# Patient Record
Sex: FEMALE | Race: WHITE | NOT HISPANIC OR LATINO | Employment: OTHER | URBAN - METROPOLITAN AREA
[De-identification: names, ages, dates, MRNs, and addresses within clinical notes are randomized per-mention and may not be internally consistent; named-entity substitution may affect disease eponyms.]

---

## 2017-03-02 ENCOUNTER — HOSPITAL ENCOUNTER (EMERGENCY)
Facility: HOSPITAL | Age: 82
Discharge: HOME | End: 2017-03-02
Attending: EMERGENCY MEDICINE
Payer: MEDICARE

## 2017-03-02 VITALS
WEIGHT: 125 LBS | OXYGEN SATURATION: 98 % | RESPIRATION RATE: 18 BRPM | HEIGHT: 58 IN | DIASTOLIC BLOOD PRESSURE: 80 MMHG | HEART RATE: 77 BPM | TEMPERATURE: 98 F | SYSTOLIC BLOOD PRESSURE: 170 MMHG | BODY MASS INDEX: 26.24 KG/M2

## 2017-03-02 DIAGNOSIS — M25.512 ACUTE PAIN OF LEFT SHOULDER: Primary | ICD-10-CM

## 2017-03-02 DIAGNOSIS — M25.519 SHOULDER PAIN: ICD-10-CM

## 2017-03-02 PROCEDURE — 63600175 PHARM REV CODE 636 W HCPCS: Performed by: EMERGENCY MEDICINE

## 2017-03-02 PROCEDURE — 96372 THER/PROPH/DIAG INJ SC/IM: CPT

## 2017-03-02 PROCEDURE — 99283 EMERGENCY DEPT VISIT LOW MDM: CPT | Mod: 25

## 2017-03-02 RX ORDER — ORPHENADRINE CITRATE 30 MG/ML
60 INJECTION INTRAMUSCULAR; INTRAVENOUS
Status: COMPLETED | OUTPATIENT
Start: 2017-03-02 | End: 2017-03-02

## 2017-03-02 RX ORDER — TIZANIDINE HYDROCHLORIDE 4 MG/1
1 CAPSULE, GELATIN COATED ORAL NIGHTLY
Qty: 12 CAPSULE | Refills: 0 | Status: SHIPPED | OUTPATIENT
Start: 2017-03-02 | End: 2018-03-30

## 2017-03-02 RX ORDER — KETOROLAC TROMETHAMINE 30 MG/ML
30 INJECTION, SOLUTION INTRAMUSCULAR; INTRAVENOUS
Status: COMPLETED | OUTPATIENT
Start: 2017-03-02 | End: 2017-03-02

## 2017-03-02 RX ADMIN — KETOROLAC TROMETHAMINE 30 MG: 30 INJECTION, SOLUTION INTRAMUSCULAR at 08:03

## 2017-03-02 RX ADMIN — ORPHENADRINE CITRATE 60 MG: 30 INJECTION INTRAMUSCULAR; INTRAVENOUS at 08:03

## 2017-03-02 NOTE — ED AVS SNAPSHOT
[facility]  [address]  [place] [address]               Willow Giron   3/2/2017  7:22 PM   ED    Description:  Female : 1930   Department:  [facility]           Your Care was Coordinated By:     Provider Role From To    Edward Trinh MD Attending Provider 17 --      Reason for Visit     Shoulder Pain           Diagnoses this Visit        Comments    Acute pain of left shoulder    -  Primary     Shoulder pain           ED Disposition     None           To Do List           Follow-up Information     Schedule an appointment as soon as possible for a visit with Qamar Bland MD.    Specialty:  Internal Medicine    Contact information:    [address]  [address]  [place] [address]  [phone]         These Medications        Disp Refills Start End    tizanidine 4 mg Cap 12 capsule 0 3/2/2017     Take 1 capsule by mouth every evening. - Oral    Pharmacy: [facility] [address] [place] [address] [address] AT [facility] [address] Ph #: [phone]         [facility]     [facility] -  Assistance  Registered nurses in the [facility] provide clinical advisement, health education, appointment booking, and other advisory services.  Call for this free service at [phone].             Medications           Message regarding Medications     Verify the changes and/or additions to your medication regime listed below are the same as discussed with your clinician today.  If any of these changes or additions are incorrect, please notify your healthcare provider.        START taking these NEW medications        Refills    tizanidine 4 mg Cap 0    Sig: Take 1 capsule by mouth every evening.    Class: Print    Route: Oral      These medications were administered today        Dose Freq    ketorolac injection 30 mg 30 mg ED 1 Time    Sig: Inject 30 mg into  "the muscle ED 1 Time.    Class: Normal    Route: Intramuscular    orphenadrine injection 60 mg 60 mg ED 1 Time    Sig: Inject 2 mLs (60 mg total) into the muscle ED 1 Time.    Class: Normal    Route: Intramuscular           Verify that the below list of medications is an accurate representation of the medications you are currently taking.  If none reported, the list may be blank. If incorrect, please contact your healthcare provider. Carry this list with you in case of emergency.           Current Medications     hydrocodone-acetaminophen 7.5-325mg (NORCO) 7.5-325 mg per tablet Take 1 tablet by mouth every 6 (six) hours as needed.    alprazolam (XANAX) 0.5 MG tablet     amlodipine (NORVASC) 10 MG tablet Take 10 mg by mouth once daily.    celecoxib (CELEBREX) 200 MG capsule Take 200 mg by mouth 2 (two) times daily.    furosemide (LASIX) 20 MG tablet     lisinopril (PRINIVIL,ZESTRIL) 40 MG tablet Take 40 mg by mouth once daily.    nabumetone (RELAFEN) 500 MG tablet Take 500 mg by mouth 2 (two) times daily.    omeprazole (PRILOSEC) 20 MG capsule Take 20 mg by mouth once daily.    simvastatin (ZOCOR) 40 MG tablet Take 40 mg by mouth every evening.    tizanidine 4 mg Cap Take 1 capsule by mouth every evening.           Clinical Reference Information           Your Vitals Were     BP Pulse Temp Resp Height Weight    180/81 (Patient Position: Sitting) 78 98.1 °F (36.7 °C) (Oral) 20 4' 10" (1.473 m) 56.7 kg (125 lb)    SpO2 BMI             97% 26.13 kg/m2         Allergies as of 3/2/2017        Reactions    Pcn [Penicillins] Rash      Immunizations Administered on Date of Encounter - 3/2/2017     None      ED Micro, Lab, POCT     None      ED Imaging Orders     Start Ordered       Status Ordering Provider    03/02/17 1934 03/02/17 1933  X-Ray Scapula Left  1 time imaging      Final result     03/02/17 1933 03/02/17 1933  X-Ray Shoulder 2 or More Views Left  1 time imaging      Final result       Discharge " References/Attachments     R.I.C.E. (ENGLISH)    SHOULDER PAIN, UNCERTAIN CAUSE (ENGLISH)      [facility] Sign-Up     Activating your [facility] account is as easy as 1-2-3!     1) Visit [email or web address], select Sign Up Now, enter this activation code and your date of birth, then select Next.  [email or web address]  Expires: 4/16/2017  8:54 PM      2) Create a username and password to use when you visit [facility] in the future and select a security question in case you lose your password and select Next.    3) Enter your e-mail address and click Sign Up!    Additional Information  If you have questions, please e-mail [email or web address] or call [phone] to talk to our [facility] staff. Remember, [facility] is NOT to be used for urgent needs. For medical emergencies, dial 911.         Smoking Cessation     If you would like to quit smoking:   You may be eligible for free services if you are a [place] resident and started smoking cigarettes before September 1, 1988.  Call the Smoking Cessation Trust (SCT) toll free at (934) 536-9713 or (271) 346-0253.   Call 7-923-QUIT-NOW if you do not meet the above criteria.             [facility] complies with applicable Federal civil rights laws and does not discriminate on the basis of race, color, national origin, age, disability, or sex.        Language Assistance Services     ATTENTION: Language assistance services are available, free of charge. Please call [phone].      ATENCIÓN: Si habla español, tiene a rahman disposición servicios gratuitos de asistencia lingüística. Llame al [phone].     CHÚ Ý: N?u b?n nói Ti?ng Vi?t, có các d?ch v? h? tr? ngôn ng? mi?n phí dành cho b?n. G?i s? [phone].

## 2017-03-03 ENCOUNTER — TELEPHONE (OUTPATIENT)
Dept: ORTHOPEDICS | Facility: CLINIC | Age: 82
End: 2017-03-03

## 2017-03-03 NOTE — TELEPHONE ENCOUNTER
Spoke with daughter, Aliya, who is requesting earlier appt than available late next week with Dr. Bland - as noted in 3/2/17 ED discharge instructions. SELIN James/[facility] notified and will have staff follow up with daughter/pt for appt in [facility].

## 2017-03-03 NOTE — ED PROVIDER NOTES
Encounter Date: 3/2/2017       History     Chief Complaint   Patient presents with    Shoulder Pain     pt ambulatory to room with family member and reports left shoulder pain since friday; seen at urgent care saturday; pt denies any injury; pt pain was worse today but took hydrocoone pta and p[ain was relieved;     Review of patient's allergies indicates:   Allergen Reactions    Pcn [penicillins] Rash     HPI Comments: Since the ED complaining of left shoulder pain.  She reports an aching pain to the left posterior shoulder, just lateral to the scapula.  Reports the pain is constant but exacerbated by movement of her left upper extremity.  Denies any trauma.  Reports she was seen in an urgent care and given a prescription for Norco.  However, she still has the pain and she has had been having difficulty sleeping due to the pain.  Denies any focal numbness or weakness.  Has a history of chronic and severe arthritis.  Has been unable to follow-up with an orthopedist or with her primary care physician, who is out of town for the week.    The history is provided by the patient and a relative.     Past Medical History:   Diagnosis Date    Arthritis     GERD (gastroesophageal reflux disease)     Hyperlipidemia     Hypertension      Past Surgical History:   Procedure Laterality Date     SECTION, CLASSIC      HYSTERECTOMY      SHOULDER SURGERY       History reviewed. No pertinent family history.  Social History   Substance Use Topics    Smoking status: Former Smoker    Smokeless tobacco: None    Alcohol use Yes      Comment: social     Review of Systems   Constitutional: Negative for chills, fatigue and fever.   HENT: Negative for congestion, rhinorrhea, sore throat and voice change.    Eyes: Negative for photophobia, pain and redness.   Respiratory: Negative for cough, chest tightness and shortness of breath.    Cardiovascular: Negative for chest pain, palpitations and leg swelling.   Gastrointestinal:  Negative for abdominal pain, diarrhea, nausea and vomiting.   Genitourinary: Negative for dysuria, frequency and urgency.   Musculoskeletal: Negative for gait problem, neck pain and neck stiffness.   Neurological: Negative for seizures, light-headedness, numbness and headaches.   All other systems reviewed and are negative.      Physical Exam   Initial Vitals   BP Pulse Resp Temp SpO2   03/02/17 1927 03/02/17 1927 03/02/17 1927 03/02/17 1927 03/02/17 1927   180/81 78 20 98.1 °F (36.7 °C) 97 %     Physical Exam    Nursing note and vitals reviewed.  Constitutional: She appears well-developed and well-nourished. No distress.   Somewhat elderly and frail   HENT:   Head: Normocephalic and atraumatic.   Mouth/Throat: Oropharynx is clear and moist.   Eyes: Conjunctivae and EOM are normal. Pupils are equal, round, and reactive to light.   Neck: Normal range of motion. Neck supple. No tracheal deviation present.   Cardiovascular: Normal rate, regular rhythm, normal heart sounds and intact distal pulses.   Pulmonary/Chest: Breath sounds normal. No respiratory distress. She has no wheezes. She has no rhonchi. She has no rales.   Abdominal: Soft. Bowel sounds are normal. She exhibits no distension. There is no tenderness. There is no rebound and no guarding.   Musculoskeletal: She exhibits no edema.        Arms:  Neurological: She is alert and oriented to person, place, and time. She has normal strength. No cranial nerve deficit or sensory deficit.   Skin: Skin is warm and dry.         ED Course   Procedures  Labs Reviewed - No data to display       X-Rays:   Independently Interpreted Readings:   Other Readings:  X-ray of left shoulder and scapula interpreted by radiologist and visualized by me:    Imaging Results         X-Ray Shoulder 2 or More Views Left (Final result) Result time:  03/02/17 20:13:18    Final result by Wyatt Menendez MD (03/02/17 20:13:18)    Impression:      1.  Degenerative changes of the glenohumeral  joint and acromioclavicular joint, without convincing evidence of acute displaced fracture or dislocation of the shoulder or scapula noting prominent vascular channel along the inferior glenoid margin.      Electronically signed by: STEFAN MENENDEZ MD  Date:     03/02/17  Time:    20:13     Narrative:    Scapula left, shoulder complete 2 or more views    Clinical history: Shoulder pain    Comparison: None    Findings:  2 view shoulder, 2 views scapula.    Degenerative changes are noted of the glenohumeral joint.  The left humeral head maintains anatomic relationship of the glenoid without acute displaced fracture or dislocation.  Degenerative changes are noted of the acromioclavicular joint without significant widening.  There is diffuse osteopenia.  Allowing for this, no convincing acute displaced scapular fracture noting prominent vascular channel along the inferior glenoid margin.  The visualized left ribs appear intact.  The visualized left lung zones are grossly clear.            X-Ray Scapula Left (Final result) Result time:  03/02/17 20:13:18    Final result by Stefan Menendez MD (03/02/17 20:13:18)    Impression:      1.  Degenerative changes of the glenohumeral joint and acromioclavicular joint, without convincing evidence of acute displaced fracture or dislocation of the shoulder or scapula noting prominent vascular channel along the inferior glenoid margin.      Electronically signed by: STEFAN MENENDEZ MD  Date:     03/02/17  Time:    20:13     Narrative:    Scapula left, shoulder complete 2 or more views    Clinical history: Shoulder pain    Comparison: None    Findings:  2 view shoulder, 2 views scapula.    Degenerative changes are noted of the glenohumeral joint.  The left humeral head maintains anatomic relationship of the glenoid without acute displaced fracture or dislocation.  Degenerative changes are noted of the acromioclavicular joint without significant widening.  There is diffuse osteopenia.   Allowing for this, no convincing acute displaced scapular fracture noting prominent vascular channel along the inferior glenoid margin.  The visualized left ribs appear intact.  The visualized left lung zones are grossly clear.              Medical Decision Making:   Initial Assessment:   86-year-old female presents to the emergency department complaining of pain to her left posterior shoulder.  Eyes trauma.  Differential Diagnosis:   Fracture, mass, injury, dislocation, soft tissue injury, muscle strain or sprain  Independently Interpreted Test(s):   I have ordered and independently interpreted X-rays - see prior notes.  ED Management:  Patient feeling somewhat better.  Discussed disposition with she and her daughter, including discharge with prescriptions for Norflex, instructions to call her primary care physician and her insurance company for a list of orthopedists for follow-up appointments early next week, return with new or worsening symptoms.  Patient and daughter expressed good understanding and are comfortable with discharge at this time.                   ED Course     Clinical Impression:   The primary encounter diagnosis was Acute pain of left shoulder. A diagnosis of Shoulder pain was also pertinent to this visit.    Disposition:   Disposition: Discharged  Condition: Stable       Edward Trinh MD  03/02/17 7789

## 2017-03-03 NOTE — ED NOTES
Pt here c/o left shoulder pain that was treated in urgent care last Saturday with no relief keeping pt from sleeping. Pt with no other compleints, deneis trauma,new activity or any other problems.

## 2017-03-03 NOTE — TELEPHONE ENCOUNTER
----- Message from Ofelia Menendez sent at 3/3/2017  2:03 PM CST -----  Contact: Pt's daughter Aliya Mejia  Pt's daughter, Aliya, called asking for advice about an urgent appt for above patient.      Pt was discharged from the hospital on yesterday for acute pain of left shoulder.       Please call Ms. Pisano at [phone].          Thanks!

## 2017-10-23 DIAGNOSIS — R60.0 LOWER EXTREMITY EDEMA: Primary | ICD-10-CM

## 2017-11-06 ENCOUNTER — HOSPITAL ENCOUNTER (OUTPATIENT)
Dept: RADIOLOGY | Facility: HOSPITAL | Age: 82
Discharge: HOME | End: 2017-11-06
Attending: INTERNAL MEDICINE
Payer: MEDICARE

## 2017-11-06 DIAGNOSIS — R60.0 LOWER EXTREMITY EDEMA: ICD-10-CM

## 2017-11-06 PROCEDURE — 93970 EXTREMITY STUDY: CPT | Mod: 26,,, | Performed by: RADIOLOGY

## 2017-11-06 PROCEDURE — 93970 EXTREMITY STUDY: CPT | Mod: TC

## 2018-02-17 ENCOUNTER — HOSPITAL ENCOUNTER (INPATIENT)
Facility: HOSPITAL | Age: 83
LOS: 6 days | Discharge: NURSING, REHAB OR LONG-TERM CARE | DRG: 643 | End: 2018-02-23
Attending: EMERGENCY MEDICINE | Admitting: INTERNAL MEDICINE
Payer: MEDICARE

## 2018-02-17 DIAGNOSIS — R42 DIZZINESS: ICD-10-CM

## 2018-02-17 DIAGNOSIS — M79.604 PAIN AND SWELLING OF RIGHT LOWER EXTREMITY: ICD-10-CM

## 2018-02-17 DIAGNOSIS — E22.2 SIADH (SYNDROME OF INAPPROPRIATE ADH PRODUCTION): ICD-10-CM

## 2018-02-17 DIAGNOSIS — D64.9 ANEMIA, UNSPECIFIED TYPE: ICD-10-CM

## 2018-02-17 DIAGNOSIS — E87.1 ACUTE HYPONATREMIA: ICD-10-CM

## 2018-02-17 DIAGNOSIS — E87.6 ACUTE HYPOKALEMIA: ICD-10-CM

## 2018-02-17 DIAGNOSIS — M79.89 PAIN AND SWELLING OF RIGHT LOWER EXTREMITY: ICD-10-CM

## 2018-02-17 DIAGNOSIS — E87.1 HYPONATREMIA: Primary | ICD-10-CM

## 2018-02-17 DIAGNOSIS — J01.30 ACUTE NON-RECURRENT SPHENOIDAL SINUSITIS: ICD-10-CM

## 2018-02-17 DIAGNOSIS — R41.0 CONFUSION: ICD-10-CM

## 2018-02-17 DIAGNOSIS — I10 ESSENTIAL HYPERTENSION: ICD-10-CM

## 2018-02-17 DIAGNOSIS — E87.8 HYPOCHLOREMIA: ICD-10-CM

## 2018-02-17 DIAGNOSIS — R41.82 ALTERED MENTAL STATUS, UNSPECIFIED ALTERED MENTAL STATUS TYPE: ICD-10-CM

## 2018-02-17 PROBLEM — K21.9 GERD (GASTROESOPHAGEAL REFLUX DISEASE): Status: ACTIVE | Noted: 2018-02-17

## 2018-02-17 PROBLEM — R19.7 DIARRHEA: Status: ACTIVE | Noted: 2018-02-17

## 2018-02-17 PROBLEM — E78.5 HYPERLIPIDEMIA: Status: ACTIVE | Noted: 2018-02-17

## 2018-02-17 LAB
ALBUMIN SERPL BCP-MCNC: 4.1 G/DL
ALP SERPL-CCNC: 75 U/L
ALT SERPL W/O P-5'-P-CCNC: 13 U/L
ANION GAP SERPL CALC-SCNC: 10 MMOL/L
ANION GAP SERPL CALC-SCNC: 7 MMOL/L
ANION GAP SERPL CALC-SCNC: 7 MMOL/L
AST SERPL-CCNC: 19 U/L
BASOPHILS # BLD AUTO: 0.01 K/UL
BASOPHILS NFR BLD: 0.1 %
BILIRUB SERPL-MCNC: 0.6 MG/DL
BILIRUB UR QL STRIP: NEGATIVE
BUN SERPL-MCNC: 12 MG/DL
BUN SERPL-MCNC: 12 MG/DL
BUN SERPL-MCNC: 14 MG/DL
CALCIUM SERPL-MCNC: 8.8 MG/DL
CALCIUM SERPL-MCNC: 9.2 MG/DL
CALCIUM SERPL-MCNC: 9.4 MG/DL
CHLORIDE SERPL-SCNC: 76 MMOL/L
CHLORIDE SERPL-SCNC: 82 MMOL/L
CHLORIDE SERPL-SCNC: 85 MMOL/L
CLARITY UR: CLEAR
CO2 SERPL-SCNC: 28 MMOL/L
CO2 SERPL-SCNC: 28 MMOL/L
CO2 SERPL-SCNC: 31 MMOL/L
COLOR UR: YELLOW
CREAT SERPL-MCNC: 0.9 MG/DL
CREAT SERPL-MCNC: 1 MG/DL
CREAT SERPL-MCNC: 1.2 MG/DL
CREAT UR-MCNC: 23.6 MG/DL
DIFFERENTIAL METHOD: ABNORMAL
EOSINOPHIL # BLD AUTO: 0.1 K/UL
EOSINOPHIL NFR BLD: 0.7 %
ERYTHROCYTE [DISTWIDTH] IN BLOOD BY AUTOMATED COUNT: 11.3 %
EST. GFR  (AFRICAN AMERICAN): 47 ML/MIN/1.73 M^2
EST. GFR  (AFRICAN AMERICAN): 59 ML/MIN/1.73 M^2
EST. GFR  (AFRICAN AMERICAN): >60 ML/MIN/1.73 M^2
EST. GFR  (NON AFRICAN AMERICAN): 41 ML/MIN/1.73 M^2
EST. GFR  (NON AFRICAN AMERICAN): 51 ML/MIN/1.73 M^2
EST. GFR  (NON AFRICAN AMERICAN): 58 ML/MIN/1.73 M^2
GLUCOSE SERPL-MCNC: 108 MG/DL
GLUCOSE SERPL-MCNC: 125 MG/DL
GLUCOSE SERPL-MCNC: 98 MG/DL
GLUCOSE UR QL STRIP: NEGATIVE
HCT VFR BLD AUTO: 33 %
HGB BLD-MCNC: 11.9 G/DL
HGB UR QL STRIP: ABNORMAL
KETONES UR QL STRIP: NEGATIVE
LEUKOCYTE ESTERASE UR QL STRIP: NEGATIVE
LIPASE SERPL-CCNC: 59 U/L
LYMPHOCYTES # BLD AUTO: 1.2 K/UL
LYMPHOCYTES NFR BLD: 16.8 %
MAGNESIUM SERPL-MCNC: 1.9 MG/DL
MCH RBC QN AUTO: 31.3 PG
MCHC RBC AUTO-ENTMCNC: 36.1 G/DL
MCV RBC AUTO: 87 FL
MONOCYTES # BLD AUTO: 0.8 K/UL
MONOCYTES NFR BLD: 11.4 %
NEUTROPHILS # BLD AUTO: 5.2 K/UL
NEUTROPHILS NFR BLD: 70.9 %
NITRITE UR QL STRIP: NEGATIVE
OSMOLALITY SERPL: 248 MOSM/KG
OSMOLALITY UR: 130 MOSM/KG
PH UR STRIP: 7 [PH] (ref 5–8)
PLATELET # BLD AUTO: 265 K/UL
PMV BLD AUTO: 8.7 FL
POCT GLUCOSE: 112 MG/DL (ref 70–110)
POTASSIUM SERPL-SCNC: 2.9 MMOL/L
POTASSIUM SERPL-SCNC: 3.2 MMOL/L
POTASSIUM SERPL-SCNC: 3.8 MMOL/L
PROT SERPL-MCNC: 6.9 G/DL
PROT UR QL STRIP: NEGATIVE
RBC # BLD AUTO: 3.8 M/UL
SODIUM SERPL-SCNC: 114 MMOL/L
SODIUM SERPL-SCNC: 120 MMOL/L
SODIUM SERPL-SCNC: 120 MMOL/L
SODIUM UR-SCNC: 21 MMOL/L
SP GR UR STRIP: <=1.005 (ref 1–1.03)
TROPONIN I SERPL DL<=0.01 NG/ML-MCNC: 0.01 NG/ML
URN SPEC COLLECT METH UR: ABNORMAL
UROBILINOGEN UR STRIP-ACNC: NEGATIVE EU/DL
WBC # BLD AUTO: 7.34 K/UL

## 2018-02-17 PROCEDURE — 11000001 HC ACUTE MED/SURG PRIVATE ROOM

## 2018-02-17 PROCEDURE — 83735 ASSAY OF MAGNESIUM: CPT

## 2018-02-17 PROCEDURE — 36415 COLL VENOUS BLD VENIPUNCTURE: CPT

## 2018-02-17 PROCEDURE — 82570 ASSAY OF URINE CREATININE: CPT

## 2018-02-17 PROCEDURE — 93005 ELECTROCARDIOGRAM TRACING: CPT

## 2018-02-17 PROCEDURE — 63600175 PHARM REV CODE 636 W HCPCS: Performed by: EMERGENCY MEDICINE

## 2018-02-17 PROCEDURE — 80048 BASIC METABOLIC PNL TOTAL CA: CPT

## 2018-02-17 PROCEDURE — 99285 EMERGENCY DEPT VISIT HI MDM: CPT | Mod: 25

## 2018-02-17 PROCEDURE — 83935 ASSAY OF URINE OSMOLALITY: CPT

## 2018-02-17 PROCEDURE — 80048 BASIC METABOLIC PNL TOTAL CA: CPT | Mod: 91

## 2018-02-17 PROCEDURE — 96374 THER/PROPH/DIAG INJ IV PUSH: CPT

## 2018-02-17 PROCEDURE — 96361 HYDRATE IV INFUSION ADD-ON: CPT

## 2018-02-17 PROCEDURE — 25000003 PHARM REV CODE 250: Performed by: STUDENT IN AN ORGANIZED HEALTH CARE EDUCATION/TRAINING PROGRAM

## 2018-02-17 PROCEDURE — 25000003 PHARM REV CODE 250: Performed by: EMERGENCY MEDICINE

## 2018-02-17 PROCEDURE — 84300 ASSAY OF URINE SODIUM: CPT

## 2018-02-17 PROCEDURE — 94761 N-INVAS EAR/PLS OXIMETRY MLT: CPT

## 2018-02-17 PROCEDURE — 63600175 PHARM REV CODE 636 W HCPCS: Performed by: STUDENT IN AN ORGANIZED HEALTH CARE EDUCATION/TRAINING PROGRAM

## 2018-02-17 PROCEDURE — 85025 COMPLETE CBC W/AUTO DIFF WBC: CPT

## 2018-02-17 PROCEDURE — 93010 ELECTROCARDIOGRAM REPORT: CPT | Mod: ,,, | Performed by: INTERNAL MEDICINE

## 2018-02-17 PROCEDURE — 84484 ASSAY OF TROPONIN QUANT: CPT

## 2018-02-17 PROCEDURE — 80053 COMPREHEN METABOLIC PANEL: CPT

## 2018-02-17 PROCEDURE — 83930 ASSAY OF BLOOD OSMOLALITY: CPT

## 2018-02-17 PROCEDURE — 83690 ASSAY OF LIPASE: CPT

## 2018-02-17 PROCEDURE — 81003 URINALYSIS AUTO W/O SCOPE: CPT

## 2018-02-17 PROCEDURE — 82962 GLUCOSE BLOOD TEST: CPT

## 2018-02-17 RX ORDER — SODIUM CHLORIDE 0.9 % (FLUSH) 0.9 %
5 SYRINGE (ML) INJECTION
Status: DISCONTINUED | OUTPATIENT
Start: 2018-02-17 | End: 2018-02-23 | Stop reason: HOSPADM

## 2018-02-17 RX ORDER — ACETAMINOPHEN 325 MG/1
650 TABLET ORAL EVERY 6 HOURS PRN
Status: DISCONTINUED | OUTPATIENT
Start: 2018-02-17 | End: 2018-02-23 | Stop reason: HOSPADM

## 2018-02-17 RX ORDER — SIMETHICONE 125 MG
125 TABLET,CHEWABLE ORAL EVERY 6 HOURS PRN
Status: DISCONTINUED | OUTPATIENT
Start: 2018-02-17 | End: 2018-02-23 | Stop reason: HOSPADM

## 2018-02-17 RX ORDER — HEPARIN SODIUM 5000 [USP'U]/ML
5000 INJECTION, SOLUTION INTRAVENOUS; SUBCUTANEOUS EVERY 8 HOURS
Status: DISCONTINUED | OUTPATIENT
Start: 2018-02-17 | End: 2018-02-23 | Stop reason: HOSPADM

## 2018-02-17 RX ORDER — CELECOXIB 100 MG/1
200 CAPSULE ORAL 2 TIMES DAILY
Status: DISCONTINUED | OUTPATIENT
Start: 2018-02-17 | End: 2018-02-23 | Stop reason: HOSPADM

## 2018-02-17 RX ORDER — LISINOPRIL 20 MG/1
40 TABLET ORAL DAILY
Status: DISCONTINUED | OUTPATIENT
Start: 2018-02-18 | End: 2018-02-23 | Stop reason: HOSPADM

## 2018-02-17 RX ORDER — POTASSIUM CHLORIDE 7.45 MG/ML
10 INJECTION INTRAVENOUS ONCE
Status: DISCONTINUED | OUTPATIENT
Start: 2018-02-17 | End: 2018-02-17

## 2018-02-17 RX ORDER — AMLODIPINE BESYLATE 5 MG/1
10 TABLET ORAL ONCE
Status: COMPLETED | OUTPATIENT
Start: 2018-02-17 | End: 2018-02-17

## 2018-02-17 RX ORDER — POTASSIUM CHLORIDE 20 MEQ/1
40 TABLET, EXTENDED RELEASE ORAL ONCE
Status: DISCONTINUED | OUTPATIENT
Start: 2018-02-17 | End: 2018-02-17

## 2018-02-17 RX ORDER — PANTOPRAZOLE SODIUM 40 MG/1
40 TABLET, DELAYED RELEASE ORAL DAILY
Status: DISCONTINUED | OUTPATIENT
Start: 2018-02-18 | End: 2018-02-23 | Stop reason: HOSPADM

## 2018-02-17 RX ORDER — LISINOPRIL 20 MG/1
40 TABLET ORAL ONCE
Status: COMPLETED | OUTPATIENT
Start: 2018-02-17 | End: 2018-02-17

## 2018-02-17 RX ORDER — AMLODIPINE BESYLATE 5 MG/1
10 TABLET ORAL DAILY
Status: DISCONTINUED | OUTPATIENT
Start: 2018-02-18 | End: 2018-02-23 | Stop reason: HOSPADM

## 2018-02-17 RX ORDER — HYDROCODONE BITARTRATE AND ACETAMINOPHEN 7.5; 325 MG/1; MG/1
1 TABLET ORAL EVERY 8 HOURS PRN
Status: DISCONTINUED | OUTPATIENT
Start: 2018-02-17 | End: 2018-02-23 | Stop reason: HOSPADM

## 2018-02-17 RX ORDER — ONDANSETRON 2 MG/ML
4 INJECTION INTRAMUSCULAR; INTRAVENOUS
Status: COMPLETED | OUTPATIENT
Start: 2018-02-17 | End: 2018-02-17

## 2018-02-17 RX ORDER — POTASSIUM CHLORIDE 20 MEQ/1
40 TABLET, EXTENDED RELEASE ORAL ONCE
Status: COMPLETED | OUTPATIENT
Start: 2018-02-17 | End: 2018-02-17

## 2018-02-17 RX ORDER — SIMVASTATIN 20 MG/1
40 TABLET, FILM COATED ORAL NIGHTLY
Status: DISCONTINUED | OUTPATIENT
Start: 2018-02-17 | End: 2018-02-23 | Stop reason: HOSPADM

## 2018-02-17 RX ORDER — MECLIZINE HYDROCHLORIDE 25 MG/1
25 TABLET ORAL ONCE
Status: COMPLETED | OUTPATIENT
Start: 2018-02-17 | End: 2018-02-17

## 2018-02-17 RX ADMIN — HYDROCODONE BITARTRATE AND ACETAMINOPHEN 1 TABLET: 7.5; 325 TABLET ORAL at 08:02

## 2018-02-17 RX ADMIN — SODIUM CHLORIDE 250 ML: 0.9 INJECTION, SOLUTION INTRAVENOUS at 01:02

## 2018-02-17 RX ADMIN — AMLODIPINE BESYLATE 10 MG: 5 TABLET ORAL at 06:02

## 2018-02-17 RX ADMIN — MECLIZINE HYDROCHLORIDE 25 MG: 25 TABLET ORAL at 01:02

## 2018-02-17 RX ADMIN — CELECOXIB 200 MG: 100 CAPSULE ORAL at 10:02

## 2018-02-17 RX ADMIN — ONDANSETRON 4 MG: 2 INJECTION INTRAMUSCULAR; INTRAVENOUS at 02:02

## 2018-02-17 RX ADMIN — HEPARIN SODIUM 5000 UNITS: 5000 INJECTION, SOLUTION INTRAVENOUS; SUBCUTANEOUS at 10:02

## 2018-02-17 RX ADMIN — SIMETHICONE 125 MG: 125 TABLET, CHEWABLE ORAL at 10:02

## 2018-02-17 RX ADMIN — LISINOPRIL 40 MG: 20 TABLET ORAL at 06:02

## 2018-02-17 RX ADMIN — POTASSIUM CHLORIDE 40 MEQ: 20 TABLET, EXTENDED RELEASE ORAL at 01:02

## 2018-02-17 RX ADMIN — SIMVASTATIN 40 MG: 20 TABLET, FILM COATED ORAL at 08:02

## 2018-02-17 NOTE — ED NOTES
Pt is still urinating on the commode. Is urinating a small amount every couple of minutes. Requests to stay on commode for a little while longer. Family is at bedside

## 2018-02-17 NOTE — ED NOTES
Pt assisted back into bed from commode. Remains weak. Lab contacted to verify accuracy of pt's recent BMP

## 2018-02-17 NOTE — ED NOTES
"Pt states she began having nausea, vomiting, and diarrhea last Thursday, and that these symptoms lasted until this Wednesday. States she began having some dizziness last Friday and the dizziness has not improved. Reports yesterday she became very weak while walking to the bathroom and slid down to the ground. Was assisted by her , and did not injure herself or hit her head, but was unable to get up on her own. Medics came to the house and got her up, but she was not evaluated in the hospital. Pt denies recent chest pain, SOB, or fever. Describes her dizziness as "something in my head feels off." States she does not feel the room spinning, but feels more lightheaded.  "

## 2018-02-17 NOTE — ED PROVIDER NOTES
Encounter Date: 2018    SCRIBE #1 NOTE: I, Queta Rivera, am scribing for, and in the presence of,  Dr. Goodwin. I have scribed the entire note.       History     Chief Complaint   Patient presents with    Dizziness     pt states last thursday she began having n/v/d that went till this Wednesday.  Pt c/o dizziness since Friday.  pt denies abd pain     Time patient was seen by the provider: 12:17 PM      The patient is a 87 y.o. female with co-morbidities including arthritis, GERD, HTN, and HLD who presents to the ED with a complaint of dizziness since Friday. She reports she felt dizzy and fell this morning, but denies hitting her head or LOC. She states she had the same dizzy episode 2 years ago. She says she took Phenergan and Xanax last night, to alleviate her symptoms and ease her anxiety. The patient mentions no exacerbating or relieving factors. She also reports that she had the stomach flu from last Thursday to Wednesday, and had associated N/V/D which has since subsided. She denies any current N/V/D, headache, visual changes, numbness, SOB, or CP, but states she feels generally weak. The patient states she is dizzy right now.              Review of patient's allergies indicates:   Allergen Reactions    Pcn [penicillins] Rash     Past Medical History:   Diagnosis Date    Arthritis     GERD (gastroesophageal reflux disease)     Hyperlipidemia     Hypertension      Past Surgical History:   Procedure Laterality Date     SECTION, CLASSIC      HYSTERECTOMY      SHOULDER SURGERY       No family history on file.  Social History   Substance Use Topics    Smoking status: Former Smoker    Smokeless tobacco: Not on file    Alcohol use Yes      Comment: social     Review of Systems   Constitutional: Negative for fever.   HENT: Negative for sore throat.    Respiratory: Negative for shortness of breath.    Cardiovascular: Negative for chest pain.   Gastrointestinal: Negative for nausea.    Genitourinary: Negative for dysuria.   Musculoskeletal: Negative for back pain.   Skin: Negative for rash.   Neurological: Positive for dizziness and weakness. Negative for numbness.   Hematological: Does not bruise/bleed easily.       Physical Exam     Initial Vitals [02/17/18 1146]   BP Pulse Resp Temp SpO2   (!) 189/79 65 16 98.9 °F (37.2 °C) 99 %      MAP       115.67         Physical Exam    Nursing note and vitals reviewed.  Constitutional: She appears well-developed.   HENT:   Head: Normocephalic and atraumatic.   Mouth/Throat: Oropharynx is clear and moist.   Eyes: Conjunctivae are normal. Pupils are equal, round, and reactive to light.   Neck: Neck supple.   nontender   Cardiovascular: Normal rate, regular rhythm, normal heart sounds and intact distal pulses. Exam reveals no gallop and no friction rub.    No murmur heard.  Pulmonary/Chest: Breath sounds normal. She has no wheezes. She has no rhonchi. She has no rales.   Abdominal: Soft. She exhibits no distension. There is no tenderness.   Musculoskeletal: Normal range of motion.   2+ pitting edema bilaterally (symmetric), no pronator drift, symmetric  strength   Lymphadenopathy:     She has no cervical adenopathy.   Neurological: She is alert and oriented to person, place, and time.   Skin: No rash noted. No erythema.   Psychiatric: She has a normal mood and affect.         ED Course   Procedures  Labs Reviewed   CBC W/ AUTO DIFFERENTIAL - Abnormal; Notable for the following:        Result Value    RBC 3.80 (*)     Hemoglobin 11.9 (*)     Hematocrit 33.0 (*)     MCH 31.3 (*)     MCHC 36.1 (*)     RDW 11.3 (*)     MPV 8.7 (*)     Lymph% 16.8 (*)     All other components within normal limits   COMPREHENSIVE METABOLIC PANEL - Abnormal; Notable for the following:     Sodium 114 (*)     Potassium 2.9 (*)     Chloride 76 (*)     eGFR if  47 (*)     eGFR if non  41 (*)     All other components within normal limits     Narrative:     Sodium  result(s) called and verbal readback obtained from Yaquelin Naik., 02/17/2018 12:59   URINALYSIS - Abnormal; Notable for the following:     Specific Gravity, UA <=1.005 (*)     Occult Blood UA Trace (*)     All other components within normal limits   POCT GLUCOSE - Abnormal; Notable for the following:     POCT Glucose 112 (*)     All other components within normal limits   LIPASE   MAGNESIUM   TROPONIN I     EKG Readings: (Independently Interpreted)   Initial Reading: No STEMI. Heart Rate: 70. Conduction: 1st Degree AV Block. T Waves Flipped: III, V1 and V3.       X-Rays:   Independently Interpreted Readings:   Other Readings:  I have visualized all imaging for this patient, radiology has done the interpretation.      Imaging Results          CT Head Without Contrast (Final result)  Result time 02/17/18 12:54:57    Final result by Connie Mo MD (02/17/18 12:54:57)                 Impression:        Age advanced generalized cerebral volume loss with prominence of the extra-axial space about the bilateral frontal lobes with remote encephalomalacia in the right basal ganglia.  No evidence for acute intracranial hemorrhage or abnormal parenchymal hypoattenuation to suggest an acute infarction.  Clinical correlation and further evaluation as warranted.      Electronically signed by: Connie Mo MD  Date:     02/17/18  Time:    12:54              Narrative:    Comparison: None.    Technique: 5 mm CT images of the brain were obtained without contrast.    Results: There is age advanced generalized cerebral volume loss with prominence of the extra-axial spaces about the bilateral frontal lobes.  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  Mild to moderate chronic microvascular ischemic changes noted.  There is no evidence for acute intracranial hemorrhage or sulcal effacement.  No mass effect or midline shift is seen.  The ventricles are normal in size and configuration without  hydrocephalus.  No extra-axial fluid collections.    The visualized paranasal sinuses including the mastoid air cells are clear.                             X-Ray Chest PA And Lateral (Final result)  Result time 02/17/18 12:16:52    Final result by Connie Mo MD (02/17/18 12:16:52)                 Impression:     As above.      Electronically signed by: Connie Mo MD  Date:     02/17/18  Time:    12:16              Narrative:    Chest, 2 views: The lungs are clear.  The heart is normal in size.  Calcified atheromatous disease affects the aorta.  No mediastinal, hilar or pleural abnormalities are detected.  The bones are osteopenic and show age-appropriate degenerative change.                              Medical Decision Making:   Initial Assessment:   The patient is a 87 y.o. female with co-morbidities including arthritis, GERD, HTN, and HLD who presents to the ED with a complaint of dizziness since Friday.   Differential Diagnosis:   Posterior CVA, vertigo, dehydration,  BPPV  Clinical Tests:   Lab Tests: Ordered and Reviewed       <> Summary of Lab: Hyponatremic, hypokalemic  Radiological Study: Ordered and Reviewed  ED Management:  Patient given IVFs in ED   Will admit for further correction of electrolytes.  Case discussed with [facility] internal medicine who agree with admission                   ED Course as of Feb 19 2207   Sat Feb 17, 2018   1158 BP: (!) 189/79 [LD]   1158 Temp: 98.9 °F (37.2 °C) [LD]   1158 Pulse: 65 [LD]   1158 Resp: 16 [LD]   1158 SpO2: 99 % [LD]   1317 Patient getting 250 cc NS Sodium: (!!) 114 [LD]   1318 Ordered 40 mEq of K dur Potassium: (!) 2.9 [LD]      ED Course User Index  [LD] Olivia Goodwin MD     Clinical Impression:     1. Acute hyponatremia    2. Dizziness    3. Acute hypokalemia    4. Hypochloremia          Disposition:   Disposition: Admitted         I, Olivia Goodwin,  personally performed the services described in this documentation. All medical record entries  made by the scribe were at my direction and in my presence.  I have reviewed the chart and agree that the record reflects my personal performance and is accurate and complete. Olivia Goodwin M.D. 1:17 PM02/17/2018                 Olivia Goodwin MD  02/17/18 4927       Olivia Goodwin MD  02/19/18 0755

## 2018-02-17 NOTE — LETTER
February 23, 2018         [address] [place] [address] [place] [address]  Phone: [phone]  Fax: [phone]       Patient: Willow Giron   YOB: 1930  Date of Visit: 02/23/2018    To Whom It May Concern:    Rossy Giron  was at [facility] from 2/17/18 until 02/23/2018. She may return to work/school when she is medically able, sometime after 2/26 with no restrictions. If you have any questions or concerns, or if I can be of further assistance, please do not hesitate to contact me.    Sincerely,    Karen Hawkins MD

## 2018-02-17 NOTE — ED PROVIDER NOTES
Encounter Date: 2018       History     Chief Complaint   Patient presents with    Dizziness     pt states last thursday she began having n/v/d that went till this Wednesday.  Pt c/o dizziness since Friday.  pt denies abd pain     HPI  Review of patient's allergies indicates:   Allergen Reactions    Pcn [penicillins] Rash     Past Medical History:   Diagnosis Date    Arthritis     GERD (gastroesophageal reflux disease)     Hyperlipidemia     Hypertension      Past Surgical History:   Procedure Laterality Date     SECTION, CLASSIC      HYSTERECTOMY      SHOULDER SURGERY       No family history on file.  Social History   Substance Use Topics    Smoking status: Former Smoker    Smokeless tobacco: Not on file    Alcohol use Yes      Comment: social     Review of Systems    Physical Exam     Initial Vitals [18 1146]   BP Pulse Resp Temp SpO2   (!) 189/79 65 16 98.9 °F (37.2 °C) 99 %      MAP       115.67         Physical Exam    ED Course   Procedures  Labs Reviewed   CBC W/ AUTO DIFFERENTIAL - Abnormal; Notable for the following:        Result Value    RBC 3.80 (*)     Hemoglobin 11.9 (*)     Hematocrit 33.0 (*)     MCH 31.3 (*)     MCHC 36.1 (*)     RDW 11.3 (*)     MPV 8.7 (*)     Lymph% 16.8 (*)     All other components within normal limits   COMPREHENSIVE METABOLIC PANEL - Abnormal; Notable for the following:     Sodium 114 (*)     Potassium 2.9 (*)     Chloride 76 (*)     eGFR if  47 (*)     eGFR if non  41 (*)     All other components within normal limits    Narrative:     Sodium  result(s) called and verbal readback obtained from Yaquelin Naik., 2018 12:59   URINALYSIS - Abnormal; Notable for the following:     Specific Gravity, UA <=1.005 (*)     Occult Blood UA Trace (*)     All other components within normal limits   POCT GLUCOSE - Abnormal; Notable for the following:     POCT Glucose 112 (*)     All other components within normal limits    LIPASE   MAGNESIUM   TROPONIN I                               ED Course as of Feb 17 1317   Sat Feb 17, 2018   1158 BP: (!) 189/79 [LD]   1158 Temp: 98.9 °F (37.2 °C) [LD]   1158 Pulse: 65 [LD]   1158 Resp: 16 [LD]   1158 SpO2: 99 % [LD]      ED Course User Index  [LD] Olivia Goodwin MD     Clinical Impression:   {Add your Clinical Impression here. If you haven't documented one yet, please pend the note, finalize a Clinical Impression, and refresh your note before signing.:[id number]}

## 2018-02-18 PROBLEM — R11.2 NON-INTRACTABLE VOMITING WITH NAUSEA: Status: ACTIVE | Noted: 2018-02-17

## 2018-02-18 LAB
ALBUMIN SERPL BCP-MCNC: 3.8 G/DL
ALP SERPL-CCNC: 61 U/L
ALT SERPL W/O P-5'-P-CCNC: 14 U/L
ANION GAP SERPL CALC-SCNC: 10 MMOL/L
ANION GAP SERPL CALC-SCNC: 10 MMOL/L
ANION GAP SERPL CALC-SCNC: 13 MMOL/L
ANION GAP SERPL CALC-SCNC: 7 MMOL/L
ANION GAP SERPL CALC-SCNC: 8 MMOL/L
AST SERPL-CCNC: 18 U/L
BASOPHILS # BLD AUTO: 0.01 K/UL
BASOPHILS NFR BLD: 0.2 %
BILIRUB SERPL-MCNC: 0.6 MG/DL
BUN SERPL-MCNC: 10 MG/DL
BUN SERPL-MCNC: 11 MG/DL
BUN SERPL-MCNC: 11 MG/DL
BUN SERPL-MCNC: 12 MG/DL
BUN SERPL-MCNC: 12 MG/DL
CALCIUM SERPL-MCNC: 9 MG/DL
CALCIUM SERPL-MCNC: 9.1 MG/DL
CALCIUM SERPL-MCNC: 9.2 MG/DL
CALCIUM SERPL-MCNC: 9.2 MG/DL
CALCIUM SERPL-MCNC: 9.6 MG/DL
CHLORIDE SERPL-SCNC: 86 MMOL/L
CHLORIDE SERPL-SCNC: 88 MMOL/L
CHLORIDE SERPL-SCNC: 88 MMOL/L
CO2 SERPL-SCNC: 24 MMOL/L
CO2 SERPL-SCNC: 26 MMOL/L
CO2 SERPL-SCNC: 26 MMOL/L
CO2 SERPL-SCNC: 29 MMOL/L
CO2 SERPL-SCNC: 30 MMOL/L
CREAT SERPL-MCNC: 0.9 MG/DL
CREAT SERPL-MCNC: 1 MG/DL
CREAT SERPL-MCNC: 1 MG/DL
DIFFERENTIAL METHOD: ABNORMAL
EOSINOPHIL # BLD AUTO: 0.1 K/UL
EOSINOPHIL NFR BLD: 1.4 %
ERYTHROCYTE [DISTWIDTH] IN BLOOD BY AUTOMATED COUNT: 11.3 %
EST. GFR  (AFRICAN AMERICAN): 59 ML/MIN/1.73 M^2
EST. GFR  (AFRICAN AMERICAN): 59 ML/MIN/1.73 M^2
EST. GFR  (AFRICAN AMERICAN): >60 ML/MIN/1.73 M^2
EST. GFR  (NON AFRICAN AMERICAN): 51 ML/MIN/1.73 M^2
EST. GFR  (NON AFRICAN AMERICAN): 51 ML/MIN/1.73 M^2
EST. GFR  (NON AFRICAN AMERICAN): 58 ML/MIN/1.73 M^2
ESTIMATED AVG GLUCOSE: 94 MG/DL
FERRITIN SERPL-MCNC: 141 NG/ML
GLUCOSE SERPL-MCNC: 104 MG/DL
GLUCOSE SERPL-MCNC: 106 MG/DL
GLUCOSE SERPL-MCNC: 108 MG/DL
GLUCOSE SERPL-MCNC: 147 MG/DL
GLUCOSE SERPL-MCNC: 162 MG/DL
HBA1C MFR BLD HPLC: 4.9 %
HCT VFR BLD AUTO: 32.6 %
HGB BLD-MCNC: 11.8 G/DL
IRON SERPL-MCNC: 65 UG/DL
LYMPHOCYTES # BLD AUTO: 1.5 K/UL
LYMPHOCYTES NFR BLD: 30.7 %
MAGNESIUM SERPL-MCNC: 1.9 MG/DL
MCH RBC QN AUTO: 31.8 PG
MCHC RBC AUTO-ENTMCNC: 36.2 G/DL
MCV RBC AUTO: 88 FL
MONOCYTES # BLD AUTO: 0.6 K/UL
MONOCYTES NFR BLD: 11.8 %
NEUTROPHILS # BLD AUTO: 2.7 K/UL
NEUTROPHILS NFR BLD: 55.9 %
PHOSPHATE SERPL-MCNC: 3.2 MG/DL
PLATELET # BLD AUTO: 269 K/UL
PMV BLD AUTO: 8.7 FL
POTASSIUM SERPL-SCNC: 3.2 MMOL/L
POTASSIUM SERPL-SCNC: 3.4 MMOL/L
POTASSIUM SERPL-SCNC: 3.5 MMOL/L
POTASSIUM SERPL-SCNC: 3.8 MMOL/L
POTASSIUM SERPL-SCNC: 3.8 MMOL/L
PROT SERPL-MCNC: 6.5 G/DL
RBC # BLD AUTO: 3.71 M/UL
SATURATED IRON: 17 %
SODIUM SERPL-SCNC: 122 MMOL/L
SODIUM SERPL-SCNC: 123 MMOL/L
SODIUM SERPL-SCNC: 123 MMOL/L
SODIUM SERPL-SCNC: 124 MMOL/L
SODIUM SERPL-SCNC: 125 MMOL/L
TOTAL IRON BINDING CAPACITY: 383 UG/DL
TRANSFERRIN SERPL-MCNC: 259 MG/DL
TRANSFERRIN SERPL-MCNC: 259 MG/DL
WBC # BLD AUTO: 4.85 K/UL

## 2018-02-18 PROCEDURE — 97535 SELF CARE MNGMENT TRAINING: CPT

## 2018-02-18 PROCEDURE — G8988 SELF CARE GOAL STATUS: HCPCS | Mod: CJ

## 2018-02-18 PROCEDURE — 25000003 PHARM REV CODE 250: Performed by: STUDENT IN AN ORGANIZED HEALTH CARE EDUCATION/TRAINING PROGRAM

## 2018-02-18 PROCEDURE — 83540 ASSAY OF IRON: CPT

## 2018-02-18 PROCEDURE — G8987 SELF CARE CURRENT STATUS: HCPCS | Mod: CK

## 2018-02-18 PROCEDURE — 85025 COMPLETE CBC W/AUTO DIFF WBC: CPT

## 2018-02-18 PROCEDURE — 82728 ASSAY OF FERRITIN: CPT

## 2018-02-18 PROCEDURE — 63600175 PHARM REV CODE 636 W HCPCS: Performed by: STUDENT IN AN ORGANIZED HEALTH CARE EDUCATION/TRAINING PROGRAM

## 2018-02-18 PROCEDURE — 97161 PT EVAL LOW COMPLEX 20 MIN: CPT

## 2018-02-18 PROCEDURE — 25000003 PHARM REV CODE 250: Performed by: RADIOLOGY

## 2018-02-18 PROCEDURE — 83036 HEMOGLOBIN GLYCOSYLATED A1C: CPT

## 2018-02-18 PROCEDURE — 94761 N-INVAS EAR/PLS OXIMETRY MLT: CPT

## 2018-02-18 PROCEDURE — 36415 COLL VENOUS BLD VENIPUNCTURE: CPT

## 2018-02-18 PROCEDURE — 80053 COMPREHEN METABOLIC PANEL: CPT

## 2018-02-18 PROCEDURE — 97166 OT EVAL MOD COMPLEX 45 MIN: CPT

## 2018-02-18 PROCEDURE — 11000001 HC ACUTE MED/SURG PRIVATE ROOM

## 2018-02-18 PROCEDURE — 97530 THERAPEUTIC ACTIVITIES: CPT

## 2018-02-18 PROCEDURE — 84100 ASSAY OF PHOSPHORUS: CPT

## 2018-02-18 PROCEDURE — G8979 MOBILITY GOAL STATUS: HCPCS | Mod: CJ

## 2018-02-18 PROCEDURE — 83735 ASSAY OF MAGNESIUM: CPT

## 2018-02-18 PROCEDURE — G8978 MOBILITY CURRENT STATUS: HCPCS | Mod: CK

## 2018-02-18 PROCEDURE — 63600175 PHARM REV CODE 636 W HCPCS: Performed by: RADIOLOGY

## 2018-02-18 PROCEDURE — 80048 BASIC METABOLIC PNL TOTAL CA: CPT | Mod: 91

## 2018-02-18 RX ORDER — DOCUSATE SODIUM 100 MG/1
100 CAPSULE, LIQUID FILLED ORAL 2 TIMES DAILY
Status: DISCONTINUED | OUTPATIENT
Start: 2018-02-18 | End: 2018-02-23 | Stop reason: HOSPADM

## 2018-02-18 RX ORDER — RAMELTEON 8 MG/1
8 TABLET ORAL ONCE
Status: COMPLETED | OUTPATIENT
Start: 2018-02-18 | End: 2018-02-18

## 2018-02-18 RX ORDER — CLINDAMYCIN HYDROCHLORIDE 150 MG/1
150 CAPSULE ORAL EVERY 6 HOURS
Status: DISCONTINUED | OUTPATIENT
Start: 2018-02-18 | End: 2018-02-22

## 2018-02-18 RX ORDER — POTASSIUM CHLORIDE 20 MEQ/1
20 TABLET, EXTENDED RELEASE ORAL
Status: COMPLETED | OUTPATIENT
Start: 2018-02-18 | End: 2018-02-18

## 2018-02-18 RX ORDER — DEXTROSE MONOHYDRATE 50 MG/ML
INJECTION, SOLUTION INTRAVENOUS CONTINUOUS
Status: DISPENSED | OUTPATIENT
Start: 2018-02-18 | End: 2018-02-19

## 2018-02-18 RX ORDER — DEXTROSE MONOHYDRATE 50 MG/ML
INJECTION, SOLUTION INTRAVENOUS CONTINUOUS
Status: DISCONTINUED | OUTPATIENT
Start: 2018-02-18 | End: 2018-02-18

## 2018-02-18 RX ORDER — POTASSIUM CHLORIDE 20 MEQ/1
20 TABLET, EXTENDED RELEASE ORAL ONCE
Status: COMPLETED | OUTPATIENT
Start: 2018-02-18 | End: 2018-02-18

## 2018-02-18 RX ORDER — POTASSIUM CHLORIDE 7.45 MG/ML
10 INJECTION INTRAVENOUS ONCE
Status: COMPLETED | OUTPATIENT
Start: 2018-02-18 | End: 2018-02-18

## 2018-02-18 RX ADMIN — POTASSIUM CHLORIDE 20 MEQ: 20 TABLET, EXTENDED RELEASE ORAL at 06:02

## 2018-02-18 RX ADMIN — ACETAMINOPHEN 650 MG: 325 TABLET ORAL at 09:02

## 2018-02-18 RX ADMIN — CLINDAMYCIN HYDROCHLORIDE 150 MG: 150 CAPSULE ORAL at 06:02

## 2018-02-18 RX ADMIN — CELECOXIB 200 MG: 100 CAPSULE ORAL at 09:02

## 2018-02-18 RX ADMIN — DOCUSATE SODIUM 100 MG: 100 CAPSULE, LIQUID FILLED ORAL at 06:02

## 2018-02-18 RX ADMIN — POTASSIUM CHLORIDE 10 MEQ: 10 INJECTION, SOLUTION INTRAVENOUS at 06:02

## 2018-02-18 RX ADMIN — SIMVASTATIN 40 MG: 20 TABLET, FILM COATED ORAL at 09:02

## 2018-02-18 RX ADMIN — HYDROCODONE BITARTRATE AND ACETAMINOPHEN 1 TABLET: 7.5; 325 TABLET ORAL at 06:02

## 2018-02-18 RX ADMIN — DEXTROSE: 5 SOLUTION INTRAVENOUS at 11:02

## 2018-02-18 RX ADMIN — POTASSIUM CHLORIDE 20 MEQ: 20 TABLET, EXTENDED RELEASE ORAL at 07:02

## 2018-02-18 RX ADMIN — HEPARIN SODIUM 5000 UNITS: 5000 INJECTION, SOLUTION INTRAVENOUS; SUBCUTANEOUS at 06:02

## 2018-02-18 RX ADMIN — DOCUSATE SODIUM 100 MG: 100 CAPSULE, LIQUID FILLED ORAL at 09:02

## 2018-02-18 RX ADMIN — HEPARIN SODIUM 5000 UNITS: 5000 INJECTION, SOLUTION INTRAVENOUS; SUBCUTANEOUS at 01:02

## 2018-02-18 RX ADMIN — RAMELTEON 8 MG: 8 TABLET, FILM COATED ORAL at 09:02

## 2018-02-18 RX ADMIN — AMLODIPINE BESYLATE 10 MG: 5 TABLET ORAL at 09:02

## 2018-02-18 RX ADMIN — HEPARIN SODIUM 5000 UNITS: 5000 INJECTION, SOLUTION INTRAVENOUS; SUBCUTANEOUS at 09:02

## 2018-02-18 RX ADMIN — PANTOPRAZOLE SODIUM 40 MG: 40 TABLET, DELAYED RELEASE ORAL at 09:02

## 2018-02-18 RX ADMIN — LISINOPRIL 40 MG: 20 TABLET ORAL at 09:02

## 2018-02-18 RX ADMIN — HYDROCODONE BITARTRATE AND ACETAMINOPHEN 1 TABLET: 7.5; 325 TABLET ORAL at 07:02

## 2018-02-18 RX ADMIN — DEXTROSE: 5 SOLUTION INTRAVENOUS at 09:02

## 2018-02-18 NOTE — H&P
[facility] Internal Medicine History and Physical - Resident Note    Admitting Team: [facility] Internal Medicine Team B  Attending Physician: Emily  Resident: Mock  Interns: [place]    Date of Admit: 2/17/2018    Chief Complaint     Dizziness (pt states last thursday she began having n/v/d that went till this Wednesday.  Pt c/o dizziness since Friday.  pt denies abd pain)   for one day.     Subjective:      History of Present Illness:  Willow Giron is a 87 y.o. female who  has a past medical history of Arthritis; GERD (gastroesophageal reflux disease); Hyperlipidemia; and Hypertension.. The patient presented to [facility] on 2/17/2018 with a primary complaint of Dizziness (pt states last thursday she began having n/v/d that went till this Wednesday.  Pt c/o dizziness since Friday.  pt denies abd pain)      Ms. Giron is a 87F brought in by her daughter for one day of lightheadedness associated with fatigue, weakness, nausea vomiting and diarrhea and decreased PO intake for one week.  Nausea with Vomiting and watery diarrhea 4-5 x day started about one week ago; contacted PCP who called in a prescription for phenergan last monday, which she took last night with her xanax that she takes for anxiety.  Then last night, she fell asleep at dinner table leaning forward; when she hadn't come to bed that night her  found her sleeping at the table and she was hard to arouse at that time then fell out of the chair.  Denies head trauma.  She denies sick contacts or anyone else with similar symptoms, fever, chills, abdominal pain, chest pain.     In the ED she was found to have a Na of 114, K of 2.9, cl of 76.  CT head, CXR, trop all negative. She was given a 250cc bolus of NS, meclizine, potassium replacement, and zofran for nausea.     Past Medical History:  Past Medical History:   Diagnosis Date    Arthritis     GERD (gastroesophageal reflux disease)     Hyperlipidemia     Hypertension         Past Surgical History:  Past Surgical History:   Procedure Laterality Date     SECTION, CLASSIC      HYSTERECTOMY      SHOULDER SURGERY         Allergies:  Review of patient's allergies indicates:   Allergen Reactions    Pcn [penicillins] Rash       Home Medications:  Prior to Admission medications    Medication Sig Start Date End Date Taking? Authorizing Provider   alprazolam (XANAX) 0.5 MG tablet  11/2/15   Historical Provider, MD   amlodipine (NORVASC) 10 MG tablet Take 10 mg by mouth once daily.    Historical Provider, MD   celecoxib (CELEBREX) 200 MG capsule Take 200 mg by mouth 2 (two) times daily.    Historical Provider, MD   furosemide (LASIX) 20 MG tablet  9/21/15   Historical Provider, MD   hydrocodone-acetaminophen 7.5-325mg (NORCO) 7.5-325 mg per tablet Take 1 tablet by mouth every 6 (six) hours as needed.    Historical Provider, MD   lisinopril (PRINIVIL,ZESTRIL) 40 MG tablet Take 40 mg by mouth once daily.    Historical Provider, MD   nabumetone (RELAFEN) 500 MG tablet Take 500 mg by mouth 2 (two) times daily.    Historical Provider, MD   omeprazole (PRILOSEC) 20 MG capsule Take 20 mg by mouth once daily.    Historical Provider, MD   simvastatin (ZOCOR) 40 MG tablet Take 40 mg by mouth every evening.    Historical Provider, MD   tizanidine 4 mg Cap Take 1 capsule by mouth every evening. 3/2/17   Edward Trinh MD       Family History:  No family history on file.    Social History:  Social History   Substance Use Topics    Smoking status: Former Smoker    Smokeless tobacco: Not on file    Alcohol use Yes      Comment: social       Review of Systems:  Pertinent positives and negatives listed in HPI. All other systems are reviewed and are negative.    Health Maintaince :   Primary Care Physician: Qamar Bland MD  Immunizations:   TDap is not up to date  Influenza is up to date  Pneumovax is up to date  Cancer Screening:  PAP: is up to date.   Mammogram: is up to  "date.  Colonoscopy: is up to date.      Objective:   Last 24 Hour Vital Signs:  BP  Min: 189/79  Max: 189/79  Temp  Av.9 °F (37.2 °C)  Min: 98.9 °F (37.2 °C)  Max: 98.9 °F (37.2 °C)  Pulse  Av  Min: 65  Max: 65  Resp  Av  Min: 16  Max: 16  SpO2  Av %  Min: 99 %  Max: 99 %  Height  Av' 10" (147.3 cm)  Min: 4' 10" (147.3 cm)  Max: 4' 10" (147.3 cm)  Weight  Av kg (119 lb)  Min: 54 kg (119 lb)  Max: 54 kg (119 lb)  Body mass index is 24.87 kg/m².  No intake/output data recorded.    Physical Examination:  General: Alert and awake in no apparent distress, was using the commode, pale appearing elderly woman  Head:  Normocephalic and atraumatic  Eyes:  PERRL; EOMi with anicteric sclera and clear conjunctivae  Mouth:  Oropharynx clear and without exudate; moist mucous membranes  Cardio:  Regular rate and rhythm with normal S1 and S2; no murmurs or rubs  Resp:  CTAB; respirations unlabored; no wheezes, crackles or rhonchi  Abdom: Soft, NTND with normoactive bowel sounds  Extrem: Warm and well-perfused with no clubbing, cyanosis or edema  Skin:  Pale, No rashes, lesions, or color changes  Pulses: 2+ and symmetric distally  Neuro:  AAOx3; cooperative and pleasant with no focal deficits    Laboratory:  Most Recent Data:  CBC: Lab Results   Component Value Date    WBC 7.34 2018    HGB 11.9 (L) 2018    HCT 33.0 (L) 2018     2018    MCV 87 2018    RDW 11.3 (L) 2018     BMP: Lab Results   Component Value Date     (LL) 2018    K 2.9 (L) 2018    CL 76 (L) 2018    CO2 28 2018    BUN 14 2018    CREATININE 1.2 2018     2018    CALCIUM 9.4 2018    MG 1.9 2018     LFTs: Lab Results   Component Value Date    PROT 6.9 2018    ALBUMIN 4.1 2018    BILITOT 0.6 2018    AST 19 2018    ALKPHOS 75 2018    ALT 13 2018     Coags: No results found for: INR, PROTIME, PTT  FLP: No " results found for: CHOL, HDL, LDLCALC, TRIG, CHOLHDL  DM: Lab Results   Component Value Date    CREATININE 1.2 02/17/2018     Thyroid: No results found for: TSH, FREET4, O8IBCEY, O3UGVJD, THYROIDAB  Anemia: No results found for: IRON, TIBC, FERRITIN, TRWHCMBD43, FOLATE  Cardiac: Lab Results   Component Value Date    TROPONINI 0.007 02/17/2018     Urinalysis: Lab Results   Component Value Date    COLORU Yellow 02/17/2018    SPECGRAV <=1.005 (A) 02/17/2018    NITRITE Negative 02/17/2018    KETONESU Negative 02/17/2018    UROBILINOGEN Negative 02/17/2018       Trended Cardiac Data:    Recent Labs  Lab 02/17/18  1222   TROPONINI 0.007       Microbiology Data:  none    Other Laboratory Data:    Lipase 59    Other Results:  EKG (my interpretation): NSR 70 BPM, no TWI, STI,     Radiology:  Imaging Results          CT Head Without Contrast (Final result)  Result time 02/17/18 12:54:57    Final result by Connie Mo MD (02/17/18 12:54:57)                 Impression:        Age advanced generalized cerebral volume loss with prominence of the extra-axial space about the bilateral frontal lobes with remote encephalomalacia in the right basal ganglia.  No evidence for acute intracranial hemorrhage or abnormal parenchymal hypoattenuation to suggest an acute infarction.  Clinical correlation and further evaluation as warranted.      Electronically signed by: Connie Mo MD  Date:     02/17/18  Time:    12:54              Narrative:    Comparison: None.    Technique: 5 mm CT images of the brain were obtained without contrast.    Results: There is age advanced generalized cerebral volume loss with prominence of the extra-axial spaces about the bilateral frontal lobes.  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  Mild to moderate chronic microvascular ischemic changes noted.  There is no evidence for acute intracranial hemorrhage or sulcal effacement.  No mass effect or midline shift is seen.  The ventricles are  normal in size and configuration without hydrocephalus.  No extra-axial fluid collections.    The visualized paranasal sinuses including the mastoid air cells are clear.                             X-Ray Chest PA And Lateral (Final result)  Result time 02/17/18 12:16:52    Final result by Connie Mo MD (02/17/18 12:16:52)                 Impression:     As above.      Electronically signed by: Connie Mo MD  Date:     02/17/18  Time:    12:16              Narrative:    Chest, 2 views: The lungs are clear.  The heart is normal in size.  Calcified atheromatous disease affects the aorta.  No mediastinal, hilar or pleural abnormalities are detected.  The bones are osteopenic and show age-appropriate degenerative change.                                 Assessment:     Willow Giron is a 87 y.o. female with:  Patient Active Problem List    Diagnosis Date Noted    Acute hyponatremia 02/17/2018        Plan:     Hyponatremia  -114 in ED  -250cc NS bolus  -120 on repeat x 2  -fluid restriction   -urine sodium 21, urine Cr 23.6, urine osmolality 248  -BMP q4    Hypokalemia   -likely secondary to diarrhea   -K 2.9, 3.8 on repeat  -40 meq oral in ED  -replete as needed  -BMP q4     Hypochloridemia, resolved  -likely secondary to diarrhea  -76 initially, 82 on repeat    HTN  -189/79 on presentation   -didn't take BP meds this morning  -continue home amlodipine, lisinopril, first dose given this evening     HLD  -continue home simvastatin 40    GERD  -protonix 40    Arthritis  -tylenol PRN  -continue home celecoxib, norco PRN    Anemia  -H/H 11.9/33, MCV 87  -iron studies    Diet: cardiac fluid restriction  Ppx: heparin  Dispo: pending correction of sodium, PT/OT evaluation  Code: Full    Code Status:     Full    Jessie Saez  [facility] HO-I  [facility] Internal [facility] Service    [facility] Hospitalist Pager numbers:   [facility] Hospitalist Medicine Team A (Adrien/Rufino): [phone]  [facility] Hospitalist  Medicine Team B (Emily/Mekhi):  [phone]

## 2018-02-18 NOTE — NURSING
Pt arrived to the unit from ED. Pt is AAOx4. She is on room air. Family at the bedside. Bed in lowest position. Bed alarm set. Pt instructed to call for assistance. VS documented refer to flowsheet. Will continue to monitor.

## 2018-02-18 NOTE — PT/OT/SLP EVAL
Physical Therapy Evaluation/Treatment     Patient Name:  Willow Giron   MRN:  [id number]    Recommendations:     Discharge Recommendations:  nursing facility, skilled   Discharge Equipment Recommendations:  (Defer to SNF)   Barriers to discharge: Decreased caregiver support    Assessment:     Willow Giron is a 87 y.o. female admitted with a medical diagnosis of Hyponatremia.  She presents with the following impairments/functional limitations:  weakness, impaired endurance, impaired self care skills, impaired balance, gait instability, impaired functional mobilty, decreased lower extremity function, decreased upper extremity function, decreased ROM, decreased safety awareness, impaired coordination, impaired fine motor, impaired skin, edema. Pt currently requires increased assistance compared to baseline. Pt expresses increased fear of falling. Pt would benefit from continued acute care PT services as well as SNF placement upon hospital discharge to improve current impairments and return safely to a more independent functional mobility level.     Rehab Prognosis:  good; patient would benefit from acute skilled PT services to address these deficits and reach maximum level of function.      Recent Surgery: * No surgery found *      Plan:     During this hospitalization, patient to be seen 6 x/week to address the above listed problems via gait training, therapeutic activities, therapeutic exercises  · Plan of Care Expires:  03/21/18   Plan of Care Reviewed with: patient, daughter, other (see comments) (BRANDY)    Subjective     Communicated with Sol, RN prior to session.  Patient found sitting up in bedside chair  upon PT entry to room, agreeable to evaluation.      Chief Complaint: decreased functional mobility, can not get comfortable, and increased fear of falling.   Patient comments/goals: improve current impairments and be more stable during functional mobility.   Pain/Comfort:  · Pain Rating 1:  0/10  · Pain Rating Post-Intervention 1: 0/10    Patients cultural, spiritual, Episcopal conflicts given the current situation: None verbalized to PT    Living Environment:  Pt lives with her  in a 2 story home with 2STE and a tub/shower with grab bars.  She reports her bedroom and bathroom are downstairs and she does not go up stairs as she is afraid she will fall. She denies any recent falls/near falls. Pt denies however, her daughter endorses furniture walking.   Prior to admission, patients level of function was independent with ambulation and ADLs, her  drives. She does light cooking, such as heating up meals her children have prepared and frozen for her and her .  Patient has the following equipment: grab bar, shower chair.  DME owned (not currently used): none.  Upon discharge, patient will have limited assistance. None of her children live in the city and her  requires AD for ambulation.    Objective:     Patient found with: peripheral IV     General Precautions: Standard, fall, seizure   Orthopedic Precautions:N/A   Braces: N/A     Exams:  · Cognitive Exam:  Patient is oriented to Person, Place, Time and Situation and follows 100% of   commands   · Gross Motor Coordination:  Decreased use of RUE 2* chronic R shoulder subluxation.   · Skin Integrity/Edema:      · -       Skin integrity: Redness and Pitting edema to the lateral distal leg.  Pt denies tenderness of the area.   · RLE ROM: WFL  · RLE Strength: WFL except Hip flexion: 3-/5, Knee flexion: 3/5  · LLE ROM: WFL  · LLE Strength: WFL except Hip and knee flexion: 3+/5    Functional Mobility:  · Bed Mobility:     · Scooting: stand by assistance  · Bridging: stand by assistance  · Sit to Supine: contact guard assistance  · Transfers:     · Sit to Stand:  contact guard assistance with no AD  · Toilet Transfer: contact guard assistance with  HHA X2  using  Step Transfer  · Gait: ~5 feet X2  · Balance: Sitting: Good. Standing:  Fair     AM-PAC 6 CLICK MOBILITY  Total Score:17       Therapeutic Activities and Exercises:  -Bed mobility and transfers as listed above.   -Pt's family with many questions regarding pt's POC, current assistance level and anticipated needs. All questions were answered.    Additional Treatment session:   1196-9311: PT returned to assist pt back to bed.   Bed >chair with HHA X2 and CGA.  -Pt requried assistance to scoot backwards while sitting EOB, as her feet were unable to reach the floor. Pt scooted towards HOB with SBA and use of Bed rails.  -Daughter asked questions about pt's fluid restrictions. Deferred questions to Sol, RN and notified Sol of daughter's questions.      Patient left HOB elevated with all lines intact, call button in reach, bed alarm on, Sol, RN notified and daughter present.    GOALS:    Physical Therapy Goals        Problem: Physical Therapy Goal    Goal Priority Disciplines Outcome Goal Variances Interventions   Physical Therapy Goal     PT/OT, PT Ongoing (interventions implemented as appropriate)     Description:  Goals to be met by: 3/21/18     Patient will increase functional independence with mobility by performin) Sup <>Sit with Mod I with RW  2) Sit <>Stand with Mod I with RW   3) Bed <>chair mod I with RW  4) ambulate 150 feet Mod I with RW  5) perform LE therex X10 with independence.                       History:     Past Medical History:   Diagnosis Date    Arthritis     GERD (gastroesophageal reflux disease)     Hyperlipidemia     Hypertension        Past Surgical History:   Procedure Laterality Date     SECTION, CLASSIC      HYSTERECTOMY      SHOULDER SURGERY         Clinical Decision Making:     History  Co-morbidities and personal factors that may impact the plan of care Examination  Body Structures and Functions, activity limitations and participation restrictions that may impact the plan of care Clinical Presentation   Decision Making/ Complexity Score    Co-morbidities:   [] Time since onset of injury / illness / exacerbation  [] Status of current condition  []Patient's cognitive status and safety concerns    [] Multiple Medical Problems (see med hx)  Personal Factors:   [] Patient's age  [] Prior Level of function   [] Patient's home situation (environment and family support)  [] Patient's level of motivation  [] Expected progression of patient      HISTORY:(criteria)    [x] 37027 - no personal factors/history    [] 92361 - has 1-2 personal factor/comorbidity     [] 39870 - has >3 personal factor/comorbidity     Body Regions:  [x] Objective examination findings  [] Head     []  Neck  [] Trunk   [x] Upper Extremity  [x] Lower Extremity    Body Systems:  [] For communication ability, affect, cognition, language, and learning style: the assessment of the ability to make needs known, consciousness, orientation (person, place, and time), expected emotional /behavioral responses, and learning preferences (eg, learning barriers, education  needs)  [x] For the neuromuscular system: a general assessment of gross coordinated movement (eg, balance, gait, locomotion, transfers, and transitions) and motor function  (motor control and motor learning)  [x] For the musculoskeletal system: the assessment of gross symmetry, gross range of motion, gross strength, height, and weight  [] For the integumentary system: the assessment of pliability(texture), presence of scar formation, skin color, and skin integrity  [] For cardiovascular/pulmonary system: the assessment of heart rate, respiratory rate, blood pressure, and edema     Activity limitations:    [] Patient's cognitive status and saf ety concerns          [] Status of current condition      [] Weight bearing restriction  [] Cardiopulmunary Restriction    Participation Restrictions:   [] Goals and goal agreement with the patient     [] Rehab potential (prognosis) and probable outcome      Examination of Body System:  (criteria)    [] 49851 - addressing 1-2 elements    [] 53022 - addressing a total of 3 or more elements     [x] 37816 -  Addressing a total of 4 or more elements         Clinical Presentation: (criteria)  Stable - 70816     On examination of body system using standardized tests and measures patient presents with 4 or more elements from any of the following: body structures and functions, activity limitations, and/or participation restrictions.  Leading to a clinical presentation that is considered stable and/or uncomplicated                              Clinical Decision Making  (Eval Complexity):  Low- 51009     Time Tracking:     PT Received On: 02/18/18  PT Start Time: 1125     PT Stop Time: 1150  PT Total Time (min): 25 min     Billable Minutes: Evaluation 10 and Therapeutic Activity 25      Reynaldo Glover, PT, DPT  02/18/2018

## 2018-02-18 NOTE — PROGRESS NOTES
[facility] Resident Progress Note    Subjective:      Willow Giron is a 87 y.o. female who is being followed by the [facility] IM service at [facility] for hyponatremia and dizziness for one day.    Patient says dizziness, N/V have improved. No diarrhea for two days. Has not had a BM for two days and feels like she needs something to help her go. Endorses right arm pain after KCl infusion through IV.      Objective:   Last 24 Hour Vital Signs:  BP  Min: 104/57  Max: 195/84  Temp  Av.4 °F (36.9 °C)  Min: 98.2 °F (36.8 °C)  Max: 98.6 °F (37 °C)  Pulse  Av.4  Min: 61  Max: 80  Resp  Av  Min: 18  Max: 18  SpO2  Av.6 %  Min: 97 %  Max: 99 %  I/O last 3 completed shifts:  In: 1511.7 [P.O.:685; I.V.:576.7; IV Piggyback:250]  Out: 650 [Urine:650]    Physical Examination:  General:          Alert and awake in no apparent distress, pale appearing elderly woman  Head:               Normocephalic and atraumatic  Eyes:               PERRL; EOMi with anicteric sclera and clear conjunctivae  Mouth:             Oropharynx clear and without exudate; moist mucous membranes  Cardio:             Regular rate and rhythm with normal S1 and S2; no murmurs or rubs  Resp:               CTAB; respirations unlabored; no wheezes, crackles or rhonchi  Abdom:            Soft, NTND with normoactive bowel sounds  Extrem:            Warm and well-perfused with no clubbing, cyanosis or edema  Skin:                Pale, No rashes, lesions, or color changes  Pulses:            2+ and symmetric distally  Neuro:             AAOx3; cooperative and pleasant with no focal deficits    Laboratory:  Laboratory Data Reviewed: yes  Pertinent Findings:  CBC:   Lab Results   Component Value Date    WBC 4.85 2018    HGB 11.8 (L) 2018    HCT 32.6 (L) 2018     2018    MCV 88 2018    RDW 11.3 (L) 2018     BMP:   Lab Results   Component Value Date     (L) 2018     K 3.5 02/18/2018    CL 88 (L) 02/18/2018    CO2 24 02/18/2018    BUN 12 02/18/2018    CREATININE 0.9 02/18/2018     02/18/2018    CALCIUM 9.6 02/18/2018    MG 1.9 02/18/2018    PHOS 3.2 02/18/2018     LFTs:   Lab Results   Component Value Date    PROT 6.5 02/18/2018    ALBUMIN 3.8 02/18/2018    BILITOT 0.6 02/18/2018    AST 18 02/18/2018    ALKPHOS 61 02/18/2018    ALT 14 02/18/2018     Coags: No results found for: INR, PROTIME, PTT  FLP: No results found for: CHOL, HDL, LDLCALC, TRIG, CHOLHDL  DM:   Lab Results   Component Value Date    HGBA1C 4.9 02/18/2018    CREATININE 0.9 02/18/2018     Thyroid: No results found for: TSH, FREET4, S5RAFLZ, M7QNZVS, THYROIDAB  Anemia:   Lab Results   Component Value Date    IRON 65 02/18/2018    TIBC 383 02/18/2018    FERRITIN 141 02/18/2018     Cardiac:   Lab Results   Component Value Date    TROPONINI 0.007 02/17/2018     Urinalysis:   Lab Results   Component Value Date    COLORU Yellow 02/17/2018    SPECGRAV <=1.005 (A) 02/17/2018    NITRITE Negative 02/17/2018    KETONESU Negative 02/17/2018    UROBILINOGEN Negative 02/17/2018       Trended Cardiac Data:    Recent Labs  Lab 02/17/18  1222   TROPONINI 0.007       Microbiology Data Reviewed: yes  Pertinent Findings:  none    Other Results:  EKG (my interpretation):no new  Radiology Data Reviewed: yes  Pertinent Findings:  No new    Current Medications:     Infusions:   dextrose 5 %          Scheduled:   amLODIPine  10 mg Oral Daily    celecoxib  200 mg Oral BID    clindamycin  150 mg Oral Q6H    docusate sodium  100 mg Oral BID    heparin (porcine)  5,000 Units Subcutaneous Q8H    lisinopril  40 mg Oral Daily    pantoprazole  40 mg Oral Daily    psyllium husk (aspartame)  1 packet Oral Daily    simvastatin  40 mg Oral QHS        PRN:  acetaminophen, hydrocodone-acetaminophen 7.5-325mg, simethicone, sodium chloride 0.9%, [START ON 2/19/2018] DIPH,PERTUS (ADACEL),TETANUS PF VAC (ADULT)    Antibiotics and Day  Number of Therapy:  none    Lines and Day Number of Therapy:  PIV    Assessment:     Willow Giron is a 87 y.o.female with  Patient Active Problem List    Diagnosis Date Noted    Hyponatremia 02/17/2018    Hypokalemia 02/17/2018    Essential hypertension 02/17/2018    Hyperlipidemia 02/17/2018    Diarrhea 02/17/2018    GERD (gastroesophageal reflux disease) 02/17/2018    Non-intractable vomiting with nausea 02/17/2018        Plan:     Hyponatremia  -114 in ED  -250cc NS bolus  -120 on repeat x 2, 123 this morning  -will give d5 at 100mL/hr  -urine sodium 21, urine Cr 23.6, urine osmolality 248  -BMP q4     Hypokalemia   -likely secondary to diarrhea   -K 2.9 on presentation, 3.5 this morning  -40 meq oral in ED, KCL 10 meq   -replete as needed  -BMP q4      Hypochloridemia, resolved  -likely secondary to diarrhea  -76 initially, 82 on repeat     HTN  -189/79 on presentation   -didn't take BP meds this morning  -continue home amlodipine, lisinopril, first dose given this evening      HLD  -continue home simvastatin 40     GERD  -protonix 40     Arthritis  -tylenol PRN  -continue home celecoxib, norco PRN     Anemia  -H/H 11.9/33, MCV 87  -iron studies: iron 65, TIBC 383, Sat 17, Transferrin 259, Ferritin 141      Diet: cardiac fluid  Ppx: heparin  Dispo: pending correction of sodium, PT/OT evaluation  Code: Full    Jessie Saez  [facility] Internal Medicine HO-1  U IM Service Team B    [facility] Medicine Hospitalist Pager numbers:   [facility] Hospitalist Medicine Team A (Adrien/Rufino): [phone]  [facility] Hospitalist Medicine Team B (Emily/Mekhi):  [phone]

## 2018-02-18 NOTE — NURSING
Pt reported she is constipated and is getting miralax constantly. Spoken to Dr. Saez on the floor and MD aware. No order at this moment.

## 2018-02-18 NOTE — PLAN OF CARE
Problem: Physical Therapy Goal  Goal: Physical Therapy Goal  Goals to be met by: 3/21/18     Patient will increase functional independence with mobility by performin) Sup <>Sit with Mod I with RW  2) Sit <>Stand with Mod I with RW   3) Bed <>chair mod I with RW  4) ambulate 150 feet Mod I with RW  5) perform LE therex X10 with independence.     Outcome: Ongoing (interventions implemented as appropriate)  Initial evaluation complete PT to follow. Recommending SNF placement upon hospital discharge.

## 2018-02-18 NOTE — PT/OT/SLP EVAL
Occupational Therapy   Evaluation    Name: Willow Giron  MRN: [id number]  Admitting Diagnosis:  Hyponatremia      Recommendations:     Discharge Recommendations: nursing facility, skilled  Discharge Equipment Recommendations:  walker, rolling, bedside commode, bath bench (defer to SNF)  Barriers to discharge:  Decreased caregiver support    History:     Occupational Profile:  Living Environment: 2st home w/ 0STE w/ spouse; lives on 1st floor; T/S combo  Previous level of function: Mod (I) w/ BADLs, fxnl mobility w/o DME & standing tub t/f's & showers w/ GB use  Roles and Routines: light meal prep  Equipment Owned:  shower chair, grab bar  Assistance upon Discharge: 24hr S/A    Past Medical History:   Diagnosis Date    Arthritis     GERD (gastroesophageal reflux disease)     Hyperlipidemia     Hypertension        Past Surgical History:   Procedure Laterality Date     SECTION, CLASSIC      HYSTERECTOMY      SHOULDER SURGERY         Subjective     Chief Complaint: sitting too long  Patient/Family stated goals: to get help at home  Communicated with: prudencio prior to session.  Pain/Comfort:  · Pain Rating 1: 0/10  · Pain Rating Post-Intervention 1: 0/10    Patients cultural, spiritual, Jainism conflicts given the current situation:      Objective:     Patient found with: peripheral IV    General Precautions: Standard, fall, seizure   Orthopedic Precautions:    Braces:       Occupational Performance:    Bed Mobility:    · NT    Functional Mobility/Transfers:  · Patient completed Sit <> Stand Transfer with contact guard assistance  with  no assistive device   · Patient completed Toilet Transfer Step Transfer technique with contact guard assistance and of 2 persons with  bedside commode  · Functional Mobility: HHA x 2; thru f/a on R    Activities of Daily Living:  · Toileting: moderate assistance for clothing mgmt    Cognitive/Visual Perceptual:  Grossly intact; decreased insight into current  "deficits    Physical Exam:  R elb-->Ds WFL  L UE 3+/5 at shldr; 4-/5 elb-->Ds  CGA x 2 for static stand balance  Skin: reddened area post knee-->ankle laterally on R     Patient left at EOB w/ PT with all lines intact and fly & PT present    AMPA 6 Click:  [facility] Total Score: 15    Treatment & Education:  OT marilee completed w/ PT this date. Pt found in b/s chair w/ noted chronic R shldr sublux s/p 2-3 yrs w/ failed Sx x 2 attempts. Pt lives w/ spouse on 1st level of 2st w/ 0STE & has T/S combo. PLOF: Mod (I) w/o amb DME w/ BADLs incl standing tub showers/ t/'fs w/ GB use; perf light meal prep. Has, but doesn't use sh ch. Pt currently req'ed HHA x 2 amb short dist b/s chair<--> BSC w/ R UE support thru elbow. Req'ed CGA for static standing balance during toileting hygiene & during Mod A clothing mgmt task. Pt left at EOB for PT to complete LB assess. Edu to pt/fly re: d/c recs & DME. Pt/fly verbalized understanding.      Education:    Assessment:   Pt presents w/ decreased overall endurance/conditioning, balance/mobility & coordination w/ subsequent decline in (I)/safety w/ BADLs, fxnl mobility & fxnl t/f's. OT 5x/wk to increase phys/fxnl status & maximize potential to achieve established goals for d/c--> SNF & DME recs for RW, BSC & TTB deferred.      Willow Giron is a 87 y.o. female with a medical diagnosis of Hyponatremia.  She presents with ..  Performance deficits affecting function are weakness, impaired endurance, impaired self care skills, impaired functional mobilty, decreased coordination, impaired balance, gait instability, decreased upper extremity function, decreased lower extremity function, decreased safety awareness, edema, decreased ROM.      Rehab Prognosis:  good; patient would benefit from acute skilled OT services to address these deficits and reach maximum level of function.         Clinical Decision Makin.  OT Mod:  "Pt evaluation falls under moderate complexity for evaluation " "coding due to identification of 3-5 performance deficits noted as stated above. Eval required Min/Mod assistance to complete on this date and detailed assessment(s) were utilized. Moreover, an expanded review of history and occupational profile obtained with additional review of cognitive, physical and psychosocial hx."     Plan:     Patient to be seen 5 x/week to address the above listed problems via self-care/home management, therapeutic activities, therapeutic exercises  · Plan of Care Expires: 03/18/18  · Plan of Care Reviewed with: patient, daughter, other (see comments) (BRANDY)    This Plan of care has been discussed with the patient who was involved in its development and understands and is in agreement with the identified goals and treatment plan    GOALS:    Occupational Therapy Goals        Problem: Occupational Therapy Goal    Goal Priority Disciplines Outcome Interventions   Occupational Therapy Goal     OT, PT/OT Ongoing (interventions implemented as appropriate)    Description:  Goals to be met by: 03/18/18     Patient will increase functional independence with ADLs by performing:    Feeding with Set-up Assistance.  UE Dressing with Supervision.  LE Dressing with Minimal Assistance.  Grooming while standing at sink with Stand-by Assistance.  Toileting from toilet with Contact Guard Assistance for hygiene and clothing management.   Toilet transfer to toilet with Contact Guard Assistance.  Increased functional strength to WFL for ADLs.                      Time Tracking:     OT Date of Treatment: 02/18/18  OT Start Time: 1125  OT Stop Time: 1150  OT Total Time (min): 25 min    Billable Minutes:Evaluation 10  Self Care/Home Management 15  Total Time 40    YOLY Martinez  2/18/2018    "

## 2018-02-18 NOTE — PLAN OF CARE
Problem: Patient Care Overview  Goal: Plan of Care Review  Pt. On RA. Pt with no apparent distress noted. Will continue to monitor

## 2018-02-18 NOTE — PLAN OF CARE
DAVID Plan of Care Note    Paged regarding RLE swelling and redness. I went to bedside to evaluate the patient. Noted well-demarcated redness and induration on RLE with warmth. No pain with flexion of foot. I highly suspect erysipelas and began treatment with PO clinda (given penicillin allergy). LE venous US ordered to r/o DVT as well.     Patient was also complaining of constipation. She reports a home regimen of metamucil and colace BID. I added those medications to her MAR.       Willow Millard MD  2/18/2018  2:44 PM

## 2018-02-18 NOTE — PLAN OF CARE
Problem: Patient Care Overview  Goal: Plan of Care Review  Outcome: Ongoing (interventions implemented as appropriate)  Plan of care reviewed with patient and daughter. Verbalized understanding. No distress noted. Will continue to monitor. Bed alarm active, bed in lowest position, call light within reach. Patient instructed to use call light for assistance up to BSC. Verbalized understanding. Bed rails up x2.

## 2018-02-18 NOTE — NURSING
Pt complaint of general ache and redness on her L lateral calf to popliteal. Notify Dr. Millard andMd will see the pt. Will continue to monitor.

## 2018-02-19 PROBLEM — E87.1 HYPONATREMIA: Status: RESOLVED | Noted: 2018-02-17 | Resolved: 2018-02-19

## 2018-02-19 PROBLEM — R19.7 DIARRHEA: Status: RESOLVED | Noted: 2018-02-17 | Resolved: 2018-02-19

## 2018-02-19 PROBLEM — E87.6 HYPOKALEMIA: Status: RESOLVED | Noted: 2018-02-17 | Resolved: 2018-02-19

## 2018-02-19 LAB
ALBUMIN SERPL BCP-MCNC: 3.5 G/DL
ALBUMIN SERPL BCP-MCNC: 3.6 G/DL
ALBUMIN SERPL BCP-MCNC: 3.7 G/DL
ALP SERPL-CCNC: 56 U/L
ALP SERPL-CCNC: 60 U/L
ALP SERPL-CCNC: 61 U/L
ALT SERPL W/O P-5'-P-CCNC: 11 U/L
ALT SERPL W/O P-5'-P-CCNC: 12 U/L
ALT SERPL W/O P-5'-P-CCNC: 13 U/L
ANION GAP SERPL CALC-SCNC: 10 MMOL/L
ANION GAP SERPL CALC-SCNC: 10 MMOL/L
ANION GAP SERPL CALC-SCNC: 11 MMOL/L
ANION GAP SERPL CALC-SCNC: 12 MMOL/L
ANION GAP SERPL CALC-SCNC: 9 MMOL/L
ANION GAP SERPL CALC-SCNC: 9 MMOL/L
AST SERPL-CCNC: 15 U/L
AST SERPL-CCNC: 17 U/L
AST SERPL-CCNC: 19 U/L
BASOPHILS # BLD AUTO: 0.03 K/UL
BASOPHILS NFR BLD: 0.5 %
BILIRUB SERPL-MCNC: 0.4 MG/DL
BILIRUB SERPL-MCNC: 0.5 MG/DL
BILIRUB SERPL-MCNC: 0.5 MG/DL
BUN SERPL-MCNC: 11 MG/DL
BUN SERPL-MCNC: 6 MG/DL
BUN SERPL-MCNC: 7 MG/DL
BUN SERPL-MCNC: 7 MG/DL
BUN SERPL-MCNC: 9 MG/DL
BUN SERPL-MCNC: 9 MG/DL
CALCIUM SERPL-MCNC: 8.6 MG/DL
CALCIUM SERPL-MCNC: 8.8 MG/DL
CALCIUM SERPL-MCNC: 9 MG/DL
CALCIUM SERPL-MCNC: 9 MG/DL
CALCIUM SERPL-MCNC: 9.1 MG/DL
CALCIUM SERPL-MCNC: 9.4 MG/DL
CHLORIDE SERPL-SCNC: 88 MMOL/L
CHLORIDE SERPL-SCNC: 89 MMOL/L
CHLORIDE SERPL-SCNC: 89 MMOL/L
CHLORIDE SERPL-SCNC: 90 MMOL/L
CHLORIDE SERPL-SCNC: 90 MMOL/L
CHLORIDE SERPL-SCNC: 91 MMOL/L
CO2 SERPL-SCNC: 22 MMOL/L
CO2 SERPL-SCNC: 22 MMOL/L
CO2 SERPL-SCNC: 23 MMOL/L
CO2 SERPL-SCNC: 23 MMOL/L
CO2 SERPL-SCNC: 25 MMOL/L
CO2 SERPL-SCNC: 25 MMOL/L
CREAT SERPL-MCNC: 0.7 MG/DL
CREAT SERPL-MCNC: 0.8 MG/DL
CREAT SERPL-MCNC: 0.9 MG/DL
DIFFERENTIAL METHOD: ABNORMAL
EOSINOPHIL # BLD AUTO: 0.1 K/UL
EOSINOPHIL NFR BLD: 1 %
ERYTHROCYTE [DISTWIDTH] IN BLOOD BY AUTOMATED COUNT: 11.6 %
EST. GFR  (AFRICAN AMERICAN): >60 ML/MIN/1.73 M^2
EST. GFR  (NON AFRICAN AMERICAN): 58 ML/MIN/1.73 M^2
EST. GFR  (NON AFRICAN AMERICAN): >60 ML/MIN/1.73 M^2
FOLATE SERPL-MCNC: 9.4 NG/ML
GLUCOSE SERPL-MCNC: 131 MG/DL
GLUCOSE SERPL-MCNC: 135 MG/DL
GLUCOSE SERPL-MCNC: 147 MG/DL
GLUCOSE SERPL-MCNC: 150 MG/DL
HCT VFR BLD AUTO: 32.5 %
HGB BLD-MCNC: 11.3 G/DL
LYMPHOCYTES # BLD AUTO: 1.8 K/UL
LYMPHOCYTES NFR BLD: 29.2 %
MAGNESIUM SERPL-MCNC: 1.9 MG/DL
MCH RBC QN AUTO: 31 PG
MCHC RBC AUTO-ENTMCNC: 34.8 G/DL
MCV RBC AUTO: 89 FL
MONOCYTES # BLD AUTO: 0.8 K/UL
MONOCYTES NFR BLD: 11.9 %
NEUTROPHILS # BLD AUTO: 3.6 K/UL
NEUTROPHILS NFR BLD: 57.1 %
PHOSPHATE SERPL-MCNC: 2.6 MG/DL
PLATELET # BLD AUTO: 285 K/UL
PMV BLD AUTO: 8.8 FL
POTASSIUM SERPL-SCNC: 3.8 MMOL/L
POTASSIUM SERPL-SCNC: 3.8 MMOL/L
POTASSIUM SERPL-SCNC: 4 MMOL/L
POTASSIUM SERPL-SCNC: 4.2 MMOL/L
PROT SERPL-MCNC: 6.1 G/DL
PROT SERPL-MCNC: 6.2 G/DL
PROT SERPL-MCNC: 6.6 G/DL
RBC # BLD AUTO: 3.64 M/UL
SODIUM SERPL-SCNC: 121 MMOL/L
SODIUM SERPL-SCNC: 122 MMOL/L
SODIUM SERPL-SCNC: 122 MMOL/L
SODIUM SERPL-SCNC: 123 MMOL/L
SODIUM SERPL-SCNC: 125 MMOL/L
SODIUM SERPL-SCNC: 125 MMOL/L
TSH SERPL DL<=0.005 MIU/L-ACNC: 0.73 UIU/ML
VIT B12 SERPL-MCNC: 854 PG/ML
WBC # BLD AUTO: 6.31 K/UL

## 2018-02-19 PROCEDURE — 25000003 PHARM REV CODE 250: Performed by: STUDENT IN AN ORGANIZED HEALTH CARE EDUCATION/TRAINING PROGRAM

## 2018-02-19 PROCEDURE — 63600175 PHARM REV CODE 636 W HCPCS: Performed by: STUDENT IN AN ORGANIZED HEALTH CARE EDUCATION/TRAINING PROGRAM

## 2018-02-19 PROCEDURE — 80053 COMPREHEN METABOLIC PANEL: CPT

## 2018-02-19 PROCEDURE — 97110 THERAPEUTIC EXERCISES: CPT

## 2018-02-19 PROCEDURE — 84100 ASSAY OF PHOSPHORUS: CPT

## 2018-02-19 PROCEDURE — 82607 VITAMIN B-12: CPT

## 2018-02-19 PROCEDURE — 80048 BASIC METABOLIC PNL TOTAL CA: CPT

## 2018-02-19 PROCEDURE — 97535 SELF CARE MNGMENT TRAINING: CPT

## 2018-02-19 PROCEDURE — 83735 ASSAY OF MAGNESIUM: CPT

## 2018-02-19 PROCEDURE — 82746 ASSAY OF FOLIC ACID SERUM: CPT

## 2018-02-19 PROCEDURE — 85025 COMPLETE CBC W/AUTO DIFF WBC: CPT

## 2018-02-19 PROCEDURE — 86580 TB INTRADERMAL TEST: CPT | Performed by: STUDENT IN AN ORGANIZED HEALTH CARE EDUCATION/TRAINING PROGRAM

## 2018-02-19 PROCEDURE — 94761 N-INVAS EAR/PLS OXIMETRY MLT: CPT

## 2018-02-19 PROCEDURE — 80048 BASIC METABOLIC PNL TOTAL CA: CPT | Mod: 91

## 2018-02-19 PROCEDURE — 36415 COLL VENOUS BLD VENIPUNCTURE: CPT

## 2018-02-19 PROCEDURE — 11000001 HC ACUTE MED/SURG PRIVATE ROOM

## 2018-02-19 PROCEDURE — 80053 COMPREHEN METABOLIC PANEL: CPT | Mod: 91

## 2018-02-19 PROCEDURE — 84443 ASSAY THYROID STIM HORMONE: CPT

## 2018-02-19 PROCEDURE — 82533 TOTAL CORTISOL: CPT

## 2018-02-19 RX ORDER — RAMELTEON 8 MG/1
8 TABLET ORAL NIGHTLY PRN
Status: DISCONTINUED | OUTPATIENT
Start: 2018-02-19 | End: 2018-02-23 | Stop reason: HOSPADM

## 2018-02-19 RX ORDER — DEXTROSE MONOHYDRATE 50 MG/ML
INJECTION, SOLUTION INTRAVENOUS CONTINUOUS
Status: DISCONTINUED | OUTPATIENT
Start: 2018-02-19 | End: 2018-02-19

## 2018-02-19 RX ORDER — QUETIAPINE FUMARATE 25 MG/1
25 TABLET, FILM COATED ORAL NIGHTLY
Status: DISCONTINUED | OUTPATIENT
Start: 2018-02-19 | End: 2018-02-20

## 2018-02-19 RX ORDER — LABETALOL HYDROCHLORIDE 5 MG/ML
10 INJECTION, SOLUTION INTRAVENOUS EVERY 6 HOURS PRN
Status: DISCONTINUED | OUTPATIENT
Start: 2018-02-19 | End: 2018-02-23 | Stop reason: HOSPADM

## 2018-02-19 RX ORDER — SODIUM CHLORIDE 9 MG/ML
INJECTION, SOLUTION INTRAVENOUS CONTINUOUS
Status: DISCONTINUED | OUTPATIENT
Start: 2018-02-19 | End: 2018-02-19

## 2018-02-19 RX ADMIN — PANTOPRAZOLE SODIUM 40 MG: 40 TABLET, DELAYED RELEASE ORAL at 09:02

## 2018-02-19 RX ADMIN — VANCOMYCIN HYDROCHLORIDE 750 MG: 750 INJECTION, POWDER, LYOPHILIZED, FOR SOLUTION INTRAVENOUS at 07:02

## 2018-02-19 RX ADMIN — HEPARIN SODIUM 5000 UNITS: 5000 INJECTION, SOLUTION INTRAVENOUS; SUBCUTANEOUS at 06:02

## 2018-02-19 RX ADMIN — CLINDAMYCIN HYDROCHLORIDE 150 MG: 150 CAPSULE ORAL at 05:02

## 2018-02-19 RX ADMIN — CLINDAMYCIN HYDROCHLORIDE 150 MG: 150 CAPSULE ORAL at 06:02

## 2018-02-19 RX ADMIN — DEXTROSE: 5 SOLUTION INTRAVENOUS at 09:02

## 2018-02-19 RX ADMIN — Medication 5 UNITS: at 05:02

## 2018-02-19 RX ADMIN — CLINDAMYCIN HYDROCHLORIDE 150 MG: 150 CAPSULE ORAL at 11:02

## 2018-02-19 RX ADMIN — HEPARIN SODIUM 5000 UNITS: 5000 INJECTION, SOLUTION INTRAVENOUS; SUBCUTANEOUS at 02:02

## 2018-02-19 RX ADMIN — DOCUSATE SODIUM 100 MG: 100 CAPSULE, LIQUID FILLED ORAL at 09:02

## 2018-02-19 RX ADMIN — AMLODIPINE BESYLATE 10 MG: 5 TABLET ORAL at 07:02

## 2018-02-19 RX ADMIN — CELECOXIB 200 MG: 100 CAPSULE ORAL at 09:02

## 2018-02-19 RX ADMIN — CLINDAMYCIN HYDROCHLORIDE 150 MG: 150 CAPSULE ORAL at 12:02

## 2018-02-19 RX ADMIN — LISINOPRIL 40 MG: 20 TABLET ORAL at 07:02

## 2018-02-19 RX ADMIN — SODIUM CHLORIDE: 0.9 INJECTION, SOLUTION INTRAVENOUS at 06:02

## 2018-02-19 RX ADMIN — SODIUM CHLORIDE: 0.9 INJECTION, SOLUTION INTRAVENOUS at 11:02

## 2018-02-19 NOTE — PT/OT/SLP PROGRESS
"Occupational Therapy   Treatment    Name: Willow Giron  MRN: [id number]  Admitting Diagnosis:  Hyponatremia       Recommendations:     Discharge Recommendations: nursing facility, skilled  Discharge Equipment Recommendations:   (Defer to SNF)  Barriers to discharge:  Decreased caregiver support    Subjective   "You're a little white girl. I'm holding that little black girl's hand right now" - patient having tangential, hallucinative speech and thoughts, but was oriented  Communicated with: nurseJulia prior to session.  Pain/Comfort:  · Pain Rating 1: 0/10  · Pain Rating Post-Intervention 1: 0/10    Patients cultural, spiritual, Synagogue conflicts given the current situation:  (none reported)    Objective:     Patient found with: peripheral IV    General Precautions: Standard, fall, seizure   Orthopedic Precautions:N/A   Braces: N/A     Bed Mobility:    · Patient completed Scooting/Bridging with contact guard assistance, total assistance and 2 persons  · Patient completed Supine to Sit with minimum assistance and with side rail  · Patient completed Sit to Supine with moderate assistance and with leg lift     Functional Mobility/Transfers:  · Patient completed Sit <> Stand Transfer with minimum assistance  with  no assistive device   · Patient completed Toilet Transfer Step Transfer technique with minimum assistance with  no AD and bedside commode    Activities of Daily Living:  · Grooming: contact guard assistance and for sitting balance    · Toileting: maximal assistance      Patient left HOB elevated with all lines intact, call button in reach, bed alarm on, RN notified and daughter present    [facility] 6 Click:  [facility] Total Score: 15    Treatment & Education:  Patient appeared to be hallucinating and having tangential conversations. Agreeable to therapy. Bed mob as noted above. Completed G/H tasks from EOB -- oral care and facial hygiene. Patient requesting to use BSC. Min (A) t/f and VCs for technique. " Patient with increased time on BSC and required max (a) for hygiene and to pull down/up underwear. Patient returned to EOB and supine. TOtal (A) of 2 to HOB via drawsheet.  Education:    Assessment:   Patient with tangential speech throughout entire session. Oriented to self, , year and location. Patient will benefit from continued skilled OT to address functional deficits. Recommend SNF upon D/C.     Willow Giron is a 87 y.o. female with a medical diagnosis of Hyponatremia.  Performance deficits affecting function are weakness, impaired endurance, impaired self care skills, impaired functional mobilty, gait instability, impaired balance, decreased upper extremity function, decreased lower extremity function, impaired cognition, decreased safety awareness, decreased ROM.      Rehab Prognosis:  Good; patient would benefit from acute skilled OT services to address these deficits and reach maximum level of function.       Plan:     Patient to be seen 5 x/week to address the above listed problems via self-care/home management, therapeutic activities, therapeutic exercises  · Plan of Care Expires: 18  · Plan of Care Reviewed with: patient, daughter, family    This Plan of care has been discussed with the patient who was involved in its development and understands and is in agreement with the identified goals and treatment plan    GOALS:    Occupational Therapy Goals        Problem: Occupational Therapy Goal    Goal Priority Disciplines Outcome Interventions   Occupational Therapy Goal     OT, PT/OT Ongoing (interventions implemented as appropriate)    Description:  Goals to be met by: 18     Patient will increase functional independence with ADLs by performing:    Feeding with Set-up Assistance.  UE Dressing with Supervision.  LE Dressing with Minimal Assistance.  Grooming while standing at sink with Stand-by Assistance.  Toileting from toilet with Contact Guard Assistance for hygiene and clothing  management.   Toilet transfer to toilet with Contact Guard Assistance.  Increased functional strength to WFL for ADLs.                      Time Tracking:     OT Date of Treatment: 02/19/18  OT Start Time: 1031  OT Stop Time: 1109  OT Total Time (min): 38 min    Billable Minutes:Self Care/Home Management 38    JONATAN Lockett  2/19/2018

## 2018-02-19 NOTE — PROGRESS NOTES
"U Internal Medicine Resident Progress Note    Subjective:      Willow Giron is a 87 y.o. female who is being followed by the [facility] IM service at [facility] for hyponatremia and dizziness for one day.    Ramelteon didn't work for her sleep, she is requesting something "stronger." Denying any complaints at this time. No nasuea/vomiting/shortness of breath. She is answering all questions appropriately this morning, alert and oriented, although her sentences are not making sense contextually. Words are appropriate without dysarthria and she is able to say yes or no to questions, but she just keeps talking with no clear purpose.    Daughter present at bedside feeding patient, states patient is not at her baseline mental status.    Per PT/OT recs from yesterday, patient would benefit from SNF.     Objective:   Last 24 Hour Vital Signs:  BP  Min: 138/64  Max: 195/84  Temp  Av °F (36.7 °C)  Min: 97 °F (36.1 °C)  Max: 98.4 °F (36.9 °C)  Pulse  Av  Min: 69  Max: 80  Resp  Av.4  Min: 16  Max: 20  SpO2  Av.4 %  Min: 94 %  Max: 99 %  I/O last 3 completed shifts:  In: 1261.7 [P.O.:685; I.V.:576.7]  Out: 950 [Urine:950]    Physical Examination:  General:          Alert and awake in no apparent distress, pale appearing elderly woman  Head:               Normocephalic and atraumatic  Eyes:               PERRL; EOMi with anicteric sclera and clear conjunctivae  Mouth:             Oropharynx clear and without exudate; moist mucous membranes  Cardio:             Regular rate and rhythm with normal S1 and S2; no murmurs or rubs  Resp:               CTAB; respirations unlabored; no wheezes, crackles or rhonchi  Abdom:            Soft, NTND with normoactive bowel sounds  Extrem:            Warm and well-perfused with no clubbing, cyanosis or edema  Skin:                Some RLE swelling and redness, full range of motion  Pulses:            2+ and symmetric distally  Neuro:             AAOx3; " cooperative and pleasant with no focal deficits    Laboratory:  Laboratory Data Reviewed: yes  Pertinent Findings:  CBC:   Lab Results   Component Value Date    WBC 6.31 02/19/2018    HGB 11.3 (L) 02/19/2018    HCT 32.5 (L) 02/19/2018     02/19/2018    MCV 89 02/19/2018    RDW 11.6 02/19/2018     BMP:   Lab Results   Component Value Date     (L) 02/19/2018     (L) 02/19/2018    K 3.8 02/19/2018    K 3.8 02/19/2018    CL 89 (L) 02/19/2018    CL 90 (L) 02/19/2018    CO2 25 02/19/2018    CO2 25 02/19/2018    BUN 9 02/19/2018    BUN 9 02/19/2018    CREATININE 0.8 02/19/2018    CREATININE 0.8 02/19/2018     (H) 02/19/2018     (H) 02/19/2018    CALCIUM 9.0 02/19/2018    CALCIUM 9.1 02/19/2018    MG 1.9 02/19/2018    PHOS 2.6 (L) 02/19/2018     LFTs:   Lab Results   Component Value Date    PROT 6.2 02/19/2018    ALBUMIN 3.6 02/19/2018    BILITOT 0.5 02/19/2018    AST 17 02/19/2018    ALKPHOS 56 02/19/2018    ALT 12 02/19/2018     Coags: No results found for: INR, PROTIME, PTT  FLP: No results found for: CHOL, HDL, LDLCALC, TRIG, CHOLHDL  DM:   Lab Results   Component Value Date    HGBA1C 4.9 02/18/2018    CREATININE 0.8 02/19/2018    CREATININE 0.8 02/19/2018     Thyroid: No results found for: TSH, FREET4, K5MGIZE, V0CCCAK, THYROIDAB  Anemia:   Lab Results   Component Value Date    IRON 65 02/18/2018    TIBC 383 02/18/2018    FERRITIN 141 02/18/2018     Cardiac:   Lab Results   Component Value Date    TROPONINI 0.007 02/17/2018     Urinalysis:   Lab Results   Component Value Date    COLORU Yellow 02/17/2018    SPECGRAV <=1.005 (A) 02/17/2018    NITRITE Negative 02/17/2018    KETONESU Negative 02/17/2018    UROBILINOGEN Negative 02/17/2018       Trended Cardiac Data:    Recent Labs  Lab 02/17/18  1222   TROPONINI 0.007       Microbiology Data Reviewed: yes  Pertinent Findings:  none    Other Results:  EKG (my interpretation):no new  Radiology Data Reviewed: yes  Pertinent Findings:  No  new    Current Medications:     Infusions:       Scheduled:   amLODIPine  10 mg Oral Daily    celecoxib  200 mg Oral BID    clindamycin  150 mg Oral Q6H    docusate sodium  100 mg Oral BID    heparin (porcine)  5,000 Units Subcutaneous Q8H    lisinopril  40 mg Oral Daily    pantoprazole  40 mg Oral Daily    psyllium husk (aspartame)  1 packet Oral Daily    simvastatin  40 mg Oral QHS        PRN:  acetaminophen, hydrocodone-acetaminophen 7.5-325mg, simethicone, sodium chloride 0.9%, DIPH,PERTUS (ADACEL),TETANUS PF VAC (ADULT)    Antibiotics and Day Number of Therapy:  none    Lines and Day Number of Therapy:  PIV    Assessment:     Willow Giron is a 87 y.o.female with  Patient Active Problem List    Diagnosis Date Noted    Hyponatremia 02/17/2018    Hypokalemia 02/17/2018    Essential hypertension 02/17/2018    Hyperlipidemia 02/17/2018    Diarrhea 02/17/2018    GERD (gastroesophageal reflux disease) 02/17/2018    Non-intractable vomiting with nausea 02/17/2018        Plan:     Altered Mental status, possibly 2/2 delirium  -delirium precautions in place, no medications that could cause this, no known alcohol or drug abuse, no signs of withdrawal, CT head negative for acute abnormality, but with age-advanced cerebral volume loss  -TSH wnl  -will check B12 level, folate  -Alert and oriented x4, however as described above, speaking off-topic, non-purposeful    Hyponatremia  -114 in Ed, now 125, Na increasing appropriately with D5 NS @ 100mL/hr  -urine sodium 21, urine Cr 23.6, urine osmolality 248  -BMP q4 to monitor sodium     Hypokalemia   -likely secondary to diarrhea   -K 2.9 on presentation, 3.8 this morning  -replete as needed     Hypochloridemia, resolving  -likely secondary to diarrhea  -76 initially, 90 today     HTN  -189/79 on presentation (hadn't taken home BP meds), remaining elevated, continuing home meds, will add PRN labetalol for systolic >180    Rash on right lower extremity,  c/w erysipelas  -started on clindamycin, improving  -no evidence of DVTs on ultrasound     HLD  -continue home simvastatin 40mg daily     GERD  -protonix 40mg daily     Arthritis  -tylenol PRN  -continue home celecoxib, norco PRN     Normocytic Anemia  -H/H 11.9/33, MCV 87  -iron studies: iron 65, TIBC 383, Sat 17, Transferrin 259, Ferritin 141      Diet: cardiac fluid  Ppx: heparin  Dispo: pending correction of sodium, PT/OT recs for SNF  Code: Full    Karen Hawkins  U Internal Medicine HO-1  [facility] IM Service Team B    [facility] Medicine Hospitalist Pager numbers:   U Hospitalist Medicine Team A (Adrien/Rufino): [phone]  U Hospitalist Medicine Team B (Emily/Mekhi):  [phone]

## 2018-02-19 NOTE — PLAN OF CARE
Problem: Physical Therapy Goal  Goal: Physical Therapy Goal  Goals to be met by: 3/21/18     Patient will increase functional independence with mobility by performin) Sup <>Sit with Mod I with RW  2) Sit <>Stand with Mod I with RW   3) Bed <>chair mod I with RW  4) ambulate 150 feet Mod I with RW  5) perform LE therex X10 with independence.      Outcome: Ongoing (interventions implemented as appropriate)  Goals ongoing

## 2018-02-19 NOTE — PT/OT/SLP PROGRESS
Physical Therapy Treatment    Patient Name:  Willow Giron   MRN:  [id number]    Recommendations:     Discharge Recommendations:  nursing facility, skilled   Discharge Equipment Recommendations:  (defer to SNF)   Barriers to discharge: Decreased caregiver support    Assessment:     Willow Giron is a 87 y.o. female admitted with a medical diagnosis of Hyponatremia.  She presents with the following impairments/functional limitations:  weakness, impaired endurance, impaired functional mobilty, impaired cognition pt speaking throughout ex's and very nonsensical,no safe to attempt further rx today.    Rehab Prognosis:  Good; patient would benefit from acute skilled PT services to address these deficits and reach maximum level of function.      Recent Surgery: * No surgery found *      Plan:     During this hospitalization, patient to be seen 6 x/week to address the above listed problems via gait training, therapeutic activities, therapeutic exercises  · Plan of Care Expires:  03/21/18   Plan of Care Reviewed with: patient, family    Subjective     Communicated with nsg prior to session.  Patient found supine upon PT entry to room, agreeable to treatment.      Chief Complaint: n/a  Patient comments/goals: various comments  Pain/Comfort:  · Pain Rating 1: 0/10    Patients cultural, spiritual, Presybeterian conflicts given the current situation: None verbalized to PT    Objective:     Patient found with: peripheral IV     General Precautions: Standard, fall, seizure   Orthopedic Precautions:N/A   Braces: N/A     Functional Mobility:  · Gait: n/a      AM-PAC 6 CLICK MOBILITY  Turning over in bed (including adjusting bedclothes, sheets and blankets)?: 3  Sitting down on and standing up from a chair with arms (e.g., wheelchair, bedside commode, etc.): 3  Moving from lying on back to sitting on the side of the bed?: 3  Moving to and from a bed to a chair (including a wheelchair)?: 3  Need to walk in hospital room?:  2  Climbing 3-5 steps with a railing?: 2  Total Score: 16       Therapeutic Activities and Exercises: le AA supine ex's X 10-12 reps inc: ap,hs,abd/add,slr with increased cues to stay on task,family present and PCT       Patient left supine with all lines intact and call button in reach..    GOALS: see general POC   Physical Therapy Goals        Problem: Physical Therapy Goal    Goal Priority Disciplines Outcome Goal Variances Interventions   Physical Therapy Goal     PT/OT, PT Ongoing (interventions implemented as appropriate)     Description:  Goals to be met by: 3/21/18     Patient will increase functional independence with mobility by performin) Sup <>Sit with Mod I with RW  2) Sit <>Stand with Mod I with RW   3) Bed <>chair mod I with RW  4) ambulate 150 feet Mod I with RW  5) perform LE therex X10 with independence.                       Time Tracking:     PT Received On: 18  PT Start Time: 1504     PT Stop Time: 1516  PT Total Time (min): 12 min     Billable Minutes: Therapeutic Exercise 12    Treatment Type: Treatment  PT/PTA: PTA     PTA Visit Number: 1     Jesús Mejia, PTA  2018

## 2018-02-19 NOTE — PLAN OF CARE
POC discussed with pt and family. Pt is awake and alert,oriented to person,place,and time.Pt continues to form nonsensical sentences. No distress noted. Denies any pain.Safety/Fall precautions maintained. Call bell in reach. Bed in lowest position.All questions answered. Verbalized understanding.

## 2018-02-19 NOTE — PT/OT/SLP PROGRESS
Occupational Therapy  Visit Attempt    Patient Name:  Willow Giron   MRN:  [id number]    Patient not seen today secondary to eating breakfast and family asked that therapy return later. Will follow-up later, as time permits.    JONATAN Lockett  2/19/2018

## 2018-02-19 NOTE — PLAN OF CARE
Problem: Patient Care Overview  Goal: Plan of Care Review  Outcome: Ongoing (interventions implemented as appropriate)  Plan of care reviewed with patient. Verbalized understanding. No distress noted. Will continue to monitor. Bed alarm active, bed in lowest position, call light within reach. Patient instructed to use call light for assistance. Verbalized understanding. Bed rails up x2.

## 2018-02-19 NOTE — PLAN OF CARE
Problem: Occupational Therapy Goal  Goal: Occupational Therapy Goal  Goals to be met by: 18     Patient will increase functional independence with ADLs by performing:    Feeding with Set-up Assistance.  UE Dressing with Supervision.  LE Dressing with Minimal Assistance.  Grooming while standing at sink with Stand-by Assistance.  Toileting from toilet with Contact Guard Assistance for hygiene and clothing management.   Toilet transfer to toilet with Contact Guard Assistance.  Increased functional strength to WFL for ADLs.     Outcome: Ongoing (interventions implemented as appropriate)  Patient with tangential speech throughout entire session. Oriented to self, , year and location. Patient will benefit from continued skilled OT to address functional deficits. Recommend SNF upon D/C.

## 2018-02-19 NOTE — PLAN OF CARE
"TN went to meet with patient, daughter-in-law (Karen) at bedside. Patient is independent and lives with her spouse at home. Per Karen, patient has five children who provide support. She does not have home health, but has grab bars and a shower chair at home. Patient also has a "life alert" at home. Patient still drives as well. Therapy is recommending SNF for patient. TN educated patient and Karen of SNF placement. Patient is not making sense and rambling with her sentences. Patient is refusing SNF, however Karen believes patient would benefit from it. TN left SNF list at bedside. TN will contact family regarding SNF placement.     --Update: TN met with patient and family again at bedside. Patient's son Az at bedside now. He stated they spoke with his siblings and would like patient to go SNF. He will also talk with his father as well. TN did encourage them to choose SNFs, so that when patient is ready for discharge we have a place. He was also notified it is a process, and would need to start working on it. Family verbalized understanding. TN also made a copy for family of SNF per request. TN will touch base with them tomorrow morning if we have not heard back yet. TN did inform them would have to be approved by her insurance as well.     02/19/18 1044   Discharge Assessment   Assessment Type Discharge Planning Assessment   Confirmed/corrected address and phone number on facesheet? Yes   Assessment information obtained from? Patient;Medical Record;Other  (Daughter-In-Law)   Prior to hospitilization cognitive status: Alert/Oriented   Prior to hospitalization functional status: Independent   Current cognitive status: Not Oriented to Time   Current Functional Status: Needs Assistance   Facility Arrived From: Home   Lives With spouse   Able to Return to Prior Arrangements unable to determine at this time (comments)   Is patient able to care for self after discharge? Unable to determine at this time (comments) "   Who are your caregiver(s) and their phone number(s)? Jung Villatoro Spouse [phone]    Patient's perception of discharge disposition skilled nursing facility   Readmission Within The Last 30 Days no previous admission in last 30 days   Patient currently being followed by outpatient case management? No   Equipment Currently Used at Home grab bar;shower chair   Do you have any problems affording any of your prescribed medications? No   Is the patient taking medications as prescribed? yes   Does the patient have transportation home? Yes   Transportation Available family or friend will provide   Dialysis Name and Scheduled days N/A   Does the patient receive services at [facility]? No   Discharge Plan A Skilled Nursing Facility   Discharge Plan B Home with family;Home Health   Patient/Family In Agreement With Plan yes     Jennifer Guerrier RN  Transition Navigator  [phone]

## 2018-02-19 NOTE — PLAN OF CARE
Problem: Patient Care Overview  Goal: Plan of Care Review  Plan of care reviewed with the patient. Verbalized clear understanding. Bed alarm set. Bed in lowest position. Pt remain afebrile and free of fall. Call light within reach. Instructed pt to call when getting out of bed. Family remains at the bedside.Administered prn pain medicine as needed. Dextrose 5% continuous IV infusing @ 100ml/hr. Bedside commode at the bedside. Will continue to monitor.

## 2018-02-20 ENCOUNTER — TELEPHONE (OUTPATIENT)
Dept: OTOLARYNGOLOGY | Facility: CLINIC | Age: 83
End: 2018-02-20

## 2018-02-20 PROBLEM — J01.30 ACUTE SPHENOIDAL SINUSITIS: Status: ACTIVE | Noted: 2018-02-20

## 2018-02-20 LAB
ALBUMIN SERPL BCP-MCNC: 3.1 G/DL
ALBUMIN SERPL BCP-MCNC: 3.2 G/DL
ALBUMIN SERPL BCP-MCNC: 3.2 G/DL
ALBUMIN SERPL BCP-MCNC: 3.3 G/DL
ALP SERPL-CCNC: 52 U/L
ALP SERPL-CCNC: 53 U/L
ALP SERPL-CCNC: 58 U/L
ALP SERPL-CCNC: 58 U/L
ALT SERPL W/O P-5'-P-CCNC: 10 U/L
ALT SERPL W/O P-5'-P-CCNC: 11 U/L
ALT SERPL W/O P-5'-P-CCNC: 13 U/L
ALT SERPL W/O P-5'-P-CCNC: 13 U/L
AMPHET+METHAMPHET UR QL: NEGATIVE
ANION GAP SERPL CALC-SCNC: 6 MMOL/L
ANION GAP SERPL CALC-SCNC: 7 MMOL/L
ANION GAP SERPL CALC-SCNC: 8 MMOL/L
ANION GAP SERPL CALC-SCNC: 8 MMOL/L
AST SERPL-CCNC: 15 U/L
AST SERPL-CCNC: 16 U/L
AST SERPL-CCNC: 19 U/L
AST SERPL-CCNC: 19 U/L
BARBITURATES UR QL SCN>200 NG/ML: NEGATIVE
BASOPHILS # BLD AUTO: 0.03 K/UL
BASOPHILS NFR BLD: 0.4 %
BENZODIAZ UR QL SCN>200 NG/ML: NEGATIVE
BILIRUB SERPL-MCNC: 0.4 MG/DL
BILIRUB SERPL-MCNC: 0.5 MG/DL
BUN SERPL-MCNC: 10 MG/DL
BUN SERPL-MCNC: 13 MG/DL
BUN SERPL-MCNC: 6 MG/DL
BUN SERPL-MCNC: 8 MG/DL
BZE UR QL SCN: NEGATIVE
CALCIUM SERPL-MCNC: 8.4 MG/DL
CALCIUM SERPL-MCNC: 8.4 MG/DL
CALCIUM SERPL-MCNC: 8.6 MG/DL
CALCIUM SERPL-MCNC: 8.8 MG/DL
CANNABINOIDS UR QL SCN: NEGATIVE
CHLORIDE SERPL-SCNC: 93 MMOL/L
CHLORIDE SERPL-SCNC: 93 MMOL/L
CHLORIDE SERPL-SCNC: 94 MMOL/L
CHLORIDE SERPL-SCNC: 95 MMOL/L
CO2 SERPL-SCNC: 21 MMOL/L
CO2 SERPL-SCNC: 22 MMOL/L
CO2 SERPL-SCNC: 23 MMOL/L
CO2 SERPL-SCNC: 24 MMOL/L
CORTIS SERPL-MCNC: 13.8 UG/DL
CREAT SERPL-MCNC: 0.7 MG/DL
CREAT SERPL-MCNC: 0.7 MG/DL
CREAT SERPL-MCNC: 0.8 MG/DL
CREAT SERPL-MCNC: 0.8 MG/DL
CREAT UR-MCNC: 50 MG/DL
DIFFERENTIAL METHOD: ABNORMAL
EOSINOPHIL # BLD AUTO: 0 K/UL
EOSINOPHIL NFR BLD: 0.4 %
ERYTHROCYTE [DISTWIDTH] IN BLOOD BY AUTOMATED COUNT: 11.6 %
EST. GFR  (AFRICAN AMERICAN): >60 ML/MIN/1.73 M^2
EST. GFR  (NON AFRICAN AMERICAN): >60 ML/MIN/1.73 M^2
ETHANOL UR-MCNC: <10 MG/DL
GLUCOSE SERPL-MCNC: 103 MG/DL
GLUCOSE SERPL-MCNC: 111 MG/DL
GLUCOSE SERPL-MCNC: 116 MG/DL
GLUCOSE SERPL-MCNC: 127 MG/DL
HCT VFR BLD AUTO: 30.2 %
HGB BLD-MCNC: 10.4 G/DL
LYMPHOCYTES # BLD AUTO: 1.2 K/UL
LYMPHOCYTES NFR BLD: 15 %
MAGNESIUM SERPL-MCNC: 1.6 MG/DL
MCH RBC QN AUTO: 31.2 PG
MCHC RBC AUTO-ENTMCNC: 34.4 G/DL
MCV RBC AUTO: 91 FL
METHADONE UR QL SCN>300 NG/ML: NEGATIVE
MONOCYTES # BLD AUTO: 0.6 K/UL
MONOCYTES NFR BLD: 7.9 %
NEUTROPHILS # BLD AUTO: 6 K/UL
NEUTROPHILS NFR BLD: 76.2 %
OPIATES UR QL SCN: NEGATIVE
OSMOLALITY SERPL: 263 MOSM/KG
PCP UR QL SCN>25 NG/ML: NEGATIVE
PHOSPHATE SERPL-MCNC: 3.2 MG/DL
PLATELET # BLD AUTO: 235 K/UL
PMV BLD AUTO: 8.3 FL
POTASSIUM SERPL-SCNC: 4 MMOL/L
POTASSIUM SERPL-SCNC: 4.1 MMOL/L
POTASSIUM SERPL-SCNC: 4.5 MMOL/L
POTASSIUM SERPL-SCNC: 4.6 MMOL/L
PROT SERPL-MCNC: 5.5 G/DL
PROT SERPL-MCNC: 5.6 G/DL
PROT SERPL-MCNC: 5.7 G/DL
PROT SERPL-MCNC: 5.8 G/DL
RBC # BLD AUTO: 3.33 M/UL
SODIUM SERPL-SCNC: 121 MMOL/L
SODIUM SERPL-SCNC: 124 MMOL/L
SODIUM SERPL-SCNC: 124 MMOL/L
SODIUM SERPL-SCNC: 125 MMOL/L
TOXICOLOGY INFORMATION: NORMAL
WBC # BLD AUTO: 7.88 K/UL

## 2018-02-20 PROCEDURE — 97110 THERAPEUTIC EXERCISES: CPT

## 2018-02-20 PROCEDURE — 83930 ASSAY OF BLOOD OSMOLALITY: CPT

## 2018-02-20 PROCEDURE — 63600175 PHARM REV CODE 636 W HCPCS: Performed by: STUDENT IN AN ORGANIZED HEALTH CARE EDUCATION/TRAINING PROGRAM

## 2018-02-20 PROCEDURE — 97116 GAIT TRAINING THERAPY: CPT

## 2018-02-20 PROCEDURE — 25000003 PHARM REV CODE 250: Performed by: STUDENT IN AN ORGANIZED HEALTH CARE EDUCATION/TRAINING PROGRAM

## 2018-02-20 PROCEDURE — 83935 ASSAY OF URINE OSMOLALITY: CPT

## 2018-02-20 PROCEDURE — 83735 ASSAY OF MAGNESIUM: CPT

## 2018-02-20 PROCEDURE — 80053 COMPREHEN METABOLIC PANEL: CPT | Mod: 91

## 2018-02-20 PROCEDURE — 84100 ASSAY OF PHOSPHORUS: CPT

## 2018-02-20 PROCEDURE — 80053 COMPREHEN METABOLIC PANEL: CPT

## 2018-02-20 PROCEDURE — 80307 DRUG TEST PRSMV CHEM ANLYZR: CPT

## 2018-02-20 PROCEDURE — 97535 SELF CARE MNGMENT TRAINING: CPT

## 2018-02-20 PROCEDURE — 85025 COMPLETE CBC W/AUTO DIFF WBC: CPT

## 2018-02-20 PROCEDURE — 84540 ASSAY OF URINE/UREA-N: CPT

## 2018-02-20 PROCEDURE — 11000001 HC ACUTE MED/SURG PRIVATE ROOM

## 2018-02-20 PROCEDURE — 25000242 PHARM REV CODE 250 ALT 637 W/ HCPCS: Performed by: STUDENT IN AN ORGANIZED HEALTH CARE EDUCATION/TRAINING PROGRAM

## 2018-02-20 PROCEDURE — 25500020 PHARM REV CODE 255: Performed by: INTERNAL MEDICINE

## 2018-02-20 PROCEDURE — 36415 COLL VENOUS BLD VENIPUNCTURE: CPT

## 2018-02-20 PROCEDURE — A9585 GADOBUTROL INJECTION: HCPCS | Performed by: INTERNAL MEDICINE

## 2018-02-20 PROCEDURE — 94761 N-INVAS EAR/PLS OXIMETRY MLT: CPT

## 2018-02-20 RX ORDER — FLUTICASONE PROPIONATE 50 MCG
2 SPRAY, SUSPENSION (ML) NASAL 2 TIMES DAILY
Status: DISCONTINUED | OUTPATIENT
Start: 2018-02-20 | End: 2018-02-23 | Stop reason: HOSPADM

## 2018-02-20 RX ORDER — GADOBUTROL 604.72 MG/ML
5 INJECTION INTRAVENOUS
Status: COMPLETED | OUTPATIENT
Start: 2018-02-20 | End: 2018-02-20

## 2018-02-20 RX ADMIN — HEPARIN SODIUM 5000 UNITS: 5000 INJECTION, SOLUTION INTRAVENOUS; SUBCUTANEOUS at 10:02

## 2018-02-20 RX ADMIN — FLUTICASONE PROPIONATE 100 MCG: 50 SPRAY, METERED NASAL at 03:02

## 2018-02-20 RX ADMIN — CLINDAMYCIN HYDROCHLORIDE 150 MG: 150 CAPSULE ORAL at 06:02

## 2018-02-20 RX ADMIN — SIMVASTATIN 40 MG: 20 TABLET, FILM COATED ORAL at 08:02

## 2018-02-20 RX ADMIN — AMLODIPINE BESYLATE 10 MG: 5 TABLET ORAL at 09:02

## 2018-02-20 RX ADMIN — CLINDAMYCIN HYDROCHLORIDE 150 MG: 150 CAPSULE ORAL at 11:02

## 2018-02-20 RX ADMIN — CLINDAMYCIN HYDROCHLORIDE 150 MG: 150 CAPSULE ORAL at 05:02

## 2018-02-20 RX ADMIN — DOCUSATE SODIUM 100 MG: 100 CAPSULE, LIQUID FILLED ORAL at 08:02

## 2018-02-20 RX ADMIN — DOCUSATE SODIUM 100 MG: 100 CAPSULE, LIQUID FILLED ORAL at 09:02

## 2018-02-20 RX ADMIN — PANTOPRAZOLE SODIUM 40 MG: 40 TABLET, DELAYED RELEASE ORAL at 09:02

## 2018-02-20 RX ADMIN — CELECOXIB 200 MG: 100 CAPSULE ORAL at 09:02

## 2018-02-20 RX ADMIN — GADOBUTROL 5 ML: 604.72 INJECTION INTRAVENOUS at 08:02

## 2018-02-20 RX ADMIN — HEPARIN SODIUM 5000 UNITS: 5000 INJECTION, SOLUTION INTRAVENOUS; SUBCUTANEOUS at 06:02

## 2018-02-20 RX ADMIN — CELECOXIB 200 MG: 100 CAPSULE ORAL at 08:02

## 2018-02-20 RX ADMIN — HEPARIN SODIUM 5000 UNITS: 5000 INJECTION, SOLUTION INTRAVENOUS; SUBCUTANEOUS at 02:02

## 2018-02-20 RX ADMIN — SODIUM CHLORIDE TAB 1 GM 1 G: 1 TAB at 02:02

## 2018-02-20 RX ADMIN — FLUTICASONE PROPIONATE 100 MCG: 50 SPRAY, METERED NASAL at 08:02

## 2018-02-20 RX ADMIN — LISINOPRIL 40 MG: 20 TABLET ORAL at 09:02

## 2018-02-20 NOTE — PT/OT/SLP PROGRESS
Occupational Therapy   Treatment    Name: Willow Giron  MRN: [id number]  Admitting Diagnosis:  Hyponatremia       Recommendations:     Discharge Recommendations: nursing facility, skilled  Discharge Equipment Recommendations:   (defer to SNF)  Barriers to discharge:  Decreased caregiver support    Subjective     Communicated with: Julia maldonado prior to session.  Pain/Comfort:  · Pain Rating 1: 0/10  · Pain Rating Post-Intervention 1: 0/10    Patients cultural, spiritual, Jewish conflicts given the current situation:  (none reported)    Objective:     Patient found with: peripheral IV, telemetry    General Precautions: Standard, fall, seizure   Orthopedic Precautions:N/A   Braces: N/A     Bed Mobility:    · N/A - in bedside chair    Functional Mobility/Transfers:  · Patient completed Toilet Transfer Step Transfer technique with minimum assistance with  no AD and bedside commode    Activities of Daily Living:  · Grooming: stand by assistance    · Toileting: minimum assistance      Patient left up in chair with all lines intact, call button in reach, RN notified and granddaughter present    [facility] 6 Click:  [facility] Total Score: 16    Treatment & Education:  Patient in bedside chair and agreeable to therapy. Completed BUE AROM therex, 1 x 10 reps: horizontal sh abd/add, bicep curls. Patient then requested to use BSC. RN requested clean catch urine specimen. T/f bedside chair <> BSC with Min (A) and VCs. Patient required min (A) with toileting and hygiene. Returned to bedside chair and able to scoot self back with VCs. BLEs elevated for comfort.  Education:    Assessment:   Patient with improved mental status - no tangential conversation noted. Patient improving with activity tolerance and will benefit from SNF upon D/C to maximize strength and endurance for ADL tasks.    Willow Giron is a 87 y.o. female with a medical diagnosis of Hyponatremia.  Performance deficits affecting function are weakness,  impaired endurance, impaired self care skills, impaired functional mobilty, gait instability, decreased upper extremity function, decreased lower extremity function, decreased ROM, impaired balance, decreased safety awareness, impaired cognition, decreased coordination.      Rehab Prognosis: Good; patient would benefit from acute skilled OT services to address these deficits and reach maximum level of function.       Plan:     Patient to be seen 5 x/week to address the above listed problems via self-care/home management, therapeutic exercises, therapeutic activities  · Plan of Care Expires: 03/18/18  · Plan of Care Reviewed with: patient, grandchild(orestes)    This Plan of care has been discussed with the patient who was involved in its development and understands and is in agreement with the identified goals and treatment plan    GOALS:    Occupational Therapy Goals        Problem: Occupational Therapy Goal    Goal Priority Disciplines Outcome Interventions   Occupational Therapy Goal     OT, PT/OT Ongoing (interventions implemented as appropriate)    Description:  Goals to be met by: 03/18/18     Patient will increase functional independence with ADLs by performing:    Feeding with Set-up Assistance.  UE Dressing with Supervision.  LE Dressing with Minimal Assistance.  Grooming while standing at sink with Stand-by Assistance.  Toileting from toilet with Contact Guard Assistance for hygiene and clothing management.   Toilet transfer to toilet with Contact Guard Assistance.  Increased functional strength to WFL for ADLs.                      Time Tracking:     OT Date of Treatment: 02/20/18  OT Start Time: 1444  OT Stop Time: 1510  OT Total Time (min): 26 min    Billable Minutes:Self Care/Home Management 26    JONATAN Lockett  2/20/2018

## 2018-02-20 NOTE — PLAN OF CARE
TN spoke with daughter-in-law Karen via phone. She stated  their niece works [facility] [employer] [employer] in [place]. They would like to send SNF referral there, as well as [facility]. TN informed Aracely ALY.    TN also received called from daughter Aliya. She is in agreement, but wants to make sure her mother would not do better with home health. TN explained to her differences between SNF and Home Health. They will speak with the MD team today and get back with TN.    --Update: TN went to meet with patient, granddaughter at bedside. Patient oriented and speaking more coherently today. TN did explain to her regarding SNF vs. HH. Patient would also like to see what she does with therapy today (therapy in room at this time). TN also called and spoke with daughter Aliya. She stated they do believe they want SNF placement for their mother. They are going to see [facility] today. They would want [facility] as their 1st choice since it is close to patient's home. TN informed her sent referral to [facility] as they requested as well. They do not want [facility] anymore, because they believe it is too far away. TN sent referral via right [facility] to [facility].    TN did inform daughter again once patient is medically accepted, will have to make sure insurance will approve as well.     02/20/18 1325   Discharge Reassessment   Assessment Type Discharge Planning Reassessment   Provided patient/caregiver education on the expected discharge date and the discharge plan Yes   Do you have any problems affording any of your prescribed medications? TBD   Discharge Plan A Skilled Nursing Facility   Discharge Plan B Home with family;Home Health   Patient choice form signed by patient/caregiver N/A   Can the patient answer the patient profile reliably? Yes, cognitively intact   How does the patient rate their overall health at the present time? Good   Describe the patient's ability to walk at the present time. Walks with the help  of equipment   How often would a person be available to care for the patient? Often     Jennifer Guerrier RN  Transition Navigator  [phone]

## 2018-02-20 NOTE — PLAN OF CARE
Problem: Occupational Therapy Goal  Goal: Occupational Therapy Goal  Goals to be met by: 03/18/18     Patient will increase functional independence with ADLs by performing:    Feeding with Set-up Assistance.  UE Dressing with Supervision.  LE Dressing with Minimal Assistance.  Grooming while standing at sink with Stand-by Assistance.  Toileting from toilet with Contact Guard Assistance for hygiene and clothing management.   Toilet transfer to toilet with Contact Guard Assistance.  Increased functional strength to WFL for ADLs.     Outcome: Ongoing (interventions implemented as appropriate)  Patient with improved mental status - no tangential conversation noted. Patient improving with activity tolerance and will benefit from SNF upon D/C to maximize strength and endurance for ADL tasks.

## 2018-02-20 NOTE — TELEPHONE ENCOUNTER
----- Message from Sherry Queen sent at 2018 12:37 PM CST -----  Contact: [phone]     CONSULTS:     Patient: Willow Giron    : 1930    Clinic#: [id number]    Room number: 419    Referring MD: Dr. Bennett    Diagnosis: acute sinus diease per CT and MRI    Person calling: Jerica

## 2018-02-20 NOTE — PLAN OF CARE
POC discussed with pt and family.Pt is awake and alert,oriented X4. No distress noted. Denies any pain Bed in lowest position. Safety/Fall precautions maintained. Call bell in reach. All questions answered. Verbalized understanding.

## 2018-02-20 NOTE — CONSULTS
Consult Note  [facility]    Consult Requested By: Andrea Diaz MD    Reason for Consult: Hyponatremia    SUBJECTIVE:     History of Present Illness:  Patient is a 87 y.o. female with PMHx of HTN, dyslipidemia, anxiety, GERD, IBD, chronic pain syndrome presents to ER after she sustained near syncope. Two days prior to admission, she experienced N/V/D. PCP gave her phenergan which did not help much. Her daughter finally brought her in. Her serum Na was 114, CL 78, K 2.9, urinalysis revealed specific gravity < 1.001. Serum urine osm 248, Charlie 21 and Uosm 130. Patient was given NS 1 liter in ER followed by NS bolus. She was started on D5W because serum Na increased to 120. Her serum Na dropped back to 122-125. She takes lasix 20 mg once daily. She picks and chooses her diet. She drinks  tea and eats selected food (according to her grand-daughter) [facility] is consulted for hyponatremia work up. She states that she might have taken lasix p.o on the days she was having N/V/D    Past Medical History:   Diagnosis Date    Arthritis     GERD (gastroesophageal reflux disease)     Hyperlipidemia     Hypertension      Past Surgical History:   Procedure Laterality Date     SECTION, CLASSIC      HYSTERECTOMY      SHOULDER SURGERY       No family history on file.  Social History   Substance Use Topics    Smoking status: Former Smoker    Smokeless tobacco: Not on file    Alcohol use Yes      Comment: social       Review of patient's allergies indicates:   Allergen Reactions    Pcn [penicillins] Rash        Review of Systems:  ROS is negative for chest pain, SOB, palp, HA, change in vision    OBJECTIVE:     Vital Signs (Most Recent)  Temp: 99.6 °F (37.6 °C) (18 0401)  Pulse: 77 (18 0401)  Resp: 18 (18 0401)  BP: (!) 168/70 (18 0516)  SpO2: 95 % (18 0004)    Vital Signs Range (Last 24H):  Temp:  [97.6 °F (36.4 °C)-99.6 °F (37.6 °C)]   Pulse:  [76-91]   Resp:  [18-20]   BP:  (140-195)/(70-88)   SpO2:  [95 %-96 %]     Physical Exam:  NAD  No jvd  No murmurs  Lungs are clear   Abdomen is soft +BS  Ext: no edema    Laboratory:  CBC:   Recent Labs  Lab 02/20/18  0658   WBC 7.88   RBC 3.33*   HGB 10.4*   HCT 30.2*      MCV 91   MCH 31.2*   MCHC 34.4     BMP:   Recent Labs  Lab 02/20/18  0658      *   K 4.1   CL 95   CO2 24   BUN 8   CREATININE 0.7   CALCIUM 8.6*   MG 1.6     CMP:   Recent Labs  Lab 02/20/18  0658      CALCIUM 8.6*   ALBUMIN 3.2*   PROT 5.6*   *   K 4.1   CO2 24   CL 95   BUN 8   CREATININE 0.7   ALKPHOS 52*   ALT 11   AST 16   BILITOT 0.5     LFTs:   Recent Labs  Lab 02/20/18  0658   ALT 11   AST 16   ALKPHOS 52*   BILITOT 0.5   PROT 5.6*   ALBUMIN 3.2*     Recent Labs  Lab 02/17/18  1222   TROPONINI 0.007       Recent Labs  Lab 02/17/18  1214   COLORU Yellow   SPECGRAV <=1.005*   PHUR 7.0   PROTEINUA Negative   NITRITE Negative   LEUKOCYTESUR Negative   UROBILINOGEN Negative       Cortisol and TSH are normal    ASSESSMENT/PLAN:     A 88 y/o with PMHx of HTN, dyslipidemia, IBS presents to ER with near syncope found to have hypovolemia hypotonic hyponatremia d/t N/V/D. Her urine Na was 21 which might be an over representation. She might have taken lasix that day.     1- hypovolemia hypotonic hyponatremia etiology d/t N/V/D    Plan:   -- start salt tablet 1 gram p.o twice a day with meals (each salt tablet 1 gram is equivalent of Na 154 mmol--> one NS 0.9% has 154 mmol/L)   -- serum Na q6 hrs  -- plan to increase serum Na by 8 mmol/L/day  -- repeat urine osmo today and in am  -- no signs of SIADH  -- would not give lasix yet  -- increase free water intake to 1.5 liters rather than 1 liter.

## 2018-02-20 NOTE — PLAN OF CARE
Problem: Patient Care Overview  Goal: Plan of Care Review  Outcome: Ongoing (interventions implemented as appropriate)  Plan of care reviewed with patient and son. Verbalized understanding. Pt was able to sleep throughout the night without any medication aid. No distress noted. Will continue to monitor. Bed alarm active, bed in lowest position, call light within reach. Patient instructed to use call light for assistance. Verbalized understanding. Bed rails up x2.

## 2018-02-20 NOTE — PT/OT/SLP PROGRESS
Physical Therapy Treatment    Patient Name:  Willow Giron   MRN:  [id number]    Recommendations:     Discharge Recommendations:  nursing facility, skilled   Discharge Equipment Recommendations:  (defer to SNF)   Barriers to discharge: Decreased caregiver support    Assessment:     Willow Giron is a 87 y.o. female admitted with a medical diagnosis of Hyponatremia.  She presents with the following impairments/functional limitations:  weakness, impaired endurance, impaired functional mobilty, gait instability, impaired balance, decreased ROM, impaired coordination pt with much improved cognitive and mobility status,pt remains weak and requires SBA for safety,pt will benefit from continued rx.    Rehab Prognosis:  Good; patient would benefit from acute skilled PT services to address these deficits and reach maximum level of function.      Recent Surgery: * No surgery found *      Plan:     During this hospitalization, patient to be seen 6 x/week to address the above listed problems via gait training, therapeutic activities, therapeutic exercises  · Plan of Care Expires:  03/21/18   Plan of Care Reviewed with: patient    Subjective     Communicated with nsg prior to session.  Patient found supine upon PT entry to room, agreeable to treatment.      Chief Complaint: n/a  Patient comments/goals: pt is feeling much better today and has assistance of her  at home  Pain/Comfort:  · Pain Rating 1: 0/10    Patients cultural, spiritual, Zoroastrian conflicts given the current situation: None verbalized to PT    Objective:     Patient found with: peripheral IV     General Precautions: Standard, fall, seizure   Orthopedic Precautions:N/A   Braces: N/A     Functional Mobility:  · Bed Mobility:     · Supine to Sit: contact guard assistance and minimum assistance  · Transfers:     · Sit to Stand:  contact guard assistance with rolling walker  · Bed to Chair: contact guard assistance with  rolling walker  using   ambulation  · Gait: amb ~20' X 2 and ~10' X 1 with RW and CGA X 2 seated rests  · Balance: fair sitting balance      AM-PAC 6 CLICK MOBILITY  Turning over in bed (including adjusting bedclothes, sheets and blankets)?: 3  Sitting down on and standing up from a chair with arms (e.g., wheelchair, bedside commode, etc.): 3  Moving from lying on back to sitting on the side of the bed?: 3  Moving to and from a bed to a chair (including a wheelchair)?: 3  Need to walk in hospital room?: 3  Climbing 3-5 steps with a railing?: 2  Total Score: 17       Therapeutic Activities and Exercises: le supine ex's X 10-12 reps inc: ap,qs,hs,abd/add,slr       Patient left up in chair with all lines intact, call button in reach and nsg notified..    GOALS: see general POC   Physical Therapy Goals        Problem: Physical Therapy Goal    Goal Priority Disciplines Outcome Goal Variances Interventions   Physical Therapy Goal     PT/OT, PT Ongoing (interventions implemented as appropriate)     Description:  Goals to be met by: 3/21/18     Patient will increase functional independence with mobility by performin) Sup <>Sit with Mod I with RW  2) Sit <>Stand with Mod I with RW   3) Bed <>chair mod I with RW  4) ambulate 150 feet Mod I with RW  5) perform LE therex X10 with independence.                       Time Tracking:     PT Received On: 18  PT Start Time: 1304     PT Stop Time: 1332  PT Total Time (min): 28 min     Billable Minutes: Gait Training 17 and Therapeutic Exercise 11    Treatment Type: Treatment  PT/PTA: PTA     PTA Visit Number: 2     Jesús Mejia, PTA  2018

## 2018-02-20 NOTE — PROGRESS NOTES
[facility] Resident Progress Note    Subjective:      Willow Giron is a 87 y.o. female who is being followed by the [facility] service at [facility] for hyponatremia and dizziness for one day.    Yesterday, pt's mental status progressively worsened with severe derailment thought disorder with constant talking, milton-like. Stat CT head done, with no acute process but shows sinus disease. MRI Brain ordered to be done today, and Psych consulted for further recs. DDx include delirium d/t infection vs hyponatremia vs benzo withdrawal. Stat BMP with Na of 122. Fluids stopped. Nephrology consulted for further hyponatremia mgmt.     Last night, pt was sleeping when nurse went to give night-time meds. Pt's son requested to hold off and allow pt to sleep. Held meds included Zocor, Clinda, Celebrex, Seroquel. Pt slept throughout the night without any agitation or distress.      Pt says she is feeling much better, and slept well. Family at bedside says she is almost at baseline now. She completed her MRI this morning without difficulty.  Denies HA, nasal congestion, SOB, chest pain, N/V, and abdominal pain. Reports mild intermittent rhinorrhea.     Objective:   Last 24 Hour Vital Signs:  BP  Min: 140/74  Max: 193/83  Temp  Av.6 °F (37 °C)  Min: 97.6 °F (36.4 °C)  Max: 99.6 °F (37.6 °C)  Pulse  Av.5  Min: 68  Max: 87  Resp  Av.8  Min: 17  Max: 18  SpO2  Av.5 %  Min: 95 %  Max: 96 %  I/O last 3 completed shifts:  In: 300 [P.O.:300]  Out: 1900 [Urine:1900]    Physical Examination:  General: Alert, NAD, sitting up with HOB at 60degrees talking with her granddaughter  HEENT: NC/AT, EOMI, PERRL, MMM, clear speech, occasional sniffle  Resp: normal effort, lungs CTA b/l, speaking in full sentences  CV: RRR, no murmur or gallop, 2+ b/l radial and DP pulses  Abdomen: soft, nontender, nondistended, normoactive bowel sounds  Extremities: no cyanosis or clubbing, no edema of b/l  LE,Skin: dry, warm, non-diaphoretic, mild flat nontender erythematous rash over posterior right calf and knee- improved from yesterday; echymosis over dorsal left hand and wrist  Neuro: AOx3, clear speech with appropriate answers, no asterixis, equal b/l UE and LE strength, no focal deficits    Laboratory:  Laboratory Data Reviewed: yes  Pertinent Findings:  Trended Lab Data:     Recent Labs  Lab 02/18/18  0442  02/19/18  0414  02/20/18  0010 02/20/18  0658 02/20/18  1257   WBC 4.85  --  6.31  --   --  7.88  --    HGB 11.8*  --  11.3*  --   --  10.4*  --    HCT 32.6*  --  32.5*  --   --  30.2*  --      --  285  --   --  235  --    MCV 88  --  89  --   --  91  --    RDW 11.3*  --  11.6  --   --  11.6  --    *  < > 125*  125*  < > 124* 125* 124*   K 3.2*  < > 3.8  3.8  < > 4.0 4.1 4.5   CL 86*  < > 89*  90*  < > 94* 95 93*   CO2 30*  < > 25  25  < > 22* 24 23   BUN 11  < > 9  9  < > 6* 8 10   CREATININE 1.0  < > 0.8  0.8  < > 0.7 0.7 0.8     < > 131*  135*  < > 116* 103 127*   CALCIUM 9.2  < > 9.0  9.1  < > 8.4* 8.6* 8.8   MG 1.9  --  1.9  --   --  1.6  --    PHOS 3.2  --  2.6*  --   --  3.2  --    PROT 6.5  --  6.2  < > 5.5* 5.6* 5.8*   ALBUMIN 3.8  --  3.6  < > 3.1* 3.2* 3.3*   BILITOT 0.6  --  0.5  < > 0.4 0.5 0.5   AST 18  --  17  < > 15 16 19   ALKPHOS 61  --  56  < > 53* 52* 58   ALT 14  --  12  < > 10 11 13   < > = values in this interval not displayed.   Trended Cardiac Data:     Recent Labs  Lab 02/17/18  1222   TROPONINI 0.007          Thyroid:   Lab Results   Component Value Date    TSH 0.735 02/19/2018     Anemia:   Lab Results   Component Value Date    IRON 65 02/18/2018    TIBC 383 02/18/2018    FERRITIN 141 02/18/2018    BPOMZBHN89 854 02/19/2018    FOLATE 9.4 02/19/2018   Urinalysis:   Lab Results   Component Value Date    COLORU Yellow 02/17/2018    SPECGRAV <=1.005 (A) 02/17/2018    NITRITE Negative 02/17/2018    KETONESU Negative 02/17/2018    UROBILINOGEN Negative  02/17/2018       Microbiology Data Reviewed: yes  Pertinent Findings:  none    Other Results:  EKG (my interpretation):no new  Radiology Data Reviewed: yes  Pertinent Findings:  MRI Brain w and wo Contrast (2/20)-  Findings:  There is prominence of the extra-axial CSF space overlying the frontal lobes bilaterally. Findings are suggestive of either bilateral frontal atrophy or bilateral chronic subdural hygromas.  In the deep white matter of the right frontal lobe and extending into the right anterior basal ganglia there are changes from encephalomalacia with volume loss and ipsilateral dilatation of the right frontal horn. There is also a focal susceptibility change in the caudate nucleus and lentiform nucleus, suggesting an old resolved hemorrhagic infarction. In addition in the deep white matter all the centrum bilaterally there are numerous scattered T2 hyperintensities without restricted diffusion or abnormal enhancement or abnormal mass effects, most likely from chronic microvascular ischemia.  Within the parenchyma there is no abnormal enhancement or restricted diffusion to suggest acute infarctions or neoplasms. There is no midline shift or herniations. The temporal horns are symmetric and normal.  There is cerebellar atrophy. In the posterior fossa no acute infarctions or neoplasms or extra-axial enhancing masses is noted.  The flow-voids of the major vessels is normal. There are post operative changes from cataract surgery. Air-fluid levels in the sphenoid sinuses from acute sphenoid sinusitis noted bilaterally.   Impression   1. 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  2. Periventricular white matter changes from chronic microvascular ischemia.  3. 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  4. Acute sphenoid sinusitis.       CT Head w/o Contrast (2/19)-  Findings:  Remote  right basal ganglia lacunar infarcts, unchanged.  There is no evidence of acute infarct, hemorrhage, or mass.  There is ventricular and sulcal enlargement consistent with generalized atrophy.  No mass-effect or midline shift.  There is enlargement of the extra-axial CSF spaces along the frontal lobes, unchanged. Mild to moderate decreased supratentorial white matter attenuation most likely related to chronic nonspecific small vessel disease.  Air-fluid levels in the sphenoid sinuses, unchanged.  The remaining paranasal sinuses and mastoid air cells are clear.    The calvarium appears intact.  .   Impression   No acute intracranial abnormalities.  Stable chronic findings, as above.  Sphenoid sinus disease with air-fluid levels, unchanged.       Current Medications:     Infusions:       Scheduled:   amLODIPine  10 mg Oral Daily    celecoxib  200 mg Oral BID    clindamycin  150 mg Oral Q6H    docusate sodium  100 mg Oral BID    fluticasone  2 spray Each Nare BID    heparin (porcine)  5,000 Units Subcutaneous Q8H    lisinopril  40 mg Oral Daily    pantoprazole  40 mg Oral Daily    simvastatin  40 mg Oral QHS    sodium chloride  1 g Oral BID WM        PRN:  acetaminophen, hydrocodone-acetaminophen 7.5-325mg, labetalol, ramelteon, simethicone, sodium chloride 0.9%, DIPH,PERTUS (ADACEL),TETANUS PF VAC (ADULT)    Antibiotics and Day Number of Therapy:  Clindamycin- 2/18-current  Vanc- x1 dose on 2/19    Lines and Day Number of Therapy:  PIV    Assessment:     Willow Giron is a 87 y.o.female with  Patient Active Problem List    Diagnosis Date Noted    Acute sphenoidal sinusitis 02/20/2018    Essential hypertension 02/17/2018    Hyperlipidemia 02/17/2018    GERD (gastroesophageal reflux disease) 02/17/2018    Non-intractable vomiting with nausea 02/17/2018        Plan:     Altered Mental Status with insomnia and derailment thought disorder, likely 2/2 delirium vs hyponatremia vs benzo withdrawal vs  infection   -Yesterday morning (2/19), pt was AOx3 but had odd speech content. Over the course of the day, she progressively worsened with AOx2 (self, time, but unable to tell place or situation), non-stop derailment-pattern speech. Family reports she has not slept since admission.   -takes 1/2 tablet of Xanax (0.5mg tablet possibly) BID every day, but did not continue on admit due to concern for sedation especially in setting of severe hyponatremia  -delirium precautions in place, no medications that could cause this, no known alcohol or drug abuse, no signs of withdrawal, CT head negative for acute abnormality, but with age-advanced cerebral volume loss  -TSH wnl, random cortisol wnl, Vit B12 level WNL, folate  -Psychiatry consulted for evaluation and treatment, appreciate recs  -started seroquel 25mg qhs last night as pt had not slept since admit, but pt fell asleep last night before taking any of her night meds and family did not want to wake her  -Stat CT Head yesterday for concerns of bleed or other CNS pathology- imaging unchanged, but shows sinus disease  -MRI Brain with no acute changes but shows acute sphenoid sinusitis  -pt is near baseline this morning and continuing to improve    Hyponatremia  -114 in Ed, now 125, Na increasing appropriately with D5 NS @ 100mL/hr  -urine sodium 21, urine Cr 23.6, urine osmolality 248  -held fluids after her Na decreased to 122 from 125 with normal saline  -consulted Nephrology for further mgmt recs- no signs of SIADH, plan to increase serum Na by 8 mmol/L/day, start Salt tab 1g BID with meals, increase free water intake to 1.5L, get urine Osm today and tomorrow morning, would not give lasix, continue serum Na q6h  -Na improved to 124-125 this morning. Will initiate Nephrology recs     Hypokalemia (resolved)  -likely secondary to diarrhea   -K 2.9 on presentation, 4.1 this morning  -replete as needed      Hypochloridemia, improving  -likely secondary to diarrhea  -76  initially, 95 today    Acute Sphenoid Sinusitis  -seen on both CT Head and MRI Brain  -pt reports intermittent rhinorrhea but denies nasal congestion or sinus pain  -already on clindamycin for erysipelas, will start flonase and nasal saline BID x7 days then decrease to daily.    Weakness, dizzines  -on admission, c/o weakness and dizziness for ~1 week  -likely due to hyponatremia and dehydration  -PT/OT consulted, recommend SNF  -SW is working on SNF placement now     HTN  -189/79 on presentation (hadn't taken home BP meds)  -remaining elevated, continuing home meds (lisinopril 40mg and amlodipine 10mg), added PRN labetalol for systolic >180  -will likely need to add another antihypertensive if elevated BP continues    R LE Erysipelas  -started on clindamycin, improving  -no evidence of DVTs on ultrasound      HLD  -continue home simvastatin 40mg daily     GERD  -protonix 40mg daily     Arthritis  -tylenol PRN  -continue home celecoxib, norco PRN     Normocytic Anemia  -H/H 11.9/33, MCV 87  -iron studies: iron 65, TIBC 383, Sat 17, Transferrin 259, Ferritin 141      Diet: cardiac fluid  Ppx: heparin  Code: Full    Dispo: pending correction of sodium, Nephrology following, Psych consulted, PT/OT following, SHEEBA working on SNF       Estefani Bennett DO  [facility] Internal Medicine/Pediatrics- PGY2  [facility] Internal [facility] Service Team B  2/20/2018, 2:23 PM      [facility] Hospitalist Pager numbers:   [facility] Hospitalist Medicine Team A (Adrien/Rufino):   [facility] Hospitalist Medicine Team B (Emily/Mekhi):  [phone]

## 2018-02-20 NOTE — PLAN OF CARE
Problem: Patient Care Overview  Goal: Plan of Care Review  Outcome: Ongoing (interventions implemented as appropriate)  Pt on RA with sats of 98. Will continue to monitor.

## 2018-02-20 NOTE — PLAN OF CARE
"Spoke with Dr. Saez and informed her that pt was sleeping when nurse went to administer pm medications and family member at bedside requested to let her sleep stating "she finally just fell asleep and hasn't slept since Friday."  was ok with letting the pt sleep and holding off on pm meds. VSS. Will continue to monitor.   "

## 2018-02-21 PROBLEM — R11.2 NON-INTRACTABLE VOMITING WITH NAUSEA: Status: RESOLVED | Noted: 2018-02-17 | Resolved: 2018-02-21

## 2018-02-21 LAB
ALBUMIN SERPL BCP-MCNC: 3.2 G/DL
ALBUMIN SERPL BCP-MCNC: 3.2 G/DL
ALBUMIN SERPL BCP-MCNC: 3.3 G/DL
ALBUMIN SERPL BCP-MCNC: 3.3 G/DL
ALP SERPL-CCNC: 54 U/L
ALP SERPL-CCNC: 57 U/L
ALP SERPL-CCNC: 58 U/L
ALP SERPL-CCNC: 64 U/L
ALT SERPL W/O P-5'-P-CCNC: 11 U/L
ALT SERPL W/O P-5'-P-CCNC: 12 U/L
ALT SERPL W/O P-5'-P-CCNC: 12 U/L
ALT SERPL W/O P-5'-P-CCNC: 13 U/L
ANION GAP SERPL CALC-SCNC: 6 MMOL/L
ANION GAP SERPL CALC-SCNC: 6 MMOL/L
ANION GAP SERPL CALC-SCNC: 8 MMOL/L
ANION GAP SERPL CALC-SCNC: 9 MMOL/L
AST SERPL-CCNC: 16 U/L
AST SERPL-CCNC: 17 U/L
AST SERPL-CCNC: 17 U/L
AST SERPL-CCNC: 18 U/L
BASOPHILS # BLD AUTO: 0.03 K/UL
BASOPHILS NFR BLD: 0.7 %
BILIRUB SERPL-MCNC: 0.5 MG/DL
BUN SERPL-MCNC: 12 MG/DL
BUN SERPL-MCNC: 13 MG/DL
BUN SERPL-MCNC: 14 MG/DL
BUN SERPL-MCNC: 16 MG/DL
CALCIUM SERPL-MCNC: 8.5 MG/DL
CALCIUM SERPL-MCNC: 8.5 MG/DL
CALCIUM SERPL-MCNC: 8.8 MG/DL
CALCIUM SERPL-MCNC: 8.8 MG/DL
CHLORIDE SERPL-SCNC: 92 MMOL/L
CHLORIDE SERPL-SCNC: 93 MMOL/L
CHLORIDE SERPL-SCNC: 93 MMOL/L
CHLORIDE SERPL-SCNC: 94 MMOL/L
CO2 SERPL-SCNC: 22 MMOL/L
CO2 SERPL-SCNC: 22 MMOL/L
CO2 SERPL-SCNC: 23 MMOL/L
CO2 SERPL-SCNC: 26 MMOL/L
CREAT SERPL-MCNC: 0.7 MG/DL
CREAT SERPL-MCNC: 1 MG/DL
CREAT UR-MCNC: 152.9 MG/DL
DIFFERENTIAL METHOD: ABNORMAL
EOSINOPHIL # BLD AUTO: 0.1 K/UL
EOSINOPHIL NFR BLD: 2.7 %
ERYTHROCYTE [DISTWIDTH] IN BLOOD BY AUTOMATED COUNT: 11.6 %
EST. GFR  (AFRICAN AMERICAN): 59 ML/MIN/1.73 M^2
EST. GFR  (AFRICAN AMERICAN): >60 ML/MIN/1.73 M^2
EST. GFR  (NON AFRICAN AMERICAN): 51 ML/MIN/1.73 M^2
EST. GFR  (NON AFRICAN AMERICAN): >60 ML/MIN/1.73 M^2
GLUCOSE SERPL-MCNC: 106 MG/DL
GLUCOSE SERPL-MCNC: 106 MG/DL
GLUCOSE SERPL-MCNC: 107 MG/DL
GLUCOSE SERPL-MCNC: 119 MG/DL
HCT VFR BLD AUTO: 30.4 %
HGB BLD-MCNC: 10.5 G/DL
LYMPHOCYTES # BLD AUTO: 1.3 K/UL
LYMPHOCYTES NFR BLD: 31.5 %
MAGNESIUM SERPL-MCNC: 1.7 MG/DL
MCH RBC QN AUTO: 31.3 PG
MCHC RBC AUTO-ENTMCNC: 34.5 G/DL
MCV RBC AUTO: 91 FL
MONOCYTES # BLD AUTO: 0.5 K/UL
MONOCYTES NFR BLD: 13.1 %
NEUTROPHILS # BLD AUTO: 2.1 K/UL
NEUTROPHILS NFR BLD: 51.8 %
OSMOLALITY UR: 368 MOSM/KG
OSMOLALITY UR: 558 MOSM/KG
PHOSPHATE SERPL-MCNC: 3.6 MG/DL
PLATELET # BLD AUTO: 237 K/UL
PMV BLD AUTO: 8.7 FL
POTASSIUM SERPL-SCNC: 4 MMOL/L
POTASSIUM SERPL-SCNC: 4 MMOL/L
POTASSIUM SERPL-SCNC: 4.1 MMOL/L
POTASSIUM SERPL-SCNC: 4.5 MMOL/L
PROT SERPL-MCNC: 5.7 G/DL
PROT SERPL-MCNC: 5.8 G/DL
PROT SERPL-MCNC: 5.9 G/DL
PROT SERPL-MCNC: 6.1 G/DL
RBC # BLD AUTO: 3.35 M/UL
SODIUM SERPL-SCNC: 123 MMOL/L
SODIUM SERPL-SCNC: 123 MMOL/L
SODIUM SERPL-SCNC: 124 MMOL/L
SODIUM SERPL-SCNC: 124 MMOL/L
UUN UR-MCNC: 365 MG/DL
UUN UR-MCNC: 852 MG/DL
WBC # BLD AUTO: 4.06 K/UL

## 2018-02-21 PROCEDURE — 84540 ASSAY OF URINE/UREA-N: CPT

## 2018-02-21 PROCEDURE — 97116 GAIT TRAINING THERAPY: CPT

## 2018-02-21 PROCEDURE — 82570 ASSAY OF URINE CREATININE: CPT

## 2018-02-21 PROCEDURE — 63600175 PHARM REV CODE 636 W HCPCS: Performed by: STUDENT IN AN ORGANIZED HEALTH CARE EDUCATION/TRAINING PROGRAM

## 2018-02-21 PROCEDURE — 83935 ASSAY OF URINE OSMOLALITY: CPT

## 2018-02-21 PROCEDURE — 25000003 PHARM REV CODE 250: Performed by: STUDENT IN AN ORGANIZED HEALTH CARE EDUCATION/TRAINING PROGRAM

## 2018-02-21 PROCEDURE — 11000001 HC ACUTE MED/SURG PRIVATE ROOM

## 2018-02-21 PROCEDURE — 80053 COMPREHEN METABOLIC PANEL: CPT

## 2018-02-21 PROCEDURE — 97530 THERAPEUTIC ACTIVITIES: CPT

## 2018-02-21 PROCEDURE — 83735 ASSAY OF MAGNESIUM: CPT

## 2018-02-21 PROCEDURE — A4216 STERILE WATER/SALINE, 10 ML: HCPCS | Performed by: STUDENT IN AN ORGANIZED HEALTH CARE EDUCATION/TRAINING PROGRAM

## 2018-02-21 PROCEDURE — 84100 ASSAY OF PHOSPHORUS: CPT

## 2018-02-21 PROCEDURE — 36415 COLL VENOUS BLD VENIPUNCTURE: CPT

## 2018-02-21 PROCEDURE — 94761 N-INVAS EAR/PLS OXIMETRY MLT: CPT

## 2018-02-21 PROCEDURE — 99222 1ST HOSP IP/OBS MODERATE 55: CPT | Mod: ,,, | Performed by: OTOLARYNGOLOGY

## 2018-02-21 PROCEDURE — 97535 SELF CARE MNGMENT TRAINING: CPT

## 2018-02-21 PROCEDURE — 85025 COMPLETE CBC W/AUTO DIFF WBC: CPT

## 2018-02-21 RX ORDER — FLUTICASONE PROPIONATE 50 MCG
2 SPRAY, SUSPENSION (ML) NASAL 2 TIMES DAILY
Qty: 1 BOTTLE | Refills: 0 | Status: SHIPPED | OUTPATIENT
Start: 2018-02-21 | End: 2018-02-23

## 2018-02-21 RX ORDER — MOXIFLOXACIN HYDROCHLORIDE 400 MG/1
400 TABLET ORAL DAILY
Status: DISCONTINUED | OUTPATIENT
Start: 2018-02-21 | End: 2018-02-23 | Stop reason: HOSPADM

## 2018-02-21 RX ADMIN — LISINOPRIL 40 MG: 20 TABLET ORAL at 08:02

## 2018-02-21 RX ADMIN — FLUTICASONE PROPIONATE 100 MCG: 50 SPRAY, METERED NASAL at 09:02

## 2018-02-21 RX ADMIN — CLINDAMYCIN HYDROCHLORIDE 150 MG: 150 CAPSULE ORAL at 05:02

## 2018-02-21 RX ADMIN — PANTOPRAZOLE SODIUM 40 MG: 40 TABLET, DELAYED RELEASE ORAL at 08:02

## 2018-02-21 RX ADMIN — HEPARIN SODIUM 5000 UNITS: 5000 INJECTION, SOLUTION INTRAVENOUS; SUBCUTANEOUS at 05:02

## 2018-02-21 RX ADMIN — LABETALOL HYDROCHLORIDE 10 MG: 5 INJECTION, SOLUTION INTRAVENOUS at 12:02

## 2018-02-21 RX ADMIN — DOCUSATE SODIUM 100 MG: 100 CAPSULE, LIQUID FILLED ORAL at 09:02

## 2018-02-21 RX ADMIN — CLINDAMYCIN HYDROCHLORIDE 150 MG: 150 CAPSULE ORAL at 11:02

## 2018-02-21 RX ADMIN — SODIUM CHLORIDE TAB 1 GM 2 G: 1 TAB at 05:02

## 2018-02-21 RX ADMIN — SODIUM CHLORIDE TAB 1 GM 2 G: 1 TAB at 08:02

## 2018-02-21 RX ADMIN — CELECOXIB 200 MG: 100 CAPSULE ORAL at 08:02

## 2018-02-21 RX ADMIN — LIDOCAINE HYDROCHLORIDE: 20 SOLUTION ORAL; TOPICAL at 09:02

## 2018-02-21 RX ADMIN — MOXIFLOXACIN HYDROCHLORIDE 400 MG: 400 TABLET, FILM COATED ORAL at 11:02

## 2018-02-21 RX ADMIN — SODIUM CHLORIDE, PRESERVATIVE FREE 5 ML: 5 INJECTION INTRAVENOUS at 09:02

## 2018-02-21 RX ADMIN — AMLODIPINE BESYLATE 10 MG: 5 TABLET ORAL at 08:02

## 2018-02-21 RX ADMIN — HEPARIN SODIUM 5000 UNITS: 5000 INJECTION, SOLUTION INTRAVENOUS; SUBCUTANEOUS at 09:02

## 2018-02-21 RX ADMIN — SIMVASTATIN 40 MG: 20 TABLET, FILM COATED ORAL at 09:02

## 2018-02-21 RX ADMIN — HEPARIN SODIUM 5000 UNITS: 5000 INJECTION, SOLUTION INTRAVENOUS; SUBCUTANEOUS at 01:02

## 2018-02-21 RX ADMIN — FLUTICASONE PROPIONATE 100 MCG: 50 SPRAY, METERED NASAL at 08:02

## 2018-02-21 RX ADMIN — DOCUSATE SODIUM 100 MG: 100 CAPSULE, LIQUID FILLED ORAL at 08:02

## 2018-02-21 RX ADMIN — CELECOXIB 200 MG: 100 CAPSULE ORAL at 09:02

## 2018-02-21 NOTE — PHYSICIAN QUERY
PT Name: Willow Giron  MR #: [id number]    Physician Query Form - Neurological Condition Clarification       CDS/: Robyn Monaco               Contact information:    This form is a permanent document in the medical record.     Query Date: February 21, 2018    By submitting this query, we are merely seeking further clarification of documentation. Please utilize your independent clinical judgment when addressing the question(s) below.    The Medical record contains the following:   Indicators   Supporting Clinical Findings Location in Medical Record   x AMS, Confusion, LOC, etc. Then last night, she fell asleep at dinner table leaning forward; when she hadn't come to bed that night her   found her sleeping at the table and she was hard to arouse at that time then fell out of the chair  ED she was found to have a Na of 114, K of 2.9, cl of 76    -Yesterday morning (2/19), pt was AOx3 but had odd speech content. Over the course of the day, she progressively worsened with AOx2 (self, time, but unable to tell place or situation), non-stop derailment-pattern speech. Family reports she has not  slept since admission.     Pt says she is feeling much better, and slept well. Son is at the bedside says she is almost at baseline now. Alert and oriented x4, denying any complaints      Pt says she is feeling much better, and slept well. Son is at the bedside says she is almost at baseline now. Alert and oriented x4, denying any complaints 2/21 Hosp med note   x Acute / Chronic Illness Hyponateremia  Hypokalemia  Hypocloridemia  Acute sphenoidal sinusitis    hypovolemia hypotonic hyponatremia etiology d/t N/V/D on admission but now its behaving like SIADH (high urine osmolality and low serum osmo)   2/21 Hosp med note          2/21 Renal consult   x Radiology Findings MRI Brain with no acute changes but shows acute sphenoid sinusitis    2/21 Hosp med note   x Electrolyte Imbalance Na= 114, 120, 120, 123, 122,  125, 125, 124, 123, 125, 121  K= 2.9, 3.2, 3.4  [place]= 76, 82, 85, 86   2/18- 2/19 Lab   x Medication Sodium cloride tablet 2 times daily with meals  Clindamycin  q 6hours  Vancomycin IVPB once  Potassium cloride 20meq IV   KCl 20meq x4 doses  D5 NS at 100cc hr  NS at 150cc/hr    2/20- 2/21  MAR    2/18 MAR    2/17- 2/18 MAR  2/19 MAR  2/18 MAR   x Treatment Renal consult  CT head  MRI head  -250cc NS bolus  -120 on repeat x 2  -fluid restriction   -urine sodium 21, urine Cr 23.6, urine osmolality 248  -BMP q4  40 meq oral in ED    2/18- 2/21 NSG orders    Other       Provider, please specify the diagnosis or diagnoses associated with above clinical findings.    [   ] Metabolic Encephalopathy    [  ] Toxic Encephalopathy    [  ] Other Encephalopathy    [X  ] Unspecified Encephalopathy    [  ] Other (please specify): _____________________________________    [  ] Clinically Undetermined      Please document in your progress notes daily for the duration of treatment until resolved, and  include in your discharge summary.

## 2018-02-21 NOTE — PROGRESS NOTES
Progress Note  [facility]    Admit Date: 2/17/2018   LOS: 4 days     SUBJECTIVE:     Follow-up For:  hyponatremia    Review of Systems:  She feels better this morning  She received her first salt tab at 2 pm and after two hrs her serum Na was 121. Which prompted increase in salt tab 2 grams  Na 125 on salt tab 2 grams twice daily    OBJECTIVE:     Vital Signs (Most Recent)  Temp: 98.8 °F (37.1 °C) (02/21/18 0708)  Pulse: 63 (02/21/18 0752)  Resp: 17 (02/21/18 0708)  BP: (!) 191/81 (02/21/18 0708)  SpO2: 97 % (02/21/18 0853)    Vital Signs Range (Last 24H):  Temp:  [98.2 °F (36.8 °C)-99 °F (37.2 °C)]   Pulse:  [61-71]   Resp:  [12-18]   BP: (130-191)/(58-81)   SpO2:  [95 %-97 %]     I & O (Last 24H):  Intake/Output Summary (Last 24 hours) at 02/21/18 0932  Last data filed at 02/21/18 0051   Gross per 24 hour   Intake              300 ml   Output             1775 ml   Net            -1475 ml     Physical Exam:  NAD  No murmurs  Lungs are clear  Abdomen is soft +BS  Ext: no edema    Laboratory:  CBC:    Recent Labs  Lab 02/21/18  0637   WBC 4.06   RBC 3.35*   HGB 10.5*   HCT 30.4*        BMP:    Recent Labs  Lab 02/21/18  0636   *   K 4.5   CL 92*   CO2 26   BUN 13   CREATININE 0.7   CALCIUM 8.8      Urine osmo: 368  Serum osmo: 263        ASSESSMENT/PLAN:        A 86 y/o with PMHx of HTN, dyslipidemia, IBS presents to ER with near syncope found to have hypovolemia hypotonic hyponatremia d/t N/V/D. Her urine Na was 21 which might be an over representation. She might have taken lasix that day.      1- hypovolemia hypotonic hyponatremia etiology d/t N/V/D on admission but now its behaving like SIADH (high urine osmolality and low serum osmo)     Plan:   -- salt tablet 2 gram p.o twice a day with meals (each salt tablet 1 gram is equivalent of Na 154 mmol--> one NS 0.9% has 154 mmol/L)   -- serum Na q6 hrs  -- plan to increase serum Na by 8 mmol/L/day  -- repeat urine osmo am  -- will consider lasix 20 mg  p.o twice daily to assist with free water excretion  -- unfortunately we have to limit her water intake to 1 liter per day    Brady Castillo MD  [facility] PGY-5

## 2018-02-21 NOTE — PLAN OF CARE
Problem: Occupational Therapy Goal  Goal: Occupational Therapy Goal  Goals to be met by: 03/18/18     Patient will increase functional independence with ADLs by performing:    Feeding with Set-up Assistance. --MET 2/21  UE Dressing with Supervision.  LE Dressing with Minimal Assistance.  Grooming while standing at sink with Stand-by Assistance.  Toileting from toilet with Contact Guard Assistance for hygiene and clothing management.   Toilet transfer to toilet with Contact Guard Assistance.  Increased functional strength to WFL for ADLs. --MET 2/21     Outcome: Ongoing (interventions implemented as appropriate)  Patient continues to improve with mentation and activity participation/tolerance. Will benefit from continued skilled OT to address functional deficits. Recommend SNF upon D/C to maximize independence and safety with ADL tasks.

## 2018-02-21 NOTE — PROGRESS NOTES
sent progress notes to  [facility] via [facility].

## 2018-02-21 NOTE — ASSESSMENT & PLAN NOTE
No evidence of complicated acute sphenoidal sinusitis. Continue Clindamycin as course has already been started for erysipelas, consider addition of respiratory quinolone which has better coverage for acute sinusitis. Continue Flonase daily.  Continue nasal saline irrigations BID.  Follow up in my office in 2 weeks.

## 2018-02-21 NOTE — PLAN OF CARE
TN spoke with patient's daughter Aliya. She stated they toured [facility], and they definitely want that as their choice for SNF. TN informed her will notify SW. Patient is not ready yet for discharge. Once patient is medically accepted, will have to get authorization from insurance for SNF. Patient's daughter is aware and agreeable.     --Update: TN went to meet with patient, son Scott in room. Patient sitting in chair at this time. Patient and Scott states they are all in agreement for SNF at [facility]. TN informed them once medically ready, will have to get insurance approval. They are all in agreement and verbalized understanding.     02/21/18 141   Discharge Reassessment   Assessment Type Discharge Planning Reassessment   Provided patient/caregiver education on the expected discharge date and the discharge plan No   Do you have any problems affording any of your prescribed medications? No   Discharge Plan A Skilled Nursing Facility   Discharge Plan B Home with family;Home Health   Patient choice form signed by patient/caregiver N/A   Can the patient answer the patient profile reliably? Yes, cognitively intact   How does the patient rate their overall health at the present time? Good   Describe the patient's ability to walk at the present time. Walks with the help of equipment   How often would a person be available to care for the patient? Often     Jennifer Guerrier RN  Transition Navigator  [phone]

## 2018-02-21 NOTE — PT/OT/SLP PROGRESS
Occupational Therapy   Treatment    Name: Willow Giron  MRN: [id number]  Admitting Diagnosis:  Hyponatremia       Recommendations:     Discharge Recommendations: nursing facility, skilled  Discharge Equipment Recommendations:   (Defer to SNF)  Barriers to discharge:  Decreased caregiver support    Subjective     Communicated with: nurseSharon prior to session.  Pain/Comfort:  · Pain Rating 1: 0/10  · Pain Rating Post-Intervention 1: 0/10    Patients cultural, spiritual, Restoration conflicts given the current situation:  (none reported)    Objective:     Patient found with: telemetry    General Precautions: Standard, fall, seizure   Orthopedic Precautions:N/A   Braces: N/A     Bed Mobility:    · Patient completed Scooting/Bridging with stand by assistance  · Patient completed Supine to Sit with stand by assistance and with side rail     Functional Mobility/Transfers:  · Patient completed Sit <> Stand Transfer with contact guard assistance  with  no assistive device   · Patient completed Bed <> Chair Transfer using Step Transfer technique with contact guard assistance with no assistive device    Activities of Daily Living:  · Feeding:  supervision    · Grooming: stand by assistance      Patient left up in chair with all lines intact, call button in reach, RN notified and son present    [facility] 6 Click:  [facility] Total Score: 18    Treatment & Education:  Patient eating breakfast with set-up (A) -- minimal PO intake noted. Agreeable to OOB activity. Bed mob as noted above. Patient able to stand and t/f to chair with CGA and VCs. In bedside chair, patient completed facial hygiene, oral care, and hair grooming with set-up and SBA. Patient with use of BUEs to perform tasks, despite R sh subluxation. Patient left in chair and PCT notified regarding patient's desire for bath.  Education:    Assessment:   Patient continues to improve with mentation and activity participation/tolerance. Will benefit from continued skilled  OT to address functional deficits. Recommend SNF upon D/C to maximize independence and safety with ADL tasks.    Willow Giron is a 87 y.o. female with a medical diagnosis of Hyponatremia.  Performance deficits affecting function are weakness, impaired endurance, impaired self care skills, impaired functional mobilty, gait instability, impaired balance, decreased upper extremity function, decreased ROM, impaired fine motor, decreased coordination.      Rehab Prognosis:  Good; patient would benefit from acute skilled OT services to address these deficits and reach maximum level of function.       Plan:     Patient to be seen 5 x/week to address the above listed problems via self-care/home management, therapeutic activities, therapeutic exercises  · Plan of Care Expires: 03/18/18  · Plan of Care Reviewed with: patient, son    This Plan of care has been discussed with the patient who was involved in its development and understands and is in agreement with the identified goals and treatment plan    GOALS:    Occupational Therapy Goals        Problem: Occupational Therapy Goal    Goal Priority Disciplines Outcome Interventions   Occupational Therapy Goal     OT, PT/OT Ongoing (interventions implemented as appropriate)    Description:  Goals to be met by: 03/18/18     Patient will increase functional independence with ADLs by performing:    Feeding with Set-up Assistance. --MET 2/21  UE Dressing with Supervision.  LE Dressing with Minimal Assistance.  Grooming while standing at sink with Stand-by Assistance.  Toileting from toilet with Contact Guard Assistance for hygiene and clothing management.   Toilet transfer to toilet with Contact Guard Assistance.  Increased functional strength to WFL for ADLs. --MET 2/21                       Time Tracking:     OT Date of Treatment: 02/21/18  OT Start Time: 0933  OT Stop Time: 1000  OT Total Time (min): 27 min    Billable Minutes:Self Care/Home Management 27    Katherine GASTON  JESSICA Cantu/ANTHONY  2/21/2018

## 2018-02-21 NOTE — PROGRESS NOTES
Patient has been medically accepted by [facility] for Skilled level of care pending insurance (PHN)'s authorization. Patient not stable for discharge today.  sent updated notes to Admissions coordinator Soila via [facility].  Family in agreement with plan.

## 2018-02-21 NOTE — SUBJECTIVE & OBJECTIVE
Medications:  Continuous Infusions:  Scheduled Meds:   amLODIPine  10 mg Oral Daily    celecoxib  200 mg Oral BID    clindamycin  150 mg Oral Q6H    docusate sodium  100 mg Oral BID    fluticasone  2 spray Each Nare BID    heparin (porcine)  5,000 Units Subcutaneous Q8H    lisinopril  40 mg Oral Daily    pantoprazole  40 mg Oral Daily    simvastatin  40 mg Oral QHS    sodium chloride  2 g Oral BID WM     PRN Meds:acetaminophen, hydrocodone-acetaminophen 7.5-325mg, labetalol, ramelteon, simethicone, sodium chloride 0.9%, DIPH,PERTUS (ADACEL),TETANUS PF VAC (ADULT)     No current facility-administered medications on file prior to encounter.      Current Outpatient Prescriptions on File Prior to Encounter   Medication Sig    alprazolam (XANAX) 0.5 MG tablet     amlodipine (NORVASC) 10 MG tablet Take 10 mg by mouth once daily.    celecoxib (CELEBREX) 200 MG capsule Take 200 mg by mouth 2 (two) times daily.    furosemide (LASIX) 20 MG tablet     hydrocodone-acetaminophen 7.5-325mg (NORCO) 7.5-325 mg per tablet Take 1 tablet by mouth every 6 (six) hours as needed.    lisinopril (PRINIVIL,ZESTRIL) 40 MG tablet Take 40 mg by mouth once daily.    nabumetone (RELAFEN) 500 MG tablet Take 500 mg by mouth 2 (two) times daily.    omeprazole (PRILOSEC) 20 MG capsule Take 20 mg by mouth once daily.    simvastatin (ZOCOR) 40 MG tablet Take 40 mg by mouth every evening.    tizanidine 4 mg Cap Take 1 capsule by mouth every evening.       Review of patient's allergies indicates:   Allergen Reactions    Pcn [penicillins] Rash       Past Medical History:   Diagnosis Date    Arthritis     GERD (gastroesophageal reflux disease)     Hyperlipidemia     Hypertension      Past Surgical History:   Procedure Laterality Date     SECTION, CLASSIC      HYSTERECTOMY      SHOULDER SURGERY       Family History     None        Social History Main Topics    Smoking status: Former Smoker    Smokeless tobacco: Not on  file    Alcohol use Yes      Comment: social    Drug use: No    Sexual activity: Not on file     Review of Systems   Constitutional: Negative for chills, fever and unexpected weight change.   HENT: Negative for congestion, ear pain, facial swelling, postnasal drip, rhinorrhea, sinus pain and sinus pressure.    Eyes: Negative for photophobia, pain and visual disturbance.   Respiratory: Negative for shortness of breath, wheezing and stridor.    Cardiovascular: Negative for chest pain and palpitations.   Gastrointestinal: Negative for abdominal pain, blood in stool and vomiting.   Musculoskeletal: Negative for gait problem and myalgias.   Skin: Negative for pallor and rash.   Neurological: Negative for seizures, syncope, weakness, light-headedness and headaches.   Hematological: Negative for adenopathy. Does not bruise/bleed easily.   Psychiatric/Behavioral: Negative for confusion and dysphoric mood.     Objective:     Vital Signs (Most Recent):  Temp: 98.8 °F (37.1 °C) (02/21/18 0708)  Pulse: 63 (02/21/18 0708)  Resp: 17 (02/21/18 0708)  BP: (!) 191/81 (02/21/18 0708)  SpO2: 95 % (02/21/18 0014) Vital Signs (24h Range):  Temp:  [98.2 °F (36.8 °C)-99 °F (37.2 °C)] 98.8 °F (37.1 °C)  Pulse:  [61-71] 63  Resp:  [12-18] 17  SpO2:  [95 %-97 %] 95 %  BP: (130-191)/(58-81) 191/81     Weight: 48.6 kg (107 lb 2.3 oz)  Body mass index is 22.39 kg/m².         Physical Exam     Constitutional: Well appearing / communicating.  NAD.  Eyes: EOM I Bilaterally  Head/Face: Normocephalic.  Negative paranasal sinus pressure/tenderness.  Salivary glands WNL.  House Brackmann I Bilaterally.    Right Ear: External Auditory Canal WNL,TM w/o masses/lesions/perforations.  Auricle WNL.  Left Ear: External Auditory Canal WNL,TM w/o masses/lesions/perforations. Auricle WNL.  Nose: No gross nasal septal deviation. Inferior Turbinates 3+ bilaterally. No septal perforation. No masses/lesions. External nasal skin without masses/lesions.  Oral  Cavity: Gingiva/lips WNL.  FOM Soft, no masses palpated. Oral Tongue mobile. Hard Palate WNL.   Oropharynx: BOT WNL. No masses/lesions noted. Tonsillar fossa/pharyngeal wall without lesions. Posterior oropharynx WNL.  Soft palate without masses. Midline uvula.   Neck/Lymphatic: No LAD I-VI bilaterally.  No thyromegaly.  No masses noted on exam.    Mirror laryngoscopy/nasopharyngoscopy: Active gag reflex.  Unable to perform.    Neuro/Psychiatric: AOx3.  Normal mood and affect.   Cardiovascular: Normal carotid pulses bilaterally, no increasing jugular venous distention noted at cervical region bilaterally.    Respiratory: Normal respiratory effort, no stridor, no retractions noted.      Significant Labs:  BMP:   Recent Labs  Lab 02/20/18  0658  02/20/18  2346     < > 106   CL 95  < > 93*   CO2 24  < > 22*   BUN 8  < > 16   CREATININE 0.7  < > 0.7   CALCIUM 8.6*  < > 8.5*   MG 1.6  --   --    < > = values in this interval not displayed.  CBC:   Recent Labs  Lab 02/21/18  0637   WBC 4.06   RBC 3.35*   HGB 10.5*   HCT 30.4*      MCV 91   MCH 31.3*   MCHC 34.5     CMP:   Recent Labs  Lab 02/20/18  2346      CALCIUM 8.5*   ALBUMIN 3.2*   PROT 5.8*   *   K 4.0   CO2 22*   CL 93*   BUN 16   CREATININE 0.7   ALKPHOS 64   ALT 12   AST 17   BILITOT 0.5       Significant Diagnostics:  CT: I have reviewed all pertinent results/findings within the past 24 hours and my personal findings are:  CT head: air-fluid level in bilateral sphenoid sinuses  MRI: I have reviewed all pertinent results/findings within the past 24 hours and my personal findings are:  air-fluid levels in bilateral sphenoid sinuses

## 2018-02-21 NOTE — PROGRESS NOTES
U Internal Medicine Resident Progress Note    Subjective:      Willow Giron is a 87 y.o. female who is being followed by the [facility] IM service at [facility] for hyponatremia and dizziness for one day.     Pt says she is feeling much better, and slept well. Son is at the bedside says she is almost at baseline now. Alert and oriented x4, denying any complaints.  Denies HA, nasal congestion, SOB, chest pain, N/V, and abdominal pain. Reports mild intermittent rhinorrhea.     Objective:   Last 24 Hour Vital Signs:  BP  Min: 130/58  Max: 191/81  Temp  Av.7 °F (37.1 °C)  Min: 98.2 °F (36.8 °C)  Max: 99 °F (37.2 °C)  Pulse  Av.1  Min: 61  Max: 71  Resp  Avg: 15.3  Min: 12  Max: 18  SpO2  Av %  Min: 95 %  Max: 97 %  Weight  Av.6 kg (107 lb 2.3 oz)  Min: 48.6 kg (107 lb 2.3 oz)  Max: 48.6 kg (107 lb 2.3 oz)  I/O last 3 completed shifts:  In: 300 [P.O.:300]  Out: 1775 [Urine:]    Physical Examination:  General: Alert, NAD, sitting up with HOB at 60degrees talking with her granddaughter  HEENT: NC/AT, EOMI, PERRL, MMM, clear speech, occasional sniffle  Resp: normal effort, lungs CTA b/l, speaking in full sentences  CV: RRR, no murmur or gallop, 2+ b/l radial and DP pulses  Abdomen: soft, nontender, nondistended, normoactive bowel sounds  Extremities: no cyanosis or clubbing, no edema of b/l LE,Skin: dry, warm, non-diaphoretic, mild flat nontender erythematous rash over posterior right calf and knee- improved from yesterday; echymosis over dorsal left hand and wrist  Neuro: AOx4, clear speech with appropriate answers, equal strength bilaterally    Laboratory:  Laboratory Data Reviewed: yes  Pertinent Findings:  Trended Lab Data:     Recent Labs  Lab 18  0442  18  0414  18  0658 18  1257 18  1728 18  2346 18  0637   WBC 4.85  --  6.31  --  7.88  --   --   --  4.06   HGB 11.8*  --  11.3*  --  10.4*  --   --   --  10.5*   HCT 32.6*  --   32.5*  --  30.2*  --   --   --  30.4*     --  285  --  235  --   --   --  237   MCV 88  --  89  --  91  --   --   --  91   RDW 11.3*  --  11.6  --  11.6  --   --   --  11.6   *  < > 125*  125*  < > 125* 124* 121* 123*  --    K 3.2*  < > 3.8  3.8  < > 4.1 4.5 4.6 4.0  --    CL 86*  < > 89*  90*  < > 95 93* 93* 93*  --    CO2 30*  < > 25  25  < > 24 23 21* 22*  --    BUN 11  < > 9  9  < > 8 10 13 16  --    CREATININE 1.0  < > 0.8  0.8  < > 0.7 0.8 0.8 0.7  --      < > 131*  135*  < > 103 127* 111* 106  --    CALCIUM 9.2  < > 9.0  9.1  < > 8.6* 8.8 8.4* 8.5*  --    MG 1.9  --  1.9  --  1.6  --   --   --   --    PHOS 3.2  --  2.6*  --  3.2  --   --   --   --    PROT 6.5  --  6.2  < > 5.6* 5.8* 5.7* 5.8*  --    ALBUMIN 3.8  --  3.6  < > 3.2* 3.3* 3.2* 3.2*  --    BILITOT 0.6  --  0.5  < > 0.5 0.5 0.5 0.5  --    AST 18  --  17  < > 16 19 19 17  --    ALKPHOS 61  --  56  < > 52* 58 58 64  --    ALT 14  --  12  < > 11 13 13 12  --    < > = values in this interval not displayed.   Trended Cardiac Data:     Recent Labs  Lab 02/17/18  1222   TROPONINI 0.007          Thyroid:   Lab Results   Component Value Date    TSH 0.735 02/19/2018     Anemia:   Lab Results   Component Value Date    IRON 65 02/18/2018    TIBC 383 02/18/2018    FERRITIN 141 02/18/2018    AAIYFANM16 854 02/19/2018    FOLATE 9.4 02/19/2018   Urinalysis:   Lab Results   Component Value Date    COLORU Yellow 02/17/2018    SPECGRAV <=1.005 (A) 02/17/2018    NITRITE Negative 02/17/2018    KETONESU Negative 02/17/2018    UROBILINOGEN Negative 02/17/2018       Microbiology Data Reviewed: yes  Pertinent Findings:  none    Other Results:  EKG (my interpretation):no new  Radiology Data Reviewed: yes  Pertinent Findings:  MRI Brain w and wo Contrast (2/20)-  Findings:  There is prominence of the extra-axial CSF space overlying the frontal lobes bilaterally. Findings are suggestive of either bilateral frontal atrophy or bilateral chronic  subdural hygromas.  In the deep white matter of the right frontal lobe and extending into the right anterior basal ganglia there are changes from encephalomalacia with volume loss and ipsilateral dilatation of the right frontal horn. There is also a focal susceptibility change in the caudate nucleus and lentiform nucleus, suggesting an old resolved hemorrhagic infarction. In addition in the deep white matter all the centrum bilaterally there are numerous scattered T2 hyperintensities without restricted diffusion or abnormal enhancement or abnormal mass effects, most likely from chronic microvascular ischemia.  Within the parenchyma there is no abnormal enhancement or restricted diffusion to suggest acute infarctions or neoplasms. There is no midline shift or herniations. The temporal horns are symmetric and normal.  There is cerebellar atrophy. In the posterior fossa no acute infarctions or neoplasms or extra-axial enhancing masses is noted.  The flow-voids of the major vessels is normal. There are post operative changes from cataract surgery. Air-fluid levels in the sphenoid sinuses from acute sphenoid sinusitis noted bilaterally.   Impression   1. 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  2. Periventricular white matter changes from chronic microvascular ischemia.  3. 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  4. Acute sphenoid sinusitis.       CT Head w/o Contrast (2/19)-  Findings:  Remote right basal ganglia lacunar infarcts, unchanged.  There is no evidence of acute infarct, hemorrhage, or mass.  There is ventricular and sulcal enlargement consistent with generalized atrophy.  No mass-effect or midline shift.  There is enlargement of the extra-axial CSF spaces along the frontal lobes, unchanged. Mild to moderate decreased supratentorial white matter attenuation most likely related  to chronic nonspecific small vessel disease.  Air-fluid levels in the sphenoid sinuses, unchanged.  The remaining paranasal sinuses and mastoid air cells are clear.    The calvarium appears intact.  .   Impression   No acute intracranial abnormalities.  Stable chronic findings, as above.  Sphenoid sinus disease with air-fluid levels, unchanged.       Current Medications:     Infusions:       Scheduled:   amLODIPine  10 mg Oral Daily    celecoxib  200 mg Oral BID    clindamycin  150 mg Oral Q6H    docusate sodium  100 mg Oral BID    fluticasone  2 spray Each Nare BID    heparin (porcine)  5,000 Units Subcutaneous Q8H    lisinopril  40 mg Oral Daily    pantoprazole  40 mg Oral Daily    simvastatin  40 mg Oral QHS    sodium chloride  2 g Oral BID WM        PRN:  acetaminophen, hydrocodone-acetaminophen 7.5-325mg, labetalol, ramelteon, simethicone, sodium chloride 0.9%, DIPH,PERTUS (ADACEL),TETANUS PF VAC (ADULT)    Antibiotics and Day Number of Therapy:  Clindamycin- 2/18-current  Vanc- x1 dose on 2/19    Lines and Day Number of Therapy:  PIV    Assessment:     Willow Giron is a 87 y.o.female with  Patient Active Problem List    Diagnosis Date Noted    Acute sphenoidal sinusitis 02/20/2018    Essential hypertension 02/17/2018    Hyperlipidemia 02/17/2018    GERD (gastroesophageal reflux disease) 02/17/2018    Non-intractable vomiting with nausea 02/17/2018        Plan:     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 Over the course of the day, she progressively worsened with AOx2 (self, time, but unable to tell place or situation), non-stop derailment-pattern speech. Family reports she has not slept since admission.   -takes 1/2 tablet of Xanax (0.5mg tablet possibly) BID every day, but did not continue on admit due to concern for  sedation especially in setting of severe hyponatremia  -delirium precautions in place, no medications that could cause this, no known alcohol or drug abuse, no signs of withdrawal, CT head negative for acute abnormality, but with age-advanced cerebral volume loss  -TSH wnl, random cortisol wnl, Vit B12 level WNL, folate  -Psychiatry consulted for evaluation and treatment, appreciate recs  -started seroquel 25mg qhs last night as pt had not slept since admit, but pt fell asleep last night before taking any of her night meds and family did not want to wake her  -Stat CT Head repeat unchanged, but shows sinus disease, flonase for now. ENT consulted, appreciate recs, will start moxifloxacin  -MRI Brain with no acute changes but shows acute sphenoid sinusitis  -pt at baseline    Hypovolemic Hypotonic Hyponatremia  -114 in Ed, now 124, Na increasing appropriately with D5 NS @ 100mL/hr  -urine sodium 21, urine Cr 23.6, urine osmolality 248  -likely from nausea/vomiting/diarrhea  -held fluids after her Na decreased to 122 from 125 with normal saline  -consulted Nephrology for further mgmt recs- no signs of SIADH, plan to increase serum Na by 8 mmol/L/day, start Salt tab 1g BID with meals, increase free water intake to 1.5L, get urine Osm today and tomorrow morning, would not give lasix, continue serum Na q6h  -Na improved to 124, continue 2g salt tab daily with meals. Will initiate Nephrology recs, plan to increase Na by 8mmol/L/day, limit free water intake to 1L per day. Will consider adding lasix to assist with free water excretion.  -will get repeat UA with lytes and FeUrea, osmo this morning     Hypokalemia (resolved)  -likely secondary to diarrhea   -K 2.9 on presentation, normalized  -continue to monitor     Hypochloridemia, improving  -likely secondary to diarrhea  -76 initially, 92 today    Acute Sphenoid Sinusitis  -seen on both CT Head and MRI Brain  -pt reports intermittent rhinorrhea but denies nasal congestion  or sinus pain  -already on clindamycin for erysipelas, will start flonase and nasal saline BID x7 days then decrease to daily.    Weakness, dizzines  -on admission, c/o weakness and dizziness for ~1 week  -likely due to hyponatremia and dehydration  -PT/OT consulted, recommend SNF  -SW is working on SNF placement now-possibly [place]     HTN  -189/79 on presentation (hadn't taken home BP meds)  -remaining elevated, continuing home meds (lisinopril 40mg and amlodipine 10mg), added PRN labetalol for systolic >180  -will likely need to add another antihypertensive if elevated BP continues    R LE Erysipelas  -started on clindamycin, improving  -no evidence of DVTs on ultrasound      HLD  -continue home simvastatin 40mg daily     GERD  -protonix 40mg daily     Arthritis  -tylenol PRN  -continue home celecoxib, norco PRN     Normocytic Anemia  -H/H 11.9/33, MCV 87  -iron studies: iron 65, TIBC 383, Sat 17, Transferrin 259, Ferritin 141      Diet: cardiac fluid  Ppx: heparin  Code: Full    Dispo: pending correction of sodium, Nephrology following, Psych consulted, PT/OT following, SW working on SNF       Karen Hawkins MD  U Internal Medicine-PGY1  [facility] Internal Medicine Service Team B  2/21/2018, 2:23 PM      [facility] Medicine Hospitalist Pager numbers:   [facility] Hospitalist Medicine Team A (Adrien/Rufino): [phone]  [facility] Hospitalist Medicine Team B (Emily/Mekhi):  [phone]

## 2018-02-21 NOTE — HPI
87 year old female admitted on 2/17/2017 after a 2 day history of lightheadness associated with nausea, vomitting, and diarrhea.  She was found to have hyponatremia upon evaluation. CT scan of the head obtained in the ED showed no acute intracranial findings but did show sphenoid sinus opacification.   Yesterday she had acute mental status change that prompted further work up including CT head and MRI shows sphenoid sinusitis and I have been consulted to further evaluate.  The patient reports that she has intermittent rhinorrhea.  She denies nasal congestion, facial pain/pressure, headaches, changes in sense of smell or taste.  She states that she has not had previous chronic sinus problems. She has never had any nasal procedures in the past. She was not previously on any allergy medications or nasal sprays at home.  She is currrently on Clindamycin, nasal saline irrigations, and Flonase.

## 2018-02-21 NOTE — CONSULTS
[facility]  Otorhinolaryngology-Head & Neck Surgery  Consult Note    Patient Name: Willow Giron  MRN: [id number]  Code Status: Full Code  Admission Date: 2/17/2018  Hospital Length of Stay: 4 days  Attending Physician: Andrea Diaz MD  Primary Care Provider: Qamar Bland MD    Patient information was obtained from patient, spouse/SO, past medical records and ER records.     Consults  Subjective:     Chief Complaint/Reason for Admission: hyponatremia    History of Present Illness: 87 year old female admitted on 2/17/2017 after a 2 day history of lightheadness associated with nausea, vomitting, and diarrhea.  She was found to have hyponatremia upon evaluation. CT scan of the head obtained in the ED showed no acute intracranial findings but did show sphenoid sinus opacification.   Yesterday she had acute mental status change that prompted further work up including CT head and MRI shows sphenoid sinusitis and I have been consulted to further evaluate.  The patient reports that she has intermittent rhinorrhea.  She denies nasal congestion, facial pain/pressure, headaches, changes in sense of smell or taste.  She states that she has not had previous chronic sinus problems. She has never had any nasal procedures in the past. She was not previously on any allergy medications or nasal sprays at home.  She is currrently on Clindamycin, nasal saline irrigations, and Flonase.     Medications:  Continuous Infusions:  Scheduled Meds:   amLODIPine  10 mg Oral Daily    celecoxib  200 mg Oral BID    clindamycin  150 mg Oral Q6H    docusate sodium  100 mg Oral BID    fluticasone  2 spray Each Nare BID    heparin (porcine)  5,000 Units Subcutaneous Q8H    lisinopril  40 mg Oral Daily    pantoprazole  40 mg Oral Daily    simvastatin  40 mg Oral QHS    sodium chloride  2 g Oral BID WM     PRN Meds:acetaminophen, hydrocodone-acetaminophen 7.5-325mg, labetalol, ramelteon, simethicone, sodium  chloride 0.9%, DIPH,PERTUS (ADACEL),TETANUS PF VAC (ADULT)     No current facility-administered medications on file prior to encounter.      Current Outpatient Prescriptions on File Prior to Encounter   Medication Sig    alprazolam (XANAX) 0.5 MG tablet     amlodipine (NORVASC) 10 MG tablet Take 10 mg by mouth once daily.    celecoxib (CELEBREX) 200 MG capsule Take 200 mg by mouth 2 (two) times daily.    furosemide (LASIX) 20 MG tablet     hydrocodone-acetaminophen 7.5-325mg (NORCO) 7.5-325 mg per tablet Take 1 tablet by mouth every 6 (six) hours as needed.    lisinopril (PRINIVIL,ZESTRIL) 40 MG tablet Take 40 mg by mouth once daily.    nabumetone (RELAFEN) 500 MG tablet Take 500 mg by mouth 2 (two) times daily.    omeprazole (PRILOSEC) 20 MG capsule Take 20 mg by mouth once daily.    simvastatin (ZOCOR) 40 MG tablet Take 40 mg by mouth every evening.    tizanidine 4 mg Cap Take 1 capsule by mouth every evening.       Review of patient's allergies indicates:   Allergen Reactions    Pcn [penicillins] Rash       Past Medical History:   Diagnosis Date    Arthritis     GERD (gastroesophageal reflux disease)     Hyperlipidemia     Hypertension      Past Surgical History:   Procedure Laterality Date     SECTION, CLASSIC      HYSTERECTOMY      SHOULDER SURGERY       Family History     None        Social History Main Topics    Smoking status: Former Smoker    Smokeless tobacco: Not on file    Alcohol use Yes      Comment: social    Drug use: No    Sexual activity: Not on file     Review of Systems   Constitutional: Negative for chills, fever and unexpected weight change.   HENT: Negative for congestion, ear pain, facial swelling, postnasal drip, rhinorrhea, sinus pain and sinus pressure.    Eyes: Negative for photophobia, pain and visual disturbance.   Respiratory: Negative for shortness of breath, wheezing and stridor.    Cardiovascular: Negative for chest pain and palpitations.    Gastrointestinal: Negative for abdominal pain, blood in stool and vomiting.   Musculoskeletal: Negative for gait problem and myalgias.   Skin: Negative for pallor and rash.   Neurological: Negative for seizures, syncope, weakness, light-headedness and headaches.   Hematological: Negative for adenopathy. Does not bruise/bleed easily.   Psychiatric/Behavioral: Negative for confusion and dysphoric mood.     Objective:     Vital Signs (Most Recent):  Temp: 98.8 °F (37.1 °C) (02/21/18 0708)  Pulse: 63 (02/21/18 0708)  Resp: 17 (02/21/18 0708)  BP: (!) 191/81 (02/21/18 0708)  SpO2: 95 % (02/21/18 0014) Vital Signs (24h Range):  Temp:  [98.2 °F (36.8 °C)-99 °F (37.2 °C)] 98.8 °F (37.1 °C)  Pulse:  [61-71] 63  Resp:  [12-18] 17  SpO2:  [95 %-97 %] 95 %  BP: (130-191)/(58-81) 191/81     Weight: 48.6 kg (107 lb 2.3 oz)  Body mass index is 22.39 kg/m².         Physical Exam     Constitutional: Well appearing / communicating.  NAD.  Eyes: EOM I Bilaterally  Head/Face: Normocephalic.  Negative paranasal sinus pressure/tenderness.  Salivary glands WNL.  House Brackmann I Bilaterally.    Right Ear: External Auditory Canal WNL,TM w/o masses/lesions/perforations.  Auricle WNL.  Left Ear: External Auditory Canal WNL,TM w/o masses/lesions/perforations. Auricle WNL.  Nose: No gross nasal septal deviation. Inferior Turbinates 3+ bilaterally. No septal perforation. No masses/lesions. External nasal skin without masses/lesions.  Oral Cavity: Gingiva/lips WNL.  FOM Soft, no masses palpated. Oral Tongue mobile. Hard Palate WNL.   Oropharynx: BOT WNL. No masses/lesions noted. Tonsillar fossa/pharyngeal wall without lesions. Posterior oropharynx WNL.  Soft palate without masses. Midline uvula.   Neck/Lymphatic: No LAD I-VI bilaterally.  No thyromegaly.  No masses noted on exam.    Mirror laryngoscopy/nasopharyngoscopy: Active gag reflex.  Unable to perform.    Neuro/Psychiatric: AOx3.  Normal mood and affect.   Cardiovascular: Normal  carotid pulses bilaterally, no increasing jugular venous distention noted at cervical region bilaterally.    Respiratory: Normal respiratory effort, no stridor, no retractions noted.      Significant Labs:  BMP:   Recent Labs  Lab 02/20/18  0658  02/20/18  2346     < > 106   CL 95  < > 93*   CO2 24  < > 22*   BUN 8  < > 16   CREATININE 0.7  < > 0.7   CALCIUM 8.6*  < > 8.5*   MG 1.6  --   --    < > = values in this interval not displayed.  CBC:   Recent Labs  Lab 02/21/18  0637   WBC 4.06   RBC 3.35*   HGB 10.5*   HCT 30.4*      MCV 91   MCH 31.3*   MCHC 34.5     CMP:   Recent Labs  Lab 02/20/18  2346      CALCIUM 8.5*   ALBUMIN 3.2*   PROT 5.8*   *   K 4.0   CO2 22*   CL 93*   BUN 16   CREATININE 0.7   ALKPHOS 64   ALT 12   AST 17   BILITOT 0.5       Significant Diagnostics:  CT: I have reviewed all pertinent results/findings within the past 24 hours and my personal findings are:  CT head: air-fluid level in bilateral sphenoid sinuses  MRI: I have reviewed all pertinent results/findings within the past 24 hours and my personal findings are:  air-fluid levels in bilateral sphenoid sinuses    Assessment/Plan:     Acute sphenoidal sinusitis    No evidence of complicated acute sphenoidal sinusitis. Continue Clindamycin as course has already been started for erysipelas, consider addition of respiratory quinolone which has better coverage for acute sinusitis. Continue Flonase daily.  Continue nasal saline irrigations BID.           VTE Risk Mitigation         Ordered     heparin (porcine) injection 5,000 Units  Every 8 hours     Route:  Subcutaneous        02/17/18 1635     Medium Risk of VTE  Once      02/17/18 1635          Thank you for your consult.     Noemi Wahl MD  Otorhinolaryngology-Head & Neck Surgery  [facility]

## 2018-02-21 NOTE — PT/OT/SLP PROGRESS
Physical Therapy Treatment    Patient Name:  Willow Giron   MRN:  [id number]    Recommendations:     Discharge Recommendations:  nursing facility, skilled   Discharge Equipment Recommendations:  (defer to SNF)   Barriers to discharge: Decreased caregiver support    Assessment:     Willow Giron is a 87 y.o. female admitted with a medical diagnosis of Hyponatremia.  She presents with the following impairments/functional limitations:  weakness, impaired endurance, impaired functional mobilty, gait instability, impaired balance, decreased lower extremity function, impaired coordination pt with improving mobility and endurance,will benefir from SNF to return to functional independence.    Rehab Prognosis:  Good; patient would benefit from acute skilled PT services to address these deficits and reach maximum level of function.      Recent Surgery: * No surgery found *      Plan:     During this hospitalization, patient to be seen 6 x/week to address the above listed problems via therapeutic activities, gait training, therapeutic exercises  · Plan of Care Expires:  03/21/18   Plan of Care Reviewed with: patient, son    Subjective     Communicated with nsg prior to session.  Patient found up in chair upon PT entry to room, agreeable to treatment.      Chief Complaint: n/a  Patient comments/goals: pt requested to use b/s commode   Pain/Comfort:  · Pain Rating 1: 0/10    Patients cultural, spiritual, Synagogue conflicts given the current situation: None verbalized to PT    Objective:     Patient found with: telemetry     General Precautions: Standard, fall, seizure   Orthopedic Precautions:N/A   Braces: N/A     Functional Mobility:  · Transfers:     · Sit to Stand:  stand by assistance with rolling walker  · Toilet Transfer: stand by assistance with  rolling walker  using  ambulation  · Gait: amb ~15' X 2 to b/s commode and back and ~30' X 1 with RW and SBA/CGA  · Balance: good sitting balance      AM-PAC 6  CLICK MOBILITY  Turning over in bed (including adjusting bedclothes, sheets and blankets)?: 4  Sitting down on and standing up from a chair with arms (e.g., wheelchair, bedside commode, etc.): 3  Moving from lying on back to sitting on the side of the bed?: 4  Moving to and from a bed to a chair (including a wheelchair)?: 3  Need to walk in hospital room?: 3  Climbing 3-5 steps with a railing?: 2  Total Score: 19       Therapeutic Activities and Exercises:        Patient left up in chair with all lines intact, call button in reach, nsg notified and son present..    GOALS: see general POC   Physical Therapy Goals        Problem: Physical Therapy Goal    Goal Priority Disciplines Outcome Goal Variances Interventions   Physical Therapy Goal     PT/OT, PT Ongoing (interventions implemented as appropriate)     Description:  Goals to be met by: 3/21/18     Patient will increase functional independence with mobility by performin) Sup <>Sit with Mod I with RW   2) Sit <>Stand with Mod I with RW   3) Bed <>chair mod I with RW  4) ambulate 150 feet Mod I with RW  5) perform LE therex X10 with independence.                        Time Tracking:     PT Received On: 18  PT Start Time: 1213     PT Stop Time: 1239  PT Total Time (min): 26 min     Billable Minutes: Gait Training 14 and Therapeutic Activity 12    Treatment Type: Treatment  PT/PTA: PTA     PTA Visit Number: 3     Jesús Mejia, PTA  2018

## 2018-02-21 NOTE — PLAN OF CARE
Spoke to Dr. Rodriguez obtain a continous cardiac monitor order. Pt /70 and Labetalol needs to be administered.

## 2018-02-21 NOTE — PLAN OF CARE
Problem: Patient Care Overview  Goal: Plan of Care Review  Outcome: Ongoing (interventions implemented as appropriate)  Plan of care reviewed with patient and family. Verbalized understanding. No distress noted. Will continue to monitor. On telemetry, no red alarms or ectopy.NSR, Hr 60s, will continue to monitor.Bed alarm active, bed in lowest position, call light within reach. Patient instructed to use call light for assistance. Verbalized understanding. Bed rails up x2.

## 2018-02-21 NOTE — PLAN OF CARE
Problem: Patient Care Overview  Goal: Plan of Care Review  Outcome: Ongoing (interventions implemented as appropriate)  Plan of care reviewed with patient. Call light within reach, fall precautions maintained, bed alarm set. Patient aware. Nurse instructed patient to call if needs assistance. Patient verbalized complete understanding. Telemetry monitor SR throughout shift. NAD noted. Will continue to monitor and continue plan of care.

## 2018-02-22 LAB
ALBUMIN SERPL BCP-MCNC: 3.2 G/DL
ALBUMIN SERPL BCP-MCNC: 3.3 G/DL
ALBUMIN SERPL BCP-MCNC: 3.3 G/DL
ALBUMIN SERPL BCP-MCNC: 3.4 G/DL
ALP SERPL-CCNC: 50 U/L
ALP SERPL-CCNC: 52 U/L
ALP SERPL-CCNC: 53 U/L
ALP SERPL-CCNC: 59 U/L
ALT SERPL W/O P-5'-P-CCNC: 13 U/L
ALT SERPL W/O P-5'-P-CCNC: 14 U/L
ALT SERPL W/O P-5'-P-CCNC: 15 U/L
ALT SERPL W/O P-5'-P-CCNC: 18 U/L
ANION GAP SERPL CALC-SCNC: 8 MMOL/L
AST SERPL-CCNC: 16 U/L
AST SERPL-CCNC: 16 U/L
AST SERPL-CCNC: 17 U/L
AST SERPL-CCNC: 21 U/L
BASOPHILS # BLD AUTO: 0.02 K/UL
BASOPHILS NFR BLD: 0.5 %
BILIRUB SERPL-MCNC: 0.4 MG/DL
BILIRUB SERPL-MCNC: 0.5 MG/DL
BUN SERPL-MCNC: 11 MG/DL
BUN SERPL-MCNC: 11 MG/DL
BUN SERPL-MCNC: 12 MG/DL
BUN SERPL-MCNC: 15 MG/DL
CALCIUM SERPL-MCNC: 8.7 MG/DL
CALCIUM SERPL-MCNC: 8.7 MG/DL
CALCIUM SERPL-MCNC: 8.9 MG/DL
CALCIUM SERPL-MCNC: 9 MG/DL
CHLORIDE SERPL-SCNC: 93 MMOL/L
CHLORIDE SERPL-SCNC: 94 MMOL/L
CHLORIDE SERPL-SCNC: 94 MMOL/L
CHLORIDE SERPL-SCNC: 95 MMOL/L
CO2 SERPL-SCNC: 22 MMOL/L
CO2 SERPL-SCNC: 22 MMOL/L
CO2 SERPL-SCNC: 24 MMOL/L
CO2 SERPL-SCNC: 24 MMOL/L
CREAT SERPL-MCNC: 0.8 MG/DL
DIFFERENTIAL METHOD: ABNORMAL
EOSINOPHIL # BLD AUTO: 0.1 K/UL
EOSINOPHIL NFR BLD: 3.5 %
ERYTHROCYTE [DISTWIDTH] IN BLOOD BY AUTOMATED COUNT: 11.6 %
EST. GFR  (AFRICAN AMERICAN): >60 ML/MIN/1.73 M^2
EST. GFR  (NON AFRICAN AMERICAN): >60 ML/MIN/1.73 M^2
GLUCOSE SERPL-MCNC: 110 MG/DL
GLUCOSE SERPL-MCNC: 112 MG/DL
GLUCOSE SERPL-MCNC: 114 MG/DL
GLUCOSE SERPL-MCNC: 116 MG/DL
HCT VFR BLD AUTO: 28.7 %
HGB BLD-MCNC: 9.9 G/DL
LYMPHOCYTES # BLD AUTO: 1.4 K/UL
LYMPHOCYTES NFR BLD: 34.7 %
MAGNESIUM SERPL-MCNC: 1.7 MG/DL
MCH RBC QN AUTO: 31.1 PG
MCHC RBC AUTO-ENTMCNC: 34.5 G/DL
MCV RBC AUTO: 90 FL
MONOCYTES # BLD AUTO: 0.6 K/UL
MONOCYTES NFR BLD: 15.2 %
NEUTROPHILS # BLD AUTO: 1.8 K/UL
NEUTROPHILS NFR BLD: 45.9 %
PHOSPHATE SERPL-MCNC: 3.5 MG/DL
PLATELET # BLD AUTO: 235 K/UL
PMV BLD AUTO: 8.5 FL
POTASSIUM SERPL-SCNC: 3.7 MMOL/L
POTASSIUM SERPL-SCNC: 4 MMOL/L
POTASSIUM SERPL-SCNC: 4 MMOL/L
POTASSIUM SERPL-SCNC: 4.7 MMOL/L
PROT SERPL-MCNC: 5.7 G/DL
PROT SERPL-MCNC: 5.7 G/DL
PROT SERPL-MCNC: 5.9 G/DL
PROT SERPL-MCNC: 6.2 G/DL
RBC # BLD AUTO: 3.18 M/UL
SODIUM SERPL-SCNC: 124 MMOL/L
SODIUM SERPL-SCNC: 125 MMOL/L
SODIUM SERPL-SCNC: 125 MMOL/L
SODIUM SERPL-SCNC: 126 MMOL/L
TB INDURATION 48 - 72 HR READ: 0 MM
WBC # BLD AUTO: 4.01 K/UL

## 2018-02-22 PROCEDURE — 25000003 PHARM REV CODE 250: Performed by: STUDENT IN AN ORGANIZED HEALTH CARE EDUCATION/TRAINING PROGRAM

## 2018-02-22 PROCEDURE — 97110 THERAPEUTIC EXERCISES: CPT

## 2018-02-22 PROCEDURE — 84100 ASSAY OF PHOSPHORUS: CPT

## 2018-02-22 PROCEDURE — 80053 COMPREHEN METABOLIC PANEL: CPT | Mod: 91

## 2018-02-22 PROCEDURE — 97535 SELF CARE MNGMENT TRAINING: CPT

## 2018-02-22 PROCEDURE — 36415 COLL VENOUS BLD VENIPUNCTURE: CPT

## 2018-02-22 PROCEDURE — G8987 SELF CARE CURRENT STATUS: HCPCS | Mod: CK

## 2018-02-22 PROCEDURE — 11000001 HC ACUTE MED/SURG PRIVATE ROOM

## 2018-02-22 PROCEDURE — 97116 GAIT TRAINING THERAPY: CPT

## 2018-02-22 PROCEDURE — 94761 N-INVAS EAR/PLS OXIMETRY MLT: CPT

## 2018-02-22 PROCEDURE — G8988 SELF CARE GOAL STATUS: HCPCS | Mod: CJ

## 2018-02-22 PROCEDURE — 83735 ASSAY OF MAGNESIUM: CPT

## 2018-02-22 PROCEDURE — 80053 COMPREHEN METABOLIC PANEL: CPT

## 2018-02-22 PROCEDURE — 63600175 PHARM REV CODE 636 W HCPCS: Performed by: STUDENT IN AN ORGANIZED HEALTH CARE EDUCATION/TRAINING PROGRAM

## 2018-02-22 PROCEDURE — 85025 COMPLETE CBC W/AUTO DIFF WBC: CPT

## 2018-02-22 RX ORDER — FUROSEMIDE 20 MG/1
20 TABLET ORAL 2 TIMES DAILY
Status: DISCONTINUED | OUTPATIENT
Start: 2018-02-22 | End: 2018-02-23 | Stop reason: HOSPADM

## 2018-02-22 RX ORDER — FUROSEMIDE 20 MG/1
20 TABLET ORAL DAILY
Status: DISCONTINUED | OUTPATIENT
Start: 2018-02-22 | End: 2018-02-22

## 2018-02-22 RX ADMIN — RAMELTEON 8 MG: 8 TABLET, FILM COATED ORAL at 12:02

## 2018-02-22 RX ADMIN — SIMETHICONE 125 MG: 125 TABLET, CHEWABLE ORAL at 12:02

## 2018-02-22 RX ADMIN — SODIUM CHLORIDE TAB 1 GM 2 G: 1 TAB at 09:02

## 2018-02-22 RX ADMIN — DOCUSATE SODIUM 100 MG: 100 CAPSULE, LIQUID FILLED ORAL at 09:02

## 2018-02-22 RX ADMIN — LISINOPRIL 40 MG: 20 TABLET ORAL at 09:02

## 2018-02-22 RX ADMIN — HEPARIN SODIUM 5000 UNITS: 5000 INJECTION, SOLUTION INTRAVENOUS; SUBCUTANEOUS at 09:02

## 2018-02-22 RX ADMIN — SODIUM CHLORIDE TAB 1 GM 2 G: 1 TAB at 02:02

## 2018-02-22 RX ADMIN — PANTOPRAZOLE SODIUM 40 MG: 40 TABLET, DELAYED RELEASE ORAL at 09:02

## 2018-02-22 RX ADMIN — MOXIFLOXACIN HYDROCHLORIDE 400 MG: 400 TABLET, FILM COATED ORAL at 09:02

## 2018-02-22 RX ADMIN — SIMETHICONE 125 MG: 125 TABLET, CHEWABLE ORAL at 09:02

## 2018-02-22 RX ADMIN — SIMVASTATIN 40 MG: 20 TABLET, FILM COATED ORAL at 09:02

## 2018-02-22 RX ADMIN — HEPARIN SODIUM 5000 UNITS: 5000 INJECTION, SOLUTION INTRAVENOUS; SUBCUTANEOUS at 02:02

## 2018-02-22 RX ADMIN — CLINDAMYCIN HYDROCHLORIDE 150 MG: 150 CAPSULE ORAL at 12:02

## 2018-02-22 RX ADMIN — CELECOXIB 200 MG: 100 CAPSULE ORAL at 09:02

## 2018-02-22 RX ADMIN — FUROSEMIDE 20 MG: 20 TABLET ORAL at 07:02

## 2018-02-22 RX ADMIN — AMLODIPINE BESYLATE 10 MG: 5 TABLET ORAL at 09:02

## 2018-02-22 RX ADMIN — HEPARIN SODIUM 5000 UNITS: 5000 INJECTION, SOLUTION INTRAVENOUS; SUBCUTANEOUS at 05:02

## 2018-02-22 RX ADMIN — FLUTICASONE PROPIONATE 100 MCG: 50 SPRAY, METERED NASAL at 09:02

## 2018-02-22 RX ADMIN — CLINDAMYCIN HYDROCHLORIDE 150 MG: 150 CAPSULE ORAL at 05:02

## 2018-02-22 NOTE — PROGRESS NOTES
Progress Note  [facility]    Admit Date: 2/17/2018   LOS: 5 days     SUBJECTIVE:     Follow-up For:  hyponatremia    Review of Systems:  She feels better this morning  Na is 126  Urine osmo 558  OBJECTIVE:     Vital Signs (Most Recent)  Temp: 99.2 °F (37.3 °C) (02/22/18 0759)  Pulse: 71 (02/22/18 0800)  Resp: 18 (02/22/18 0759)  BP: (!) 164/73 (02/22/18 0759)  SpO2: 97 % (02/22/18 0900)    Vital Signs Range (Last 24H):  Temp:  [98.1 °F (36.7 °C)-99.2 °F (37.3 °C)]   Pulse:  [64-88]   Resp:  [14-18]   BP: (137-177)/(61-77)   SpO2:  [97 %-100 %]     I & O (Last 24H):    Intake/Output Summary (Last 24 hours) at 02/22/18 1035  Last data filed at 02/22/18 0800   Gross per 24 hour   Intake              375 ml   Output             1000 ml   Net             -625 ml     Physical Exam:  NAD  No murmurs  Lungs are clear  Abdomen is soft +BS  Ext: no edema    Laboratory:  CBC:    Recent Labs  Lab 02/22/18  0607   WBC 4.01   RBC 3.18*   HGB 9.9*   HCT 28.7*        BMP:    Recent Labs  Lab 02/22/18  0607   *   K 4.0   CL 94*   CO2 24   BUN 11   CREATININE 0.8   CALCIUM 8.7      Urine osmo: 558        ASSESSMENT/PLAN:        A 86 y/o with PMHx of HTN, dyslipidemia, IBS presents to ER with near syncope found to have hypovolemia hypotonic hyponatremia d/t N/V/D. Her urine Na was 21 which might be an over representation. She might have taken lasix that day.      1- hypovolemia hypotonic hyponatremia etiology d/t N/V/D on admission but now its behaving like SIADH (high urine osmolality and low serum osmo)     Plan:   -- increase salt tablet 2 gram p.o thrice a day with meals (each salt tablet 1 gram is equivalent of Na 154 mmol--> one NS 0.9% has 154 mmol/L)   -- serum Na q6 hrs  -- plan to increase serum Na by 8 mmol/L/day  -- repeat urine osmo am  -- start lasix 20 mg p.o twice daily to assist with free water excretion  -- Tolvaptan (ADH receptor antagonist) could be a valid option but we have to weigh the risks and  benefits--> hepatotoxicity  -- unfortunately we have to limit her water intake to 1 liter per day    Bardy Castillo MD  [facility] PGY-5

## 2018-02-22 NOTE — PROGRESS NOTES
U Internal Medicine Resident Progress Note    Subjective:      Willow Giron is a 87 y.o. female who is being followed by the [facility] at [facility] for hyponatremia and dizziness for one day.     Pt says she is feeling much better, and slept well. Son is at the bedside says she is almost at baseline now. Alert and oriented x4, denying any complaints.  Denies HA, nasal congestion, SOB, chest pain, N/V, and abdominal pain. Reports mild intermittent rhinorrhea.     Objective:   Last 24 Hour Vital Signs:  BP  Min: 137/66  Max: 177/77  Temp  Av.3 °F (36.8 °C)  Min: 98.1 °F (36.7 °C)  Max: 98.5 °F (36.9 °C)  Pulse  Av.4  Min: 63  Max: 88  Resp  Av.4  Min: 14  Max: 18  SpO2  Av %  Min: 97 %  Max: 100 %  I/O last 3 completed shifts:  In: 500 [P.O.:500]  Out: 2150 [Urine:2150]    Physical Examination:  General: Alert, NAD, sitting up with HOB at 60degrees talking with her granddaughter  HEENT: NC/AT, EOMI, PERRL, MMM, clear speech, occasional sniffle  Resp: normal effort, lungs CTA b/l, speaking in full sentences  CV: RRR, no murmur or gallop, 2+ b/l radial and DP pulses  Abdomen: soft, nontender, nondistended, normoactive bowel sounds  Extremities: no cyanosis or clubbing, no edema of b/l LE,Skin: dry, warm, non-diaphoretic, mild flat nontender erythematous rash over posterior right calf and knee- improved from yesterday; echymosis over dorsal left hand and wrist  Neuro: AOx4, clear speech with appropriate answers, equal strength bilaterally    Laboratory:  Laboratory Data Reviewed: yes  Pertinent Findings:  Trended Lab Data:     Recent Labs  Lab 18  0658  18  0636 18  0637  18  1905 18  0014 18  0607   WBC 7.88  --   --  4.06  --   --   --  4.01   HGB 10.4*  --   --  10.5*  --   --   --  9.9*   HCT 30.2*  --   --  30.4*  --   --   --  28.7*     --   --  237  --   --   --  235   MCV 91  --   --  91  --   --   --  90   RDW 11.6   --   --  11.6  --   --   --  11.6   *  < > 124*  --   < > 124* 124* 126*   K 4.1  < > 4.5  --   < > 4.0 3.7 4.0   CL 95  < > 92*  --   < > 93* 94* 94*   CO2 24  < > 26  --   < > 22* 22* 24   BUN 8  < > 13  --   < > 14 11 11   CREATININE 0.7  < > 0.7  --   < > 1.0 0.8 0.8     < > 106  --   < > 119* 110 112*   CALCIUM 8.6*  < > 8.8  --   < > 8.8 8.7 8.7   MG 1.6  --  1.7  --   --   --   --  1.7   PHOS 3.2  --  3.6  --   --   --   --  3.5   PROT 5.6*  < > 5.9*  --   < > 6.1 5.7* 5.7*   ALBUMIN 3.2*  < > 3.3*  --   < > 3.3* 3.3* 3.2*   BILITOT 0.5  < > 0.5  --   < > 0.5 0.5 0.5   AST 16  < > 17  --   < > 18 16 16   ALKPHOS 52*  < > 57  --   < > 58 53* 50*   ALT 11  < > 11  --   < > 13 14 13   < > = values in this interval not displayed.   Trended Cardiac Data:     Recent Labs  Lab 02/17/18  1222   TROPONINI 0.007          Thyroid:   Lab Results   Component Value Date    TSH 0.735 02/19/2018     Anemia:   Lab Results   Component Value Date    IRON 65 02/18/2018    TIBC 383 02/18/2018    FERRITIN 141 02/18/2018    ZRMIWTQU08 854 02/19/2018    FOLATE 9.4 02/19/2018   Urinalysis:   Lab Results   Component Value Date    COLORU Yellow 02/17/2018    SPECGRAV <=1.005 (A) 02/17/2018    NITRITE Negative 02/17/2018    KETONESU Negative 02/17/2018    UROBILINOGEN Negative 02/17/2018       Microbiology Data Reviewed: yes  Pertinent Findings:  none    Other Results:  EKG (my interpretation):no new  Radiology Data Reviewed: yes  Pertinent Findings:  MRI Brain w and wo Contrast (2/20)-  Findings:  There is prominence of the extra-axial CSF space overlying the frontal lobes bilaterally. Findings are suggestive of either bilateral frontal atrophy or bilateral chronic subdural hygromas.  In the deep white matter of the right frontal lobe and extending into the right anterior basal ganglia there are changes from encephalomalacia with volume loss and ipsilateral dilatation of the right frontal horn. There is also a focal  susceptibility change in the caudate nucleus and lentiform nucleus, suggesting an old resolved hemorrhagic infarction. In addition in the deep white matter all the centrum bilaterally there are numerous scattered T2 hyperintensities without restricted diffusion or abnormal enhancement or abnormal mass effects, most likely from chronic microvascular ischemia.  Within the parenchyma there is no abnormal enhancement or restricted diffusion to suggest acute infarctions or neoplasms. There is no midline shift or herniations. The temporal horns are symmetric and normal.  There is cerebellar atrophy. In the posterior fossa no acute infarctions or neoplasms or extra-axial enhancing masses is noted.  The flow-voids of the major vessels is normal. There are post operative changes from cataract surgery. Air-fluid levels in the sphenoid sinuses from acute sphenoid sinusitis noted bilaterally.   Impression   1. 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  2. Periventricular white matter changes from chronic microvascular ischemia.  3. 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  4. Acute sphenoid sinusitis.       CT Head w/o Contrast (2/19)-  Findings:  Remote right basal ganglia lacunar infarcts, unchanged.  There is no evidence of acute infarct, hemorrhage, or mass.  There is ventricular and sulcal enlargement consistent with generalized atrophy.  No mass-effect or midline shift.  There is enlargement of the extra-axial CSF spaces along the frontal lobes, unchanged. Mild to moderate decreased supratentorial white matter attenuation most likely related to chronic nonspecific small vessel disease.  Air-fluid levels in the sphenoid sinuses, unchanged.  The remaining paranasal sinuses and mastoid air cells are clear.    The calvarium appears intact.  .   Impression   No acute intracranial  abnormalities.  Stable chronic findings, as above.  Sphenoid sinus disease with air-fluid levels, unchanged.       Current Medications:     Infusions:       Scheduled:   amLODIPine  10 mg Oral Daily    celecoxib  200 mg Oral BID    clindamycin  150 mg Oral Q6H    docusate sodium  100 mg Oral BID    fluticasone  2 spray Each Nare BID    heparin (porcine)  5,000 Units Subcutaneous Q8H    lisinopril  40 mg Oral Daily    moxifloxacin  400 mg Oral Daily    pantoprazole  40 mg Oral Daily    simvastatin  40 mg Oral QHS    sodium chloride  2 g Oral BID WM        PRN:  acetaminophen, hydrocodone-acetaminophen 7.5-325mg, labetalol, pneumoc 13-jamar conj-dip cr(PF), ramelteon, simethicone, sodium chloride 0.9%, DIPH,PERTUS (ADACEL),TETANUS PF VAC (ADULT)    Antibiotics and Day Number of Therapy:  Clindamycin- 2/18-current  Vanc- x1 dose on 2/19    Lines and Day Number of Therapy:  PIV    Assessment:     Willow Giron is a 87 y.o.female with  Patient Active Problem List    Diagnosis Date Noted    Acute sphenoidal sinusitis 02/20/2018    Essential hypertension 02/17/2018    Hyperlipidemia 02/17/2018    GERD (gastroesophageal reflux disease) 02/17/2018        Plan:     Altered Mental Status with insomnia and derailment thought disorder, likely 2/2 delirium vs hyponatremia vs benzo withdrawal, resolved  -Yesterday morning (2/19), pt was AOx3 but had odd speech content. Over the course of the day, she progressively worsened with AOx2 (self, time, but unable to tell place or situation), non-stop derailment-pattern speech. Family reports she has not slept since admission.   -takes 1/2 tablet of Xanax (0.5mg tablet possibly) BID every day, but did not continue on admit due to concern for sedation especially in setting of severe hyponatremia  -delirium precautions in place, no medications that could cause this, no known alcohol or drug abuse, no signs of withdrawal, CT head negative for acute abnormality, but with  age-advanced cerebral volume loss  -TSH wnl, random cortisol wnl, Vit B12 level WNL, folate  -started seroquel 25mg qhs last night as pt had not slept since admit, but pt fell asleep last night before taking any of her night meds and family did not want to wake her  -Stat CT Head repeat unchanged, but shows sinus disease, flonase for now. ENT consulted, appreciate recs, will start moxifloxacin  -MRI Brain with no acute changes but shows acute sphenoid sinusitis  -pt at baseline    Hypovolemic Hypotonic Hyponatremia, once repleted with fluids, now behaving more like SIADH  -Initial Na 114 in Ed, urine sodium 21, urine Cr 23.6, urine osmolality 248  -likely from nausea/vomiting/diarrhea  -Na improved to 126 this morning, continue 2g salt tab daily with meals. Per nephrology, goal to increase Na by 8mmol/L/day, limit free water intake to 1L per day. Will consider adding lasix to assist with free water excretion.  -Urine Cr 152.9 (50- 2 days ago), Urea N 852 (365-2 days ago), FeUrea <35% suggesting pre-renal disease. Urine osmoles 558  -with high urine osmolality and low serum osmolality, more suggestive of SIADH  -Discuss with patient possible etiologies of SIADH, work-up necessary, risk v/ benefit of looking for an etiology  -Will increase salt tablets to 2g TID today and continue to check serum Na, repeat urine osmoles, holding of on Tolvaptan at this time, will monitor after BID diuresis with PO lasix 20mg  -limit water intake to 1L/day     Hypokalemia (resolved)  -likely secondary to diarrhea   -K 2.9 on presentation, normalized  -continue to monitor     Hypochloridemia, improving  -likely secondary to diarrhea  -76 initially, 94 today    Acute Sphenoid Sinusitis  -seen on both CT Head and MRI Brain  -pt reports intermittent rhinorrhea but denies nasal congestion or sinus pain  -already on clindamycin for erysipelas, will start flonase and nasal saline BID x7 days then decrease to daily.   -Moxifloxacin started  2/21, appreciate ENT recs    Weakness, dizzines  -on admission, c/o weakness and dizziness for ~1 week  -likely due to hyponatremia and dehydration  -PT/OT consulted, recommend SNF  -SW is working on SNF placement now-possibly [facility]     HTN  -189/79 on presentation (hadn't taken home BP meds)  -remaining elevated, continuing home meds (lisinopril 40mg and amlodipine 10mg), added PRN labetalol for systolic >180    R LE Erysipelas  -Finishing 5 day course clindamycin this evening  -no evidence of DVTs on ultrasound      HLD  -continue home simvastatin 40mg daily     GERD  -protonix 40mg daily     Arthritis  -tylenol PRN  -continue home celecoxib, norco PRN     Normocytic Anemia  -H/H 11.9/33, MCV 87  -iron studies: iron 65, TIBC 383, Sat 17, Transferrin 259, Ferritin 141      Diet: cardiac fluid  Ppx: heparin  Code: Full    Dispo: pending correction of sodium, trending q6h, Nephrology following, likely d/c to SNF tomorrow      Karen Hawkins MD  [facility]-PGY1  [facility] Internal Medicine Service Team B  2/22/2018, 2:23 PM      [facility] Medicine Hospitalist Pager numbers:   [facility] Hospitalist Medicine Team A (Adrien/Rufino): [phone]  [facility] Hospitalist Medicine Team B (Emily/Mekhi):  [phone]

## 2018-02-22 NOTE — PLAN OF CARE
Problem: Patient Care Overview  Goal: Plan of Care Review  Outcome: Ongoing (interventions implemented as appropriate)  Plan of care reviewed with patient. Patient verbalizes understanding. Continuous cardiac monitoring NSR per telemetry. PPD test negative, no redness swelling or induration present. 1 L fluid restriction followed. Patient up with assistance. Upper abdominal discomfort treated with simethicone tablets, with  no complaints noted at this time. Bed rails x2, bed in low position, bed alarm turned on, call button in reach. No acute distress noted, will continue to monitor.

## 2018-02-22 NOTE — PLAN OF CARE
"Problem: Occupational Therapy Goal  Goal: Occupational Therapy Goal  Goals to be met by: 03/18/18     Patient will increase functional independence with ADLs by performing:    Feeding with Set-up Assistance. --MET 2/21  UE Dressing with Supervision.  LE Dressing with Minimal Assistance.  Grooming while standing at sink with Stand-by Assistance.  Toileting from toilet with Contact Guard Assistance for hygiene and clothing management. --GOAL MET 2/22/2018.   Toilet transfer to toilet with Contact Guard Assistance.  Increased functional strength to WFL for ADLs. --MET 2/21      Outcome: Ongoing (interventions implemented as appropriate)  Found seated in bedside chair.  No c/o pain prior to/after treat this day.  Pt. Expressed, "I feel like I have to pee, but nothings coming out.  Let's wait a little longer."  Communicated with nursing prior to/after treat & made aware that patient had bowel movement.  Pt. Educated on ADL safety & DME use; verbalized understanding.  Ambulated bedside chair->bathroom->sink->bed with RW & steadying assist for safety.  Step T/F to toilet with RW & assist for steadying.  Front kartik hygiene in sitting & back kartik hygiene in stance.  Pt. Able to clean back kartik, but required check for thorough cleaning.  Clothing management in stance with assist for adjusting waist band of underwear.  Hand hygiene in stance at sink with SBA & min v/c for RW placement at sink.  Left lying with HOB elevated, lines in tact, needs in reach, & nursing notified; daughter was present, but left for cafeteria.  To benefit from continued acute care OT services to increase independence in self-care & functional T/F.  Recommend SNF placement for post-acute OT.  Continue POC.        "

## 2018-02-22 NOTE — PLAN OF CARE
TN went to meet with patient, daughter Claudia at bedside.  Patient plans to discharge to [facility] when medically ready and when insurance authorization is obtained. Patient and daughter are in agreement. TN will continue to follow and assess other discharge needs.     02/22/18 1225   Discharge Reassessment   Assessment Type Discharge Planning Reassessment   Provided patient/caregiver education on the expected discharge date and the discharge plan Yes   Do you have any problems affording any of your prescribed medications? TBD   Discharge Plan A Skilled Nursing Facility   Discharge Plan B Home with family;Home Health   Patient choice form signed by patient/caregiver N/A   Can the patient answer the patient profile reliably? Yes, cognitively intact   How does the patient rate their overall health at the present time? Good   Describe the patient's ability to walk at the present time. Walks with the help of equipment   How often would a person be available to care for the patient? Often     Jennifer Guerrier RN  Transition Navigator  [phone]

## 2018-02-22 NOTE — PLAN OF CARE
Problem: Patient Care Overview  Goal: Plan of Care Review  Outcome: Ongoing (interventions implemented as appropriate)  Plan of care reviewed with patient and family. Verbalized understanding. No distress noted. Will continue to monitor. On telemetry, no red alarms or ectopy.NSR, Hr 70s-90s, will continue to monitor.Bed alarm active, bed in lowest position, call light within reach. Patient instructed to use call light for assistance. Verbalized understanding. Bed rails up x2.

## 2018-02-22 NOTE — PT/OT/SLP PROGRESS
Physical Therapy Treatment    Patient Name:  Willow Giron   MRN:  [id number]    Recommendations:     Discharge Recommendations:  nursing facility, skilled   Discharge Equipment Recommendations:  (defer to SNF)   Barriers to discharge: Decreased caregiver support    Assessment:     Willow Giron is a 87 y.o. female admitted with a medical diagnosis of Hyponatremia.  She presents with the following impairments/functional limitations:  weakness, impaired endurance, impaired functional mobilty, gait instability, impaired balance, impaired coordination, decreased ROM good participation and improving mobility and endurance,pt will benefit from SNF upon discharge.    Rehab Prognosis:  Excellent; patient would benefit from acute skilled PT services to address these deficits and reach maximum level of function.      Recent Surgery: * No surgery found *      Plan:     During this hospitalization, patient to be seen 6 x/week to address the above listed problems via gait training, therapeutic activities, therapeutic exercises  · Plan of Care Expires:  03/21/18   Plan of Care Reviewed with: patient    Subjective     Communicated with nsg prior to session.  Patient found up in chair upon PT entry to room, agreeable to treatment.      Chief Complaint: heart monitor weight  Patient comments/goals: pt is pleased with progress and ready to move forward  Pain/Comfort:  · Pain Rating 1: 0/10    Patients cultural, spiritual, Protestant conflicts given the current situation: None verbalized to PT    Objective:     Patient found with: telemetry     General Precautions: Standard, fall, seizure   Orthopedic Precautions:N/A   Braces: N/A     Functional Mobility:  · Transfers:     · Sit to Stand:  stand by assistance with rolling walker  · Gait: amb ~40' X 2 with RW and SBA  · Balance: good sitting balance      AM-PAC 6 CLICK MOBILITY  Turning over in bed (including adjusting bedclothes, sheets and blankets)?: 4  Sitting down  on and standing up from a chair with arms (e.g., wheelchair, bedside commode, etc.): 3  Moving from lying on back to sitting on the side of the bed?: 4  Moving to and from a bed to a chair (including a wheelchair)?: 3  Need to walk in hospital room?: 3  Climbing 3-5 steps with a railing?: 2  Total Score: 19       Therapeutic Activities and Exercises: le seated ex's X 15 reps inc: ap,laq,hip flex,abd/add       Patient left up in chair with all lines intact, call button in reach and nsg present..    GOALS: see general POC   Physical Therapy Goals        Problem: Physical Therapy Goal    Goal Priority Disciplines Outcome Goal Variances Interventions   Physical Therapy Goal     PT/OT, PT Ongoing (interventions implemented as appropriate)     Description:  Goals to be met by: 3/21/18     Patient will increase functional independence with mobility by performin) Sup <>Sit with Mod I with RW MET 18  2) Sit <>Stand with Mod I with RW   3) Bed <>chair mod I with RW  4) ambulate 150 feet Mod I with RW  5) perform LE therex X10 with independence. MET 18                        Time Tracking:     PT Received On: 18  PT Start Time: 922     PT Stop Time: 948  PT Total Time (min): 26 min     Billable Minutes: Gait Training 15 and Therapeutic Exercise 11    Treatment Type: Treatment  PT/PTA: PTA     PTA Visit Number: 4     Jesús Mejia, PTA  2018

## 2018-02-22 NOTE — PLAN OF CARE
met with patient and her daughter Claudia at bedside. Patient is in agreement to discharge to [facility] for SNF tomorrow pending medical clearance.  addressed questions and concerns regarding placement. Patient Choice Form signed and copy placed in chart.     spoke to Admissions Coordinator Soila to update her on d/c plan for tomorrow. Soila will reach out to family to set up a time for them to sign paperwork and update  once this done.     02/22/18 1256   Medicare Message   Important Message from Medicare regarding Discharge Appeal Rights Given to patient/caregiver;Explained to patient/caregiver;Signed/date by patient/caregiver   Date IMM was signed 02/22/18   Time IMM was signed [id number]

## 2018-02-22 NOTE — PLAN OF CARE
Problem: Physical Therapy Goal  Goal: Physical Therapy Goal  Goals to be met by: 3/21/18     Patient will increase functional independence with mobility by performin) Sup <>Sit with Mod I with RW MET 18  2) Sit <>Stand with Mod I with RW   3) Bed <>chair mod I with RW  4) ambulate 150 feet Mod I with RW  5) perform LE therex X10 with independence. MET 18      Outcome: Ongoing (interventions implemented as appropriate)  Goals 1 and 5 met       Yes.  Schedule her for my administration time.

## 2018-02-22 NOTE — PT/OT/SLP PROGRESS
"Occupational Therapy   Treatment    Name: Willow Giron  MRN: [id number]  Admitting Diagnosis:  Hyponatremia       Recommendations:     Discharge Recommendations: nursing facility, skilled  Discharge Equipment Recommendations:  other (see comments) (defer to SNF)  Barriers to discharge:  Decreased caregiver support    Subjective     Communicated with: Nursing prior to session.  Pain/Comfort:  · Pain Rating 1: 0/10  · Pain Rating Post-Intervention 1: 0/10    Patients cultural, spiritual, Nondenominational conflicts given the current situation: None stated.     Objective:     Patient found with: telemetry    General Precautions: Standard, other (see comments), fall, seizure (standard )   Orthopedic Precautions:N/A   Braces: N/A     Occupational Performance:    Bed Mobility:    · Patient completed Sit to Supine with stand by assistance     Functional Mobility/Transfers:  · Patient completed Sit <> Stand Transfer with contact guard assistance  with  rolling walker   · Patient completed Toilet Transfer Step Transfer technique with contact guard assistance with  grab bars and rolling walker  · Functional Mobility: Ambulated bedside chair->bathroom->sink->bed with RW & steadying assist for safety.  Defer to PT.    Activities of Daily Living:  · Grooming: stand by assistance in stance at sink  · Toileting: minimum assistance at toilet with use of grab bars     Patient left HOB elevated with all lines intact, call button in reach and nursing notified    AMPAC 6 Click:  AMPAC Total Score: 18    Treatment & Education:  Found seated in bedside chair.  No c/o pain prior to/after treat this day.  Pt. Expressed, "I feel like I have to pee, but nothings coming out.  Let's wait a little longer."  Communicated with nursing prior to/after treat & made aware that patient had bowel movement.  Pt. Educated on ADL safety & DME use; verbalized understanding.  Ambulated bedside chair->bathroom->sink->bed with RW & steadying assist for safety. "  Step T/F to toilet with RW & assist for steadying.  Front kartik hygiene in sitting & back kartik hygiene in stance.  Pt. Able to clean back kartik, but required check for thorough cleaning.  Clothing management in stance with assist for adjusting waist band of underwear.  Hand hygiene in stance at sink with SBA & min v/c for RW placement at sink.  Left lying with HOB elevated, lines in tact, needs in reach, & nursing notified; daughter was present, but left for cafeteria.  CGA for toilet T/F & Andria with toileting this day.  To benefit from continued acute care OT services to increase independence in self-care & functional T/F.  Recommend SNF placement for post-acute OT.  Continue POC.    Education:    Assessment:     Willow Giron is a 87 y.o. female with a medical diagnosis of Hyponatremia.  She presents with below deficits decreasing independence in self-care & functional T/F.  Performance deficits affecting function are weakness, impaired endurance, impaired self care skills, impaired functional mobilty, gait instability, impaired balance, impaired coordination, decreased ROM.  CGA for toilet T/F & Andria with toileting this day.  To benefit from continued acute care OT services to increase independence in self-care & functional T/F.  Recommend SNF placement for post-acute OT.  Continue POC.      Rehab Prognosis:  Good; patient would benefit from acute skilled OT services to address these deficits and reach maximum level of function.       Plan:     Patient to be seen 5 x/week to address the above listed problems via self-care/home management, therapeutic activities, therapeutic exercises  · Plan of Care Expires: 03/18/18  · Plan of Care Reviewed with: patient, daughter    This Plan of care has been discussed with the patient who was involved in its development and understands and is in agreement with the identified goals and treatment plan    GOALS:    Occupational Therapy Goals        Problem: Occupational  Therapy Goal    Goal Priority Disciplines Outcome Interventions   Occupational Therapy Goal     OT, PT/OT Ongoing (interventions implemented as appropriate)    Description:  Goals to be met by: 03/18/18     Patient will increase functional independence with ADLs by performing:    Feeding with Set-up Assistance. --MET 2/21  UE Dressing with Supervision.  LE Dressing with Minimal Assistance.  Grooming while standing at sink with Stand-by Assistance.  Toileting from toilet with Contact Guard Assistance for hygiene and clothing management.   Toilet transfer to toilet with Contact Guard Assistance. --GOAL MET 2/22/2018.  Increased functional strength to WFL for ADLs. --MET 2/21                        Time Tracking:     OT Date of Treatment: 02/22/18  OT Start Time: 1111  OT Stop Time: 1152  OT Total Time (min): 41 min    Billable Minutes:Self Care/Home Management 41    Elsie Baker OT  2/22/2018

## 2018-02-23 VITALS
OXYGEN SATURATION: 98 % | SYSTOLIC BLOOD PRESSURE: 129 MMHG | BODY MASS INDEX: 23.14 KG/M2 | RESPIRATION RATE: 18 BRPM | DIASTOLIC BLOOD PRESSURE: 73 MMHG | WEIGHT: 110.25 LBS | HEIGHT: 58 IN | HEART RATE: 78 BPM | TEMPERATURE: 98 F

## 2018-02-23 LAB
ALBUMIN SERPL BCP-MCNC: 3.4 G/DL
ALBUMIN SERPL BCP-MCNC: 3.5 G/DL
ALBUMIN SERPL BCP-MCNC: 3.6 G/DL
ALP SERPL-CCNC: 51 U/L
ALP SERPL-CCNC: 54 U/L
ALP SERPL-CCNC: 55 U/L
ALT SERPL W/O P-5'-P-CCNC: 19 U/L
ALT SERPL W/O P-5'-P-CCNC: 20 U/L
ALT SERPL W/O P-5'-P-CCNC: 23 U/L
ANION GAP SERPL CALC-SCNC: 10 MMOL/L
ANION GAP SERPL CALC-SCNC: 11 MMOL/L
ANION GAP SERPL CALC-SCNC: 8 MMOL/L
AST SERPL-CCNC: 21 U/L
AST SERPL-CCNC: 21 U/L
AST SERPL-CCNC: 25 U/L
BASOPHILS # BLD AUTO: 0.03 K/UL
BASOPHILS NFR BLD: 0.7 %
BILIRUB SERPL-MCNC: 0.4 MG/DL
BILIRUB SERPL-MCNC: 0.5 MG/DL
BILIRUB SERPL-MCNC: 0.5 MG/DL
BUN SERPL-MCNC: 13 MG/DL
BUN SERPL-MCNC: 13 MG/DL
BUN SERPL-MCNC: 14 MG/DL
CALCIUM SERPL-MCNC: 9 MG/DL
CHLORIDE SERPL-SCNC: 96 MMOL/L
CHLORIDE SERPL-SCNC: 97 MMOL/L
CHLORIDE SERPL-SCNC: 98 MMOL/L
CO2 SERPL-SCNC: 20 MMOL/L
CO2 SERPL-SCNC: 22 MMOL/L
CO2 SERPL-SCNC: 25 MMOL/L
CREAT SERPL-MCNC: 0.8 MG/DL
DIFFERENTIAL METHOD: ABNORMAL
EOSINOPHIL # BLD AUTO: 0.1 K/UL
EOSINOPHIL NFR BLD: 3.3 %
ERYTHROCYTE [DISTWIDTH] IN BLOOD BY AUTOMATED COUNT: 11.8 %
EST. GFR  (AFRICAN AMERICAN): >60 ML/MIN/1.73 M^2
EST. GFR  (NON AFRICAN AMERICAN): >60 ML/MIN/1.73 M^2
GLUCOSE SERPL-MCNC: 105 MG/DL
GLUCOSE SERPL-MCNC: 107 MG/DL
GLUCOSE SERPL-MCNC: 96 MG/DL
HCT VFR BLD AUTO: 30.1 %
HGB BLD-MCNC: 10.2 G/DL
LYMPHOCYTES # BLD AUTO: 1.6 K/UL
LYMPHOCYTES NFR BLD: 37.8 %
MAGNESIUM SERPL-MCNC: 1.7 MG/DL
MCH RBC QN AUTO: 30.9 PG
MCHC RBC AUTO-ENTMCNC: 33.9 G/DL
MCV RBC AUTO: 91 FL
MONOCYTES # BLD AUTO: 0.5 K/UL
MONOCYTES NFR BLD: 11.9 %
NEUTROPHILS # BLD AUTO: 2 K/UL
NEUTROPHILS NFR BLD: 45.6 %
PHOSPHATE SERPL-MCNC: 3.4 MG/DL
PLATELET # BLD AUTO: 272 K/UL
PMV BLD AUTO: 8.6 FL
POTASSIUM SERPL-SCNC: 3.9 MMOL/L
POTASSIUM SERPL-SCNC: 4 MMOL/L
POTASSIUM SERPL-SCNC: 4.1 MMOL/L
PROT SERPL-MCNC: 5.9 G/DL
PROT SERPL-MCNC: 6.1 G/DL
PROT SERPL-MCNC: 6.3 G/DL
RBC # BLD AUTO: 3.3 M/UL
SODIUM SERPL-SCNC: 129 MMOL/L
WBC # BLD AUTO: 4.29 K/UL

## 2018-02-23 PROCEDURE — 84100 ASSAY OF PHOSPHORUS: CPT

## 2018-02-23 PROCEDURE — 85025 COMPLETE CBC W/AUTO DIFF WBC: CPT

## 2018-02-23 PROCEDURE — 63600175 PHARM REV CODE 636 W HCPCS: Performed by: INTERNAL MEDICINE

## 2018-02-23 PROCEDURE — 80053 COMPREHEN METABOLIC PANEL: CPT | Mod: 91

## 2018-02-23 PROCEDURE — 94761 N-INVAS EAR/PLS OXIMETRY MLT: CPT

## 2018-02-23 PROCEDURE — 97535 SELF CARE MNGMENT TRAINING: CPT

## 2018-02-23 PROCEDURE — 80053 COMPREHEN METABOLIC PANEL: CPT

## 2018-02-23 PROCEDURE — G0009 ADMIN PNEUMOCOCCAL VACCINE: HCPCS | Performed by: INTERNAL MEDICINE

## 2018-02-23 PROCEDURE — 90471 IMMUNIZATION ADMIN: CPT | Performed by: INTERNAL MEDICINE

## 2018-02-23 PROCEDURE — 25000003 PHARM REV CODE 250: Performed by: STUDENT IN AN ORGANIZED HEALTH CARE EDUCATION/TRAINING PROGRAM

## 2018-02-23 PROCEDURE — 90670 PCV13 VACCINE IM: CPT | Performed by: INTERNAL MEDICINE

## 2018-02-23 PROCEDURE — 83735 ASSAY OF MAGNESIUM: CPT

## 2018-02-23 PROCEDURE — 36415 COLL VENOUS BLD VENIPUNCTURE: CPT

## 2018-02-23 PROCEDURE — 3E0234Z INTRODUCTION OF SERUM, TOXOID AND VACCINE INTO MUSCLE, PERCUTANEOUS APPROACH: ICD-10-PCS | Performed by: INTERNAL MEDICINE

## 2018-02-23 PROCEDURE — 63600175 PHARM REV CODE 636 W HCPCS: Performed by: STUDENT IN AN ORGANIZED HEALTH CARE EDUCATION/TRAINING PROGRAM

## 2018-02-23 RX ORDER — FLUTICASONE PROPIONATE 50 MCG
2 SPRAY, SUSPENSION (ML) NASAL 2 TIMES DAILY
Qty: 1 BOTTLE | Refills: 0 | Status: SHIPPED | OUTPATIENT
Start: 2018-02-23 | End: 2018-02-23

## 2018-02-23 RX ORDER — FUROSEMIDE 20 MG/1
20 TABLET ORAL 2 TIMES DAILY
Qty: 30 TABLET | Refills: 0 | OUTPATIENT
Start: 2018-02-23 | End: 2018-05-07

## 2018-02-23 RX ORDER — FLUTICASONE PROPIONATE 50 MCG
2 SPRAY, SUSPENSION (ML) NASAL 2 TIMES DAILY
Qty: 1 BOTTLE | Refills: 0 | Status: SHIPPED | OUTPATIENT
Start: 2018-02-23 | End: 2021-01-26

## 2018-02-23 RX ORDER — MOXIFLOXACIN HYDROCHLORIDE 400 MG/1
400 TABLET ORAL DAILY
Qty: 3 TABLET | Refills: 0 | Status: SHIPPED | OUTPATIENT
Start: 2018-02-24 | End: 2018-02-23

## 2018-02-23 RX ORDER — MOXIFLOXACIN HYDROCHLORIDE 400 MG/1
400 TABLET ORAL DAILY
Qty: 3 TABLET | Refills: 0 | Status: SHIPPED | OUTPATIENT
Start: 2018-02-24 | End: 2018-02-27

## 2018-02-23 RX ORDER — MOXIFLOXACIN HYDROCHLORIDE 400 MG/1
400 TABLET ORAL DAILY
Qty: 3 TABLET | Refills: 0 | OUTPATIENT
Start: 2018-02-24 | End: 2018-02-23

## 2018-02-23 RX ADMIN — HEPARIN SODIUM 5000 UNITS: 5000 INJECTION, SOLUTION INTRAVENOUS; SUBCUTANEOUS at 02:02

## 2018-02-23 RX ADMIN — FUROSEMIDE 20 MG: 20 TABLET ORAL at 08:02

## 2018-02-23 RX ADMIN — PANTOPRAZOLE SODIUM 40 MG: 40 TABLET, DELAYED RELEASE ORAL at 08:02

## 2018-02-23 RX ADMIN — FLUTICASONE PROPIONATE 100 MCG: 50 SPRAY, METERED NASAL at 08:02

## 2018-02-23 RX ADMIN — HYDROCODONE BITARTRATE AND ACETAMINOPHEN 1 TABLET: 7.5; 325 TABLET ORAL at 12:02

## 2018-02-23 RX ADMIN — SODIUM CHLORIDE TAB 1 GM 2 G: 1 TAB at 05:02

## 2018-02-23 RX ADMIN — HEPARIN SODIUM 5000 UNITS: 5000 INJECTION, SOLUTION INTRAVENOUS; SUBCUTANEOUS at 05:02

## 2018-02-23 RX ADMIN — SODIUM CHLORIDE TAB 1 GM 2 G: 1 TAB at 02:02

## 2018-02-23 RX ADMIN — AMLODIPINE BESYLATE 10 MG: 5 TABLET ORAL at 08:02

## 2018-02-23 RX ADMIN — LISINOPRIL 40 MG: 20 TABLET ORAL at 08:02

## 2018-02-23 RX ADMIN — CELECOXIB 200 MG: 100 CAPSULE ORAL at 08:02

## 2018-02-23 RX ADMIN — PNEUMOCOCCAL 13-VALENT CONJUGATE VACCINE 0.5 ML: 2.2; 2.2; 2.2; 2.2; 2.2; 4.4; 2.2; 2.2; 2.2; 2.2; 2.2; 2.2; 2.2 INJECTION, SUSPENSION INTRAMUSCULAR at 02:02

## 2018-02-23 RX ADMIN — MOXIFLOXACIN HYDROCHLORIDE 400 MG: 400 TABLET, FILM COATED ORAL at 08:02

## 2018-02-23 RX ADMIN — DOCUSATE SODIUM 100 MG: 100 CAPSULE, LIQUID FILLED ORAL at 08:02

## 2018-02-23 NOTE — PROGRESS NOTES
U Internal Medicine Resident Progress Note    Subjective:      Willow Giron is a 87 y.o. female who is being followed by the [facility] IM service at [facility] for hyponatremia and dizziness for one day.     Doing well overnight, eating breakfast sitting up in bed in NAD. No shortness of breath/chest pain/nausea/vomiting. Continues to urinate frequently, good output with 20mg PO lasix BID.     Objective:   Last 24 Hour Vital Signs:  BP  Min: 125/59  Max: 184/79  Temp  Av.8 °F (37.1 °C)  Min: 98.3 °F (36.8 °C)  Max: 99.5 °F (37.5 °C)  Pulse  Av.3  Min: 65  Max: 112  Resp  Av.6  Min: 18  Max: 20  SpO2  Av.5 %  Min: 96 %  Max: 99 %  I/O last 3 completed shifts:  In: 500 [P.O.:500]  Out: 1975 [Urine:1975]    Physical Examination:  General: Alert, NAD, sitting up with HOB at 60degrees talking with her granddaughter  HEENT: NC/AT, EOMI, PERRL, MMM, clear speech, occasional sniffle  Resp: normal effort, lungs CTA b/l, speaking in full sentences  CV: RRR, no murmur or gallop, 2+ b/l radial and DP pulses  Abdomen: soft, nontender, nondistended, normoactive bowel sounds  Extremities: no cyanosis or clubbing, no edema of b/l LE,Skin: dry, warm, non-diaphoretic, mild flat nontender erythematous rash over posterior right calf and knee- improved from yesterday; echymosis over dorsal left hand and wrist  Neuro: AOx4, clear speech with appropriate answers, equal strength bilaterally    Laboratory:  Laboratory Data Reviewed: yes  Pertinent Findings:  Trended Lab Data:     Recent Labs  Lab 18  0636 18  0637  18  0607  18  1848 18  0024 18  0655   WBC  --  4.06  --  4.01  --   --   --  4.29   HGB  --  10.5*  --  9.9*  --   --   --  10.2*   HCT  --  30.4*  --  28.7*  --   --   --  30.1*   PLT  --  237  --  235  --   --   --  272   MCV  --  91  --  90  --   --   --  91   RDW  --  11.6  --  11.6  --   --   --  11.8   *  --   < > 126*  < > 125* 129* 129*    K 4.5  --   < > 4.0  < > 4.7 4.1 3.9   CL 92*  --   < > 94*  < > 95 97 98   CO2 26  --   < > 24  < > 22* 22* 20*   BUN 13  --   < > 11  < > 15 14 13   CREATININE 0.7  --   < > 0.8  < > 0.8 0.8 0.8     --   < > 112*  < > 116* 107 105   CALCIUM 8.8  --   < > 8.7  < > 9.0 9.0 9.0   MG 1.7  --   --  1.7  --   --   --  1.7   PHOS 3.6  --   --  3.5  --   --   --  3.4   PROT 5.9*  --   < > 5.7*  < > 6.2 6.1 6.3   ALBUMIN 3.3*  --   < > 3.2*  < > 3.4* 3.5 3.6   BILITOT 0.5  --   < > 0.5  < > 0.4 0.5 0.5   AST 17  --   < > 16  < > 21 21 21   ALKPHOS 57  --   < > 50*  < > 59 55 54*   ALT 11  --   < > 13  < > 18 19 20   < > = values in this interval not displayed.   Trended Cardiac Data:     Recent Labs  Lab 02/17/18  1222   TROPONINI 0.007          Thyroid:   Lab Results   Component Value Date    TSH 0.735 02/19/2018     Anemia:   Lab Results   Component Value Date    IRON 65 02/18/2018    TIBC 383 02/18/2018    FERRITIN 141 02/18/2018    OAFQMAYX16 854 02/19/2018    FOLATE 9.4 02/19/2018   Urinalysis:   Lab Results   Component Value Date    COLORU Yellow 02/17/2018    SPECGRAV <=1.005 (A) 02/17/2018    NITRITE Negative 02/17/2018    KETONESU Negative 02/17/2018    UROBILINOGEN Negative 02/17/2018       Microbiology Data Reviewed: yes  Pertinent Findings:  none    Other Results:  EKG (my interpretation):no new  Radiology Data Reviewed: yes  Pertinent Findings:  MRI Brain w and wo Contrast (2/20)-  Findings:  There is prominence of the extra-axial CSF space overlying the frontal lobes bilaterally. Findings are suggestive of either bilateral frontal atrophy or bilateral chronic subdural hygromas.  In the deep white matter of the right frontal lobe and extending into the right anterior basal ganglia there are changes from encephalomalacia with volume loss and ipsilateral dilatation of the right frontal horn. There is also a focal susceptibility change in the caudate nucleus and lentiform nucleus, suggesting an old  resolved hemorrhagic infarction. In addition in the deep white matter all the centrum bilaterally there are numerous scattered T2 hyperintensities without restricted diffusion or abnormal enhancement or abnormal mass effects, most likely from chronic microvascular ischemia.  Within the parenchyma there is no abnormal enhancement or restricted diffusion to suggest acute infarctions or neoplasms. There is no midline shift or herniations. The temporal horns are symmetric and normal.  There is cerebellar atrophy. In the posterior fossa no acute infarctions or neoplasms or extra-axial enhancing masses is noted.  The flow-voids of the major vessels is normal. There are post operative changes from cataract surgery. Air-fluid levels in the sphenoid sinuses from acute sphenoid sinusitis noted bilaterally.   Impression   1. 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  2. Periventricular white matter changes from chronic microvascular ischemia.  3. 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  4. Acute sphenoid sinusitis.       CT Head w/o Contrast (2/19)-  Findings:  Remote right basal ganglia lacunar infarcts, unchanged.  There is no evidence of acute infarct, hemorrhage, or mass.  There is ventricular and sulcal enlargement consistent with generalized atrophy.  No mass-effect or midline shift.  There is enlargement of the extra-axial CSF spaces along the frontal lobes, unchanged. Mild to moderate decreased supratentorial white matter attenuation most likely related to chronic nonspecific small vessel disease.  Air-fluid levels in the sphenoid sinuses, unchanged.  The remaining paranasal sinuses and mastoid air cells are clear.    The calvarium appears intact.  .   Impression   No acute intracranial abnormalities.  Stable chronic findings, as above.  Sphenoid sinus disease with air-fluid levels,  unchanged.       Current Medications:     Infusions:       Scheduled:   amLODIPine  10 mg Oral Daily    celecoxib  200 mg Oral BID    docusate sodium  100 mg Oral BID    fluticasone  2 spray Each Nare BID    furosemide  20 mg Oral BID    heparin (porcine)  5,000 Units Subcutaneous Q8H    lisinopril  40 mg Oral Daily    moxifloxacin  400 mg Oral Daily    pantoprazole  40 mg Oral Daily    simvastatin  40 mg Oral QHS    sodium chloride  2 g Oral TID        PRN:  acetaminophen, hydrocodone-acetaminophen 7.5-325mg, labetalol, pneumoc 13-jamar conj-dip cr(PF), ramelteon, simethicone, sodium chloride 0.9%, DIPH,PERTUS (ADACEL),TETANUS PF VAC (ADULT)    Antibiotics and Day Number of Therapy:  moxifloxacin started 2/21    Lines and Day Number of Therapy:  PIV    Assessment:     Willow Giron is a 87 y.o.female with  Patient Active Problem List    Diagnosis Date Noted    Acute sphenoidal sinusitis 02/20/2018    Essential hypertension 02/17/2018    Hyperlipidemia 02/17/2018    GERD (gastroesophageal reflux disease) 02/17/2018        Plan:     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 Over the course of the day, she progressively worsened with AOx2 (self, time, but unable to tell place or situation), non-stop derailment-pattern speech. Family reports she has not slept since admission.   -takes 1/2 tablet of Xanax (0.5mg tablet possibly) BID every day, but did not continue on admit due to concern for sedation especially in setting of severe hyponatremia  -delirium precautions in place, no medications that could cause this, no known alcohol or drug abuse, no signs of withdrawal, CT head negative for acute abnormality, but with age-advanced cerebral volume loss  -TSH wnl, random cortisol wnl, Vit B12 level WNL, folate  -Psychiatry consulted for  evaluation and treatment, appreciate recs  -started seroquel 25mg qhs last night as pt had not slept since admit, but pt fell asleep last night before taking any of her night meds and family did not want to wake her  -Stat CT Head repeat unchanged, but shows sinus disease, flonase for now. ENT consulted, appreciate recs, will start moxifloxacin  -MRI Brain with no acute changes but shows acute sphenoid sinusitis  -pt at baseline    Hypovolemic Hypotonic Hyponatremia  -114 in Ed, now 124, Na increasing appropriately with D5 NS @ 100mL/hr  -urine sodium 21, urine Cr 23.6, urine osmolality 248  -likely from nausea/vomiting/diarrhea  -held fluids after her Na decreased to 122 from 125 with normal saline  -consulted Nephrology for further mgmt recs- no signs of SIADH, plan to increase serum Na by 8 mmol/L/day, start Salt tab 1g BID with meals, increase free water intake to 1.5L, get urine Osm today and tomorrow morning, would not give lasix, continue serum Na q6h  -Na improved to 124, continue 2g salt tab daily with meals. Will initiate Nephrology recs, plan to increase Na by 8mmol/L/day, limit free water intake to 1L per day. Will consider adding lasix to assist with free water excretion.  -will get repeat UA with lytes and FeUrea, osmo this morning     Hypokalemia (resolved)  -likely secondary to diarrhea   -K 2.9 on presentation, normalized  -continue to monitor     Hypochloridemia, improving  -likely secondary to diarrhea  -76 initially, 92 today    Acute Sphenoid Sinusitis  -seen on both CT Head and MRI Brain  -pt reports intermittent rhinorrhea but denies nasal congestion or sinus pain  -continue moxifloxacin    Weakness, dizzines  -on admission, c/o weakness and dizziness for ~1 week  -likely due to hyponatremia and dehydration  -PT/OT consulted, recommend SNF  - SNF placement now-possibly [facility]     HTN  -189/79 on presentation (hadn't taken home BP meds)  -remaining elevated, continuing home meds (lisinopril  40mg and amlodipine 10mg), added PRN labetalol for systolic >180  -will likely need to add another antihypertensive if elevated BP continues    R LE Erysipelas  -improved on clindamycin  -no evidence of DVTs on ultrasound      HLD  -continue home simvastatin 40mg daily     GERD  -protonix 40mg daily     Arthritis  -tylenol PRN  -continue home celecoxib, norco PRN     Normocytic Anemia  -H/H 11.9/33, MCV 87  -iron studies: iron 65, TIBC 383, Sat 17, Transferrin 259, Ferritin 141      Diet: cardiac fluid  Ppx: heparin  Code: Full    Dispo: pending correction of sodium, Nephrology following, Psych consulted, PT/OT following, SW working on SNF       Karen Hawkins MD  [facility] Internal Medicine-PGY1  [facility] Internal Medicine Service Team B  2/23/2018, 2:23 PM      [facility] Medicine Hospitalist Pager numbers:   [facility] Hospitalist Medicine Team A (Adrien/Rufino): [phone]  [facility] Hospitalist Medicine Team B (Emily/Mekhi):  [phone]

## 2018-02-23 NOTE — PLAN OF CARE
Pt is being discharged. Removed IV, tip intact, tolerated well. Went over discharge instructions with pt and son, verbalized understanding. Called report to [facility], just waiting for transport.

## 2018-02-23 NOTE — PT/OT/SLP PROGRESS
Occupational Therapy   Treatment    Name: Willow Giron  MRN: [id number]  Admitting Diagnosis:  Hyponatremia       Recommendations:     Discharge Recommendations: nursing facility, skilled  Discharge Equipment Recommendations:   (Defer to SNF)  Barriers to discharge:  Decreased caregiver support    Subjective     Communicated with: Jeny maldonado prior to session.  Pain/Comfort:  · Pain Rating 1: 0/10  · Pain Rating Post-Intervention 1: 0/10    Patients cultural, spiritual, Scientology conflicts given the current situation:  (none reported)    Objective:     Patient found with: telemetry    General Precautions: Standard, fall   Orthopedic Precautions:N/A   Braces: N/A     Bed Mobility:    · N/A    Functional Mobility/Transfers:  · Patient completed Sit <> Stand Transfer with contact guard assistance  with  no assistive device   · Patient completed Toilet Transfer Step Transfer technique with contact guard assistance with  bedside commode    Activities of Daily Living:  · Grooming: stand by assistance    · Toileting: maximal assistance      Patient left up in chair with all lines intact, call button in reach and RN notified    [facility] 6 Click:  [facility] Total Score: 18    Treatment & Education:  Patient on BSC on arrival.  Patient requires increased time to void. Max (a) for toileting hygiene and to fully pull up underwear. Patient stepped to bedside chair and completed G/h tasks with SBA.   Education:    Assessment:   Patient continues to make progress with activity tolerance and endurance. Will benefit from continued skilled OT to address functional deficits.    Willow Giron is a 87 y.o. female with a medical diagnosis of Hyponatremia.  Performance deficits affecting function are weakness, impaired endurance, impaired self care skills, impaired functional mobilty, gait instability, impaired balance, decreased upper extremity function, decreased lower extremity function, decreased ROM.      Rehab Prognosis:   Good; patient would benefit from acute skilled OT services to address these deficits and reach maximum level of function.       Plan:     Patient to be seen 5 x/week to address the above listed problems via self-care/home management, therapeutic activities, therapeutic exercises  · Plan of Care Expires: 03/18/18  · Plan of Care Reviewed with: patient    This Plan of care has been discussed with the patient who was involved in its development and understands and is in agreement with the identified goals and treatment plan    GOALS:    Occupational Therapy Goals        Problem: Occupational Therapy Goal    Goal Priority Disciplines Outcome Interventions   Occupational Therapy Goal     OT, PT/OT Ongoing (interventions implemented as appropriate)    Description:  Goals to be met by: 03/18/18     Patient will increase functional independence with ADLs by performing:    Feeding with Set-up Assistance. --MET 2/21  UE Dressing with Supervision.  LE Dressing with Minimal Assistance.  Grooming while standing at sink with Stand-by Assistance.  Toileting from toilet with Contact Guard Assistance for hygiene and clothing management.   Toilet transfer to toilet with Contact Guard Assistance. --GOAL MET 2/22/2018.  Increased functional strength to WFL for ADLs. --MET 2/21                        Time Tracking:     OT Date of Treatment: 02/23/18  OT Start Time: 0926  OT Stop Time: 0952  OT Total Time (min): 26 min    Billable Minutes:Self Care/Home Management 26    JONATAN Lockett  2/23/2018

## 2018-02-23 NOTE — PROGRESS NOTES
received call from Soila admissions coordinator at [facility]. The orders were reviewed and the patient is clear to arrive for SNF admission today.    Report can be called to Charge nurse at  [phone]. The patient will be admitting to Room #2B.     arranged wheelchair transportation through [employer] to pick patient up within the hour. Bedside nurse Jeny informed. Packet containing copy of patient's chart placed at nurse's station.    Patient and son at bedside informed of above.

## 2018-02-23 NOTE — PLAN OF CARE
Problem: Patient Care Overview  Goal: Plan of Care Review  Outcome: Ongoing (interventions implemented as appropriate)  Plan of care reviewed with patient. Patient verbalized complete understanding. Fall/safety/delirium precautions maintained. Slip resistant socks on. Bed in lowest position, locked, call light within reach. Bed alarm on, Side rails up x's 2. Nurse instructed patient to notify staff for any assistance and pt verbalized complete understanding. AAOx4. Pt sodium low, scheduled sodium chloride given. Pt sleeping throughout care. No acute distress noted, will continue to monitor. Pt on telemetry, no ectopy or true red alarms noted. SR.

## 2018-02-23 NOTE — PROGRESS NOTES
sent correct facility transfer orders to [facility] for Admissions coordinator Soila to review.    12:44-  sent corrected facility transfer orders to [facility] for Soila to review.

## 2018-02-23 NOTE — PROGRESS NOTES
Progress Note  [facility] Nephrology    Admit Date: 2/17/2018   LOS: 6 days     SUBJECTIVE:     Follow-up For:  hyponatremia    Review of Systems:  Pleasant  Serum Na 129  OBJECTIVE:     Vital Signs (Most Recent)  Temp: 98.7 °F (37.1 °C) (02/23/18 0807)  Pulse: 71 (02/23/18 0807)  Resp: 20 (02/23/18 0807)  BP: (!) 184/79 (02/23/18 0807)  SpO2: 96 % (02/23/18 0732)    Vital Signs Range (Last 24H):  Temp:  [98.3 °F (36.8 °C)-99.5 °F (37.5 °C)]   Pulse:  []   Resp:  [18-20]   BP: (125-184)/(59-79)   SpO2:  [96 %-99 %]     I & O (Last 24H):    Intake/Output Summary (Last 24 hours) at 02/23/18 0943  Last data filed at 02/23/18 0525   Gross per 24 hour   Intake              500 ml   Output             1575 ml   Net            -1075 ml     Physical Exam:  NAD  No murmurs  Lungs are clear  Abdomen is soft +BS  Ext: no edema    Laboratory:  CBC:    Recent Labs  Lab 02/23/18  0655   WBC 4.29   RBC 3.30*   HGB 10.2*   HCT 30.1*        BMP:    Recent Labs  Lab 02/23/18  0655   *   K 3.9   CL 98   CO2 20*   BUN 13   CREATININE 0.8   CALCIUM 9.0      Urine osmo: 558        ASSESSMENT/PLAN:        A 88 y/o with PMHx of HTN, dyslipidemia, IBS presents to ER with near syncope found to have hypovolemia hypotonic hyponatremia d/t N/V/D. Her urine Na was 21 which might be an over representation. She might have taken lasix that day.      1- Euvolemia hypotonic hyponatremia d/t SIADH    2- SIADH ?? An underlying malignancy      Plan:   -- continue with salt tab 2 grams p.o TID with meals  -- continue lasix 20 mg p.o twice a day  -- if her serum Na is still 129 by the next blood draw then consider increasing lasix 40 mg p.o in am and 20 mg p.o in pm  -- Tolvaptan (ADH receptor antagonist) could be a valid option but we have to weigh the risks and benefits--> hepatotoxicity  -- unfortunately we have to limit her water intake to 1 liter per day  -- how aggressive shall we be looking for an underlying malignancy.     Brady  Jonathan AMES  [facility] nephrology PGY-5

## 2018-02-23 NOTE — PLAN OF CARE
[facility]    FACILITY TRANSFER ORDERS      Patient Name: Willow Giron  YOB: 1930    PCP: Qamar Bland MD   PCP Address: [address] / [place] [address]  PCP Phone Number: [phone]  PCP Fax: [phone]    Encounter Date: 02/23/2018    Admit to: [facility]    Vital Signs:  Routine    Diagnoses:   Active Hospital Problems    Diagnosis  POA    Acute sphenoidal sinusitis [J01.30]  Clinically Undetermined    Essential hypertension [I10]  Unknown    Hyperlipidemia [E78.5]  Unknown    GERD (gastroesophageal reflux disease) [K21.9]  Unknown      Resolved Hospital Problems    Diagnosis Date Resolved POA    *Hyponatremia [E87.1] 02/19/2018 Yes     Priority: 24     Hypokalemia [E87.6] 02/19/2018 Unknown    Diarrhea [R19.7] 02/19/2018 Unknown    Non-intractable vomiting with nausea [R11.2] 02/21/2018 Unknown       Allergies:  Review of patient's allergies indicates:   Allergen Reactions    Pcn [penicillins] Rash       Diet: regular diet    Activities: Activity as tolerated    Nursing: none     Labs: BMP Once within one week, preferably 2 days after discharge    CONSULTS:    Physical Therapy to evaluate and treat.  and Occupational Therapy to evaluate and treat. Therapy for 5 days per week.     MISCELLANEOUS CARE:  none    WOUND CARE ORDERS  None    Medications: Review discharge medications with patient and family and provide education.      Current Discharge Medication List      START taking these medications    Details   fluticasone (FLONASE) 50 mcg/actuation nasal spray 2 sprays (100 mcg total) by Each Nare route 2 (two) times daily.  Qty: 1 Bottle, Refills: 0      moxifloxacin (AVELOX) 400 mg tablet Take 1 tablet (400 mg total) by mouth once daily.  Qty: 3 tablet, Refills: 0      sodium chloride 1 gram tablet Take 2 tablets (2 g total) by mouth 3 (three) times daily.  Qty: 30 tablet, Refills: 0         CONTINUE these medications which have CHANGED     Details   furosemide (LASIX) 20 MG tablet Take 1 tablet (20 mg total) by mouth 2 (two) times daily.  Qty: 30 tablet, Refills: 0         CONTINUE these medications which have NOT CHANGED    Details   amlodipine (NORVASC) 10 MG tablet Take 10 mg by mouth once daily.      lisinopril (PRINIVIL,ZESTRIL) 40 MG tablet Take 40 mg by mouth once daily.      nabumetone (RELAFEN) 500 MG tablet Take 500 mg by mouth 2 (two) times daily.      omeprazole (PRILOSEC) 20 MG capsule Take 20 mg by mouth once daily.      simvastatin (ZOCOR) 40 MG tablet Take 40 mg by mouth every evening.      tizanidine 4 mg Cap Take 1 capsule by mouth every evening.  Qty: 12 capsule, Refills: 0      celecoxib (CELEBREX) 200 MG capsule Take 200 mg by mouth 2 (two) times daily.         STOP taking these medications       alprazolam (XANAX) 0.5 MG tablet Comments:   Reason for Stopping:         hydrocodone-acetaminophen 7.5-325mg (NORCO) 7.5-325 mg per tablet Comments:   Reason for Stopping:                    _________________________________  Jessie Saez MD  02/23/2018

## 2018-02-23 NOTE — PLAN OF CARE
Future Appointments  Date Time Provider Department Center   4/4/2018 3:00 PM ADENIKE Rojas [facility] [place] [facility]   4/4/2018 3:40 PM Noemi Wahl MD [facility] [place] [facility]     Follow-up With  Details  Why  Contact Info   [facility]  Go to  Skilled Nursing Facility  [address] [address]  [phone]   Noemi Wahl MD  On 4/4/2018  at 3:40--ENT FOLLOW-UP--AUDIOGRAM AT 3:00 PM  [address]  [address]  [place] [address]  [phone]   Donny Brown DO  On 5/24/2018  at 2:30--Nephrology Follow-Up--Please arive 20 minutes early.  [address]  [address]  [place] [address]  [phone]   Qamar Bland MD  Call  after skilled nursing facility stay.  [address]  [address]  [place] [address]  [phone]

## 2018-02-23 NOTE — PLAN OF CARE
1101--TN spoke with Dr. Hawkins. She stated patient has been cleared by nephrology for discharge. TN inquired if they would be changing patient's lasix based on nephrology's recommendations. She stated no and that patient would be discharging on her home dose.    1108--TN paged MD team to correct facility transfer orders.    TN spoke with Dr. Castillo ( Nephrology). He stated patient needs to see Dr. Donny Brown for follow-up. TN informed him will be unable to schedule 2 week follow-up. TN will schedule earliest available.    --TN called and updated patient's daughter Aliya, waiting for corrected orders.    1206--TN to page MD team again to correct orders. Need to place lab work and therapy frequency.    1507--Dr. Diaz in room with patient. He is aware of follow-up appointments scheduled. Family will call to check on any cancellations. Labs also ordered on patient for discharge. TN gave patient's son Az, at bedside, printed follow-up information. He verbalized understanding.     Future Appointments  Date Time Provider Department Center   4/4/2018 3:00 PM ADENIKE Rojas [facility] [place] [facility]   4/4/2018 3:40 PM Noemi Wahl MD [facility] [place] [facility]     Follow-up With  Details  Why  Contact Info   [facility]  Go to  Skilled Nursing Facility  [address] [facility] [address] [facility] [address]  [phone]   Noemi Wahl MD  On 4/4/2018  at 3:40--ENT FOLLOW-UP--AUDIOGRAM AT 3:00 PM  [address]  [address]  [place] [address]  [phone]   Donny Brown DO  On 5/24/2018  at 2:30--Nephrology Follow-Up--Please arive 20 minutes early.  [address]  [address]  [place] [address]  [phone]   Qamar Bland MD  Call  after skilled nursing facility stay.  [address]  [address]  [place] [address]  [phone]      02/23/18 1106   Final Note   Assessment Type Final Discharge Note   Discharge Disposition SNF   What phone number can be called within the next  1-3 days to see how you are doing after discharge? [phone]   Hospital Follow Up  Appt(s) scheduled? Yes   Discharge plans and expectations educations in teach back method with documentation complete? Yes   Right Care Referral Info   Post Acute Recommendation SNF / Sub-Acute Rehab   Referral Type SNF   Facility Name [facility]      Jennifer Guerrier RN  Transition Navigator  [phone]

## 2018-02-26 ENCOUNTER — PATIENT OUTREACH (OUTPATIENT)
Dept: ADMINISTRATIVE | Facility: CLINIC | Age: 83
End: 2018-02-26

## 2018-02-28 NOTE — PT/OT/SLP DISCHARGE
Occupational Therapy Discharge Summary    Willow Giron  MRN: [id number]   Principal Problem: Hyponatremia      Patient Discharged from acute Occupational Therapy on 2/23/18.  Please refer to prior OT note dated 2/23/18 for functional status.    Assessment:      Patient appropriate for care in another setting.    Objective:     GOALS:    Occupational Therapy Goals     Not on file          Multidisciplinary Problems (Resolved)        Problem: Occupational Therapy Goal    Goal Priority Disciplines Outcome Interventions   Occupational Therapy Goal   (Resolved)     OT, PT/OT Outcome(s) achieved    Description:  Goals to be met by: 03/18/18     Patient will increase functional independence with ADLs by performing:    Feeding with Set-up Assistance. --MET 2/21  UE Dressing with Supervision.  LE Dressing with Minimal Assistance.  Grooming while standing at sink with Stand-by Assistance.  Toileting from toilet with Contact Guard Assistance for hygiene and clothing management.   Toilet transfer to toilet with Contact Guard Assistance. --GOAL MET 2/22/2018.  Increased functional strength to WFL for ADLs. --MET 2/21                        Reasons for Discontinuation of Therapy Services  Transfer to alternate level of care.      Plan:     Patient Discharged to: Skilled Nursing Facility    Karley Rosas, OT  2/28/2018

## 2018-03-07 ENCOUNTER — PATIENT OUTREACH (OUTPATIENT)
Dept: ADMINISTRATIVE | Facility: CLINIC | Age: 83
End: 2018-03-07

## 2018-03-07 RX ORDER — DOCUSATE SODIUM 100 MG/1
100 CAPSULE, LIQUID FILLED ORAL DAILY PRN
Status: ON HOLD | COMMUNITY
End: 2023-01-01

## 2018-03-07 RX ORDER — ALPRAZOLAM 0.25 MG/1
0.5 TABLET ORAL NIGHTLY
COMMUNITY
End: 2020-05-31 | Stop reason: SDUPTHER

## 2018-03-09 ENCOUNTER — PATIENT OUTREACH (OUTPATIENT)
Dept: ADMINISTRATIVE | Facility: CLINIC | Age: 83
End: 2018-03-09

## 2018-03-09 NOTE — PATIENT INSTRUCTIONS
Discharge Instructions for Hyponatremia  You were diagnosed with hyponatremia, which means your blood level of sodium (salt) is too low. Salt is needed for the body and brain to work. Very low blood levels of sodium can be fatal. Symptoms can include headache, confusion, fatigue, muscle cramps, hallucinations, seizures, and coma. You have been treated to raise your blood levels of sodium. The following instructions will help you care for yourself at home as you have been instructed.  Home care  · Limit your intake of fluids. Drink only the amounts directed by your healthcare provider.  · Ask your healthcare provider what you should use to replace fluids if you are throwing up.  · Keep all follow-up appointments. Your provider needs to watch your condition closely.  To help prevent hyponatremia:  · Take all medicines exactly as directed. Certain medicines can lower blood sodium levels.  · If you have done something that makes you sweat a lot, drink fluids that contain salt and other electrolytes.   · Tell all healthcare providers what medicines you take. Mention all prescription and over-the-counter drugs and herbs.  · Have your sodium levels checked often. This is vital if you take a diuretic (medicine that helps your body get rid of water).  Follow-up  Schedule a follow-up visit as directed.  When to call your healthcare provider  Call your provider right away if you have any of the following:  · Severe tiredness  · Fainting  · Dizziness  · Loss of appetite  · Nausea or vomiting  · Confusion or forgetfullness  · Muscle spasms, cramping, or twitching  · Seizures  · Gait disturbances   Date Last Reviewed: 6/8/2015  © 8928-0325 [employer]. [address] [place] [address], [place] [address]. All rights reserved. This information is not intended as a substitute for professional medical care. Always follow your healthcare professional's instructions.

## 2018-03-14 NOTE — DISCHARGE SUMMARY
[facility] Discharge Summary    Primary Team: [facility]  Attending Physician: No att. providers found  Resident: Donald  Intern: Ross    Date of Admit: 2/17/2018  Date of Discharge: 3/13/2018    Discharge to: [facility]  Condition: stable    Discharge Diagnoses     Patient Active Problem List   Diagnosis    Essential hypertension    Hyperlipidemia    GERD (gastroesophageal reflux disease)    Acute sphenoidal sinusitis       Consultants and Procedures     Consultants:  Nephrology    Procedures:   none    Brief History of Present Illness      Willow Giron is a 87 y.o.  female who  has a past medical history of Arthritis; GERD (gastroesophageal reflux disease); Hyperlipidemia; and Hypertension.  The patient presented to [facility] on 2/17/2018 with a primary complaint of Dizziness (pt states last thursday she began having n/v/d that went till this Wednesday.  Pt c/o dizziness since Friday.  pt denies abd pain)  .   Ms. Giron is a 87F brought in by her daughter for one day of lightheadedness associated with fatigue, weakness, nausea vomiting and diarrhea and decreased PO intake for one week.  Nausea with Vomiting and watery diarrhea 4-5 x day started about one week ago; contacted PCP who called in a prescription for phenergan last monday, which she took last night with her xanax that she takes for anxiety.  Then last night, she fell asleep at dinner table leaning forward; when she hadn't come to bed that night her  found her sleeping at the table and she was hard to arouse at that time then fell out of the chair.  Denies head trauma.  She denies sick contacts or anyone else with similar symptoms, fever, chills, abdominal pain, chest pain.      In the ED she was found to have a Na of 114, K of 2.9, cl of 76.  CT head, CXR, trop all negative. She was given a 250cc bolus of NS, meclizine, potassium replacement, and zofran for nausea.     For the full  HPI please refer to the History & Physical from this admission.    Hospital Course By Problem with Pertinent Findings     Altered Mental Status with insomnia and derailment thought disorder, likely 2/2 delirium vs hyponatremia vs benzo withdrawal vs infection   -Intermittent speech derailment and delirium, precautions taken, CT head negative for acute abnormality, but with age-advanced cerebral volume loss  -TSH wnl, random cortisol wnl, Vit B12 level WNL, folate  -Psychiatry consulted for evaluation and treatment, appreciate recs  -CT head showed sinus disease, continue moxifloxacin  -MRI Brain with no acute changes but shows acute sphenoid sinusitis  -at discharge, patient was at her baseline     Hypovolemic Hypotonic Hyponatremia  -114 in ED urine sodium 21, urine Cr 23.6, urine osmolality 248  -likely from nausea/vomiting/diarrhea  -Na improved with salt tabs and water restriction     Hypokalemia  -likely secondary to diarrhea   -K 2.9 on presentation, normalized     Hypochloridemia  -likely secondary to diarrhea  -76 initially, improved     Acute Sphenoid Sinusitis  -seen on both CT Head and MRI Brain  -pt reports intermittent rhinorrhea but denies nasal congestion or sinus pain  -continue moxifloxacin     Weakness, dizzines  -on admission, c/o weakness and dizziness for ~1 week  -likely due to hyponatremia and dehydration  -PT/OT consulted  - SNF placement to [facility]     HTN  -189/79 on presentation (hadn't taken home BP meds)  -improved with home meds     R LE Erysipelas  -improved on clindamycin  -no evidence of DVTs on ultrasound      HLD  -continued home simvastatin 40mg daily     GERD  -protonix 40mg daily     Arthritis  -tylenol PRN  -continued home celecoxib, norco PRN     Normocytic Anemia  -H/H 11.9/33, MCV 87  -iron studies: iron 65, TIBC 383, Sat 17, Transferrin 259, Ferritin 141         Discharge Medications        Medication List      START taking these medications    fluticasone 50  mcg/actuation nasal spray  Commonly known as:  FLONASE  2 sprays (100 mcg total) by Each Nare route 2 (two) times daily.        CHANGE how you take these medications    furosemide 20 MG tablet  Commonly known as:  LASIX  Take 1 tablet (20 mg total) by mouth 2 (two) times daily.  What changed:  · how much to take  · how to take this  · when to take this        CONTINUE taking these medications    amLODIPine 10 MG tablet  Commonly known as:  NORVASC     celecoxib 200 MG capsule  Commonly known as:  CeleBREX     lisinopril 40 MG tablet  Commonly known as:  PRINIVIL,ZESTRIL     nabumetone 500 MG tablet  Commonly known as:  RELAFEN     omeprazole 20 MG capsule  Commonly known as:  PRILOSEC     simvastatin 40 MG tablet  Commonly known as:  ZOCOR     tiZANidine 4 mg Cap  Take 1 capsule by mouth every evening.        STOP taking these medications    ALPRAZolam 0.5 MG tablet  Commonly known as:  XANAX     hydrocodone-acetaminophen 7.5-325mg 7.5-325 mg per tablet  Commonly known as:  NORCO        ASK your doctor about these medications    moxifloxacin 400 mg tablet  Commonly known as:  AVELOX  Take 1 tablet (400 mg total) by mouth once daily.  Ask about: Should I take this medication?     sodium chloride 1 gram tablet  Take 2 tablets (2 g total) by mouth 3 (three) times daily.  Ask about: Should I take this medication?           Where to Get Your Medications      You can get these medications from any pharmacy    Bring a paper prescription for each of these medications  · fluticasone 50 mcg/actuation nasal spray  · furosemide 20 MG tablet  · moxifloxacin 400 mg tablet  · sodium chloride 1 gram tablet         Discharge Information:   Diet:  Fluid restricted    Physical Activity:  As tolerated    Instructions:  1. Take all medications as prescribed  2. Keep all follow-up appointments  3. Return to the hospital or call your primary care physicians if any worsening symptoms such as any of the symptoms that brought you into the  hospital occur, including fatigue, confusion,  Changes in baseline mental status, or fever.      Follow-Up Appointments:  As scheduled    Karen Hawkins  [facility], -1

## 2018-03-17 ENCOUNTER — HOSPITAL ENCOUNTER (INPATIENT)
Facility: HOSPITAL | Age: 83
LOS: 10 days | Discharge: HOME | DRG: 194 | End: 2018-03-28
Attending: EMERGENCY MEDICINE | Admitting: INTERNAL MEDICINE
Payer: MEDICARE

## 2018-03-17 DIAGNOSIS — E87.1 HYPONATREMIA: ICD-10-CM

## 2018-03-17 DIAGNOSIS — E87.6 HYPOKALEMIA: ICD-10-CM

## 2018-03-17 DIAGNOSIS — J18.9 PNEUMONIA: ICD-10-CM

## 2018-03-17 DIAGNOSIS — I10 ESSENTIAL HYPERTENSION: ICD-10-CM

## 2018-03-17 DIAGNOSIS — E22.2 SIADH (SYNDROME OF INAPPROPRIATE ADH PRODUCTION): ICD-10-CM

## 2018-03-17 DIAGNOSIS — R11.10 VOMITING: ICD-10-CM

## 2018-03-17 DIAGNOSIS — R79.89 ELEVATED TROPONIN: ICD-10-CM

## 2018-03-17 DIAGNOSIS — J18.9 HCAP (HEALTHCARE-ASSOCIATED PNEUMONIA): Primary | ICD-10-CM

## 2018-03-17 DIAGNOSIS — R11.10 VOMITING, INTRACTABILITY OF VOMITING NOT SPECIFIED, PRESENCE OF NAUSEA NOT SPECIFIED, UNSPECIFIED VOMITING TYPE: ICD-10-CM

## 2018-03-17 LAB
ALBUMIN SERPL BCP-MCNC: 3 G/DL
ALP SERPL-CCNC: 86 U/L
ALT SERPL W/O P-5'-P-CCNC: 15 U/L
ANION GAP SERPL CALC-SCNC: 15 MMOL/L
AST SERPL-CCNC: 21 U/L
BASOPHILS # BLD AUTO: 0.01 K/UL
BASOPHILS NFR BLD: 0.1 %
BILIRUB SERPL-MCNC: 0.6 MG/DL
BILIRUB UR QL STRIP: NEGATIVE
BUN SERPL-MCNC: 10 MG/DL
CALCIUM SERPL-MCNC: 9.7 MG/DL
CHLORIDE SERPL-SCNC: 76 MMOL/L
CLARITY UR: CLEAR
CO2 SERPL-SCNC: 26 MMOL/L
COLOR UR: YELLOW
CREAT SERPL-MCNC: 0.8 MG/DL
DIFFERENTIAL METHOD: ABNORMAL
EOSINOPHIL # BLD AUTO: 0 K/UL
EOSINOPHIL NFR BLD: 0.4 %
ERYTHROCYTE [DISTWIDTH] IN BLOOD BY AUTOMATED COUNT: 11.5 %
EST. GFR  (AFRICAN AMERICAN): >60 ML/MIN/1.73 M^2
EST. GFR  (NON AFRICAN AMERICAN): >60 ML/MIN/1.73 M^2
GLUCOSE SERPL-MCNC: 134 MG/DL
GLUCOSE UR QL STRIP: ABNORMAL
HCT VFR BLD AUTO: 30.4 %
HGB BLD-MCNC: 10.7 G/DL
HGB UR QL STRIP: NEGATIVE
KETONES UR QL STRIP: ABNORMAL
LEUKOCYTE ESTERASE UR QL STRIP: NEGATIVE
LYMPHOCYTES # BLD AUTO: 1.1 K/UL
LYMPHOCYTES NFR BLD: 9.6 %
MCH RBC QN AUTO: 31 PG
MCHC RBC AUTO-ENTMCNC: 35.2 G/DL
MCV RBC AUTO: 88 FL
MONOCYTES # BLD AUTO: 1.2 K/UL
MONOCYTES NFR BLD: 10.6 %
NEUTROPHILS # BLD AUTO: 8.6 K/UL
NEUTROPHILS NFR BLD: 78.2 %
NITRITE UR QL STRIP: NEGATIVE
PH UR STRIP: 7 [PH] (ref 5–8)
PLATELET # BLD AUTO: 420 K/UL
PMV BLD AUTO: 8.5 FL
POTASSIUM SERPL-SCNC: 3.8 MMOL/L
PROT SERPL-MCNC: 7.1 G/DL
PROT UR QL STRIP: NEGATIVE
RBC # BLD AUTO: 3.45 M/UL
SODIUM SERPL-SCNC: 117 MMOL/L
SODIUM UR-SCNC: 81 MMOL/L
SP GR UR STRIP: <=1.005 (ref 1–1.03)
URN SPEC COLLECT METH UR: ABNORMAL
UROBILINOGEN UR STRIP-ACNC: NEGATIVE EU/DL
WBC # BLD AUTO: 11.02 K/UL

## 2018-03-17 PROCEDURE — 85025 COMPLETE CBC W/AUTO DIFF WBC: CPT

## 2018-03-17 PROCEDURE — 96374 THER/PROPH/DIAG INJ IV PUSH: CPT

## 2018-03-17 PROCEDURE — 99285 EMERGENCY DEPT VISIT HI MDM: CPT | Mod: 25

## 2018-03-17 PROCEDURE — 93005 ELECTROCARDIOGRAM TRACING: CPT

## 2018-03-17 PROCEDURE — 96375 TX/PRO/DX INJ NEW DRUG ADDON: CPT

## 2018-03-17 PROCEDURE — 83935 ASSAY OF URINE OSMOLALITY: CPT

## 2018-03-17 PROCEDURE — 81003 URINALYSIS AUTO W/O SCOPE: CPT

## 2018-03-17 PROCEDURE — 11000001 HC ACUTE MED/SURG PRIVATE ROOM

## 2018-03-17 PROCEDURE — 63600175 PHARM REV CODE 636 W HCPCS: Performed by: EMERGENCY MEDICINE

## 2018-03-17 PROCEDURE — 80053 COMPREHEN METABOLIC PANEL: CPT

## 2018-03-17 PROCEDURE — 93010 ELECTROCARDIOGRAM REPORT: CPT | Mod: ,,, | Performed by: INTERNAL MEDICINE

## 2018-03-17 PROCEDURE — 84300 ASSAY OF URINE SODIUM: CPT

## 2018-03-17 PROCEDURE — 94640 AIRWAY INHALATION TREATMENT: CPT

## 2018-03-17 PROCEDURE — 84484 ASSAY OF TROPONIN QUANT: CPT

## 2018-03-17 PROCEDURE — 25000003 PHARM REV CODE 250: Performed by: EMERGENCY MEDICINE

## 2018-03-17 PROCEDURE — 25000242 PHARM REV CODE 250 ALT 637 W/ HCPCS: Performed by: EMERGENCY MEDICINE

## 2018-03-17 RX ORDER — IPRATROPIUM BROMIDE AND ALBUTEROL SULFATE 2.5; .5 MG/3ML; MG/3ML
3 SOLUTION RESPIRATORY (INHALATION)
Status: COMPLETED | OUTPATIENT
Start: 2018-03-17 | End: 2018-03-17

## 2018-03-17 RX ORDER — CEFEPIME HYDROCHLORIDE 2 G/1
2 INJECTION, POWDER, FOR SOLUTION INTRAVENOUS
Status: COMPLETED | OUTPATIENT
Start: 2018-03-17 | End: 2018-03-18

## 2018-03-17 RX ORDER — PROCHLORPERAZINE EDISYLATE 5 MG/ML
10 INJECTION INTRAMUSCULAR; INTRAVENOUS
Status: COMPLETED | OUTPATIENT
Start: 2018-03-17 | End: 2018-03-17

## 2018-03-17 RX ORDER — VANCOMYCIN HCL IN 5 % DEXTROSE 1G/250ML
1000 PLASTIC BAG, INJECTION (ML) INTRAVENOUS
Status: DISCONTINUED | OUTPATIENT
Start: 2018-03-17 | End: 2018-03-17

## 2018-03-17 RX ADMIN — IPRATROPIUM BROMIDE AND ALBUTEROL SULFATE 3 ML: .5; 3 SOLUTION RESPIRATORY (INHALATION) at 11:03

## 2018-03-17 RX ADMIN — PROCHLORPERAZINE EDISYLATE 10 MG: 5 INJECTION INTRAMUSCULAR; INTRAVENOUS at 10:03

## 2018-03-17 RX ADMIN — SODIUM CHLORIDE 1000 ML: 0.9 INJECTION, SOLUTION INTRAVENOUS at 10:03

## 2018-03-18 PROBLEM — J18.9 HCAP (HEALTHCARE-ASSOCIATED PNEUMONIA): Status: ACTIVE | Noted: 2018-03-18

## 2018-03-18 PROBLEM — R11.10 VOMITING: Status: ACTIVE | Noted: 2018-02-17

## 2018-03-18 LAB
ANION GAP SERPL CALC-SCNC: 12 MMOL/L
ANION GAP SERPL CALC-SCNC: 12 MMOL/L
ANION GAP SERPL CALC-SCNC: 9 MMOL/L
APTT BLDCRRT: 28 SEC
APTT BLDCRRT: 49.6 SEC
BASOPHILS # BLD AUTO: 0.01 K/UL
BASOPHILS NFR BLD: 0.1 %
BUN SERPL-MCNC: 7 MG/DL
BUN SERPL-MCNC: 8 MG/DL
BUN SERPL-MCNC: 8 MG/DL
CALCIUM SERPL-MCNC: 8.2 MG/DL
CALCIUM SERPL-MCNC: 8.6 MG/DL
CALCIUM SERPL-MCNC: 8.7 MG/DL
CHLORIDE SERPL-SCNC: 87 MMOL/L
CHLORIDE SERPL-SCNC: 88 MMOL/L
CHLORIDE SERPL-SCNC: 88 MMOL/L
CO2 SERPL-SCNC: 19 MMOL/L
CO2 SERPL-SCNC: 21 MMOL/L
CO2 SERPL-SCNC: 23 MMOL/L
CREAT SERPL-MCNC: 0.7 MG/DL
CREAT SERPL-MCNC: 0.7 MG/DL
CREAT SERPL-MCNC: 0.8 MG/DL
DIFFERENTIAL METHOD: ABNORMAL
EOSINOPHIL # BLD AUTO: 0 K/UL
EOSINOPHIL NFR BLD: 0.2 %
ERYTHROCYTE [DISTWIDTH] IN BLOOD BY AUTOMATED COUNT: 11.4 %
EST. GFR  (AFRICAN AMERICAN): >60 ML/MIN/1.73 M^2
EST. GFR  (NON AFRICAN AMERICAN): >60 ML/MIN/1.73 M^2
FACT X PPP CHRO-ACNC: <0.1 IU/ML
GLUCOSE SERPL-MCNC: 120 MG/DL
GLUCOSE SERPL-MCNC: 126 MG/DL
GLUCOSE SERPL-MCNC: 139 MG/DL
HCT VFR BLD AUTO: 26.4 %
HGB BLD-MCNC: 9.1 G/DL
INR PPP: 1.1
LYMPHOCYTES # BLD AUTO: 0.9 K/UL
LYMPHOCYTES NFR BLD: 9.7 %
MAGNESIUM SERPL-MCNC: 1.2 MG/DL
MCH RBC QN AUTO: 30.5 PG
MCHC RBC AUTO-ENTMCNC: 34.5 G/DL
MCV RBC AUTO: 89 FL
MONOCYTES # BLD AUTO: 0.9 K/UL
MONOCYTES NFR BLD: 10.3 %
NEUTROPHILS # BLD AUTO: 7.2 K/UL
NEUTROPHILS NFR BLD: 78.9 %
OSMOLALITY SERPL: 248 MOSM/KG
OSMOLALITY UR: 225 MOSM/KG
OSMOLALITY UR: 305 MOSM/KG
PHOSPHATE SERPL-MCNC: 2.3 MG/DL
PLATELET # BLD AUTO: 333 K/UL
PMV BLD AUTO: 8.3 FL
POTASSIUM SERPL-SCNC: 3.4 MMOL/L
POTASSIUM SERPL-SCNC: 3.5 MMOL/L
POTASSIUM SERPL-SCNC: 3.7 MMOL/L
PROTHROMBIN TIME: 11.7 SEC
RBC # BLD AUTO: 2.98 M/UL
SODIUM SERPL-SCNC: 119 MMOL/L
SODIUM SERPL-SCNC: 120 MMOL/L
SODIUM SERPL-SCNC: 120 MMOL/L
TROPONIN I SERPL DL<=0.01 NG/ML-MCNC: 0.57 NG/ML
TROPONIN I SERPL DL<=0.01 NG/ML-MCNC: 0.6 NG/ML
TROPONIN I SERPL DL<=0.01 NG/ML-MCNC: 0.77 NG/ML
TROPONIN I SERPL DL<=0.01 NG/ML-MCNC: 0.9 NG/ML
URATE SERPL-MCNC: 2.3 MG/DL
WBC # BLD AUTO: 9.15 K/UL

## 2018-03-18 PROCEDURE — 85730 THROMBOPLASTIN TIME PARTIAL: CPT

## 2018-03-18 PROCEDURE — 25000242 PHARM REV CODE 250 ALT 637 W/ HCPCS: Performed by: STUDENT IN AN ORGANIZED HEALTH CARE EDUCATION/TRAINING PROGRAM

## 2018-03-18 PROCEDURE — 85730 THROMBOPLASTIN TIME PARTIAL: CPT | Mod: 91

## 2018-03-18 PROCEDURE — 63600175 PHARM REV CODE 636 W HCPCS: Performed by: STUDENT IN AN ORGANIZED HEALTH CARE EDUCATION/TRAINING PROGRAM

## 2018-03-18 PROCEDURE — 85610 PROTHROMBIN TIME: CPT

## 2018-03-18 PROCEDURE — 93005 ELECTROCARDIOGRAM TRACING: CPT

## 2018-03-18 PROCEDURE — 96361 HYDRATE IV INFUSION ADD-ON: CPT

## 2018-03-18 PROCEDURE — 25000003 PHARM REV CODE 250: Performed by: STUDENT IN AN ORGANIZED HEALTH CARE EDUCATION/TRAINING PROGRAM

## 2018-03-18 PROCEDURE — 63600175 PHARM REV CODE 636 W HCPCS: Performed by: INTERNAL MEDICINE

## 2018-03-18 PROCEDURE — 84484 ASSAY OF TROPONIN QUANT: CPT

## 2018-03-18 PROCEDURE — 84484 ASSAY OF TROPONIN QUANT: CPT | Mod: 91

## 2018-03-18 PROCEDURE — 85520 HEPARIN ASSAY: CPT

## 2018-03-18 PROCEDURE — 94640 AIRWAY INHALATION TREATMENT: CPT

## 2018-03-18 PROCEDURE — 93010 ELECTROCARDIOGRAM REPORT: CPT | Mod: ,,, | Performed by: INTERNAL MEDICINE

## 2018-03-18 PROCEDURE — 63600175 PHARM REV CODE 636 W HCPCS: Performed by: EMERGENCY MEDICINE

## 2018-03-18 PROCEDURE — 94761 N-INVAS EAR/PLS OXIMETRY MLT: CPT

## 2018-03-18 PROCEDURE — 80048 BASIC METABOLIC PNL TOTAL CA: CPT

## 2018-03-18 PROCEDURE — 84100 ASSAY OF PHOSPHORUS: CPT

## 2018-03-18 PROCEDURE — 85025 COMPLETE CBC W/AUTO DIFF WBC: CPT

## 2018-03-18 PROCEDURE — 83935 ASSAY OF URINE OSMOLALITY: CPT

## 2018-03-18 PROCEDURE — 83930 ASSAY OF BLOOD OSMOLALITY: CPT

## 2018-03-18 PROCEDURE — 36415 COLL VENOUS BLD VENIPUNCTURE: CPT

## 2018-03-18 PROCEDURE — 11000001 HC ACUTE MED/SURG PRIVATE ROOM

## 2018-03-18 PROCEDURE — 25000003 PHARM REV CODE 250: Performed by: EMERGENCY MEDICINE

## 2018-03-18 PROCEDURE — 80048 BASIC METABOLIC PNL TOTAL CA: CPT | Mod: 91

## 2018-03-18 PROCEDURE — 83735 ASSAY OF MAGNESIUM: CPT

## 2018-03-18 PROCEDURE — 84550 ASSAY OF BLOOD/URIC ACID: CPT

## 2018-03-18 PROCEDURE — 87040 BLOOD CULTURE FOR BACTERIA: CPT | Mod: 59

## 2018-03-18 RX ORDER — SODIUM CHLORIDE 0.9 % (FLUSH) 0.9 %
5 SYRINGE (ML) INJECTION
Status: DISCONTINUED | OUTPATIENT
Start: 2018-03-18 | End: 2018-03-28 | Stop reason: HOSPADM

## 2018-03-18 RX ORDER — HEPARIN SODIUM,PORCINE/D5W 25000/250
16 INTRAVENOUS SOLUTION INTRAVENOUS CONTINUOUS
Status: DISCONTINUED | OUTPATIENT
Start: 2018-03-18 | End: 2018-03-19

## 2018-03-18 RX ORDER — POTASSIUM CHLORIDE 20 MEQ/1
40 TABLET, EXTENDED RELEASE ORAL ONCE
Status: COMPLETED | OUTPATIENT
Start: 2018-03-18 | End: 2018-03-18

## 2018-03-18 RX ORDER — SIMVASTATIN 20 MG/1
40 TABLET, FILM COATED ORAL NIGHTLY
Status: DISCONTINUED | OUTPATIENT
Start: 2018-03-18 | End: 2018-03-28 | Stop reason: HOSPADM

## 2018-03-18 RX ORDER — CLOPIDOGREL BISULFATE 75 MG/1
300 TABLET ORAL ONCE
Status: COMPLETED | OUTPATIENT
Start: 2018-03-18 | End: 2018-03-18

## 2018-03-18 RX ORDER — FLUTICASONE PROPIONATE 50 MCG
2 SPRAY, SUSPENSION (ML) NASAL 2 TIMES DAILY
Status: DISCONTINUED | OUTPATIENT
Start: 2018-03-18 | End: 2018-03-28 | Stop reason: HOSPADM

## 2018-03-18 RX ORDER — PANTOPRAZOLE SODIUM 40 MG/1
40 TABLET, DELAYED RELEASE ORAL DAILY
Status: DISCONTINUED | OUTPATIENT
Start: 2018-03-18 | End: 2018-03-28 | Stop reason: HOSPADM

## 2018-03-18 RX ORDER — MAGNESIUM SULFATE HEPTAHYDRATE 40 MG/ML
2 INJECTION, SOLUTION INTRAVENOUS ONCE
Status: COMPLETED | OUTPATIENT
Start: 2018-03-18 | End: 2018-03-18

## 2018-03-18 RX ORDER — RAMELTEON 8 MG/1
8 TABLET ORAL NIGHTLY PRN
Status: DISCONTINUED | OUTPATIENT
Start: 2018-03-18 | End: 2018-03-28 | Stop reason: HOSPADM

## 2018-03-18 RX ORDER — CEFEPIME HYDROCHLORIDE 1 G/1
1 INJECTION, POWDER, FOR SOLUTION INTRAMUSCULAR; INTRAVENOUS
Status: DISCONTINUED | OUTPATIENT
Start: 2018-03-18 | End: 2018-03-18

## 2018-03-18 RX ORDER — ASPIRIN 325 MG
325 TABLET ORAL DAILY
Status: DISCONTINUED | OUTPATIENT
Start: 2018-03-18 | End: 2018-03-28 | Stop reason: HOSPADM

## 2018-03-18 RX ORDER — CEFEPIME HYDROCHLORIDE 1 G/50ML
1 INJECTION, SOLUTION INTRAVENOUS
Status: DISCONTINUED | OUTPATIENT
Start: 2018-03-18 | End: 2018-03-20

## 2018-03-18 RX ORDER — AMLODIPINE BESYLATE 5 MG/1
10 TABLET ORAL DAILY
Status: DISCONTINUED | OUTPATIENT
Start: 2018-03-18 | End: 2018-03-18

## 2018-03-18 RX ORDER — IPRATROPIUM BROMIDE AND ALBUTEROL SULFATE 2.5; .5 MG/3ML; MG/3ML
3 SOLUTION RESPIRATORY (INHALATION)
Status: DISCONTINUED | OUTPATIENT
Start: 2018-03-18 | End: 2018-03-25

## 2018-03-18 RX ADMIN — IPRATROPIUM BROMIDE AND ALBUTEROL SULFATE 3 ML: .5; 3 SOLUTION RESPIRATORY (INHALATION) at 07:03

## 2018-03-18 RX ADMIN — PANTOPRAZOLE SODIUM 40 MG: 40 TABLET, DELAYED RELEASE ORAL at 09:03

## 2018-03-18 RX ADMIN — RAMELTEON 8 MG: 8 TABLET, FILM COATED ORAL at 09:03

## 2018-03-18 RX ADMIN — CEFEPIME 2 G: 2 INJECTION, POWDER, FOR SOLUTION INTRAVENOUS at 12:03

## 2018-03-18 RX ADMIN — SIMVASTATIN 40 MG: 20 TABLET, FILM COATED ORAL at 03:03

## 2018-03-18 RX ADMIN — MAGNESIUM SULFATE HEPTAHYDRATE 2 G: 40 INJECTION, SOLUTION INTRAVENOUS at 05:03

## 2018-03-18 RX ADMIN — VANCOMYCIN HYDROCHLORIDE 1000 MG: 1 INJECTION, POWDER, LYOPHILIZED, FOR SOLUTION INTRAVENOUS at 12:03

## 2018-03-18 RX ADMIN — CEFEPIME HYDROCHLORIDE 1 G: 1 INJECTION, SOLUTION INTRAVENOUS at 11:03

## 2018-03-18 RX ADMIN — POTASSIUM CHLORIDE 40 MEQ: 20 TABLET, EXTENDED RELEASE ORAL at 05:03

## 2018-03-18 RX ADMIN — ASPIRIN 325 MG ORAL TABLET 325 MG: 325 PILL ORAL at 09:03

## 2018-03-18 RX ADMIN — IPRATROPIUM BROMIDE AND ALBUTEROL SULFATE 3 ML: .5; 3 SOLUTION RESPIRATORY (INHALATION) at 03:03

## 2018-03-18 RX ADMIN — SIMVASTATIN 40 MG: 20 TABLET, FILM COATED ORAL at 08:03

## 2018-03-18 RX ADMIN — FLUTICASONE PROPIONATE 100 MCG: 50 SPRAY, METERED NASAL at 09:03

## 2018-03-18 RX ADMIN — CLOPIDOGREL BISULFATE 300 MG: 75 TABLET ORAL at 03:03

## 2018-03-18 RX ADMIN — HEPARIN SODIUM AND DEXTROSE 16 UNITS/KG/HR: 10000; 5 INJECTION INTRAVENOUS at 03:03

## 2018-03-18 RX ADMIN — SODIUM CHLORIDE 1000 ML: 0.9 INJECTION, SOLUTION INTRAVENOUS at 12:03

## 2018-03-18 NOTE — PLAN OF CARE
Problem: Patient Care Overview  Goal: Plan of Care Review  Plan of care reviewed with the patient. Family at the bedside. Verbalized clear understanding. Bed alarm set. Bed in lowest position. Pt remain afebrile and free of fall. Call light within reach. Instructed pt to call when getting out of bed. Denies any pain or discomfort. NSR HR between 70's-80's on telemetry monitor. No report of SOB or lightheadedness. NPO. Continuous IV heparin drip infusing @ 8ml/hr. Will continue to monitor.

## 2018-03-18 NOTE — H&P
"[facility] Internal Medicine History and Physical - Resident Note    Admitting Team: Medicine Team B  Attending Physician: Emily  Resident: Galdino Fernando  Interns: Chris Hanley and Levi Chisholm    Date of Admit: 3/17/2018    Chief Complaint     Vomiting for 1 day    Subjective:      History of Present Illness:  Willow Giron is a 87 y.o. female who has PMHx HTN, HLD, GERD. The patient presented to [facility] on 3/17/2018 with a primary complaint of vomiting x 1 day    Patient was in her USOH, which is independent with ADLs, 1 month ago. She was admitted to [facility] with N/V/D and was treated for hypovolemic hyponatremia. She was d/c to SNF x several weeks, then returned home 1 week ago. Per her daughter, she was never feeling back to normal. She continued to say she felt weak. 1 week ago she began have chest and head "heaviness" and dry cough. She was give steroid shot and z-pack by PCP, but did not improve. She has has poor PO over this time as well. Today she vomited 3 times (non-blood) and was feeling worse, so she came to ED. She denies chest pain, SOB, palpitations, fevers, chills, dysuria.     Past Medical History:  Past Medical History:   Diagnosis Date    Arthritis     GERD (gastroesophageal reflux disease)     Hyperlipidemia     Hypertension        Past Surgical History:  Past Surgical History:   Procedure Laterality Date     SECTION, CLASSIC      HYSTERECTOMY      SHOULDER SURGERY         Allergies:  Review of patient's allergies indicates:   Allergen Reactions    Pcn [penicillins] Rash       Home Medications:  Prior to Admission medications    Medication Sig Start Date End Date Taking? Authorizing Provider   ALPRAZolam (XANAX) 0.25 MG tablet Take 0.25 mg by mouth every evening.    Historical Provider, MD   amlodipine (NORVASC) 10 MG tablet Take 10 mg by mouth once daily.    Historical Provider, MD   celecoxib (CELEBREX) 200 MG capsule Take 200 mg by mouth 2 (two) times daily.  " "  Historical Provider, MD   docusate sodium (COLACE) 100 MG capsule Take 100 mg by mouth daily as needed for Constipation.    Historical Provider, MD   fluticasone (FLONASE) 50 mcg/actuation nasal spray 2 sprays (100 mcg total) by Each Nare route 2 (two) times daily. 18   Karen Hawkins MD   furosemide (LASIX) 20 MG tablet Take 1 tablet (20 mg total) by mouth 2 (two) times daily. 18  Karen Hawkins MD   lisinopril (PRINIVIL,ZESTRIL) 40 MG tablet Take 40 mg by mouth once daily.    Historical Provider, MD   nabumetone (RELAFEN) 500 MG tablet Take 500 mg by mouth 2 (two) times daily.    Historical Provider, MD   omeprazole (PRILOSEC) 20 MG capsule Take 20 mg by mouth once daily.    Historical Provider, MD   simvastatin (ZOCOR) 40 MG tablet Take 40 mg by mouth every evening.    Historical Provider, MD   tizanidine 4 mg Cap Take 1 capsule by mouth every evening. 3/2/17   Edward Trinh MD       Family History:  History reviewed. No pertinent family history.    Social History:  Social History   Substance Use Topics    Smoking status: Former Smoker    Smokeless tobacco: Not on file    Alcohol use Yes      Comment: social       Review of Systems:  Pertinent positives and negatives are listed in HPI.  All other systems are reviewed and are negative.    Health Maintaince :   Primary Care Physician: Baldo  Immunizations:   TDap is up to date.  Influenza is up to date.  Pneumovax is up to date.  Cancer Screening:  PAP: is not up to date.  Mammogram: is not up to date.  Colonoscopy: is not up to date.     Objective:   Last 24 Hour Vital Signs:  BP  Min: 139/69  Max: 157/67  Temp  Av.7 °F (37.1 °C)  Min: 98.6 °F (37 °C)  Max: 99 °F (37.2 °C)  Pulse  Av.5  Min: 80  Max: 107  Resp  Av  Min: 16  Max: 20  SpO2  Av.4 %  Min: 94 %  Max: 98 %  Height  Av' 10" (147.3 cm)  Min: 4' 10" (147.3 cm)  Max: 4' 10" (147.3 cm)  Weight  Av.9 kg (110 lb 1.8 oz)  Min: 49.9 kg (110 lb)  " Max: 50 kg (110 lb 3.7 oz)  Body mass index is 22.99 kg/m².  No intake/output data recorded.    Physical Examination:  General: Alert and awake in NAD  Head:  Normocephalic and atraumatic  Eyes:  PERRL; EOMi with anicteric sclera and clear conjunctivae  Mouth:  Oropharynx clear and without exudate; moist mucous membranes  Cardio:  Ireggular rhythm, 2/6 systolic ejection murmur heard in all areas  Resp:  Diffuse rhonchi; no focal consolidative changes appreciated  Abdom: Soft, NTND with normoactive bowel sounds  Extrem: 1+ edema to midshins b/l; WWP with no clubbing, cyanosis  Skin:  No rashes, lesions, or color changes  Pulses: 2+ and symmetric distally  Neuro:  AAOx3; cooperative and pleasant with no focal deficits    Laboratory:  Most Recent Data:  CBC:   Lab Results   Component Value Date    WBC 9.15 03/18/2018    HGB 9.1 (L) 03/18/2018    HCT 26.4 (L) 03/18/2018     03/18/2018    MCV 89 03/18/2018    RDW 11.4 (L) 03/18/2018     BMP:   Lab Results   Component Value Date     (L) 03/18/2018    K 3.5 03/18/2018    CL 88 (L) 03/18/2018    CO2 23 03/18/2018    BUN 8 03/18/2018    CREATININE 0.8 03/18/2018     (H) 03/18/2018    CALCIUM 8.2 (L) 03/18/2018    MG 1.2 (L) 03/18/2018    PHOS 2.3 (L) 03/18/2018     LFTs:   Lab Results   Component Value Date    PROT 7.1 03/17/2018    ALBUMIN 3.0 (L) 03/17/2018    BILITOT 0.6 03/17/2018    AST 21 03/17/2018    ALKPHOS 86 03/17/2018    ALT 15 03/17/2018     Coags:   Lab Results   Component Value Date    INR 1.1 03/18/2018     Urinalysis:   Lab Results   Component Value Date    COLORU Yellow 03/17/2018    SPECGRAV <=1.005 (A) 03/17/2018    NITRITE Negative 03/17/2018    KETONESU Trace (A) 03/17/2018    UROBILINOGEN Negative 03/17/2018       Trended Lab Data:    Recent Labs  Lab 03/17/18 2232 03/18/18  0330   WBC 11.02 9.15   HGB 10.7* 9.1*   HCT 30.4* 26.4*   * 333   MCV 88 89   RDW 11.5 11.4*   * 120*   K 3.8 3.5   CL 76* 88*   CO2 26 23   BUN  10 8   CREATININE 0.8 0.8   * 139*   PROT 7.1  --    ALBUMIN 3.0*  --    BILITOT 0.6  --    AST 21  --    ALKPHOS 86  --    ALT 15  --        Trended Cardiac Data:    Recent Labs  Lab 03/17/18  2236 03/18/18  0210   TROPONINI 0.573* 0.895*       Other Results:  EKG (my interpretation):   Irregular rhythm, narrow QRS; p-waves of different morphologies; no ischemia changes    Radiology:  Imaging Results          X-Ray Abdomen AP 1 View (KUB) (Final result)  Result time 03/17/18 22:56:13    Final result by Gabriella Fagan MD (03/17/18 22:56:13)                 Impression:    Nonobstructive bowel gas pattern.  Electronically signed by: Gabriella Fagan MD  Date:    03/17/2018  Time:    22:56             Narrative:    EXAMINATION:  XR ABDOMEN AP 1 VIEW  CLINICAL HISTORY:  Vomiting, unspecified  TECHNIQUE:  Single AP View of the abdomen was performed.  COMPARISON:  None  FINDINGS:  Nonspecific bowel gas pattern.  No evidence to suggest obstruction.  No free air identified on this single AP supine view.  Scattered stool seen within the colon.  Regional osseous structures show DJD with prominent aortic calcification noted.                             X-Ray Chest AP Portable (Final result)  Result time 03/17/18 23:00:24    Final result by Gabriella Fagan MD (03/17/18 23:00:24)                 Impression:    Subtle right lower lobe airspace opacity, possible aspiration or developing pneumonia.  Electronically signed by: Gabriella Fagan MD  Date:    03/17/2018  Time:    23:00             Narrative:    EXAMINATION:  XR CHEST AP PORTABLE  CLINICAL HISTORY:  Vomiting;  TECHNIQUE:  Single frontal view of the chest was performed.  COMPARISON:  None  FINDINGS:  Cardiac silhouette is normal in size.  Mediastinal structures are midline.  Lungs are symmetrically expanded.  There is mild interstitial prominence.  Subtle airspace opacity is seen within the lateral aspect of the right lower lung zone which may reflect developing  infiltrate.  No focal consolidation.  No evidence of pneumothorax or significant effusion.  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                            Assessment:     Willow Giron is a 87 y.o. female with:     Plan:     HCAP  -Recent hospital admit/SNF, dry cough x 1 week, general weakness  On admit, CXR with RLL opacity  No signs of systemic infection  -Vanc, cefepime started 3/18    Hypovolemic hyponatremia  -Admitted 1 month ago--hypovolemic hypoNa s/p several days N/V/D  This admit, recent poor PO intake, N/V x 3 day of admit  -On admit, Na 117, Cl 76, urine Na 81, urine osm 305  Uric acid pending  -2L NS in ED  Improving  q4h CMPs    Elevated troponin  -On admit, troponin 0.573->0.895  Denies chest pain, SOB  -EKG with irregular rhythm, variable P-wave morphology--new from previous  No ST elevations  -Consult cardiology  Received , plavix 300, heparin gtt, simvastatin    AoCD  -On admit, H/H 10/7/30--at baseline  Recent w/u c/w AoCD    HTN  -On admit, normotensive  Holding home lisinopril, norvasc in setting of HCAP--can resume if becomes hypertensive    HLD  -Continue home simvastatin    GERD  -No acute issues  Continue home omeprazole    Code Status:     Full    Fluids: 2L NS in ED  Diet: NPO  Ppx: heparin gtt    Jamaal Durbin  [facility] Internal Medicine HO-I  [employer] Medicine Service    [facility] Medicine Hospitalist Pager numbers:   U Hospitalist Medicine Team A (Adrien/Rufino): [phone]  [facility] Hospitalist Medicine Team B (Emily/Mekhi):  [phone]

## 2018-03-18 NOTE — ED TRIAGE NOTES
Pt. To the ER with c/o two episodes of vomiting today. Pt. Is awake, alert and oriented. Pt. Was just recently discharged from a skilled nursing facility in [place] for an electrolyte imbalance. Pt. Denies c/o abdominal pain just nausea. She had a regular bowel movement today. Bed in the low position, side rails elevated x 2 and call light at the bedside. Daughter and son in law at the bedside.

## 2018-03-18 NOTE — PLAN OF CARE
03/18/18 1657   Readmission Questionnaire   At the time of your discharge, did someone talk to you about what your health problems were? Yes   At the time of discharge, did someone talk to you about what to watch out for regarding worsening of your health problem? Yes   At the time of discharge, did someone talk to you about what to do if you experienced worsening of your health problem? Yes   At the time of discharge, did someone talk to you about which medication to take when you left the hospital and which ones to stop taking? Yes   At the time of discharge, did someone talk to you about when and where to follow up with a doctor after you left the hospital? Yes   What do you believe caused you to be sick enough to be re-admitted? nausea and vomiting   How often do you need to have someone help you when you read instructions, pamphlets, or other written material from your doctor or pharmacy? Never   Do you have problems taking your medications as prescribed? No   Do you have any problems affording any of  your prescribed medications? No   Do you have problems obtaining/receiving your medications? No   Does the patient have transportation to healthcare appointments? Yes   Lives With spouse   Living Arrangements house   Does the patient have family/friends to help with healtcare needs after discharge? yes   Who are your caregiver(s) and their phone number(s)? Daughter: Aliya Bloom [phone], : Jung Mack Urbano [phone], Son/DIL: Cecile Mack [phone]   Does your caregiver provide all the help you need? Yes   Are you currently feeling confused? No   Are you currently having problems thinking? No   Are you currently having memory problems? No   Have you felt down, depressed, or hopeless? 0   Have you felt little interest or pleasure in doing things? 0   In the last 7 days, my sleep quality was: fair     Gloria Goodman, RN Transitional Navigator  [phone]

## 2018-03-18 NOTE — PROGRESS NOTES
"Vancomycin Dosing and Monitoring Pharmacy Protocol    Willow Giron is a 87 y.o. female    Height: 4' 10" (1.473 m)   Wt Readings from Last 1 Encounters:   03/17/18 50 kg (110 lb 3.7 oz)     Patient must be at least 60 in tall to calculate ideal body weight    Temp Readings from Last 3 Encounters:   03/18/18 98.6 °F (37 °C) (Oral)   02/23/18 98.4 °F (36.9 °C) (Oral)   03/02/17 98.1 °F (36.7 °C) (Oral)      Lab Results   Component Value Date/Time    WBC 11.02 03/17/2018 10:32 PM    WBC 4.29 02/23/2018 06:55 AM    WBC 4.01 02/22/2018 06:07 AM      Lab Results   Component Value Date/Time    CREATININE 0.8 03/17/2018 10:32 PM    CREATININE 0.8 02/23/2018 11:32 AM    CREATININE 0.8 02/23/2018 06:55 AM        Serum creatinine: 0.8 mg/dL 03/17/18 2232  Estimated creatinine clearance: 39.1 mL/min    Antibiotics       Start     Stop Route Frequency Ordered    03/18/18 1230  ceFEPIme injection 1 g  (Cefepime IV / tobra 7.5mg/kg IV with lab panel / vancomycin IV with lab panel)      -- IV Every 12 hours (non-standard times) 03/18/18 0112    03/18/18 1130  vancomycin 750 mg in dextrose 5 % 250 mL IVPB (ready to mix system)  (Vancomycin IVPB with levels panel)      -- IV Every 12 hours (non-standard times) 03/18/18 0107    03/17/18 2330  vancomycin 1 g in 0.9% sodium chloride 250 mL IVPB (ready to mix system)      03/18 1129 IV ED 1 Time 03/17/18 2325          Antifungals       None            Microbiology Results (last 7 days)       Procedure Component Value Units Date/Time    Blood Culture #2 **CANNOT BE ORDERED STAT** [[id number]] Collected:  03/18/18 0022    Order Status:  Sent Specimen:  Blood from Peripheral, Forearm, Left Updated:  03/18/18 0024    Blood Culture #1 **CANNOT BE ORDERED STAT** [[id number]] Collected:  03/18/18 0018    Order Status:  Sent Specimen:  Blood from Peripheral, Antecubital, Left Updated:  03/18/18 0023            Indication:   Pneumonia    Target Level: 15-20 mcg/ml    Hemodialysis:   No on " N/A    Dosing Weight:   ABW--actual body weight  If ABW is greater than or equal to 30% over Ideal Body Weight, AdjBW will be used to calculate vancomycin dose.    Last Vancomycin dose: 1000 mg   Date/Time given: 3-18 @ 00:39          Vancomycin level:  No results for input(s): VANCOMYCIN-TROUGH in the last 72 hours.  No results for input(s): VANCOMYCIN, RANDOM in the last 72 hours.    Per Protocol Initial/Adjustments Dosin. Initial/Adjustment Dose: DECREASE Vancomycin will be adjusted from 750mg q12hr to 750mg q24hr  2. Vancomycin Trough Level will be drawn on 3-19 @ 23:30date/time    Pharmacy will continue to follow.    Please contact if you have any further questions. Thank you.    Esvin Acosta, PharmD  [phone]

## 2018-03-18 NOTE — PLAN OF CARE
Chief Complaint      Vomiting for 1 day    Pt was [place] [facility], acute hyponatremia from 2/17/18-2/23/18 then discharged to [facility].     Pt's daughter, Aliya Bloom stated that they were very unhappy with the care she received while there and do not wish for pt to return to a SNF on discharge. They would prefer home health with daily PT/OT/Aide visits instead.    Pt was previously independent with ADLs and was driving, currently requires minimal assist with bathing/dressing.    Pt is current with [facility]- SN/PT/OT. Has Rollator and grab bars in shower. Lives with , Jung Giron Jr. and has supportive family who can provide transportation on discharge.    Future Appointments  Date Time Provider Department Center   4/4/2018 3:00 PM ADENIKE Rojas [facility]   4/4/2018 3:40 PM Noemi Wahl MD [facility]          03/18/18 1700   Discharge Assessment   Assessment Type Discharge Planning Assessment   Confirmed/corrected address and phone number on facesheet? Yes   Assessment information obtained from? Patient;Caregiver  (Pt, , daughter, son, daughter in law)   Expected Length of Stay (days) 3   Communicated expected length of stay with patient/caregiver yes   Prior to hospitilization cognitive status: Alert/Oriented   Prior to hospitalization functional status: Needs Assistance;Assistive Equipment   Current cognitive status: Alert/Oriented   Current Functional Status: Assistive Equipment;Needs Assistance   Facility Arrived From: (home)   Lives With spouse   Able to Return to Prior Arrangements yes   Is patient able to care for self after discharge? No   Who are your caregiver(s) and their phone number(s)? Daughter: Aliya Bloom [phone], : Jung Giron Jr. [phone], Son/Daughter in Law: Cecile Giron [phone]   Patient's perception of discharge disposition home health   Readmission Within  The Last 30 Days current reason for admission unrelated to previous admission  (last admit [facility] IP, acute hyponateremia 2/17/18-2/23/18, pt D/C to [facility])   If yes, most recent [facility]   Patient currently being followed by outpatient case management? Unable to determine (comments)   Patient currently receives any other outside agency services? No   Equipment Currently Used at Home rollator;grab bar   Do you have any problems affording any of your prescribed medications? No   Is the patient taking medications as prescribed? yes   Does the patient have transportation home? Yes   Transportation Available family or friend will provide   Dialysis Name and Scheduled days N/A   Does the patient receive services at the [facility]? No   Discharge Plan A Home Health   Discharge Plan B Skilled Nursing Facility   Patient/Family In Agreement With Plan other (see comments)  (family would prefer home with home health over SNF placement)     Gloria Goodman, RN Transitional Navigator  [phone]

## 2018-03-19 PROBLEM — E87.1 HYPO-OSMOLALITY AND HYPONATREMIA: Status: ACTIVE | Noted: 2018-03-19

## 2018-03-19 PROBLEM — D64.9 NORMOCYTIC ANEMIA: Status: ACTIVE | Noted: 2018-03-19

## 2018-03-19 LAB
ANION GAP SERPL CALC-SCNC: 11 MMOL/L
ANION GAP SERPL CALC-SCNC: 14 MMOL/L
ANION GAP SERPL CALC-SCNC: 9 MMOL/L
APTT BLDCRRT: 27.1 SEC
APTT BLDCRRT: 71.1 SEC
BASOPHILS # BLD AUTO: 0.01 K/UL
BASOPHILS NFR BLD: 0.1 %
BUN SERPL-MCNC: 10 MG/DL
BUN SERPL-MCNC: 9 MG/DL
BUN SERPL-MCNC: 9 MG/DL
CALCIUM SERPL-MCNC: 8.4 MG/DL
CALCIUM SERPL-MCNC: 8.7 MG/DL
CALCIUM SERPL-MCNC: 8.9 MG/DL
CHLORIDE SERPL-SCNC: 86 MMOL/L
CHLORIDE SERPL-SCNC: 87 MMOL/L
CHLORIDE SERPL-SCNC: 88 MMOL/L
CO2 SERPL-SCNC: 19 MMOL/L
CO2 SERPL-SCNC: 23 MMOL/L
CO2 SERPL-SCNC: 25 MMOL/L
CORTIS SERPL-MCNC: 24.4 UG/DL
CREAT SERPL-MCNC: 0.8 MG/DL
CREAT SERPL-MCNC: 0.8 MG/DL
CREAT SERPL-MCNC: 0.9 MG/DL
DIFFERENTIAL METHOD: ABNORMAL
EOSINOPHIL # BLD AUTO: 0 K/UL
EOSINOPHIL NFR BLD: 0.2 %
ERYTHROCYTE [DISTWIDTH] IN BLOOD BY AUTOMATED COUNT: 11.8 %
EST. GFR  (AFRICAN AMERICAN): >60 ML/MIN/1.73 M^2
EST. GFR  (NON AFRICAN AMERICAN): 58 ML/MIN/1.73 M^2
EST. GFR  (NON AFRICAN AMERICAN): >60 ML/MIN/1.73 M^2
EST. GFR  (NON AFRICAN AMERICAN): >60 ML/MIN/1.73 M^2
FACT X PPP CHRO-ACNC: 0.46 IU/ML
GLUCOSE SERPL-MCNC: 142 MG/DL
GLUCOSE SERPL-MCNC: 149 MG/DL
GLUCOSE SERPL-MCNC: 203 MG/DL
HCT VFR BLD AUTO: 26.1 %
HGB BLD-MCNC: 9.2 G/DL
LYMPHOCYTES # BLD AUTO: 1.1 K/UL
LYMPHOCYTES NFR BLD: 12.9 %
MAGNESIUM SERPL-MCNC: 1.6 MG/DL
MCH RBC QN AUTO: 31 PG
MCHC RBC AUTO-ENTMCNC: 35.2 G/DL
MCV RBC AUTO: 88 FL
MONOCYTES # BLD AUTO: 0.8 K/UL
MONOCYTES NFR BLD: 9.7 %
NEUTROPHILS # BLD AUTO: 6.3 K/UL
NEUTROPHILS NFR BLD: 76.1 %
PHOSPHATE SERPL-MCNC: 2.9 MG/DL
PLATELET # BLD AUTO: 336 K/UL
PMV BLD AUTO: 8.1 FL
POCT GLUCOSE: 169 MG/DL (ref 70–110)
POTASSIUM SERPL-SCNC: 3.3 MMOL/L
POTASSIUM SERPL-SCNC: 3.5 MMOL/L
POTASSIUM SERPL-SCNC: 3.7 MMOL/L
RBC # BLD AUTO: 2.97 M/UL
SODIUM SERPL-SCNC: 120 MMOL/L
SODIUM SERPL-SCNC: 121 MMOL/L
SODIUM SERPL-SCNC: 121 MMOL/L
WBC # BLD AUTO: 8.23 K/UL

## 2018-03-19 PROCEDURE — 94761 N-INVAS EAR/PLS OXIMETRY MLT: CPT

## 2018-03-19 PROCEDURE — 25000003 PHARM REV CODE 250: Performed by: INTERNAL MEDICINE

## 2018-03-19 PROCEDURE — 97535 SELF CARE MNGMENT TRAINING: CPT

## 2018-03-19 PROCEDURE — 80048 BASIC METABOLIC PNL TOTAL CA: CPT | Mod: 91

## 2018-03-19 PROCEDURE — G8978 MOBILITY CURRENT STATUS: HCPCS | Mod: CK

## 2018-03-19 PROCEDURE — 80048 BASIC METABOLIC PNL TOTAL CA: CPT

## 2018-03-19 PROCEDURE — 97116 GAIT TRAINING THERAPY: CPT

## 2018-03-19 PROCEDURE — 84100 ASSAY OF PHOSPHORUS: CPT

## 2018-03-19 PROCEDURE — 25000242 PHARM REV CODE 250 ALT 637 W/ HCPCS: Performed by: STUDENT IN AN ORGANIZED HEALTH CARE EDUCATION/TRAINING PROGRAM

## 2018-03-19 PROCEDURE — G8987 SELF CARE CURRENT STATUS: HCPCS | Mod: CK

## 2018-03-19 PROCEDURE — 85730 THROMBOPLASTIN TIME PARTIAL: CPT

## 2018-03-19 PROCEDURE — 83735 ASSAY OF MAGNESIUM: CPT

## 2018-03-19 PROCEDURE — 97165 OT EVAL LOW COMPLEX 30 MIN: CPT

## 2018-03-19 PROCEDURE — G8979 MOBILITY GOAL STATUS: HCPCS | Mod: CJ

## 2018-03-19 PROCEDURE — 97161 PT EVAL LOW COMPLEX 20 MIN: CPT

## 2018-03-19 PROCEDURE — 25000003 PHARM REV CODE 250: Performed by: STUDENT IN AN ORGANIZED HEALTH CARE EDUCATION/TRAINING PROGRAM

## 2018-03-19 PROCEDURE — 63600175 PHARM REV CODE 636 W HCPCS: Performed by: INTERNAL MEDICINE

## 2018-03-19 PROCEDURE — 82533 TOTAL CORTISOL: CPT

## 2018-03-19 PROCEDURE — 11000001 HC ACUTE MED/SURG PRIVATE ROOM

## 2018-03-19 PROCEDURE — 85730 THROMBOPLASTIN TIME PARTIAL: CPT | Mod: 91

## 2018-03-19 PROCEDURE — 80202 ASSAY OF VANCOMYCIN: CPT

## 2018-03-19 PROCEDURE — 85025 COMPLETE CBC W/AUTO DIFF WBC: CPT

## 2018-03-19 PROCEDURE — 36415 COLL VENOUS BLD VENIPUNCTURE: CPT

## 2018-03-19 PROCEDURE — 85520 HEPARIN ASSAY: CPT

## 2018-03-19 PROCEDURE — 63600175 PHARM REV CODE 636 W HCPCS: Performed by: STUDENT IN AN ORGANIZED HEALTH CARE EDUCATION/TRAINING PROGRAM

## 2018-03-19 PROCEDURE — 93306 TTE W/DOPPLER COMPLETE: CPT

## 2018-03-19 PROCEDURE — G8988 SELF CARE GOAL STATUS: HCPCS | Mod: CJ

## 2018-03-19 PROCEDURE — 94640 AIRWAY INHALATION TREATMENT: CPT

## 2018-03-19 RX ORDER — AMLODIPINE BESYLATE 5 MG/1
10 TABLET ORAL DAILY
Status: DISCONTINUED | OUTPATIENT
Start: 2018-03-19 | End: 2018-03-28 | Stop reason: HOSPADM

## 2018-03-19 RX ORDER — COSYNTROPIN 0.25 MG/ML
0.25 INJECTION, POWDER, FOR SOLUTION INTRAMUSCULAR; INTRAVENOUS ONCE
Status: COMPLETED | OUTPATIENT
Start: 2018-03-19 | End: 2018-03-19

## 2018-03-19 RX ORDER — MAGNESIUM SULFATE 1 G/100ML
1 INJECTION INTRAVENOUS ONCE
Status: COMPLETED | OUTPATIENT
Start: 2018-03-19 | End: 2018-03-19

## 2018-03-19 RX ORDER — POTASSIUM CHLORIDE 20 MEQ/1
20 TABLET, EXTENDED RELEASE ORAL
Status: COMPLETED | OUTPATIENT
Start: 2018-03-19 | End: 2018-03-19

## 2018-03-19 RX ORDER — IPRATROPIUM BROMIDE AND ALBUTEROL SULFATE 2.5; .5 MG/3ML; MG/3ML
3 SOLUTION RESPIRATORY (INHALATION) ONCE
Status: COMPLETED | OUTPATIENT
Start: 2018-03-19 | End: 2018-03-19

## 2018-03-19 RX ADMIN — RAMELTEON 8 MG: 8 TABLET, FILM COATED ORAL at 11:03

## 2018-03-19 RX ADMIN — POTASSIUM CHLORIDE 20 MEQ: 20 TABLET, EXTENDED RELEASE ORAL at 12:03

## 2018-03-19 RX ADMIN — POTASSIUM CHLORIDE 20 MEQ: 20 TABLET, EXTENDED RELEASE ORAL at 03:03

## 2018-03-19 RX ADMIN — VANCOMYCIN HYDROCHLORIDE 750 MG: 750 INJECTION, POWDER, LYOPHILIZED, FOR SOLUTION INTRAVENOUS at 12:03

## 2018-03-19 RX ADMIN — FLUTICASONE PROPIONATE 100 MCG: 50 SPRAY, METERED NASAL at 09:03

## 2018-03-19 RX ADMIN — ASPIRIN 325 MG ORAL TABLET 325 MG: 325 PILL ORAL at 09:03

## 2018-03-19 RX ADMIN — MAGNESIUM SULFATE 1 G: 1 INJECTION INTRAVENOUS at 01:03

## 2018-03-19 RX ADMIN — PANTOPRAZOLE SODIUM 40 MG: 40 TABLET, DELAYED RELEASE ORAL at 09:03

## 2018-03-19 RX ADMIN — FLUTICASONE PROPIONATE 100 MCG: 50 SPRAY, METERED NASAL at 08:03

## 2018-03-19 RX ADMIN — IPRATROPIUM BROMIDE AND ALBUTEROL SULFATE 3 ML: .5; 3 SOLUTION RESPIRATORY (INHALATION) at 07:03

## 2018-03-19 RX ADMIN — HEPARIN SODIUM AND DEXTROSE 14 UNITS/KG/HR: 10000; 5 INJECTION INTRAVENOUS at 07:03

## 2018-03-19 RX ADMIN — COSYNTROPIN 0.25 MG: 0.25 INJECTION, POWDER, LYOPHILIZED, FOR SOLUTION INTRAVENOUS at 02:03

## 2018-03-19 RX ADMIN — IPRATROPIUM BROMIDE AND ALBUTEROL SULFATE 3 ML: .5; 3 SOLUTION RESPIRATORY (INHALATION) at 11:03

## 2018-03-19 RX ADMIN — CEFEPIME HYDROCHLORIDE 1 G: 1 INJECTION, SOLUTION INTRAVENOUS at 12:03

## 2018-03-19 RX ADMIN — IPRATROPIUM BROMIDE AND ALBUTEROL SULFATE 3 ML: .5; 3 SOLUTION RESPIRATORY (INHALATION) at 01:03

## 2018-03-19 RX ADMIN — HEPARIN SODIUM AND DEXTROSE 16 UNITS/KG/HR: 10000; 5 INJECTION INTRAVENOUS at 07:03

## 2018-03-19 RX ADMIN — SIMVASTATIN 40 MG: 20 TABLET, FILM COATED ORAL at 08:03

## 2018-03-19 RX ADMIN — AMLODIPINE BESYLATE 10 MG: 5 TABLET ORAL at 12:03

## 2018-03-19 RX ADMIN — CEFEPIME HYDROCHLORIDE 1 G: 1 INJECTION, SOLUTION INTRAVENOUS at 11:03

## 2018-03-19 NOTE — ASSESSMENT & PLAN NOTE
Stable. Pt reports no blood in stool or urine. She had a hysterectomy in the past. Iron levels are normal but % saturation is low. Pt may benefit from trial of oral iron replacement.

## 2018-03-19 NOTE — NURSING
Contacted lab about aptt not being drawn, transferred to , attempted calling  x 4, will continue to try and reach technician, charge RN notified.

## 2018-03-19 NOTE — PROGRESS NOTES
[employer] medicine Resident HO-1 Progress Note    Subjective:      Pt tolerated breathing tx well this morning, VSS, doing well, just had some cough and mild wheezing overnight, currently denies sob, headache, n/v cp, diarrhea, or abdominal pain.      Objective:   Last 24 Hour Vital Signs:  BP  Min: 141/81  Max: 170/76  Temp  Av.3 °F (36.8 °C)  Min: 98 °F (36.7 °C)  Max: 98.6 °F (37 °C)  Pulse  Av.9  Min: 68  Max: 106  Resp  Av.2  Min: 16  Max: 18  SpO2  Av.4 %  Min: 93 %  Max: 97 %  I/O last 3 completed shifts:  In: 2350 [IV Piggyback:2350]  Out: 903 [Urine:900; Stool:3]    Physical Examination:  General:          Alert and awake in NAD  Head:               Normocephalic and atraumatic  Eyes:               PERRL; EOMi with anicteric sclera and clear conjunctivae  Mouth:             Oropharynx clear and without exudate; moist mucous membranes  Cardio:             Ireggular rhythm, 2/6 systolic ejection murmur heard in all areas  Resp:               mild wheezing with rhonchi; no focal consolidative changes appreciated  Abdom:            Soft, NTND with normoactive bowel sounds  Extrem:            1+ edema to midshins b/l; WWP with no clubbing, cyanosis  Skin:                No rashes, lesions, or color changes  Pulses:            2+ and symmetric distally  Neuro:             AAOx3; cooperative and pleasant with no focal deficits    Laboratory:  Laboratory Data Reviewed: yes  Pertinent Findings:      Microbiology Data Reviewed: yes  Pertinent Findings:      Other Results:  EKG (my interpretation): previous EKG reviewed    Radiology Data Reviewed: yes  Pertinent Findings:      Current Medications:     Infusions:   heparin (porcine) in D5W 16 Units/kg/hr (18 0701)        Scheduled:   albuterol-ipratropium 2.5mg-0.5mg/3mL  3 mL Nebulization Q4H WAKE    aspirin  325 mg Oral Daily    ceFEPime (MAXIPIME) IVPB  1 g Intravenous Q12H    fluticasone  2 spray Each Nare BID    pantoprazole  40 mg Oral  Daily    simvastatin  40 mg Oral QHS    vancomycin (VANCOCIN) IVPB  750 mg Intravenous Q24H        PRN:  heparin (PORCINE), heparin (PORCINE), ramelteon, sodium chloride 0.9%    Antibiotics and Day Number of Therapy:  vanc and cefepime 3/18    Lines and Day Number of Therapy:  piv    Assessment:     Willow Giron is a 87 y.o.female with  Patient Active Problem List    Diagnosis Date Noted    HCAP (healthcare-associated pneumonia) 03/18/2018    Elevated troponin     Acute sphenoidal sinusitis 02/20/2018    Hyponatremia 02/17/2018    Essential hypertension 02/17/2018    Hyperlipidemia 02/17/2018    GERD (gastroesophageal reflux disease) 02/17/2018    Vomiting 02/17/2018        Plan:     HCAP  -Recent hospital admit/SNF, dry cough x 1 week, general weakness  On admit, CXR with RLL opacity  No signs of systemic infection  -Vanc, cefepime started 3/18  -blood cx remains NGTD  -patient tolerated breathing tx well this am. Continue to monitor     Hypovolemic hyponatremia  -Admitted 1 month ago--hypovolemic hypoNa s/p several days N/V/D  This admit, recent poor PO intake, N/V x 3 day of admit  -On admit, Na 117, Cl 76, urine Na 81, urine osm 305  Uric acid 2.3  -2L NS in ED  Improving  monitoring with CMP  -this morning ur=048, slightly improving,      Elevated troponin  -On admit, troponin 0.573->0.895  Denies chest pain, SOB  -EKG with irregular rhythm, variable P-wave morphology--new from previous  No ST elevations  -Consult cardiology  Received , plavix 300, heparin gtt, simvastatin  -trop tended out, patient denies CP overnight  -will f/u cards recs, patient remains NPO for any possible interventions   -echo ordered, pending results     AoCD  -On admit, H/H 10/7/30--at baseline  Recent w/u c/w AoCD     HTN  -On admit, normotensive  Holding home lisinopril, norvasc in setting of HCAP--can resume if becomes hypertensive  -patients BP this am was 160/79, will restart home Ace       HLD  -Continue home simvastatin     GERD  -No acute issues  Continue home omeprazole    Ppx: heparin full dose  Dispo: pending further clinical improvement of HCAP, pending improvement of Na+, pending cards Chris danielson  [facility] Internal Medicine HO-1  [facility] medicine Service Team B    [facility] Medicine Hospitalist Pager numbers:   [facility] Hospitalist Medicine Team A (Adrien/Rufino): [phone]  [facility] Hospitalist Medicine Team B (Emily/Mekhi):  [phone]

## 2018-03-19 NOTE — PLAN OF CARE
Problem: Patient Care Overview  Goal: Plan of Care Review  Outcome: Ongoing (interventions implemented as appropriate)  Plan of care reviewed with patient. Patient verbalized complete understanding. Fall/safety precautions maintained. Slip resistant socks on. Bed in lowest position, locked, call light within reach. Bed alarm on, Side rails up x's 2. Nurse instructed patient to notify staff for any assistance and pt verbalized complete understanding. Pt currently denies SOB, headache, N/V, or pain. Pt on heparin drip @ 8ml/hr, will review morning aptt. Pt received ramelteon to assist with sleep per pt request. Pt also received IV antibiotics during shift. Pt educated on spirometry & frequent position changes, respiratory reinforced education, pt demonstrated understanding. Troponin trends reviewed, troponin's trending down. No acute distress, will continue to monitor. Pt on telemetry, no ectopy or true red alarms noted, SR, HR 70-90's

## 2018-03-19 NOTE — PROGRESS NOTES
.Pharmacy New Medication Education    Patient accepted medication education.    Pharmacy educated patient on name and purpose of medications and possible side effects, using the teach-back method.     Current Inpatient Medication Orders   albuterol-ipratropium 2.5mg-0.5mg/3mL nebulizer solution 3 mL   aspirin tablet 325 mg   cefepime in dextrose 5 % 1 gram/50 mL IVPB 1 g   fluticasone 50 mcg/actuation nasal spray 100 mcg   heparin 25,000 units in dextrose 5% 250 mL (100 units/mL) bolus from bag; PRN BOLUS   heparin 25,000 units in dextrose 5% 250 mL (100 units/mL) bolus from bag; PRN BOLUS   heparin 25,000 units in dextrose 5% 250 mL (100 units/mL) infusion; FEMALE   pantoprazole EC tablet 40 mg   ramelteon tablet 8 mg   simvastatin tablet 40 mg   sodium chloride 0.9% flush 5 mL   vancomycin 750 mg in dextrose 5 % 250 mL IVPB (ready to mix system)       Learners of pharmacy medication education included:  Patient    Patient +/- learner response:  Verbalized Understanding, Teachback

## 2018-03-19 NOTE — HPI
"Willow Giron 87 y.o. female  has a past medical history of Arthritis; GERD (gastroesophageal reflux disease); Hyperlipidemia; and Hypertension. who presented with   Chief Complaint   Patient presents with    Vomiting     X1 episode of vomiting this evening after eating soup. Reports pt was d/c from skilled nursing facility last week for electorlyte imblance. Denies any pain at this time. Reports "not too much" nausea.      Pt presents with SOB and cough for about one week. She previously saw here PCP who diagnosed her with bronchitis and gave her a coarse of steroids and a z-pack but her symptoms worsened. She was hospitalized here last month and was seem by us for hyponatremia. At that time it was attributed to tea and toast diet and pt was discharged to LTAC on salt tablets. Pt discontinued salt tablets because they made her nauseated. Pt has not eaten much in the past few days due to nausea. Denies F/C, sick contacts, CP, N/V/D/C, swelling, dysuria, hematuria, blood in stool, melena, rashes.  "

## 2018-03-19 NOTE — NURSING
Dr. Montgomery notified of pt prominent more frequent cough, ST, and wheezing in anterior lung fields. Pt denies pain, numbness/tingling, N/V, headache, or SOB. MD will order one time duo-neb tx. Will continue to monitor.

## 2018-03-19 NOTE — SUBJECTIVE & OBJECTIVE
Past Medical History:   Diagnosis Date    Arthritis     GERD (gastroesophageal reflux disease)     Hyperlipidemia     Hypertension        Past Surgical History:   Procedure Laterality Date     SECTION, CLASSIC      HYSTERECTOMY      SHOULDER SURGERY         Review of patient's allergies indicates:   Allergen Reactions    Pcn [penicillins] Rash     Current Facility-Administered Medications   Medication Frequency    albuterol-ipratropium 2.5mg-0.5mg/3mL nebulizer solution 3 mL Q4H WAKE    amLODIPine tablet 10 mg Daily    aspirin tablet 325 mg Daily    cefepime in dextrose 5 % 1 gram/50 mL IVPB 1 g Q12H    cosyntropin injection 0.25 mg Once    fluticasone 50 mcg/actuation nasal spray 100 mcg BID    magnesium sulfate in dextrose IVPB (premix) 1 g Once    pantoprazole EC tablet 40 mg Daily    potassium chloride SA CR tablet 20 mEq Q2H    ramelteon tablet 8 mg Nightly PRN    simvastatin tablet 40 mg QHS    sodium chloride 0.9% flush 5 mL PRN    vancomycin 750 mg in dextrose 5 % 250 mL IVPB (ready to mix system) Q24H     Family History     None        Social History Main Topics    Smoking status: Former Smoker    Smokeless tobacco: Not on file    Alcohol use Yes      Comment: social    Drug use: No    Sexual activity: Not on file     Review of Systems   Constitutional: Negative for chills and fever.   HENT: Negative for congestion, sore throat and tinnitus.    Eyes: Negative for pain and visual disturbance.   Respiratory: Positive for cough (nonproductive) and shortness of breath.    Cardiovascular: Negative for chest pain, palpitations and leg swelling.   Gastrointestinal: Negative for constipation, nausea and vomiting.   Endocrine: Negative for polydipsia and polyuria.   Genitourinary: Negative for dysuria and hematuria.   Musculoskeletal: Negative for arthralgias and myalgias.   Skin: Negative for color change and rash.   Neurological: Negative for dizziness and headaches.    Hematological: Does not bruise/bleed easily.   Psychiatric/Behavioral: Negative for dysphoric mood. The patient is not nervous/anxious.      Objective:     Vital Signs (Most Recent):  Temp: 98.1 °F (36.7 °C) (03/19/18 0740)  Pulse: 103 (03/19/18 1200)  Resp: 18 (03/19/18 1126)  BP: (!) 160/79 (03/19/18 0740)  SpO2: 98 % (03/19/18 1126)  O2 Device (Oxygen Therapy): room air (03/19/18 1126) Vital Signs (24h Range):  Temp:  [98 °F (36.7 °C)-98.5 °F (36.9 °C)] 98.1 °F (36.7 °C)  Pulse:  [] 103  Resp:  [16-18] 18  SpO2:  [93 %-98 %] 98 %  BP: (141-170)/(76-81) 160/79     Weight: 49.9 kg (110 lb) (03/18/18 0238)  Body mass index is 22.99 kg/m².  Body surface area is 1.43 meters squared.    I/O last 3 completed shifts:  In: 2350 [IV Piggyback:2350]  Out: 903 [Urine:900; Stool:3]    Physical Exam   Constitutional: She is oriented to person, place, and time. She appears well-developed. No distress.   HENT:   Head: Normocephalic and atraumatic.   Eyes: Conjunctivae are normal. No scleral icterus.   Neck: Neck supple. No tracheal deviation present.   Cardiovascular: Normal rate and regular rhythm.    Pulmonary/Chest: Effort normal. She has rales (bibasilar and right middle lung field).   Abdominal: Soft. Bowel sounds are normal.   Musculoskeletal: She exhibits no edema or tenderness.   Neurological: She is alert and oriented to person, place, and time.   Skin: Skin is warm and dry.   Psychiatric: She has a normal mood and affect. Her behavior is normal.   Vitals reviewed.      Significant Labs:  All labs within the past 24 hours have been reviewed.    Significant Imaging:  X-Ray: Reviewed

## 2018-03-19 NOTE — PLAN OF CARE
TN went to meet with patient, family at bedside.  Patient sitting in chair at this time. TN reviewed clinicals. When patient is ready, will resume home health on discharge. Patient is current with [facility]. Patient and family updated and agreeable to discharge plan.    Future Appointments  Date Time Provider Department Center   4/4/2018 3:00 PM ADENIKE Rojas [facility]   4/4/2018 3:40 PM Noemi Wahl MD [facility]        03/19/18 1622   Discharge Reassessment   Assessment Type Discharge Planning Reassessment   Provided patient/caregiver education on the expected discharge date and the discharge plan Yes   Do you have any problems affording any of your prescribed medications? No   Discharge Plan A Home with family;Home Health   Discharge Plan B Home with family   Patient choice form signed by patient/caregiver N/A   Can the patient answer the patient profile reliably? Yes, cognitively intact   How does the patient rate their overall health at the present time? Good   Describe the patient's ability to walk at the present time. Walks with the help of equipment   How often would a person be available to care for the patient? Often     Jennifer Guerrier RN  Transition Navigator  [phone]

## 2018-03-19 NOTE — CONSULTS
"[facility]  Nephrology  Consult Note    Patient Name: Willow Giron  MRN: [id number]  Admission Date: 3/17/2018  Hospital Length of Stay: 1 days  Attending Provider: Andrea Diaz MD   Primary Care Physician: Qamra Bland MD  Principal Problem:<principal problem not specified>    Inpatient consult to Nephrology[facility]  Consult performed by: LETHA RUEDA  Consult ordered by: MARY RODRIGUES        Subjective:     HPI:   Willow Giron 87 y.o. female  has a past medical history of Arthritis; GERD (gastroesophageal reflux disease); Hyperlipidemia; and Hypertension. who presented with   Chief Complaint   Patient presents with    Vomiting     X1 episode of vomiting this evening after eating soup. Reports pt was d/c from skilled nursing facility last week for electorlyte imblance. Denies any pain at this time. Reports "not too much" nausea.      Pt presents with SOB and cough for about one week. She previously saw here PCP who diagnosed her with bronchitis and gave her a coarse of steroids and a z-pack but her symptoms worsened. She was hospitalized here last month and was seem by us for hyponatremia. At that time it was attributed to tea and toast diet and pt was discharged to LTAC on salt tablets. Pt discontinued salt tablets because they made her nauseated. Pt has not eaten much in the past few days due to nausea. Denies F/C, sick contacts, CP, N/V/D/C, swelling, dysuria, hematuria, blood in stool, melena, rashes.    Past Medical History:   Diagnosis Date    Arthritis     GERD (gastroesophageal reflux disease)     Hyperlipidemia     Hypertension        Past Surgical History:   Procedure Laterality Date     SECTION, CLASSIC      HYSTERECTOMY      SHOULDER SURGERY         Review of patient's allergies indicates:   Allergen Reactions    Pcn [penicillins] Rash     Current Facility-Administered Medications   Medication Frequency    albuterol-ipratropium 2.5mg-0.5mg/3mL " nebulizer solution 3 mL Q4H WAKE    amLODIPine tablet 10 mg Daily    aspirin tablet 325 mg Daily    cefepime in dextrose 5 % 1 gram/50 mL IVPB 1 g Q12H    cosyntropin injection 0.25 mg Once    fluticasone 50 mcg/actuation nasal spray 100 mcg BID    magnesium sulfate in dextrose IVPB (premix) 1 g Once    pantoprazole EC tablet 40 mg Daily    potassium chloride SA CR tablet 20 mEq Q2H    ramelteon tablet 8 mg Nightly PRN    simvastatin tablet 40 mg QHS    sodium chloride 0.9% flush 5 mL PRN    vancomycin 750 mg in dextrose 5 % 250 mL IVPB (ready to mix system) Q24H     Family History     None        Social History Main Topics    Smoking status: Former Smoker    Smokeless tobacco: Not on file    Alcohol use Yes      Comment: social    Drug use: No    Sexual activity: Not on file     Review of Systems   Constitutional: Negative for chills and fever.   HENT: Negative for congestion, sore throat and tinnitus.    Eyes: Negative for pain and visual disturbance.   Respiratory: Positive for cough (nonproductive) and shortness of breath.    Cardiovascular: Negative for chest pain, palpitations and leg swelling.   Gastrointestinal: Negative for constipation, nausea and vomiting.   Endocrine: Negative for polydipsia and polyuria.   Genitourinary: Negative for dysuria and hematuria.   Musculoskeletal: Negative for arthralgias and myalgias.   Skin: Negative for color change and rash.   Neurological: Negative for dizziness and headaches.   Hematological: Does not bruise/bleed easily.   Psychiatric/Behavioral: Negative for dysphoric mood. The patient is not nervous/anxious.      Objective:     Vital Signs (Most Recent):  Temp: 98.1 °F (36.7 °C) (03/19/18 0740)  Pulse: 103 (03/19/18 1200)  Resp: 18 (03/19/18 1126)  BP: (!) 160/79 (03/19/18 0740)  SpO2: 98 % (03/19/18 1126)  O2 Device (Oxygen Therapy): room air (03/19/18 1126) Vital Signs (24h Range):  Temp:  [98 °F (36.7 °C)-98.5 °F (36.9 °C)] 98.1 °F (36.7  °C)  Pulse:  [] 103  Resp:  [16-18] 18  SpO2:  [93 %-98 %] 98 %  BP: (141-170)/(76-81) 160/79     Weight: 49.9 kg (110 lb) (03/18/18 0238)  Body mass index is 22.99 kg/m².  Body surface area is 1.43 meters squared.    I/O last 3 completed shifts:  In: 2350 [IV Piggyback:2350]  Out: 903 [Urine:900; Stool:3]    Physical Exam   Constitutional: She is oriented to person, place, and time. She appears well-developed. No distress.   HENT:   Head: Normocephalic and atraumatic.   Eyes: Conjunctivae are normal. No scleral icterus.   Neck: Neck supple. No tracheal deviation present.   Cardiovascular: Normal rate and regular rhythm.    Pulmonary/Chest: Effort normal. She has rales (bibasilar and right middle lung field).   Abdominal: Soft. Bowel sounds are normal.   Musculoskeletal: She exhibits no edema or tenderness.   Neurological: She is alert and oriented to person, place, and time.   Skin: Skin is warm and dry.   Psychiatric: She has a normal mood and affect. Her behavior is normal.   Vitals reviewed.      Significant Labs:  All labs within the past 24 hours have been reviewed.    Significant Imaging:  X-Ray: Reviewed    Assessment/Plan:     Normocytic anemia    Stable. Pt reports no blood in stool or urine. She had a hysterectomy in the past. Iron levels are normal but % saturation is low. Pt may benefit from trial of oral iron replacement.        Hypo-osmolality and hyponatremia    117 on presentation up to 121 today. Recommend fluid restrict to 1 L daily. Liberalize pt's diet; she should not be on a low salt diet. Checking cortisol stimulation test. TSH last month was normal. Urine osm 305 which is inapropriatly high. Suspect SIADH. If Na dose not improve with fluid restriction and increase salt intake may need to be started on an aquaretic.         Essential hypertension    Continue home antihypertensives.        Hypokalemia    Agree with PRN oral repletion             Thank you for your consult. I will  follow-up with patient. Please contact us if you have any additional questions.    Levar Santana MD, 3/19/2018 1:28 PM  [facility] Nephrology PGY-IV  Pager: [phone]  Cell: [phone]

## 2018-03-19 NOTE — ASSESSMENT & PLAN NOTE
117 on presentation up to 121 today. Recommend fluid restrict to 1 L daily. Liberalize pt's diet; she should not be on a low salt diet. Checking cortisol stimulation test. TSH last month was normal. Urine osm 305 which is inapropriatly high. Suspect SIADH. If Na dose not improve with fluid restriction and increase salt intake may need to be started on an aquaretic.

## 2018-03-19 NOTE — CONSULTS
"[facility] Consult - Resident Note    Primary Attending Physician: Emily  Primary Team: [facility]  Consultant Attending: Kyle  Consultant Resident: Ford    Reason for Consult:     Elevated Troponin    Subjective:      History of Present Illness:  Willow Giron is a 87 y.o. W female who  has a past medical history of Arthritis; GERD (gastroesophageal reflux disease); Hyperlipidemia; and Hypertension.. The patient presented to the [facility] on 3/17/2018 with a primary complaint of Vomiting (X1 episode of vomiting this evening after eating soup. Reports pt was d/c from skilled nursing facility last week for electorlyte imblance. Denies any pain at this time. Reports "not too much" nausea. )      Patient admitted with HAP following an admission about on month ago. She was treated for hyponatremia and was discharged to SNF due to deconditioning. She began having a cough that continued to worsen over the last week or so. She returned to the ED and was found to have a pneumonia. She has hyponatremia again, as well as hypokalemia and hypomagnesia, with some EKG changes that were not evident on last admission. She denies any chest pain or palpitations.     Past Medical History:  Past Medical History:   Diagnosis Date    Arthritis     GERD (gastroesophageal reflux disease)     Hyperlipidemia     Hypertension        Past Surgical History:  Past Surgical History:   Procedure Laterality Date     SECTION, CLASSIC      HYSTERECTOMY      SHOULDER SURGERY         Allergies:  Review of patient's allergies indicates:   Allergen Reactions    Pcn [penicillins] Rash       Medications:   In-Hospital Scheduled Medications:   albuterol-ipratropium 2.5mg-0.5mg/3mL  3 mL Nebulization Q4H WAKE    aspirin  325 mg Oral Daily    ceFEPime (MAXIPIME) IVPB  1 g Intravenous Q12H    fluticasone  2 spray Each Nare BID    pantoprazole  40 mg Oral Daily    simvastatin  40 mg Oral QHS    " vancomycin (VANCOCIN) IVPB  750 mg Intravenous Q24H      In-Hospital PRN Medications:  heparin (PORCINE), heparin (PORCINE), ramelteon, sodium chloride 0.9%   In-Hospital IV Infusion Medications:   heparin (porcine) in D5W 14 Units/kg/hr (03/19/18 5426)      Home Medications:  Prior to Admission medications    Medication Sig Start Date End Date Taking? Authorizing Provider   ALPRAZolam (XANAX) 0.25 MG tablet Take 0.25 mg by mouth every evening.    Historical Provider, MD   amlodipine (NORVASC) 10 MG tablet Take 10 mg by mouth once daily.    Historical Provider, MD   celecoxib (CELEBREX) 200 MG capsule Take 200 mg by mouth 2 (two) times daily.    Historical Provider, MD   docusate sodium (COLACE) 100 MG capsule Take 100 mg by mouth daily as needed for Constipation.    Historical Provider, MD   fluticasone (FLONASE) 50 mcg/actuation nasal spray 2 sprays (100 mcg total) by Each Nare route 2 (two) times daily. 2/23/18   Karen Hawkins MD   furosemide (LASIX) 20 MG tablet Take 1 tablet (20 mg total) by mouth 2 (two) times daily. 2/23/18 2/23/19  Karen Hawkins MD   lisinopril (PRINIVIL,ZESTRIL) 40 MG tablet Take 40 mg by mouth once daily.    Historical Provider, MD   nabumetone (RELAFEN) 500 MG tablet Take 500 mg by mouth 2 (two) times daily.    Historical Provider, MD   omeprazole (PRILOSEC) 20 MG capsule Take 20 mg by mouth once daily.    Historical Provider, MD   simvastatin (ZOCOR) 40 MG tablet Take 40 mg by mouth every evening.    Historical Provider, MD   tizanidine 4 mg Cap Take 1 capsule by mouth every evening. 3/2/17   Edward Trinh MD       Family History:  History reviewed. No pertinent family history.    Social History:  Social History   Substance Use Topics    Smoking status: Former Smoker    Smokeless tobacco: Not on file    Alcohol use Yes      Comment: social       Review of Systems:  Pertinent items are noted in HPI.       Objective:   Last 24 Hour Vital Signs:  BP  Min: 141/81  Max:  "170/76  Temp  Av.3 °F (36.8 °C)  Min: 98 °F (36.7 °C)  Max: 98.6 °F (37 °C)  Pulse  Av.4  Min: 68  Max: 106  Resp  Av.2  Min: 16  Max: 18  SpO2  Av.4 %  Min: 93 %  Max: 97 %  I/O last 3 completed shifts:  In: 2350 [IV Piggyback:2350]  Out: 903 [Urine:900; Stool:3]    Physical Examination:  BP (!) 160/79 (BP Location: Right arm, Patient Position: Lying)   Pulse 74   Temp 98.1 °F (36.7 °C) (Oral)   Resp 18   Ht 4' 10" (1.473 m)   Wt 49.9 kg (110 lb)   SpO2 (!) 93%   Breastfeeding? No   BMI 22.99 kg/m²   General appearance: alert, appears stated age and cooperative  Head: Normocephalic, without obvious abnormality, atraumatic  Neck: no adenopathy, no carotid bruit, no JVD and supple, symmetrical, trachea midline  Lungs: wheezes RLL  Heart: regular rate and rhythm, S1, S2 normal, no murmur, click, rub or gallop  Abdomen: soft, non-tender; bowel sounds normal; no masses,  no organomegaly  Extremities: extremities normal, atraumatic, no cyanosis or edema        Laboratory Results:  Most Recent Data:  CBC: Lab Results   Component Value Date    WBC 8.23 2018    HGB 9.2 (L) 2018    HCT 26.1 (L) 2018     2018    MCV 88 2018    RDW 11.8 2018     BMP: Lab Results   Component Value Date     (L) 2018    K 3.5 2018    CL 88 (L) 2018    CO2 19 (L) 2018    BUN 9 2018     (H) 2018    CALCIUM 8.9 2018    MG 1.2 (L) 2018    PHOS 2.3 (L) 2018     LFTs: Lab Results   Component Value Date    PROT 7.1 2018    ALBUMIN 3.0 (L) 2018    BILITOT 0.6 2018    AST 21 2018    ALKPHOS 86 2018    ALT 15 2018     Coags:   Lab Results   Component Value Date    INR 1.1 2018     FLP: No results found for: CHOL, HDL, LDLCALC, TRIG, CHOLHDL  DM: Lab Results   Component Value Date    HGBA1C 4.9 2018    CREATININE 0.9 2018     Thyroid: Lab Results   Component Value Date "    TSH 0.735 02/19/2018     Anemia: Lab Results   Component Value Date    IRON 65 02/18/2018    TIBC 383 02/18/2018    FERRITIN 141 02/18/2018    KCBGQTMC90 854 02/19/2018    FOLATE 9.4 02/19/2018     Cardiac: Lab Results   Component Value Date    TROPONINI 0.596 (H) 03/18/2018     Urinalysis: Lab Results   Component Value Date    COLORU Yellow 03/17/2018    SPECGRAV <=1.005 (A) 03/17/2018    NITRITE Negative 03/17/2018    KETONESU Trace (A) 03/17/2018    UROBILINOGEN Negative 03/17/2018       Trended Lab Data:    Recent Labs  Lab 03/17/18  2232 03/18/18  0330 03/18/18  1204 03/18/18  1557 03/19/18  0005 03/19/18  0649   WBC 11.02 9.15  --   --   --  8.23   HGB 10.7* 9.1*  --   --   --  9.2*   HCT 30.4* 26.4*  --   --   --  26.1*   * 333  --   --   --  336   MCV 88 89  --   --   --  88   RDW 11.5 11.4*  --   --   --  11.8   * 120* 119* 120* 121*  --    K 3.8 3.5 3.7 3.4* 3.5  --    CL 76* 88* 88* 87* 88*  --    CO2 26 23 19* 21* 19*  --    BUN 10 8 7* 8 9  --    * 139* 126* 120* 203*  --    PROT 7.1  --   --   --   --   --    ALBUMIN 3.0*  --   --   --   --   --    BILITOT 0.6  --   --   --   --   --    AST 21  --   --   --   --   --    ALKPHOS 86  --   --   --   --   --    ALT 15  --   --   --   --   --        Trended Cardiac Data:    Recent Labs  Lab 03/18/18  0210 03/18/18  0640 03/18/18  1204   TROPONINI 0.895* 0.767* 0.596*       Other Results:  EKG (my interpretation): Left axis deviation. Sinus arrhythmia     Radiology:  X-ray Chest Pa And Lateral    Result Date: 2/17/2018  Chest, 2 views: The lungs are clear.  The heart is normal in size.  Calcified atheromatous disease affects the aorta.  No mediastinal, hilar or pleural abnormalities are detected.  The bones are osteopenic and show age-appropriate degenerative change.     As above. Electronically signed by: Connie Mo MD Date:     02/17/18 Time:    12:16     X-ray Abdomen Ap 1 View (kub)    Result Date: 3/17/2018  EXAMINATION: XR  ABDOMEN AP 1 VIEW CLINICAL HISTORY: Vomiting, unspecified TECHNIQUE: Single AP View of the abdomen was performed. COMPARISON: None FINDINGS: Nonspecific bowel gas pattern.  No evidence to suggest obstruction.  No free air identified on this single AP supine view.  Scattered stool seen within the colon.  Regional osseous structures show DJD with prominent aortic calcification noted.    Nonobstructive bowel gas pattern. Electronically signed by: Gabriella Fagan MD Date:    03/17/2018 Time:    22:56    Ct Head Without Contrast    Result Date: 2/19/2018  History: Confusion/delirium, altered LOC, unexplained Comparison: None Technique: Axial images of the brain were obtained at 5-mm intervals from the skull base to the vertex without the administration of contrast. Findings: Remote right basal ganglia lacunar infarcts, unchanged.  There is no evidence of acute infarct, hemorrhage, or mass.  There is ventricular and sulcal enlargement consistent with generalized atrophy.  No mass-effect or midline shift.  There is enlargement of the extra-axial CSF spaces along the frontal lobes, unchanged. Mild to moderate decreased supratentorial white matter attenuation most likely related to chronic nonspecific small vessel disease. Air-fluid levels in the sphenoid sinuses, unchanged.  The remaining paranasal sinuses and mastoid air cells are clear.  The calvarium appears intact.  .    No acute intracranial abnormalities. Stable chronic findings, as above. Sphenoid sinus disease with air-fluid levels, unchanged. Electronically signed by: GILBERTO SORTO MD Date:     02/19/18 Time:    19:21     Ct Head Without Contrast    Result Date: 2/17/2018  Comparison: None. Technique: 5 mm CT images of the brain were obtained without contrast. Results: There is age advanced generalized cerebral volume loss with prominence of the extra-axial spaces about the bilateral frontal lobes.  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  Mild to moderate  chronic microvascular ischemic changes noted.  There is no evidence for acute intracranial hemorrhage or sulcal effacement.  No mass effect or midline shift is seen.  The ventricles are normal in size and configuration without hydrocephalus.  No extra-axial fluid collections. The visualized paranasal sinuses including the mastoid air cells are clear.    Age advanced generalized cerebral volume loss with prominence of the extra-axial space about the bilateral frontal lobes with remote encephalomalacia in the right basal ganglia.  No evidence for acute intracranial hemorrhage or abnormal parenchymal hypoattenuation to suggest an acute infarction.  Clinical correlation and further evaluation as warranted. Electronically signed by: Connie Mo MD Date:     02/17/18 Time:    12:54     Mri Brain W Wo Contrast    Result Date: 2/20/2018  History: Disorientation. Procedure: Sagittal T1, axial T1, T2, T2 flair, diffusion and swan sequences are performed. Multiplanar postcontrast series also performed after position was given intravenous gadolinium (5 cc). Comparison study: CT 2/19/2018, 2/17/2018. Findings: There is prominence of the extra-axial CSF space overlying the frontal lobes bilaterally. Findings are suggestive of either bilateral frontal atrophy or bilateral chronic subdural hygromas. In the deep white matter of the right frontal lobe and extending into the right anterior basal ganglia there are changes from encephalomalacia with volume loss and ipsilateral dilatation of the right frontal horn. There is also a focal susceptibility change in the caudate nucleus and lentiform nucleus, suggesting an old resolved hemorrhagic infarction. In addition in the deep white matter all the centrum bilaterally there are numerous scattered T2 hyperintensities without restricted diffusion or abnormal enhancement or abnormal mass effects, most likely from chronic microvascular ischemia. Within the parenchyma there is no abnormal  enhancement or restricted diffusion to suggest acute infarctions or neoplasms. There is no midline shift or herniations. The temporal horns are symmetric and normal. There is cerebellar atrophy. In the posterior fossa no acute infarctions or neoplasms or extra-axial enhancing masses is noted. The flow-voids of the major vessels is normal. There are post operative changes from cataract surgery. Air-fluid levels in the sphenoid sinuses from acute sphenoid sinusitis noted bilaterally.    1. 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 2. Periventricular white matter changes from chronic microvascular ischemia. 3. 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 4. Acute sphenoid sinusitis. Electronically signed by: KELY OBRIEN MD Date:     02/20/18 Time:    09:17     X-ray Chest Ap Portable    Result Date: 3/17/2018  EXAMINATION: XR CHEST AP PORTABLE CLINICAL HISTORY: Vomiting; TECHNIQUE: Single frontal view of the chest was performed. COMPARISON: None FINDINGS: Cardiac silhouette is normal in size.  Mediastinal structures are midline.  Lungs are symmetrically expanded.  There is mild interstitial prominence.  Subtle airspace opacity is seen within the lateral aspect of the right lower lung zone which may reflect developing infiltrate.  No focal consolidation.  No evidence of pneumothorax or significant effusion.  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    Subtle right lower lobe airspace opacity, possible aspiration or developing pneumonia. Electronically signed by: Gabriella Fagan MD Date:    03/17/2018 Time:    23:00    Us Lower Extremity Veins Bilateral    Result Date: 2/18/2018  HISTORY:  RLE swelling and redness TECHNIQUE: Duplex and color flow Doppler evaluation of the right and left lower extremity. COMPARISON:  FINDINGS: Right lower extremity has  no evidence of acute venous thrombosis in the common femoral, superficial femoral, greater saphenous, popliteal, peroneal, anterior tibial, and posterior tibial vein(s). Left lower extremity has  no evidence of acute venous thrombosis in the common femoral, superficial femoral, greater saphenous, popliteal, peroneal, anterior tibial, and posterior tibial vein(s).    No evidence of acute venous thrombosis of the right and left lower extremity. Electronically signed by: Connie Mo MD Date:     02/18/18 Time:    15:40          Assessment:     Willow Giron is a 87 y.o. female with:  Patient Active Problem List    Diagnosis Date Noted    HCAP (healthcare-associated pneumonia) 03/18/2018    Elevated troponin     Acute sphenoidal sinusitis 02/20/2018    Hyponatremia 02/17/2018    Essential hypertension 02/17/2018    Hyperlipidemia 02/17/2018    GERD (gastroesophageal reflux disease) 02/17/2018    Vomiting 02/17/2018        Plan:     - Patient had elevated troponin in the setting of HCAP with multiple electrolyte abnormalities. She did not complain of chest pain.   - This is unlikely to be ACS and is more likely Type 2 demand ischemia. Troponin peaked at 0.895 and treaded down.  - Stop Heparin drip.   - Recommend supportive care, continue repleting K, Mg. Continue treating HCAP.  - Will read TTE as soon as it is performed.   - Arrhythmia on admission does not appear to be A fib, appears to be a sinus arrhythmia 2/2 electrolyte abnormalities.  - Telemetry monitoring  - Patient can have diet. We will not be performing any acute intervention.     Thank you for allowing us to participate in the care of this patient. Please contact me if you have any questions regarding this consult.    Duncan Youngblood  [facility]  [facility]

## 2018-03-19 NOTE — PT/OT/SLP EVAL
Occupational Therapy   Evaluation    Name: Willow Giron  MRN: [id number]  Admitting Diagnosis:  <principal problem not specified>      Recommendations:     Discharge Recommendations: home health OT, home health PT  Discharge Equipment Recommendations:  bath bench  Barriers to discharge:  None    History:     Occupational Profile:  Living Environment: w/ spouse in 2st home w/ 2STE & L HR; T/S combo   Previous level of function: Mod (I) via RW w/ ADLs except SBA for standing tub t/f & shower  Roles and Routines: light meal prep  Equipment Owned:  grab bar, walker, rolling  Assistance upon Discharge: 24 hr S/A    Past Medical History:   Diagnosis Date    Arthritis     GERD (gastroesophageal reflux disease)     Hyperlipidemia     Hypertension        Past Surgical History:   Procedure Laterality Date     SECTION, CLASSIC      HYSTERECTOMY      SHOULDER SURGERY         Subjective     Chief Complaint: fatigue  Patient/Family stated goals: return home   Communicated with: prudencio prior to session.  Pain/Comfort:  · Pain Rating 1: 0/10  · Pain Rating Post-Intervention 1: 0/10    Patients cultural, spiritual, Sabianism conflicts given the current situation:      Objective:     Patient found with: telemetry    General Precautions: Standard, fall, seizure   Orthopedic Precautions:N/A   Braces: N/A     Occupational Performance:    Bed Mobility:    · Patient completed Supine to Sit with minimum assistance    Functional Mobility/Transfers:  · Patient completed Sit <> Stand Transfer with contact guard assistance  with  rolling walker   · Patient completed Toilet Transfer Step Transfer technique with contact guard assistance with  rolling walker  · Functional Mobility: via RW around room for ADLs w/ CGA    Activities of Daily Living:  · LB Dressing: maximal assistance doff socks at EOB  · Toileting: contact guard assistance for standing balance    Cognitive/Visual Perceptual:  Grossly intact    Physical Exam:  Sit  "balance: Fair to F-  Stand balance: F- to F via RW  L shldr ~140* 3+/5; elb-->Ds 4/5  R shldr scaption ~60*; elb-->Ds 4-/5    Patient left up in chair with all lines intact, call button in reach, nsg notified and dgtr present    [facility] 6 Click:  [facility] Total Score: 17    Treatment & Education:  OT marilee completed this date. Pt recently d/c'ed SNF-->home w/ spouse in 2st home w/ 2STE & L HR. Lives exclusively on 1st floor. Mod (I) via RW except standing tub t/f's for showers w/ SBA. Pt perf sup-->EOB w/ Min A; amb via RW around room for toileting w/ CGA; toilet t/f CGA; toileting w/ CGA for static balance. Edu/tx re: HEP, energy conservation, endurance tx & DME recs. Pt/dgtr verbalized understanding.    Education:    Assessment:   Pt presents w/ decreased overall endurance/conditioning, balance/mobility & coordination w/ subsequent decline in (I)/safety w/ BADLs, fxnl mobility & fxnl t/f's. OT 5x/wk to increase phys/fxnl status & maximize potential to achieve established goals for d/c-->home w/ HH OT/PT & rec DME.    Willow Giron is a 87 y.o. female with a medical diagnosis of <principal problem not specified>.  She presents with .  Performance deficits affecting function are weakness, impaired endurance, gait instability, impaired functional mobilty, impaired self care skills, impaired balance, decreased upper extremity function, decreased safety awareness, edema.      Rehab Prognosis:  good; patient would benefit from acute skilled OT services to address these deficits and reach maximum level of function.         Clinical Decision Makin.  OT Low:  "Pt evaluation falls under low complexity for evaluation coding due to performance deficits noted in 1-3 areas as stated above and 0 co-morbities affecting current functional status. Data obtained from problem focused assessments. No modifications or assistance was required for completion of evaluation. Only brief occupational profile and history review " "completed."     Plan:     Patient to be seen 5 x/week to address the above listed problems via self-care/home management, therapeutic activities, therapeutic exercises  · Plan of Care Expires: 04/19/18  · Plan of Care Reviewed with: patient, daughter    This Plan of care has been discussed with the patient who was involved in its development and understands and is in agreement with the identified goals and treatment plan    GOALS:    Occupational Therapy Goals        Problem: Occupational Therapy Goal    Goal Priority Disciplines Outcome Interventions   Occupational Therapy Goal     OT, PT/OT Ongoing (interventions implemented as appropriate)    Description:  Goals to be met by: 04/19/18     Patient will increase functional independence with ADLs by performing:    LE Dressing with Stand-by Assistance.  Grooming while standing at sink with Stand-by Assistance.  Toileting from toilet with Modified [place] for hygiene and clothing management.   Toilet transfer to toilet with Modified [place].                      Time Tracking:     OT Date of Treatment: 03/19/18  OT Start Time: 0934  OT Stop Time: 1004  OT Total Time (min): 30 min    Billable Minutes:Evaluation 15  Self Care/Home Management 15  Total Time 30    YOLY Martinez  3/19/2018    "

## 2018-03-19 NOTE — PLAN OF CARE
Problem: Patient Care Overview  Goal: Plan of Care Review  Outcome: Ongoing (interventions implemented as appropriate)  Plan of care reviewed with patient. Patient verbalized complete understanding. Fall precautions maintained. Bed in lowest position, locked, call light within reach, and bed alarm on. Side rails up x's 2 with slip resistant socks on. Nurse instructed patient to notify staff for any assistance and pt verbalized complete understanding. Patient on telemetry throughout shift with no ectopy noted. Will continue to monitor.Assisted to bathroom through out shift with no complaints. Family remained at bedside.

## 2018-03-19 NOTE — PLAN OF CARE
03/19/18 1146   Medicare Message   Important Message from Medicare regarding Discharge Appeal Rights Given to patient/caregiver;Explained to patient/caregiver;Signed/date by patient/caregiver   Date IMM was signed 03/19/18   Time IMM was signed 1149

## 2018-03-19 NOTE — PLAN OF CARE
Problem: Occupational Therapy Goal  Goal: Occupational Therapy Goal  Goals to be met by: 04/19/18     Patient will increase functional independence with ADLs by performing:    LE Dressing with Stand-by Assistance.  Grooming while standing at sink with Stand-by Assistance.  Toileting from toilet with Modified [place] for hygiene and clothing management.   Toilet transfer to toilet with Modified [place].    Outcome: Ongoing (interventions implemented as appropriate)  OT marilee completed this date. Pt recently d/c'ed SNF-->home w/ spouse in 2st home w/ 2STE & L HR. Lives exclusively on 1st floor. Mod (I) via RW except standing tub t/f's for showers w/ SBA. Pt perf sup-->EOB w/ Min A; amb via RW around room for toileting w/ CGA; toilet t/f CGA; toileting w/ CGA for static balance. Edu/tx re: HEP, energy conservation, endurance tx & DME recs. Pt/dgtr verbalized understanding.    Pt presents w/ decreased overall endurance/conditioning, balance/mobility & coordination w/ subsequent decline in (I)/safety w/ BADLs, fxnl mobility & fxnl t/f's. OT 5x/wk to increase phys/fxnl status & maximize potential to achieve established goals for d/c-->home w/ HH OT/PT & rec DME.

## 2018-03-20 PROBLEM — E87.6 HYPOKALEMIA: Status: RESOLVED | Noted: 2018-02-17 | Resolved: 2018-03-20

## 2018-03-20 LAB
ANION GAP SERPL CALC-SCNC: 8 MMOL/L
ANION GAP SERPL CALC-SCNC: 8 MMOL/L
ANION GAP SERPL CALC-SCNC: 9 MMOL/L
ANION GAP SERPL CALC-SCNC: 9 MMOL/L
AORTIC VALVE REGURGITATION: ABNORMAL
BASOPHILS # BLD AUTO: 0 K/UL
BASOPHILS NFR BLD: 0 %
BILIRUB UR QL STRIP: NEGATIVE
BUN SERPL-MCNC: 12 MG/DL
BUN SERPL-MCNC: 7 MG/DL
BUN SERPL-MCNC: 8 MG/DL
BUN SERPL-MCNC: 9 MG/DL
CALCIUM SERPL-MCNC: 8.6 MG/DL
CALCIUM SERPL-MCNC: 8.6 MG/DL
CALCIUM SERPL-MCNC: 8.7 MG/DL
CALCIUM SERPL-MCNC: 9 MG/DL
CHLORIDE SERPL-SCNC: 87 MMOL/L
CHLORIDE SERPL-SCNC: 88 MMOL/L
CLARITY UR: CLEAR
CO2 SERPL-SCNC: 22 MMOL/L
CO2 SERPL-SCNC: 23 MMOL/L
CO2 SERPL-SCNC: 24 MMOL/L
CO2 SERPL-SCNC: 24 MMOL/L
COLOR UR: YELLOW
CREAT SERPL-MCNC: 0.7 MG/DL
CREAT SERPL-MCNC: 0.8 MG/DL
DIFFERENTIAL METHOD: ABNORMAL
EOSINOPHIL # BLD AUTO: 0.1 K/UL
EOSINOPHIL NFR BLD: 0.7 %
ERYTHROCYTE [DISTWIDTH] IN BLOOD BY AUTOMATED COUNT: 11.8 %
EST. GFR  (AFRICAN AMERICAN): >60 ML/MIN/1.73 M^2
EST. GFR  (NON AFRICAN AMERICAN): >60 ML/MIN/1.73 M^2
ESTIMATED PA SYSTOLIC PRESSURE: 40.72
GLUCOSE SERPL-MCNC: 116 MG/DL
GLUCOSE SERPL-MCNC: 119 MG/DL
GLUCOSE SERPL-MCNC: 124 MG/DL
GLUCOSE SERPL-MCNC: 149 MG/DL
GLUCOSE UR QL STRIP: ABNORMAL
HCT VFR BLD AUTO: 23.8 %
HGB BLD-MCNC: 8.4 G/DL
HGB UR QL STRIP: NEGATIVE
KETONES UR QL STRIP: NEGATIVE
LEUKOCYTE ESTERASE UR QL STRIP: NEGATIVE
LYMPHOCYTES # BLD AUTO: 1.2 K/UL
LYMPHOCYTES NFR BLD: 15.5 %
MAGNESIUM SERPL-MCNC: 1.7 MG/DL
MCH RBC QN AUTO: 31.2 PG
MCHC RBC AUTO-ENTMCNC: 35.3 G/DL
MCV RBC AUTO: 89 FL
MONOCYTES # BLD AUTO: 0.9 K/UL
MONOCYTES NFR BLD: 11.8 %
NEUTROPHILS # BLD AUTO: 5.4 K/UL
NEUTROPHILS NFR BLD: 71.1 %
NITRITE UR QL STRIP: NEGATIVE
OSMOLALITY UR: 356 MOSM/KG
PH UR STRIP: 7 [PH] (ref 5–8)
PHOSPHATE SERPL-MCNC: 2.3 MG/DL
PLATELET # BLD AUTO: 321 K/UL
PMV BLD AUTO: 8.2 FL
POCT GLUCOSE: 130 MG/DL (ref 70–110)
POCT GLUCOSE: 135 MG/DL (ref 70–110)
POTASSIUM SERPL-SCNC: 3.7 MMOL/L
POTASSIUM SERPL-SCNC: 3.8 MMOL/L
POTASSIUM SERPL-SCNC: 4.1 MMOL/L
POTASSIUM SERPL-SCNC: 4.3 MMOL/L
PROCALCITONIN SERPL IA-MCNC: 0.05 NG/ML
PROT UR QL STRIP: ABNORMAL
RBC # BLD AUTO: 2.69 M/UL
RETIRED EF AND QEF - SEE NOTES: 50 (ref 55–65)
SODIUM SERPL-SCNC: 118 MMOL/L
SODIUM SERPL-SCNC: 119 MMOL/L
SODIUM SERPL-SCNC: 119 MMOL/L
SODIUM SERPL-SCNC: 120 MMOL/L
SODIUM UR-SCNC: 25 MMOL/L
SP GR UR STRIP: 1.01 (ref 1–1.03)
URN SPEC COLLECT METH UR: ABNORMAL
UROBILINOGEN UR STRIP-ACNC: NEGATIVE EU/DL
VANCOMYCIN SERPL-MCNC: 8.4 UG/ML
VANCOMYCIN TROUGH SERPL-MCNC: 21.6 UG/ML
WBC # BLD AUTO: 7.57 K/UL

## 2018-03-20 PROCEDURE — 85025 COMPLETE CBC W/AUTO DIFF WBC: CPT

## 2018-03-20 PROCEDURE — 36415 COLL VENOUS BLD VENIPUNCTURE: CPT

## 2018-03-20 PROCEDURE — 83735 ASSAY OF MAGNESIUM: CPT

## 2018-03-20 PROCEDURE — 97110 THERAPEUTIC EXERCISES: CPT

## 2018-03-20 PROCEDURE — 25000242 PHARM REV CODE 250 ALT 637 W/ HCPCS: Performed by: STUDENT IN AN ORGANIZED HEALTH CARE EDUCATION/TRAINING PROGRAM

## 2018-03-20 PROCEDURE — 11000001 HC ACUTE MED/SURG PRIVATE ROOM

## 2018-03-20 PROCEDURE — 94761 N-INVAS EAR/PLS OXIMETRY MLT: CPT

## 2018-03-20 PROCEDURE — 80048 BASIC METABOLIC PNL TOTAL CA: CPT

## 2018-03-20 PROCEDURE — 84100 ASSAY OF PHOSPHORUS: CPT

## 2018-03-20 PROCEDURE — 97116 GAIT TRAINING THERAPY: CPT

## 2018-03-20 PROCEDURE — 80202 ASSAY OF VANCOMYCIN: CPT

## 2018-03-20 PROCEDURE — 84145 PROCALCITONIN (PCT): CPT

## 2018-03-20 PROCEDURE — 63600175 PHARM REV CODE 636 W HCPCS: Performed by: STUDENT IN AN ORGANIZED HEALTH CARE EDUCATION/TRAINING PROGRAM

## 2018-03-20 PROCEDURE — 25000003 PHARM REV CODE 250: Performed by: STUDENT IN AN ORGANIZED HEALTH CARE EDUCATION/TRAINING PROGRAM

## 2018-03-20 PROCEDURE — 81003 URINALYSIS AUTO W/O SCOPE: CPT

## 2018-03-20 PROCEDURE — 97530 THERAPEUTIC ACTIVITIES: CPT

## 2018-03-20 PROCEDURE — 63600175 PHARM REV CODE 636 W HCPCS: Performed by: EMERGENCY MEDICINE

## 2018-03-20 PROCEDURE — 97535 SELF CARE MNGMENT TRAINING: CPT

## 2018-03-20 PROCEDURE — 87449 NOS EACH ORGANISM AG IA: CPT

## 2018-03-20 PROCEDURE — 84300 ASSAY OF URINE SODIUM: CPT

## 2018-03-20 PROCEDURE — 84295 ASSAY OF SERUM SODIUM: CPT

## 2018-03-20 PROCEDURE — 94640 AIRWAY INHALATION TREATMENT: CPT

## 2018-03-20 PROCEDURE — 83935 ASSAY OF URINE OSMOLALITY: CPT

## 2018-03-20 RX ORDER — MOXIFLOXACIN HYDROCHLORIDE 400 MG/1
400 TABLET ORAL DAILY
Status: DISCONTINUED | OUTPATIENT
Start: 2018-03-20 | End: 2018-03-27

## 2018-03-20 RX ORDER — HEPARIN SODIUM 5000 [USP'U]/ML
5000 INJECTION, SOLUTION INTRAVENOUS; SUBCUTANEOUS EVERY 12 HOURS
Status: DISCONTINUED | OUTPATIENT
Start: 2018-03-20 | End: 2018-03-28 | Stop reason: HOSPADM

## 2018-03-20 RX ORDER — TOLVAPTAN 15 MG/1
15 TABLET ORAL DAILY
Status: COMPLETED | OUTPATIENT
Start: 2018-03-21 | End: 2018-03-23

## 2018-03-20 RX ADMIN — IPRATROPIUM BROMIDE AND ALBUTEROL SULFATE 3 ML: .5; 3 SOLUTION RESPIRATORY (INHALATION) at 11:03

## 2018-03-20 RX ADMIN — PANTOPRAZOLE SODIUM 40 MG: 40 TABLET, DELAYED RELEASE ORAL at 08:03

## 2018-03-20 RX ADMIN — FLUTICASONE PROPIONATE 100 MCG: 50 SPRAY, METERED NASAL at 08:03

## 2018-03-20 RX ADMIN — ASPIRIN 325 MG ORAL TABLET 325 MG: 325 PILL ORAL at 08:03

## 2018-03-20 RX ADMIN — IPRATROPIUM BROMIDE AND ALBUTEROL SULFATE 3 ML: .5; 3 SOLUTION RESPIRATORY (INHALATION) at 08:03

## 2018-03-20 RX ADMIN — IPRATROPIUM BROMIDE AND ALBUTEROL SULFATE 3 ML: .5; 3 SOLUTION RESPIRATORY (INHALATION) at 07:03

## 2018-03-20 RX ADMIN — IPRATROPIUM BROMIDE AND ALBUTEROL SULFATE 3 ML: .5; 3 SOLUTION RESPIRATORY (INHALATION) at 03:03

## 2018-03-20 RX ADMIN — RAMELTEON 8 MG: 8 TABLET, FILM COATED ORAL at 10:03

## 2018-03-20 RX ADMIN — HEPARIN SODIUM 5000 UNITS: 5000 INJECTION, SOLUTION INTRAVENOUS; SUBCUTANEOUS at 08:03

## 2018-03-20 RX ADMIN — AMLODIPINE BESYLATE 10 MG: 5 TABLET ORAL at 08:03

## 2018-03-20 RX ADMIN — SIMVASTATIN 40 MG: 20 TABLET, FILM COATED ORAL at 08:03

## 2018-03-20 RX ADMIN — MOXIFLOXACIN HYDROCHLORIDE 400 MG: 400 TABLET, FILM COATED ORAL at 11:03

## 2018-03-20 RX ADMIN — VANCOMYCIN HYDROCHLORIDE 750 MG: 750 INJECTION, POWDER, LYOPHILIZED, FOR SOLUTION INTRAVENOUS at 12:03

## 2018-03-20 NOTE — PROGRESS NOTES
.Pharmacy New Medication Education    Patient accepted medication education.    Pharmacy educated patient on name and purpose of medications and possible side effects, using the teach-back method.     Current Inpatient Medication Orders   albuterol-ipratropium 2.5mg-0.5mg/3mL nebulizer solution 3 mL   amLODIPine tablet 10 mg   aspirin tablet 325 mg   cefepime in dextrose 5 % 1 gram/50 mL IVPB 1 g   fluticasone 50 mcg/actuation nasal spray 100 mcg   pantoprazole EC tablet 40 mg   ramelteon tablet 8 mg   simvastatin tablet 40 mg   sodium chloride 0.9% flush 5 mL   vancomycin 1 g in 0.9% sodium chloride 250 mL IVPB (ready to mix system)       Learners of pharmacy medication education included:  Patient    Patient +/- learner response:  Verbalized Understanding, Teachback

## 2018-03-20 NOTE — PLAN OF CARE
Problem: Physical Therapy Goal  Goal: Physical Therapy Goal  Goals to be met by: 2018     Patient will increase functional independence with mobility by performin. Supine to sit with Modified [place]  2. Sit to supine with Modified [place]  3. Sit to stand transfer with Stand-by Assistance  4. Bed to chair transfer with Stand-by Assistance using Rolling Walker  5. Gait  x 75 feet with Contact Guard Assistance using Rolling Walker.   6. Ascend/descend 2 steps with left Handrails Contact Guard Assistance and Minimal Assistance   7. Lower extremity exercise program x10 reps with supervision      Outcome: Ongoing (interventions implemented as appropriate)  Continue working toward goals.

## 2018-03-20 NOTE — PROGRESS NOTES
[facility] medicine Resident KELLYII Progress Note    Subjective:      Doing well this morning. Denies subjective fever/chills overnight. Denies chest pain, shortness of breath, or abdominal pain. No other complaints this AM.     Objective:   Last 24 Hour Vital Signs:  BP  Min: 128/59  Max: 163/73  Temp  Av.4 °F (36.9 °C)  Min: 98.4 °F (36.9 °C)  Max: 98.5 °F (36.9 °C)  Pulse  Av.1  Min: 79  Max: 103  Resp  Av  Min: 18  Max: 18  SpO2  Av.7 %  Min: 92 %  Max: 98 %  I/O last 3 completed shifts:  In: 1120 [P.O.:370; I.V.:50; IV Piggyback:700]  Out: 1351 [Urine:1350; Stool:1]    Physical Examination:  General:          Alert and awake in NAD  HEENT: NC/AT, EOMI, PERRL, OP clear, MMM  Cardio:             Ireggular rhythm, 2/6 systolic ejection murmur heard in all areas  Resp:               mild wheezing with rhonchi; no focal consolidative changes appreciated  Abdom:            Soft, NTND with normoactive bowel sounds  Extrem:            1+ edema to midshins b/l; WWP with no clubbing, cyanosis  Skin:                No rashes, lesions, or color changes  Pulses:            2+ and symmetric distally  Neuro:             AAOx3; cooperative and pleasant with no focal deficits    Laboratory:  Laboratory Data Reviewed: yes  Pertinent Findings:    Recent Labs  Lab 18  2232 18  0330  18  0649 18  0759 18  1514 18  2334 18  0351   WBC 11.02 9.15  --  8.23  --   --   --  7.57   HGB 10.7* 9.1*  --  9.2*  --   --   --  8.4*   HCT 30.4* 26.4*  --  26.1*  --   --   --  23.8*   * 333  --  336  --   --   --  321   MCV 88 89  --  88  --   --   --  89   RDW 11.5 11.4*  --  11.8  --   --   --  11.8   * 120*  < >  --  121* 120* 119*  --    K 3.8 3.5  < >  --  3.3* 3.7 3.8  --    CL 76* 88*  < >  --  87* 86* 87*  --    CO2 26 23  < >  --  25 23 24  --    BUN 10 8  < >  --  9 10 8  --    CREATININE 0.8 0.8  < >  --  0.8 0.8 0.7  --    * 139*  < >  --  142* 149* 149*  --     PROT 7.1  --   --   --   --   --   --   --    ALBUMIN 3.0*  --   --   --   --   --   --   --    BILITOT 0.6  --   --   --   --   --   --   --    AST 21  --   --   --   --   --   --   --    ALKPHOS 86  --   --   --   --   --   --   --    ALT 15  --   --   --   --   --   --   --    < > = values in this interval not displayed.    Microbiology Data Reviewed: yes  Pertinent Findings:    Other Results:  EKG (my interpretation): previous EKG reviewed    Radiology Data Reviewed: yes  Pertinent Findings:      Current Medications:     Infusions:       Scheduled:   albuterol-ipratropium 2.5mg-0.5mg/3mL  3 mL Nebulization Q4H WAKE    amLODIPine  10 mg Oral Daily    aspirin  325 mg Oral Daily    ceFEPime (MAXIPIME) IVPB  1 g Intravenous Q12H    fluticasone  2 spray Each Nare BID    pantoprazole  40 mg Oral Daily    simvastatin  40 mg Oral QHS    [START ON 3/21/2018] vancomycin (VANCOCIN) IVPB  1,000 mg Intravenous Q24H        PRN:  ramelteon, sodium chloride 0.9%    Antibiotics and Day Number of Therapy:  vanc and cefepime 3/18 - continued    Lines and Day Number of Therapy:  piv    Assessment:     Willow Giron is a 87 y.o.female with  Patient Active Problem List    Diagnosis Date Noted    Hypo-osmolality and hyponatremia 03/19/2018    Normocytic anemia 03/19/2018    SIADH (syndrome of inappropriate ADH production)     HCAP (healthcare-associated pneumonia) 03/18/2018    Elevated troponin     Acute sphenoidal sinusitis 02/20/2018    Hyponatremia 02/17/2018    Hypokalemia 02/17/2018    Essential hypertension 02/17/2018    Hyperlipidemia 02/17/2018    GERD (gastroesophageal reflux disease) 02/17/2018    Vomiting 02/17/2018        Plan:     HCAP  -Recent hospital admit/SNF, dry cough x 1 week, general weakness  On admit, CXR with RLL opacity  No signs of systemic infection  -Vanc, cefepime started 3/18, continued  -blood cx remains NGTD  -patient tolerated breathing tx well this am. Continue to  monitor  -will consider descale abx today; likely consolidate to moxifloxacin     Hypovolemic hyponatremia  -Admitted 1 month ago--hypovolemic hypoNa s/p several days N/V/D  This admit, recent poor PO intake, N/V x 3 day of admit  -On admit, Na 117, Cl 76, urine Na 81, urine osm 305  Uric acid 2.3  - labile Na, concerning for SIADH; nephrology consulted and appreciate assistance  - fluid restriction, increase salt diet, consider tolvaptan per nephrology.      Elevated troponin  -On admit, troponin 0.573->0.895  Denies chest pain, SOB  -EKG with irregular rhythm, variable P-wave morphology--new from previous  No ST elevations  -Consult cardiology  Received , plavix 300, heparin gtt, simvastatin  -trop tended out, patient denies CP overnight  -will f/u cards recs, patient remains NPO for any possible interventions   -echo ordered, pending results     AoCD  -On admit, H/H 10/7/30--at baseline  Recent w/u c/w AoCD     HTN  -On admit, normotensive  Holding home lisinopril, norvasc in setting of HCAP--can resume if becomes hypertensive  - resumed ACEi     HLD  -Continue home simvastatin     GERD  -No acute issues  Continue home omeprazole    Ppx: subQ heparin  Dispo: pending further clinical improvement of HCAP, pending improvement of Na+, pending cards and nephrology recs.     Austin Fernando  [facility] Medicine HO-II  [facility] medicine Service Team B    [facility] Medicine Hospitalist Pager numbers:   [facility] Hospitalist Medicine Team A (Adrien/Rufino): [phone]  [facility] Hospitalist Medicine Team B (Emily/Mekhi):  [phone]

## 2018-03-20 NOTE — PROGRESS NOTES
"[facility]  Nephrology  Progress Note    Patient Name: Willow Giron  MRN: [id number]  Admission Date: 3/17/2018  Hospital Length of Stay: 2 days  Attending Provider: Andrea Diaz MD   Primary Care Physician: Qamar Bland MD  Principal Problem:<principal problem not specified>    Subjective:     HPI:   Willow Giron 87 y.o. female  has a past medical history of Arthritis; GERD (gastroesophageal reflux disease); Hyperlipidemia; and Hypertension. who presented with   Chief Complaint   Patient presents with    Vomiting     X1 episode of vomiting this evening after eating soup. Reports pt was d/c from skilled nursing facility last week for electorlyte imblance. Denies any pain at this time. Reports "not too much" nausea.      Pt presents with SOB and cough for about one week. She previously saw here PCP who diagnosed her with bronchitis and gave her a coarse of steroids and a z-pack but her symptoms worsened. She was hospitalized here last month and was seem by us for hyponatremia. At that time it was attributed to tea and toast diet and pt was discharged to [facility] on salt tablets. Pt discontinued salt tablets because they made her nauseated. Pt has not eaten much in the past few days due to nausea. Denies F/C, sick contacts, CP, N/V/D/C, swelling, dysuria, hematuria, blood in stool, melena, rashes.    Interval History: Pt states that she is feeling better today. She says that she is following her fluid restriction. No SOB, CP, N/V, F/C.    Review of patient's allergies indicates:   Allergen Reactions    Pcn [penicillins] Rash     Current Facility-Administered Medications   Medication Frequency    albuterol-ipratropium 2.5mg-0.5mg/3mL nebulizer solution 3 mL Q4H WAKE    amLODIPine tablet 10 mg Daily    aspirin tablet 325 mg Daily    fluticasone 50 mcg/actuation nasal spray 100 mcg BID    heparin (porcine) injection 5,000 Units Q12H    moxifloxacin tablet 400 mg Daily    " pantoprazole EC tablet 40 mg Daily    ramelteon tablet 8 mg Nightly PRN    simvastatin tablet 40 mg QHS    sodium chloride 0.9% flush 5 mL PRN       Objective:     Vital Signs (Most Recent):  Temp: 98 °F (36.7 °C) (03/20/18 1155)  Pulse: 81 (03/20/18 1158)  Resp: 18 (03/20/18 1158)  BP: (!) 145/64 (03/20/18 1155)  SpO2: (!) 94 % (03/20/18 1158)  O2 Device (Oxygen Therapy): room air (03/20/18 1158) Vital Signs (24h Range):  Temp:  [98 °F (36.7 °C)-98.5 °F (36.9 °C)] 98 °F (36.7 °C)  Pulse:  [73-98] 81  Resp:  [18] 18  SpO2:  [92 %-94 %] 94 %  BP: (128-163)/(58-73) 145/64     Weight: 49.9 kg (110 lb) (03/18/18 0238)  Body mass index is 22.99 kg/m².  Body surface area is 1.43 meters squared.    I/O last 3 completed shifts:  In: 1120 [P.O.:370; I.V.:50; IV Piggyback:700]  Out: 1351 [Urine:1350; Stool:1]    Physical Exam   Constitutional: She is oriented to person, place, and time. No distress.   HENT:   Head: Normocephalic and atraumatic.   Eyes: Conjunctivae are normal. No scleral icterus.   Neck: Neck supple. No tracheal deviation present.   Cardiovascular: Normal rate and regular rhythm.    Pulmonary/Chest: Effort normal and breath sounds normal.   Abdominal: Soft. She exhibits no distension. There is no tenderness.   Musculoskeletal: She exhibits no edema or tenderness.   Neurological: She is oriented to person, place, and time.   Skin: Skin is warm and dry. She is not diaphoretic.   Psychiatric: She has a normal mood and affect. Her behavior is normal.   Vitals reviewed.      Significant Labs:  All labs within the past 24 hours have been reviewed.     Significant Imaging:    None within the past 24 hours      Assessment/Plan:     Normocytic anemia    Stable. Pt reports no blood in stool or urine. She had a hysterectomy in the past. Iron levels are normal but % saturation is low. Pt may benefit from trial of oral iron replacement.        Hypo-osmolality and hyponatremia    117 on presentation up to 120 today. Not  improving. Recommend fluid restrict to 1 L daily. Liberalize pt's diet; she should not be on a low salt diet. Cortisol stimulation test ordered but does not appear to have been preformed correctly. TSH last month was normal. Urine osm 305 which is inapropriatly high. Suspect SIADH. If Na dose not improve with fluid restriction and increase salt intake may need to be started on an aquaretic.         Hypokalemia-resolved as of 3/20/2018    Agree with PRN oral repletion             Thank you for your consult. I will follow-up with patient. Please contact us if you have any additional questions.    Levar Santana MD, 3/20/2018 1:11 PM  [employer] Nephrology PGY-IV  Pager: [phone]  Cell: [phone]

## 2018-03-20 NOTE — ASSESSMENT & PLAN NOTE
117 on presentation up to 120 today. Not improving. Recommend fluid restrict to 1 L daily. Liberalize pt's diet; she should not be on a low salt diet. Cortisol stimulation test ordered but does not appear to have been preformed correctly. TSH last month was normal. Urine osm 305 which is inapropriatly high. Suspect SIADH. If Na dose not improve with fluid restriction and increase salt intake may need to be started on an aquaretic.

## 2018-03-20 NOTE — PLAN OF CARE
Problem: Occupational Therapy Goal  Goal: Occupational Therapy Goal  Goals to be met by: 04/19/18     Patient will increase functional independence with ADLs by performing:    LE Dressing with Stand-by Assistance.  Grooming while standing at sink with Stand-by Assistance.--met 03/20/18  Toileting from toilet with Modified [place] for hygiene and clothing management.   Toilet transfer to toilet with Modified [place].     Outcome: Ongoing (interventions implemented as appropriate)  Pt agreeable to tx this date & reports feeling much better. Pt sup-->EOB w/ SBA, amb via RW around room for ADLs w/ CG-SBA, toilet t/f w/ Sup, toileting w/ sup, G/H in standing w/ Sup. Edu/tx re: general safety techs & HEP. Pt/fly verbalized/demo'ed understanding. Pt left up in b/s chair at tx termination w/ son in room.    Pt demo's increased mobility skills, but cont w/ decreased overall endurance/conditioning, balance/mobility & coordination w/ subsequent decline in (I)/safety w/ BADLs, fxnl mobility & fxnl t/f's. Cont w/ OT per POC to increase phys/fxnl status & maximize potential to achieve established goals for d/c-->home w/ HHOT.

## 2018-03-20 NOTE — PT/OT/SLP PROGRESS
Physical Therapy Treatment    Patient Name:  Willow Giron   MRN:  [id number]    Recommendations:     Discharge Recommendations:  home health OT, home health PT   Discharge Equipment Recommendations: bath bench   Barriers to discharge: None    Assessment:     Willow Giron is a 87 y.o. female admitted with a medical diagnosis of <principal problem not specified>.  She presents with the following impairments/functional limitations:  weakness, impaired endurance, impaired functional mobilty, impaired self care skills, gait instability, decreased safety awareness, decreased lower extremity function, decreased upper extremity function. Pt demonstrated an increase in ambulation distance today with slow sona, decreased step length and occasional unsteadiness.  Pt would continue to benefit from P.T. To address impairments listed above.    Rehab Prognosis:  good; patient would benefit from acute skilled PT services to address these deficits and reach maximum level of function.      Recent Surgery: * No surgery found *      Plan:     During this hospitalization, patient to be seen 6 x/week to address the above listed problems via therapeutic activities, gait training, therapeutic exercises  · Plan of Care Expires:  04/19/18   Plan of Care Reviewed with: patient, family    Subjective     Communicated with RN(Alicia) prior to session.  Patient found supine upon PT entry to room, agreeable to treatment.        Patient comments/goals: Pt agreed to tx.  Pain/Comfort:  · Pain Rating 1: 0/10  · Pain Rating Post-Intervention 1: 0/10    Patients cultural, spiritual, Sikh conflicts given the current situation: none    Objective:     Patient found with: telemetry     General Precautions: Standard, fall, seizure   Orthopedic Precautions:N/A   Braces:       Functional Mobility:  · Bed Mobility:     · Rolling Right: supervision  · Scooting: stand by assistance and to EOB  · Supine to Sit: stand by assistance  · Sit  to Supine: stand by assistance  · Transfers:     · Sit to Stand:  contact guard assistance with rolling walker x 2 reps.  · Gait: 46ft x 2 with RW and close CGA with a brief standing rest break between bouts.  Pt ambulates with decreased sona, decreased step length and no heel strike.  · Balance: sitting good, standing with RW fair/fair+, ambulating fair      AM-PAC 6 CLICK MOBILITY  Turning over in bed (including adjusting bedclothes, sheets and blankets)?: 3  Sitting down on and standing up from a chair with arms (e.g., wheelchair, bedside commode, etc.): 3  Moving from lying on back to sitting on the side of the bed?: 3  Moving to and from a bed to a chair (including a wheelchair)?: 3  Need to walk in hospital room?: 3  Climbing 3-5 steps with a railing?: 3  Total Score: 18       Therapeutic Activities and Exercises:   Seated BLE therex: AP, LAQ, hip flexion/ABD/ADD, and glut sets 10 reps x 2.      Patient left supine with all lines intact, call button in reach, bed alarm on, Rn notified and pt's son present..    GOALS:    Physical Therapy Goals        Problem: Physical Therapy Goal    Goal Priority Disciplines Outcome Goal Variances Interventions   Physical Therapy Goal     PT/OT, PT Ongoing (interventions implemented as appropriate)     Description:  Goals to be met by: 2018     Patient will increase functional independence with mobility by performin. Supine to sit with Modified [place]  2. Sit to supine with Modified [place]  3. Sit to stand transfer with Stand-by Assistance  4. Bed to chair transfer with Stand-by Assistance using Rolling Walker  5. Gait  x 75 feet with Contact Guard Assistance using Rolling Walker.   6. Ascend/descend 2 steps with left Handrails Contact Guard Assistance and Minimal Assistance   7. Lower extremity exercise program x10 reps with supervision                       Time Tracking:     PT Received On: 18  PT Start Time: 1516     PT Stop Time: 1539  PT  Total Time (min): 23 min     Billable Minutes: Gait Training 11 and Therapeutic Exercise 12       PT/PTA: PTA     PTA Visit Number: 1     Kyung Wallace PTA  03/20/2018

## 2018-03-20 NOTE — PLAN OF CARE
Problem: Physical Therapy Goal  Goal: Physical Therapy Goal  Goals to be met by: 2018     Patient will increase functional independence with mobility by performin. Supine to sit with Modified [place]  2. Sit to supine with Modified [place]  3. Sit to stand transfer with Stand-by Assistance  4. Bed to chair transfer with Stand-by Assistance using Rolling Walker  5. Gait  x 75 feet with Contact Guard Assistance using Rolling Walker.   6. Ascend/descend 2 steps with left Handrails Contact Guard Assistance and Minimal Assistance   7. Lower extremity exercise program x10 reps with supervision     Outcome: Ongoing (interventions implemented as appropriate)  Patient seen for eval; pt required CGA with transfers and amb with RW 2 x 40ft; fatigued following; initial /65 HR 85 O2 sats 93% on RA; post amb /70  O2 sats 97%; will benefit from PT services while in hospital to maximize functional status; recommend  PT/OT to return pt to PLOF, and bath bench.

## 2018-03-20 NOTE — PT/OT/SLP EVAL
Physical Therapy Evaluation/Treatment    Patient Name:  Willow Giron   MRN:  [id number]    Recommendations:     Discharge Recommendations:  home health PT, home health OT   Discharge Equipment Recommendations: bath bench   Barriers to discharge: None    Assessment:     Willow Giron is a 87 y.o. female admitted with a medical diagnosis of The primary encounter diagnosis was HCAP (healthcare-associated pneumonia). Diagnoses of Vomiting, Hyponatremia, Essential hypertension, Elevated troponin, Vomiting, intractability of vomiting not specified, presence of nausea not specified, unspecified vomiting type, and SIADH (syndrome of inappropriate ADH production) were also pertinent to this visit.    She presents with the following impairments/functional limitations:  weakness, impaired endurance, impaired self care skills, impaired functional mobilty, gait instability, impaired balance, decreased upper extremity function, decreased lower extremity function, decreased safety awareness, edema; pt required CGA with transfers and amb with RW 2 x 40ft; fatigued following; initial /65 HR 85 O2 sats 93% on RA; post amb /70  O2 sats 97%; will benefit from PT services while in hospital to maximize functional status; recommend  PT/OT to return pt to PLOF, and bath bench..    Rehab Prognosis:  good; patient would benefit from acute skilled PT services to address these deficits and reach maximum level of function.      Recent Surgery: * No surgery found *      Plan:     During this hospitalization, patient to be seen 6 x/week to address the above listed problems via therapeutic activities, gait training, therapeutic exercises  · Plan of Care Expires:  04/19/18   Plan of Care Reviewed with: patient, family    Subjective     Communicated with nurse prior to session.  Patient found supine upon PT entry to room, agreeable to evaluation.      Chief Complaint: none stated initially; fatigue following  amb  Patient comments/goals: to go home  Pain/Comfort:  · Pain Rating 1: 0/10  · Pain Rating Post-Intervention 1: 0/10    Patients cultural, spiritual, Druze conflicts given the current situation: none    Living Environment:  Patient lives with  in 2SH with 2 YOBANY and L HR; tub/shower combo  Prior to admission, patients level of function: Eder with ADLs using w/c except SBA with ambulation with AD an standing tub transfer and shower;  Patient has the following equipment: grab bar, walker, rolling. Upon discharge, patient will have assistance from /family.    Objective:     Patient found with: telemetry     General Precautions: Standard, fall, seizure   Orthopedic Precautions:N/A   Braces: N/A     Exams:  · Cognitive Exam:  Patient is oriented to Person, Place, Time and Situation and follows 100% of one and two step commands   · Gross Motor Coordination:  WFL  · Postural Exam:  Patient presented with the following abnormalities:    · -       Rounded shoulders  · -       Forward head  · -       minimal increased kyphosis/forward trunk flexion  · Sensation:    · -       Intact  · RLE Strength: 4/5 grossly  · LLE ROM: WFL  · LLE Strength: 4/5 grossly  · RLE ROM: WFL    Functional Mobility:  · Bed Mobility:     · Supine to Sit: stand by assistance  · Transfers:     · Sit to Stand:  contact guard assistance with rolling walker  · Gait: Amb 2 x 40ft with RW with seated rest between; amb with VCs to increase erect stance as able and keep RW close; pt tends to amb with increased kyphosis/forward trunk lean with feet outside the boundaries of the RW; slow sona, short step length  · Balance: sit balance static Fair, sit dynamic fair-; stand with RW fair; dynamic amb with RW fair to fair-    AM-PAC 6 CLICK MOBILITY  Total Score:18       Therapeutic Activities and Exercises:   Patient instructed in proper hand placement for sit<>stand at RW; instructed in safety and technique with use of RW to amb with CGA  as described above; educated in seated and supine ex, and pacing self; see vitals in above assessment    Patient left up in chair with all lines intact, call button in reach, nurse notified and family present.    GOALS:    Physical Therapy Goals        Problem: Physical Therapy Goal    Goal Priority Disciplines Outcome Goal Variances Interventions   Physical Therapy Goal     PT/OT, PT Ongoing (interventions implemented as appropriate)     Description:  Goals to be met by: 2018     Patient will increase functional independence with mobility by performin. Supine to sit with Modified [place]  2. Sit to supine with Modified [place]  3. Sit to stand transfer with Stand-by Assistance  4. Bed to chair transfer with Stand-by Assistance using Rolling Walker  5. Gait  x 75 feet with Contact Guard Assistance using Rolling Walker.   6. Ascend/descend 2 steps with left Handrails Contact Guard Assistance and Minimal Assistance   7. Lower extremity exercise program x10 reps with supervision                       History:     Past Medical History:   Diagnosis Date    Arthritis     GERD (gastroesophageal reflux disease)     Hyperlipidemia     Hypertension        Past Surgical History:   Procedure Laterality Date     SECTION, CLASSIC      HYSTERECTOMY      SHOULDER SURGERY         Clinical Decision Making:     History  Co-morbidities and personal factors that may impact the plan of care Examination  Body Structures and Functions, activity limitations and participation restrictions that may impact the plan of care Clinical Presentation   Decision Making/ Complexity Score   Co-morbidities:   [] Time since onset of injury / illness / exacerbation  [x] Status of current condition  []Patient's cognitive status and safety concerns    [x] Multiple Medical Problems (see med hx)  Personal Factors:   [] Patient's age  [] Prior Level of function   [] Patient's home situation (environment and family  support)  [] Patient's level of motivation  [] Expected progression of patient      HISTORY:(criteria)    [] 49319 - no personal factors/history    [x] 54115 - has 1-2 personal factor/comorbidity     [] 08027 - has >3 personal factor/comorbidity     Body Regions:  [] Objective examination findings  [] Head     []  Neck  [x] Trunk   [] Upper Extremity  [x] Lower Extremity    Body Systems:  [x] For communication ability, affect, cognition, language, and learning style: the assessment of the ability to make needs known, consciousness, orientation (person, place, and time), expected emotional /behavioral responses, and learning preferences (eg, learning barriers, education  needs)  [x] For the neuromuscular system: a general assessment of gross coordinated movement (eg, balance, gait, locomotion, transfers, and transitions) and motor function  (motor control and motor learning)  [x] For the musculoskeletal system: the assessment of gross symmetry, gross range of motion, gross strength, height, and weight  [] For the integumentary system: the assessment of pliability(texture), presence of scar formation, skin color, and skin integrity  [x] For cardiovascular/pulmonary system: the assessment of heart rate, respiratory rate, blood pressure, and edema     Activity limitations:    [] Patient's cognitive status and saf ety concerns          [] Status of current condition      [] Weight bearing restriction  [] Cardiopulmunary Restriction    Participation Restrictions:   [] Goals and goal agreement with the patient     [] Rehab potential (prognosis) and probable outcome      Examination of Body System: (criteria)    [] 65550 - addressing 1-2 elements    [] 71936 - addressing a total of 3 or more elements     [x] 70903 -  Addressing a total of 4 or more elements         Clinical Presentation: (criteria)  Stable - 21482     On examination of body system using standardized tests and measures patient presents with 4 or more  elements from any of the following: body structures and functions, activity limitations, and/or participation restrictions.  Leading to a clinical presentation that is considered stable and/or uncomplicated                              Clinical Decision Making  (Eval Complexity):  Low- 16729     Time Tracking:     PT Received On: 03/19/18  PT Start Time: 1536     PT Stop Time: 1612  PT Total Time (min): 36 min     Billable Minutes: Evaluation 15 minutes and Gait Training 15 minutes      Josephine Mcmillan, PT  03/19/2018

## 2018-03-20 NOTE — SUBJECTIVE & OBJECTIVE
Interval History: Pt states that she is feeling better today. She says that she is following her fluid restriction. No SOB, CP, N/V, F/C.    Review of patient's allergies indicates:   Allergen Reactions    Pcn [penicillins] Rash     Current Facility-Administered Medications   Medication Frequency    albuterol-ipratropium 2.5mg-0.5mg/3mL nebulizer solution 3 mL Q4H WAKE    amLODIPine tablet 10 mg Daily    aspirin tablet 325 mg Daily    fluticasone 50 mcg/actuation nasal spray 100 mcg BID    heparin (porcine) injection 5,000 Units Q12H    moxifloxacin tablet 400 mg Daily    pantoprazole EC tablet 40 mg Daily    ramelteon tablet 8 mg Nightly PRN    simvastatin tablet 40 mg QHS    sodium chloride 0.9% flush 5 mL PRN       Objective:     Vital Signs (Most Recent):  Temp: 98 °F (36.7 °C) (03/20/18 1155)  Pulse: 81 (03/20/18 1158)  Resp: 18 (03/20/18 1158)  BP: (!) 145/64 (03/20/18 1155)  SpO2: (!) 94 % (03/20/18 1158)  O2 Device (Oxygen Therapy): room air (03/20/18 1158) Vital Signs (24h Range):  Temp:  [98 °F (36.7 °C)-98.5 °F (36.9 °C)] 98 °F (36.7 °C)  Pulse:  [73-98] 81  Resp:  [18] 18  SpO2:  [92 %-94 %] 94 %  BP: (128-163)/(58-73) 145/64     Weight: 49.9 kg (110 lb) (03/18/18 0238)  Body mass index is 22.99 kg/m².  Body surface area is 1.43 meters squared.    I/O last 3 completed shifts:  In: 1120 [P.O.:370; I.V.:50; IV Piggyback:700]  Out: 1351 [Urine:1350; Stool:1]    Physical Exam   Constitutional: She is oriented to person, place, and time. No distress.   HENT:   Head: Normocephalic and atraumatic.   Eyes: Conjunctivae are normal. No scleral icterus.   Neck: Neck supple. No tracheal deviation present.   Cardiovascular: Normal rate and regular rhythm.    Pulmonary/Chest: Effort normal and breath sounds normal.   Abdominal: Soft. She exhibits no distension. There is no tenderness.   Musculoskeletal: She exhibits no edema or tenderness.   Neurological: She is oriented to person, place, and time.   Skin:  Skin is warm and dry. She is not diaphoretic.   Psychiatric: She has a normal mood and affect. Her behavior is normal.   Vitals reviewed.      Significant Labs:  All labs within the past 24 hours have been reviewed.     Significant Imaging:    None within the past 24 hours

## 2018-03-20 NOTE — PLAN OF CARE
Problem: Patient Care Overview  Goal: Plan of Care Review  Outcome: Ongoing (interventions implemented as appropriate)  The pt has slept off and on during the night.  She is on telemetry running normal sinus rhythm.  VSS.  She has had no complaints during the shift.  She has had no falls.

## 2018-03-20 NOTE — PLAN OF CARE
Problem: Patient Care Overview  Goal: Plan of Care Review  Outcome: Ongoing (interventions implemented as appropriate)  Pt on RA sats  92%.

## 2018-03-20 NOTE — PLAN OF CARE
Problem: Patient Care Overview  Goal: Plan of Care Review  Outcome: Ongoing (interventions implemented as appropriate)  During shift observed feeding self. Assisted to the bathroom. Denies pain or discomfort. Family remained at bed side. Instructed on fluid restriction of 1000ml v and demonstrated verbalized understanding. Plan of care reviewed with patient. Patient verbalized complete understanding. Fall precautions maintained. Bed in lowest position, locked, call light within reach, and bed alarm on. Side rails up x's 2 with slip resistant socks on. Nurse instructed patient to notify staff for any assistance and pt verbalized complete understanding. Patient on telemetry throughout shift with no ectopy noted. Will continue to monitor

## 2018-03-21 LAB
ANION GAP SERPL CALC-SCNC: 11 MMOL/L
ANION GAP SERPL CALC-SCNC: 11 MMOL/L
BASOPHILS # BLD AUTO: 0.01 K/UL
BASOPHILS NFR BLD: 0.1 %
BUN SERPL-MCNC: 8 MG/DL
BUN SERPL-MCNC: 9 MG/DL
CALCIUM SERPL-MCNC: 8.7 MG/DL
CALCIUM SERPL-MCNC: 8.9 MG/DL
CHLORIDE SERPL-SCNC: 87 MMOL/L
CHLORIDE SERPL-SCNC: 89 MMOL/L
CO2 SERPL-SCNC: 22 MMOL/L
CO2 SERPL-SCNC: 23 MMOL/L
CREAT SERPL-MCNC: 0.8 MG/DL
CREAT SERPL-MCNC: 0.8 MG/DL
CREAT UR-MCNC: 28.9 MG/DL
DIFFERENTIAL METHOD: ABNORMAL
DIGOXIN SERPL-MCNC: <0.1 NG/ML
EOSINOPHIL # BLD AUTO: 0.1 K/UL
EOSINOPHIL NFR BLD: 1.4 %
ERYTHROCYTE [DISTWIDTH] IN BLOOD BY AUTOMATED COUNT: 12.1 %
EST. GFR  (AFRICAN AMERICAN): >60 ML/MIN/1.73 M^2
EST. GFR  (AFRICAN AMERICAN): >60 ML/MIN/1.73 M^2
EST. GFR  (NON AFRICAN AMERICAN): >60 ML/MIN/1.73 M^2
EST. GFR  (NON AFRICAN AMERICAN): >60 ML/MIN/1.73 M^2
GLUCOSE SERPL-MCNC: 119 MG/DL
GLUCOSE SERPL-MCNC: 140 MG/DL
HCT VFR BLD AUTO: 26.1 %
HGB BLD-MCNC: 9 G/DL
LYMPHOCYTES # BLD AUTO: 1.7 K/UL
LYMPHOCYTES NFR BLD: 19.5 %
MAGNESIUM SERPL-MCNC: 1.4 MG/DL
MCH RBC QN AUTO: 30.6 PG
MCHC RBC AUTO-ENTMCNC: 34.5 G/DL
MCV RBC AUTO: 89 FL
MONOCYTES # BLD AUTO: 1 K/UL
MONOCYTES NFR BLD: 11.3 %
NEUTROPHILS # BLD AUTO: 5.8 K/UL
NEUTROPHILS NFR BLD: 66.2 %
PHOSPHATE SERPL-MCNC: 2.7 MG/DL
PLATELET # BLD AUTO: 325 K/UL
PMV BLD AUTO: 8.3 FL
POTASSIUM SERPL-SCNC: 3.8 MMOL/L
POTASSIUM SERPL-SCNC: 4 MMOL/L
POTASSIUM SERPL-SCNC: 4 MMOL/L
PROT UR-MCNC: 9 MG/DL
PROT/CREAT RATIO, UR: 0.31
RBC # BLD AUTO: 2.94 M/UL
SODIUM SERPL-SCNC: 118 MMOL/L
SODIUM SERPL-SCNC: 119 MMOL/L
SODIUM SERPL-SCNC: 119 MMOL/L
SODIUM SERPL-SCNC: 120 MMOL/L
SODIUM SERPL-SCNC: 123 MMOL/L
WBC # BLD AUTO: 8.7 K/UL

## 2018-03-21 PROCEDURE — 25000003 PHARM REV CODE 250: Performed by: STUDENT IN AN ORGANIZED HEALTH CARE EDUCATION/TRAINING PROGRAM

## 2018-03-21 PROCEDURE — 84132 ASSAY OF SERUM POTASSIUM: CPT

## 2018-03-21 PROCEDURE — 63600175 PHARM REV CODE 636 W HCPCS: Performed by: STUDENT IN AN ORGANIZED HEALTH CARE EDUCATION/TRAINING PROGRAM

## 2018-03-21 PROCEDURE — 80048 BASIC METABOLIC PNL TOTAL CA: CPT

## 2018-03-21 PROCEDURE — 84295 ASSAY OF SERUM SODIUM: CPT | Mod: 91

## 2018-03-21 PROCEDURE — 93005 ELECTROCARDIOGRAM TRACING: CPT

## 2018-03-21 PROCEDURE — 82570 ASSAY OF URINE CREATININE: CPT

## 2018-03-21 PROCEDURE — 94761 N-INVAS EAR/PLS OXIMETRY MLT: CPT

## 2018-03-21 PROCEDURE — 97116 GAIT TRAINING THERAPY: CPT

## 2018-03-21 PROCEDURE — 85025 COMPLETE CBC W/AUTO DIFF WBC: CPT

## 2018-03-21 PROCEDURE — 25000242 PHARM REV CODE 250 ALT 637 W/ HCPCS: Performed by: STUDENT IN AN ORGANIZED HEALTH CARE EDUCATION/TRAINING PROGRAM

## 2018-03-21 PROCEDURE — 97535 SELF CARE MNGMENT TRAINING: CPT

## 2018-03-21 PROCEDURE — 80162 ASSAY OF DIGOXIN TOTAL: CPT

## 2018-03-21 PROCEDURE — 97530 THERAPEUTIC ACTIVITIES: CPT

## 2018-03-21 PROCEDURE — 94640 AIRWAY INHALATION TREATMENT: CPT

## 2018-03-21 PROCEDURE — 97110 THERAPEUTIC EXERCISES: CPT

## 2018-03-21 PROCEDURE — 83735 ASSAY OF MAGNESIUM: CPT

## 2018-03-21 PROCEDURE — 11000001 HC ACUTE MED/SURG PRIVATE ROOM

## 2018-03-21 PROCEDURE — 36415 COLL VENOUS BLD VENIPUNCTURE: CPT

## 2018-03-21 PROCEDURE — 84100 ASSAY OF PHOSPHORUS: CPT

## 2018-03-21 RX ORDER — POLYETHYLENE GLYCOL 3350 17 G/17G
17 POWDER, FOR SOLUTION ORAL DAILY
Status: DISCONTINUED | OUTPATIENT
Start: 2018-03-21 | End: 2018-03-28 | Stop reason: HOSPADM

## 2018-03-21 RX ORDER — MAGNESIUM SULFATE 1 G/100ML
1 INJECTION INTRAVENOUS ONCE
Status: COMPLETED | OUTPATIENT
Start: 2018-03-21 | End: 2018-03-21

## 2018-03-21 RX ADMIN — IPRATROPIUM BROMIDE AND ALBUTEROL SULFATE 3 ML: .5; 3 SOLUTION RESPIRATORY (INHALATION) at 07:03

## 2018-03-21 RX ADMIN — RAMELTEON 8 MG: 8 TABLET, FILM COATED ORAL at 10:03

## 2018-03-21 RX ADMIN — IPRATROPIUM BROMIDE AND ALBUTEROL SULFATE 3 ML: .5; 3 SOLUTION RESPIRATORY (INHALATION) at 11:03

## 2018-03-21 RX ADMIN — IPRATROPIUM BROMIDE AND ALBUTEROL SULFATE 3 ML: .5; 3 SOLUTION RESPIRATORY (INHALATION) at 03:03

## 2018-03-21 RX ADMIN — PANTOPRAZOLE SODIUM 40 MG: 40 TABLET, DELAYED RELEASE ORAL at 08:03

## 2018-03-21 RX ADMIN — AMLODIPINE BESYLATE 10 MG: 5 TABLET ORAL at 08:03

## 2018-03-21 RX ADMIN — MOXIFLOXACIN HYDROCHLORIDE 400 MG: 400 TABLET, FILM COATED ORAL at 08:03

## 2018-03-21 RX ADMIN — LIDOCAINE HYDROCHLORIDE: 20 SOLUTION ORAL; TOPICAL at 09:03

## 2018-03-21 RX ADMIN — ASPIRIN 325 MG ORAL TABLET 325 MG: 325 PILL ORAL at 08:03

## 2018-03-21 RX ADMIN — HEPARIN SODIUM 5000 UNITS: 5000 INJECTION, SOLUTION INTRAVENOUS; SUBCUTANEOUS at 09:03

## 2018-03-21 RX ADMIN — FLUTICASONE PROPIONATE 100 MCG: 50 SPRAY, METERED NASAL at 08:03

## 2018-03-21 RX ADMIN — MAGNESIUM SULFATE HEPTAHYDRATE 1 G: 500 INJECTION, SOLUTION INTRAMUSCULAR; INTRAVENOUS at 10:03

## 2018-03-21 RX ADMIN — HEPARIN SODIUM 5000 UNITS: 5000 INJECTION, SOLUTION INTRAVENOUS; SUBCUTANEOUS at 08:03

## 2018-03-21 RX ADMIN — FLUTICASONE PROPIONATE 100 MCG: 50 SPRAY, METERED NASAL at 09:03

## 2018-03-21 RX ADMIN — SIMVASTATIN 40 MG: 20 TABLET, FILM COATED ORAL at 09:03

## 2018-03-21 RX ADMIN — TOLVAPTAN 15 MG: 15 TABLET ORAL at 09:03

## 2018-03-21 RX ADMIN — IPRATROPIUM BROMIDE AND ALBUTEROL SULFATE 3 ML: .5; 3 SOLUTION RESPIRATORY (INHALATION) at 08:03

## 2018-03-21 RX ADMIN — POLYETHYLENE GLYCOL 3350 17 G: 17 POWDER, FOR SOLUTION ORAL at 06:03

## 2018-03-21 NOTE — PLAN OF CARE
TN went to meet with patient, no family present at bedside. TN reviewed progress notes, patient not medically ready for discharge yet. On discharge, patient will have home health resumed with [facility] (daughter also requests an AIDE too). TN will continue to follow.    Future Appointments  Date Time Provider Department Center   4/4/2018 3:00 PM ADENIKE Rojas [facility]   4/4/2018 3:40 PM [facility]        03/21/18 1539   Discharge Reassessment   Assessment Type Discharge Planning Reassessment   Provided patient/caregiver education on the expected discharge date and the discharge plan No   Do you have any problems affording any of your prescribed medications? TBD   Discharge Plan A Home with family;Home Health   Discharge Plan B Home with family   Patient choice form signed by patient/caregiver N/A   Can the patient answer the patient profile reliably? Yes, cognitively intact   How does the patient rate their overall health at the present time? Good   Describe the patient's ability to walk at the present time. Walks with the help of equipment   How often would a person be available to care for the patient? Often     Jennifer Guerrier RN  Transition Navigator  [phone]

## 2018-03-21 NOTE — SUBJECTIVE & OBJECTIVE
Interval History: Pt denies any SOB, CP, N/V, swelling, rashes.    Review of patient's allergies indicates:   Allergen Reactions    Pcn [penicillins] Rash     Current Facility-Administered Medications   Medication Frequency    albuterol-ipratropium 2.5mg-0.5mg/3mL nebulizer solution 3 mL Q4H WAKE    amLODIPine tablet 10 mg Daily    aspirin tablet 325 mg Daily    fluticasone 50 mcg/actuation nasal spray 100 mcg BID    heparin (porcine) injection 5,000 Units Q12H    magnesium sulfate in dextrose IVPB (premix) 1 g Once    moxifloxacin tablet 400 mg Daily    pantoprazole EC tablet 40 mg Daily    ramelteon tablet 8 mg Nightly PRN    simvastatin tablet 40 mg QHS    sodium chloride 0.9% flush 5 mL PRN    tolvaptan tablet 15 mg Daily       Objective:     Vital Signs (Most Recent):  Temp: 98.7 °F (37.1 °C) (03/21/18 0758)  Pulse: 91 (03/21/18 0800)  Resp: 18 (03/21/18 0758)  BP: (!) 139/59 (03/21/18 0800)  SpO2: (!) 92 % (03/21/18 0701)  O2 Device (Oxygen Therapy): room air (03/21/18 0701) Vital Signs (24h Range):  Temp:  [98 °F (36.7 °C)-99.2 °F (37.3 °C)] 98.7 °F (37.1 °C)  Pulse:  [73-92] 91  Resp:  [14-19] 18  SpO2:  [92 %-97 %] 92 %  BP: (127-152)/() 139/59     Weight: 49.9 kg (110 lb) (03/21/18 0800)  Body mass index is 22.99 kg/m².  Body surface area is 1.43 meters squared.    I/O last 3 completed shifts:  In: 1360 [P.O.:960; I.V.:50; IV Piggyback:350]  Out: 1850 [Urine:1850]    Physical Exam   Constitutional: She is oriented to person, place, and time. No distress.   HENT:   Head: Normocephalic and atraumatic.   Eyes: Conjunctivae are normal. No scleral icterus.   Neck: Neck supple. No tracheal deviation present.   Cardiovascular: Normal rate and regular rhythm.    Pulmonary/Chest: Effort normal and breath sounds normal.   Abdominal: Soft. She exhibits no distension. There is no tenderness.   Musculoskeletal: She exhibits no edema or tenderness.   Neurological: She is alert and oriented to person,  place, and time.   Skin: Skin is warm and dry. She is not diaphoretic.   Psychiatric: She has a normal mood and affect. Her behavior is normal.   Vitals reviewed.      Significant Labs:  All labs within the past 24 hours have been reviewed.     Significant Imaging:    None within the past 24 hours

## 2018-03-21 NOTE — PROGRESS NOTES
[facility] medicine Resident [facility] Progress Note    Subjective:      Doing well this morning. Denies subjective fever/chills overnight. Denies chest pain, shortness of breath, or abdominal pain. Will start tolvaptan today    Objective:   Last 24 Hour Vital Signs:  BP  Min: 127/60  Max: 152/66  Temp  Av.6 °F (37 °C)  Min: 98 °F (36.7 °C)  Max: 99.2 °F (37.3 °C)  Pulse  Av.9  Min: 73  Max: 98  Resp  Av.7  Min: 14  Max: 19  SpO2  Av.4 %  Min: 92 %  Max: 97 %  I/O last 3 completed shifts:  In: 1360 [P.O.:960; I.V.:50; IV Piggyback:350]  Out: 1850 [Urine:1850]    Physical Examination:  General:          Alert and awake in NAD  HEENT: NC/AT, EOMI, PERRL, OP clear, MMM  Cardio:             Ireggular rhythm, 2/6 systolic ejection murmur heard in all areas  Resp:               mild wheezing with rhonchi; no focal consolidative changes appreciated  Abdom:            Soft, NTND with normoactive bowel sounds  Extrem:            1+ edema to midshins b/l; WWP with no clubbing, cyanosis  Skin:                No rashes, lesions, or color changes  Pulses:            2+ and symmetric distally  Neuro:             AAOx3; cooperative and pleasant with no focal deficits    Laboratory:  Laboratory Data Reviewed: yes  Pertinent Findings:    Recent Labs  Lab 18  2232  18  0649  18  0351 18  0843 18  1641 18  1744 18  2258 18  0548   WBC 11.02  < > 8.23  --  7.57  --   --   --   --  8.70   HGB 10.7*  < > 9.2*  --  8.4*  --   --   --   --  9.0*   HCT 30.4*  < > 26.1*  --  23.8*  --   --   --   --  26.1*   *  < > 336  --  321  --   --   --   --  325   MCV 88  < > 88  --  89  --   --   --   --  89   RDW 11.5  < > 11.8  --  11.8  --   --   --   --  12.1   *  < >  --   < >  --  120* 118* 118*  118* 119* 119*  119*   K 3.8  < >  --   < >  --  3.7 4.1  --  4.3 4.0   CL 76*  < >  --   < >  --  88* 87*  --  87*  --    CO2 26  < >  --   < >  --  23 22*  --  24  --    BUN 10  <  >  --   < >  --  7* 9  --  12  --    CREATININE 0.8  < >  --   < >  --  0.8 0.8  --  0.8  --    *  < >  --   < >  --  119* 116*  --  124*  --    PROT 7.1  --   --   --   --   --   --   --   --   --    ALBUMIN 3.0*  --   --   --   --   --   --   --   --   --    BILITOT 0.6  --   --   --   --   --   --   --   --   --    AST 21  --   --   --   --   --   --   --   --   --    ALKPHOS 86  --   --   --   --   --   --   --   --   --    ALT 15  --   --   --   --   --   --   --   --   --    < > = values in this interval not displayed.    Microbiology Data Reviewed: yes  Pertinent Findings:    Other Results:  EKG (my interpretation): previous EKG reviewed    Radiology Data Reviewed: yes  Pertinent Findings:      Current Medications:     Infusions:       Scheduled:   albuterol-ipratropium 2.5mg-0.5mg/3mL  3 mL Nebulization Q4H WAKE    amLODIPine  10 mg Oral Daily    aspirin  325 mg Oral Daily    fluticasone  2 spray Each Nare BID    heparin (porcine)  5,000 Units Subcutaneous Q12H    magnesium sulfate IVPB  1 g Intravenous Once    moxifloxacin  400 mg Oral Daily    pantoprazole  40 mg Oral Daily    simvastatin  40 mg Oral QHS    tolvaptan  15 mg Oral Daily        PRN:  ramelteon, sodium chloride 0.9%    Antibiotics and Day Number of Therapy:  vanc and cefepime 3/18 - continued    Lines and Day Number of Therapy:  piv    Assessment:     Willow Giron is a 87 y.o.female with  Patient Active Problem List    Diagnosis Date Noted    Hypo-osmolality and hyponatremia 03/19/2018    Normocytic anemia 03/19/2018    SIADH (syndrome of inappropriate ADH production)     HCAP (healthcare-associated pneumonia) 03/18/2018    Elevated troponin     Acute sphenoidal sinusitis 02/20/2018    Hyponatremia 02/17/2018    Essential hypertension 02/17/2018    Hyperlipidemia 02/17/2018    GERD (gastroesophageal reflux disease) 02/17/2018    Vomiting 02/17/2018        Plan:     HCAP  -Recent hospital admit/SNF, dry  cough x 1 week, general weakness  On admit, CXR with RLL opacity  No signs of systemic infection  -Vanc, cefepime started 3/18, continued  -blood cx remains NGTD  -patient tolerated breathing tx well this am. Continue to monitor  -will consider descale abx today; consolidate to moxifloxacin     Hypovolemic hyponatremia  -Admitted 1 month ago--hypovolemic hypoNa s/p several days N/V/D  This admit, recent poor PO intake, N/V x 3 day of admit  -On admit, Na 117, Cl 76, urine Na 81, urine osm 305  Uric acid 2.3  - labile Na, concerning for SIADH; nephrology consulted and appreciate assistance  - fluid restriction, increase salt diet  -Nephro to start tolvaptan. Consider CT chest/MRI brain to look for alternative causes of SIADH     Elevated troponin  -On admit, troponin 0.573->0.895  Denies chest pain, SOB  -EKG with irregular rhythm, variable P-wave morphology--new from previous  No ST elevations  -Consult cardiology  Received , plavix 300, heparin gtt, simvastatin  -trop tended out, patient denies CP overnight. Hep gtt stopped  -will f/u cards recs  -echo - hypokinetic apical septum, intermediate LV diastolic dysfunction, PHTN (41 mmHg)     AoCD  -On admit, H/H 10/7/30--at baseline  Recent w/u c/w AoCD     HTN  -On admit, normotensive  Holding home lisinopril, norvasc in setting of HCAP--can resume if becomes hypertensive  - resumed ACEi     HLD  -Continue home simvastatin     GERD  -No acute issues  Continue home omeprazole    Ppx: subQ heparin  Dispo: pending further clinical improvement of HCAP, pending improvement of Na+, nephrology recs.     Edward Chisholm  [facility] Internal Medicine/Pediatrics HO-I  [facility] medicine Service Team B    [facility] Medicine Hospitalist Pager numbers:   [facility] Hospitalist Medicine Team A (Adrien/Rufino): [phone]  [facility] Hospitalist Medicine Team B (Emily/Mekhi):  [phone]

## 2018-03-21 NOTE — PLAN OF CARE
Problem: Patient Care Overview  Goal: Plan of Care Review  Outcome: Ongoing (interventions implemented as appropriate)  Pt on RA with sats of 92%.  The proper method of use, as well as anticipated side effects, of this aerosol treatment are discussed and demonstrated to the patient.   Will continue to monitor.

## 2018-03-21 NOTE — PLAN OF CARE
Problem: Patient Care Overview  Goal: Plan of Care Review  Outcome: Ongoing (interventions implemented as appropriate)  Pt plan of care was discussed with her. Sodium remains below normal limits, vitals have remained stable, telemetry monitored/normal sinus, no longer on fluid restriction, encouraged to drink per provider, tolerated meals well, encouraged to cough per provider, ambulated successful down jones via walker with PT/OT, up with assist x1 ambulated to restroom and voided urine without difficulty,small BM noted with reported straining/provider called/ laxative ordered, continued I&O's.per order.

## 2018-03-21 NOTE — PLAN OF CARE
Problem: Patient Care Overview  Goal: Plan of Care Review  Outcome: Ongoing (interventions implemented as appropriate)  Pt has slept well between care.  She is on telemetry running normal sinus rhythm without ectopy.  VSS.  She has had no complaints during the shift.  Her NA+ is still low at 119.  Her neuro checks are good.  She has had no falls this shift.

## 2018-03-21 NOTE — PT/OT/SLP PROGRESS
Physical Therapy Treatment    Patient Name:  Willow Giron   MRN:  [id number]    Recommendations:     Discharge Recommendations:  home health PT   Discharge Equipment Recommendations: bath bench   Barriers to discharge: None    Assessment:     Willow Giron is a 87 y.o. female admitted with a medical diagnosis of <principal problem not specified>.  She presents with the following impairments/functional limitations:  weakness, impaired endurance, impaired functional mobilty, gait instability, impaired balance, decreased ROM, impaired coordination pt with good participation and improving mobility,will benefit from  services upon discharge.    Rehab Prognosis:  Excellent; patient would benefit from acute skilled PT services to address these deficits and reach maximum level of function.      Recent Surgery: * No surgery found *      Plan:     During this hospitalization, patient to be seen 6 x/week to address the above listed problems via gait training, therapeutic activities, therapeutic exercises  · Plan of Care Expires:  04/19/18   Plan of Care Reviewed with: patient    Subjective     Communicated with nsg prior to session.  Patient found up in chair upon PT entry to room, agreeable to treatment.      Chief Complaint: decreased sleep  Patient comments/goals: pt is anxious to go home  Pain/Comfort:  · Pain Rating 1: 0/10    Patients cultural, spiritual, Denominational conflicts given the current situation: none    Objective:     Patient found with: telemetry     General Precautions: Standard, fall, seizure   Orthopedic Precautions:N/A   Braces: N/A     Functional Mobility:  · Transfers:     · Sit to Stand:  stand by assistance with rolling walker  · Gait: amb ~120' X 1 with RW and S  · Balance: pt requires RW with standing      AM-PAC 6 CLICK MOBILITY  Turning over in bed (including adjusting bedclothes, sheets and blankets)?: 3  Sitting down on and standing up from a chair with arms (e.g., wheelchair,  bedside commode, etc.): 3  Moving from lying on back to sitting on the side of the bed?: 3  Moving to and from a bed to a chair (including a wheelchair)?: 3  Need to walk in hospital room?: 3  Climbing 3-5 steps with a railing?: 3  Total Score: 18       Therapeutic Activities and Exercises: le supine ex's X 15 reps inc: ap,qs,hs,abd/abd,slr       Patient left up in chair with all lines intact, call button in reach and nsg notified..    GOALS: see general POC   Physical Therapy Goals        Problem: Physical Therapy Goal    Goal Priority Disciplines Outcome Goal Variances Interventions   Physical Therapy Goal     PT/OT, PT Ongoing (interventions implemented as appropriate)     Description:  Goals to be met by: 2018     Patient will increase functional independence with mobility by performin. Supine to sit with Modified [place]  2. Sit to supine with Modified [place]  3. Sit to stand transfer with Stand-by Assistance Met 3/21/18  4. Bed to chair transfer with Stand-by Assistance using Rolling Walker MET 3/21/18  5. Gait  x 75 feet with Contact Guard Assistance using Rolling Walker. MET 3/21/18  6. Ascend/descend 2 steps with left Handrails Contact Guard Assistance and Minimal Assistance   7. Lower extremity exercise program x10 reps with supervision MET 3/21/18                        Time Tracking:     PT Received On: 18  PT Start Time: 932     PT Stop Time: 956  PT Total Time (min): 24 min     Billable Minutes: Gait Training 14 and Therapeutic Exercise 10    Treatment Type: Treatment  PT/PTA: PTA     PTA Visit Number: 2     Jesús Mejia, PTA  2018

## 2018-03-21 NOTE — PLAN OF CARE
Problem: Occupational Therapy Goal  Goal: Occupational Therapy Goal  Goals to be met by: 04/19/18     Patient will increase functional independence with ADLs by performing:    LE Dressing with Stand-by Assistance.  Grooming while standing at sink with Stand-by Assistance.--met 03/20/18  Toileting from toilet with Modified [place] for hygiene and clothing management.   Toilet transfer to toilet with Modified [place].      Outcome: Ongoing (interventions implemented as appropriate)  Patient with good activity tolerance this date. Will benefit from continued skilled OT to address functional deficits.

## 2018-03-21 NOTE — ASSESSMENT & PLAN NOTE
117 on presentation 119 today. Not improving. Recommend removing fluid restriction as pt is being started on tolvaptan. Liberalize pt's diet; she should not be on a low salt diet. Cortisol stimulation test ordered but does not appear to have been preformed correctly. TSH last month was normal. Urine osm 305 which is inapropriatly high. Suspect SIADH.

## 2018-03-21 NOTE — ASSESSMENT & PLAN NOTE
Consider chest CT and brain MRI to rule out any small pulmonary or brain process leading to the persistent SIADH.

## 2018-03-21 NOTE — PLAN OF CARE
Problem: Physical Therapy Goal  Goal: Physical Therapy Goal  Goals to be met by: 2018     Patient will increase functional independence with mobility by performin. Supine to sit with Modified [place]  2. Sit to supine with Modified [place]  3. Sit to stand transfer with Stand-by Assistance Met 3/21/18  4. Bed to chair transfer with Stand-by Assistance using Rolling Walker MET 3/21/18  5. Gait  x 75 feet with Contact Guard Assistance using Rolling Walker. MET 3/21/18  6. Ascend/descend 2 steps with left Handrails Contact Guard Assistance and Minimal Assistance   7. Lower extremity exercise program x10 reps with supervision MET 3/21/18      Outcome: Ongoing (interventions implemented as appropriate)  Goals 3,4,5 and 7 met

## 2018-03-21 NOTE — PT/OT/SLP PROGRESS
Occupational Therapy   Treatment    Name: Willow Giron  MRN: [id number]  Admitting Diagnosis: The primary encounter diagnosis was HCAP (healthcare-associated pneumonia). Diagnoses of Vomiting, Hyponatremia, Essential hypertension, Elevated troponin, Vomiting, intractability of vomiting not specified, presence of nausea not specified, unspecified vomiting type, SIADH (syndrome of inappropriate ADH production), and Pneumonia were also pertinent to this visit.    Recommendations:     Discharge Recommendations: home health PT, home health OT  Discharge Equipment Recommendations:  bath bench  Barriers to discharge:  None    Subjective     Communicated with: Kym maldonado prior to session.  Pain/Comfort:  · Pain Rating 1: 0/10  · Pain Rating Post-Intervention 1: 0/10    Patients cultural, spiritual, Latter day conflicts given the current situation:  (none reported)    Objective:     Patient found with: telemetry, peripheral IV    General Precautions: Standard, fall, seizure   Orthopedic Precautions:N/A   Braces: N/A     Bed Mobility:    · Patient completed Scooting/Bridging with contact guard assistance  · Patient completed Supine to Sit with contact guard assistance and increased time     Functional Mobility/Transfers:  · Patient completed Sit <> Stand Transfer with stand by assistance  with  no assistive device   · Patient completed Bed <> Chair Transfer using Step Transfer technique with stand by assistance and contact guard assistance with no assistive device    Activities of Daily Living:  · Grooming: stand by assistance      Patient left up in chair with all lines intact, call button in reach and RN notified    [facility] 6 Click:  [facility] Total Score: 17    Treatment & Education:  Bed mob as noted above. Requires increased time and effort for bed mob 2* short stature. Patient t/f to bedside chair and completed G/H tasks with set-up and Sup. Chair reclined for comfort. RN notified of  status.  Education:    Assessment:   Patient with good activity tolerance this date. Will benefit from continued skilled OT to address functional deficits.     Willow Giron is a 87 y.o. female with a medical diagnosis of The primary encounter diagnosis was HCAP (healthcare-associated pneumonia). Diagnoses of Vomiting, Hyponatremia, Essential hypertension, Elevated troponin, Vomiting, intractability of vomiting not specified, presence of nausea not specified, unspecified vomiting type, SIADH (syndrome of inappropriate ADH production), and Pneumonia were also pertinent to this visit.  Performance deficits affecting function are weakness, impaired endurance, impaired self care skills, impaired functional mobilty, gait instability, impaired balance, impaired fine motor, decreased ROM.      Rehab Prognosis:  Good; patient would benefit from acute skilled OT services to address these deficits and reach maximum level of function.       Plan:     Patient to be seen 5 x/week to address the above listed problems via self-care/home management, therapeutic activities, therapeutic exercises  · Plan of Care Expires: 04/19/18  · Plan of Care Reviewed with: patient    This Plan of care has been discussed with the patient who was involved in its development and understands and is in agreement with the identified goals and treatment plan    GOALS:    Occupational Therapy Goals        Problem: Occupational Therapy Goal    Goal Priority Disciplines Outcome Interventions   Occupational Therapy Goal     OT, PT/OT Ongoing (interventions implemented as appropriate)    Description:  Goals to be met by: 04/19/18     Patient will increase functional independence with ADLs by performing:    LE Dressing with Stand-by Assistance.  Grooming while standing at sink with Stand-by Assistance.--met 03/20/18  Toileting from toilet with Modified [place] for hygiene and clothing management.   Toilet transfer to toilet with Modified  [place].                       Time Tracking:     OT Date of Treatment: 03/21/18  OT Start Time: 0850  OT Stop Time: 0913  OT Total Time (min): 23 min    Billable Minutes:Self Care/Home Management 10  Therapeutic Activity 13    JONATAN Lockett  3/21/2018

## 2018-03-21 NOTE — PROGRESS NOTES
"[facility]  Nephrology  Progress Note    Patient Name: Willow Giron  MRN: [id number]  Admission Date: 3/17/2018  Hospital Length of Stay: 3 days  Attending Provider: Andrea Diaz MD   Primary Care Physician: Qamar Bland MD  Principal Problem:<principal problem not specified>    Subjective:     HPI:   Willow Giron 87 y.o. female  has a past medical history of Arthritis; GERD (gastroesophageal reflux disease); Hyperlipidemia; and Hypertension. who presented with   Chief Complaint   Patient presents with    Vomiting     X1 episode of vomiting this evening after eating soup. Reports pt was d/c from skilled nursing facility last week for electorlyte imblance. Denies any pain at this time. Reports "not too much" nausea.      Pt presents with SOB and cough for about one week. She previously saw here PCP who diagnosed her with bronchitis and gave her a coarse of steroids and a z-pack but her symptoms worsened. She was hospitalized here last month and was seem by us for hyponatremia. At that time it was attributed to tea and toast diet and pt was discharged to [facility] on salt tablets. Pt discontinued salt tablets because they made her nauseated. Pt has not eaten much in the past few days due to nausea. Denies F/C, sick contacts, CP, N/V/D/C, swelling, dysuria, hematuria, blood in stool, melena, rashes.    Interval History: Pt denies any SOB, CP, N/V, swelling, rashes.    Review of patient's allergies indicates:   Allergen Reactions    Pcn [penicillins] Rash     Current Facility-Administered Medications   Medication Frequency    albuterol-ipratropium 2.5mg-0.5mg/3mL nebulizer solution 3 mL Q4H WAKE    amLODIPine tablet 10 mg Daily    aspirin tablet 325 mg Daily    fluticasone 50 mcg/actuation nasal spray 100 mcg BID    heparin (porcine) injection 5,000 Units Q12H    magnesium sulfate in dextrose IVPB (premix) 1 g Once    moxifloxacin tablet 400 mg Daily    pantoprazole EC tablet " 40 mg Daily    ramelteon tablet 8 mg Nightly PRN    simvastatin tablet 40 mg QHS    sodium chloride 0.9% flush 5 mL PRN    tolvaptan tablet 15 mg Daily       Objective:     Vital Signs (Most Recent):  Temp: 98.7 °F (37.1 °C) (03/21/18 0758)  Pulse: 91 (03/21/18 0800)  Resp: 18 (03/21/18 0758)  BP: (!) 139/59 (03/21/18 0800)  SpO2: (!) 92 % (03/21/18 0701)  O2 Device (Oxygen Therapy): room air (03/21/18 0701) Vital Signs (24h Range):  Temp:  [98 °F (36.7 °C)-99.2 °F (37.3 °C)] 98.7 °F (37.1 °C)  Pulse:  [73-92] 91  Resp:  [14-19] 18  SpO2:  [92 %-97 %] 92 %  BP: (127-152)/() 139/59     Weight: 49.9 kg (110 lb) (03/21/18 0800)  Body mass index is 22.99 kg/m².  Body surface area is 1.43 meters squared.    I/O last 3 completed shifts:  In: 1360 [P.O.:960; I.V.:50; IV Piggyback:350]  Out: 1850 [Urine:1850]    Physical Exam   Constitutional: She is oriented to person, place, and time. No distress.   HENT:   Head: Normocephalic and atraumatic.   Eyes: Conjunctivae are normal. No scleral icterus.   Neck: Neck supple. No tracheal deviation present.   Cardiovascular: Normal rate and regular rhythm.    Pulmonary/Chest: Effort normal and breath sounds normal.   Abdominal: Soft. She exhibits no distension. There is no tenderness.   Musculoskeletal: She exhibits no edema or tenderness.   Neurological: She is alert and oriented to person, place, and time.   Skin: Skin is warm and dry. She is not diaphoretic.   Psychiatric: She has a normal mood and affect. Her behavior is normal.   Vitals reviewed.      Significant Labs:  All labs within the past 24 hours have been reviewed.     Significant Imaging:    None within the past 24 hours    Assessment/Plan:     SIADH (syndrome of inappropriate ADH production)    Consider chest CT and brain MRI to rule out any small pulmonary or brain process leading to the persistent SIADH.        Normocytic anemia    Stable. Pt reports no blood in stool or urine. She had a hysterectomy in the  past. Iron levels are normal but % saturation is low. Pt may benefit from trial of oral iron replacement.        Hypo-osmolality and hyponatremia    117 on presentation 119 today. Not improving. Recommend removing fluid restriction as pt is being started on tolvaptan. Liberalize pt's diet; she should not be on a low salt diet. Cortisol stimulation test ordered but does not appear to have been preformed correctly. TSH last month was normal. Urine osm 305 which is inapropriatly high. Suspect SIADH.         Essential hypertension    Continue home antihypertensives.            Thank you for your consult. I will follow-up with patient. Please contact us if you have any additional questions.    Levar Santana MD, 3/21/2018 9:23 AM  [facility] Nephrology PGY-IV  Pager: [phone]  Cell: [phone]

## 2018-03-22 LAB
ANION GAP SERPL CALC-SCNC: 6 MMOL/L
ANION GAP SERPL CALC-SCNC: 7 MMOL/L
ANION GAP SERPL CALC-SCNC: 8 MMOL/L
BASOPHILS # BLD AUTO: 0 K/UL
BASOPHILS NFR BLD: 0 %
BUN SERPL-MCNC: 12 MG/DL
BUN SERPL-MCNC: 13 MG/DL
BUN SERPL-MCNC: 9 MG/DL
CALCIUM SERPL-MCNC: 8.8 MG/DL
CALCIUM SERPL-MCNC: 8.8 MG/DL
CALCIUM SERPL-MCNC: 8.9 MG/DL
CHLORIDE SERPL-SCNC: 88 MMOL/L
CHLORIDE SERPL-SCNC: 93 MMOL/L
CHLORIDE SERPL-SCNC: 93 MMOL/L
CO2 SERPL-SCNC: 27 MMOL/L
CREAT SERPL-MCNC: 0.8 MG/DL
CREAT SERPL-MCNC: 0.8 MG/DL
CREAT SERPL-MCNC: 0.9 MG/DL
DIFFERENTIAL METHOD: ABNORMAL
DIGOXIN SERPL-MCNC: <0.1 NG/ML
EOSINOPHIL # BLD AUTO: 0 K/UL
EOSINOPHIL NFR BLD: 0.5 %
ERYTHROCYTE [DISTWIDTH] IN BLOOD BY AUTOMATED COUNT: 12.4 %
EST. GFR  (AFRICAN AMERICAN): >60 ML/MIN/1.73 M^2
EST. GFR  (NON AFRICAN AMERICAN): 58 ML/MIN/1.73 M^2
EST. GFR  (NON AFRICAN AMERICAN): >60 ML/MIN/1.73 M^2
EST. GFR  (NON AFRICAN AMERICAN): >60 ML/MIN/1.73 M^2
GLUCOSE SERPL-MCNC: 116 MG/DL
GLUCOSE SERPL-MCNC: 119 MG/DL
GLUCOSE SERPL-MCNC: 131 MG/DL
HCT VFR BLD AUTO: 24.2 %
HGB BLD-MCNC: 8.3 G/DL
LYMPHOCYTES # BLD AUTO: 1.2 K/UL
LYMPHOCYTES NFR BLD: 14.8 %
MAGNESIUM SERPL-MCNC: 1.7 MG/DL
MCH RBC QN AUTO: 31 PG
MCHC RBC AUTO-ENTMCNC: 34.3 G/DL
MCV RBC AUTO: 90 FL
MONOCYTES # BLD AUTO: 0.8 K/UL
MONOCYTES NFR BLD: 10.8 %
NEUTROPHILS # BLD AUTO: 5.7 K/UL
NEUTROPHILS NFR BLD: 73.3 %
PHOSPHATE SERPL-MCNC: 3.5 MG/DL
PLATELET # BLD AUTO: 278 K/UL
PMV BLD AUTO: 8 FL
POTASSIUM SERPL-SCNC: 4.1 MMOL/L
POTASSIUM SERPL-SCNC: 4.3 MMOL/L
POTASSIUM SERPL-SCNC: 4.5 MMOL/L
POTASSIUM SERPL-SCNC: 5.1 MMOL/L
RBC # BLD AUTO: 2.68 M/UL
SODIUM SERPL-SCNC: 123 MMOL/L
SODIUM SERPL-SCNC: 125 MMOL/L
SODIUM SERPL-SCNC: 126 MMOL/L
SODIUM SERPL-SCNC: 127 MMOL/L
WBC # BLD AUTO: 7.77 K/UL

## 2018-03-22 PROCEDURE — 83735 ASSAY OF MAGNESIUM: CPT

## 2018-03-22 PROCEDURE — 25000003 PHARM REV CODE 250: Performed by: STUDENT IN AN ORGANIZED HEALTH CARE EDUCATION/TRAINING PROGRAM

## 2018-03-22 PROCEDURE — 97116 GAIT TRAINING THERAPY: CPT

## 2018-03-22 PROCEDURE — 84132 ASSAY OF SERUM POTASSIUM: CPT

## 2018-03-22 PROCEDURE — 94761 N-INVAS EAR/PLS OXIMETRY MLT: CPT

## 2018-03-22 PROCEDURE — 97110 THERAPEUTIC EXERCISES: CPT

## 2018-03-22 PROCEDURE — 25000242 PHARM REV CODE 250 ALT 637 W/ HCPCS: Performed by: STUDENT IN AN ORGANIZED HEALTH CARE EDUCATION/TRAINING PROGRAM

## 2018-03-22 PROCEDURE — 84295 ASSAY OF SERUM SODIUM: CPT

## 2018-03-22 PROCEDURE — 80048 BASIC METABOLIC PNL TOTAL CA: CPT | Mod: 91

## 2018-03-22 PROCEDURE — 80048 BASIC METABOLIC PNL TOTAL CA: CPT

## 2018-03-22 PROCEDURE — 63600175 PHARM REV CODE 636 W HCPCS: Performed by: STUDENT IN AN ORGANIZED HEALTH CARE EDUCATION/TRAINING PROGRAM

## 2018-03-22 PROCEDURE — 85025 COMPLETE CBC W/AUTO DIFF WBC: CPT

## 2018-03-22 PROCEDURE — 94640 AIRWAY INHALATION TREATMENT: CPT

## 2018-03-22 PROCEDURE — 36415 COLL VENOUS BLD VENIPUNCTURE: CPT

## 2018-03-22 PROCEDURE — 84100 ASSAY OF PHOSPHORUS: CPT

## 2018-03-22 PROCEDURE — 80162 ASSAY OF DIGOXIN TOTAL: CPT

## 2018-03-22 PROCEDURE — 11000001 HC ACUTE MED/SURG PRIVATE ROOM

## 2018-03-22 PROCEDURE — 97535 SELF CARE MNGMENT TRAINING: CPT

## 2018-03-22 RX ORDER — CALCIUM CARBONATE 200(500)MG
500 TABLET,CHEWABLE ORAL DAILY PRN
Status: DISCONTINUED | OUTPATIENT
Start: 2018-03-22 | End: 2018-03-28 | Stop reason: HOSPADM

## 2018-03-22 RX ADMIN — FLUTICASONE PROPIONATE 100 MCG: 50 SPRAY, METERED NASAL at 09:03

## 2018-03-22 RX ADMIN — SIMVASTATIN 40 MG: 20 TABLET, FILM COATED ORAL at 09:03

## 2018-03-22 RX ADMIN — CALCIUM CARBONATE (ANTACID) CHEW TAB 500 MG 500 MG: 500 CHEW TAB at 01:03

## 2018-03-22 RX ADMIN — AMLODIPINE BESYLATE 10 MG: 5 TABLET ORAL at 08:03

## 2018-03-22 RX ADMIN — IPRATROPIUM BROMIDE AND ALBUTEROL SULFATE 3 ML: .5; 3 SOLUTION RESPIRATORY (INHALATION) at 08:03

## 2018-03-22 RX ADMIN — FLUTICASONE PROPIONATE 100 MCG: 50 SPRAY, METERED NASAL at 08:03

## 2018-03-22 RX ADMIN — HEPARIN SODIUM 5000 UNITS: 5000 INJECTION, SOLUTION INTRAVENOUS; SUBCUTANEOUS at 08:03

## 2018-03-22 RX ADMIN — TOLVAPTAN 15 MG: 15 TABLET ORAL at 08:03

## 2018-03-22 RX ADMIN — MOXIFLOXACIN HYDROCHLORIDE 400 MG: 400 TABLET, FILM COATED ORAL at 08:03

## 2018-03-22 RX ADMIN — HEPARIN SODIUM 5000 UNITS: 5000 INJECTION, SOLUTION INTRAVENOUS; SUBCUTANEOUS at 09:03

## 2018-03-22 RX ADMIN — IPRATROPIUM BROMIDE AND ALBUTEROL SULFATE 3 ML: .5; 3 SOLUTION RESPIRATORY (INHALATION) at 07:03

## 2018-03-22 RX ADMIN — IPRATROPIUM BROMIDE AND ALBUTEROL SULFATE 3 ML: .5; 3 SOLUTION RESPIRATORY (INHALATION) at 03:03

## 2018-03-22 RX ADMIN — ASPIRIN 325 MG ORAL TABLET 325 MG: 325 PILL ORAL at 08:03

## 2018-03-22 RX ADMIN — IPRATROPIUM BROMIDE AND ALBUTEROL SULFATE 3 ML: .5; 3 SOLUTION RESPIRATORY (INHALATION) at 11:03

## 2018-03-22 RX ADMIN — PANTOPRAZOLE SODIUM 40 MG: 40 TABLET, DELAYED RELEASE ORAL at 08:03

## 2018-03-22 NOTE — PROGRESS NOTES
[employer] medicine Resident [facility] Progress Note    Subjective:      Doing well this morning. Denies subjective fever/chills overnight. Denies chest pain, shortness of breath, or abdominal pain. Tolerating tolvaptan    Objective:   Last 24 Hour Vital Signs:  BP  Min: 96/53  Max: 152/65  Temp  Av.7 °F (37.1 °C)  Min: 98 °F (36.7 °C)  Max: 99.2 °F (37.3 °C)  Pulse  Av.6  Min: 77  Max: 101  Resp  Av.5  Min: 14  Max: 18  SpO2  Av.2 %  Min: 93 %  Max: 98 %  Weight  Av.6 kg (111 lb 8 oz)  Min: 50.6 kg (111 lb 8 oz)  Max: 50.6 kg (111 lb 8 oz)  I/O last 3 completed shifts:  In: 805 [P.O.:805]  Out: 2251 [Urine:2250; Stool:1]    Physical Examination:  General:          Alert and awake in NAD  HEENT: NC/AT, EOMI, PERRL, OP clear, MMM  Cardio:             Ireggular rhythm, 2/6 systolic ejection murmur heard in all areas  Resp:               mild wheezing with rhonchi; no focal consolidative changes appreciated  Abdom:            Soft, NTND with normoactive bowel sounds  Extrem:            1+ edema to midshins b/l; WWP with no clubbing, cyanosis  Skin:                No rashes, lesions, or color changes  Pulses:            2+ and symmetric distally  Neuro:             AAOx3; cooperative and pleasant with no focal deficits    Laboratory:  Laboratory Data Reviewed: yes  Pertinent Findings:    Recent Labs  Lab 18  2232  18  0351  18  0548 18  0803  18  1542 18  0007 18  0323   WBC 11.02  < > 7.57  --  8.70  --   --   --   --  7.77   HGB 10.7*  < > 8.4*  --  9.0*  --   --   --   --  8.3*   HCT 30.4*  < > 23.8*  --  26.1*  --   --   --   --  24.2*   *  < > 321  --  325  --   --   --   --  278   MCV 88  < > 89  --  89  --   --   --   --  90   RDW 11.5  < > 11.8  --  12.1  --   --   --   --  12.4   *  < >  --   < > 119*  119* 120*  < > 123* 123* 125*   K 3.8  < >  --   < > 4.0 4.0  --  3.8 4.3 4.1   CL 76*  < >  --   < >  --  87*  --  89* 88*  --    CO2 26  < >   --   < >  --  22*  --  23 27  --    BUN 10  < >  --   < >  --  8  --  9 13  --    CREATININE 0.8  < >  --   < >  --  0.8  --  0.8 0.8  --    *  < >  --   < >  --  119*  --  140* 131*  --    PROT 7.1  --   --   --   --   --   --   --   --   --    ALBUMIN 3.0*  --   --   --   --   --   --   --   --   --    BILITOT 0.6  --   --   --   --   --   --   --   --   --    AST 21  --   --   --   --   --   --   --   --   --    ALKPHOS 86  --   --   --   --   --   --   --   --   --    ALT 15  --   --   --   --   --   --   --   --   --    < > = values in this interval not displayed.    Microbiology Data Reviewed: yes  Pertinent Findings:    Other Results:  EKG (my interpretation): previous EKG reviewed    Radiology Data Reviewed: yes  Pertinent Findings:      Current Medications:     Infusions:       Scheduled:   albuterol-ipratropium 2.5mg-0.5mg/3mL  3 mL Nebulization Q4H WAKE    amLODIPine  10 mg Oral Daily    aspirin  325 mg Oral Daily    fluticasone  2 spray Each Nare BID    heparin (porcine)  5,000 Units Subcutaneous Q12H    moxifloxacin  400 mg Oral Daily    pantoprazole  40 mg Oral Daily    polyethylene glycol  17 g Oral Daily    simvastatin  40 mg Oral QHS    tolvaptan  15 mg Oral Daily        PRN:  calcium carbonate, ramelteon, sodium chloride 0.9%    Antibiotics and Day Number of Therapy:  vanc and cefepime 3/18 - continued    Lines and Day Number of Therapy:  piv    Assessment:     Willow Giron is a 87 y.o.female with  Patient Active Problem List    Diagnosis Date Noted    Hypo-osmolality and hyponatremia 03/19/2018    Normocytic anemia 03/19/2018    SIADH (syndrome of inappropriate ADH production)     HCAP (healthcare-associated pneumonia) 03/18/2018    Elevated troponin     Acute sphenoidal sinusitis 02/20/2018    Hyponatremia 02/17/2018    Essential hypertension 02/17/2018    Hyperlipidemia 02/17/2018    GERD (gastroesophageal reflux disease) 02/17/2018    Vomiting 02/17/2018         Plan:     HCAP  -Recent hospital admit/SNF, dry cough x 1 week, general weakness  On admit, CXR with RLL opacity  No signs of systemic infection  -Vanc, cefepime started 3/18, continued  -blood cx remains NGTD  -patient tolerated breathing tx well this am. Continue to monitor  -will consider descale abx today; consolidate to moxifloxacin     Hypovolemic hyponatremia  -Admitted 1 month ago--hypovolemic hypoNa s/p several days N/V/D  This admit, recent poor PO intake, N/V x 3 day of admit  -On admit, Na 117, Cl 76, urine Na 81, urine osm 305  Uric acid 2.3  - labile Na, concerning for SIADH; nephrology consulted and appreciate assistance  - fluid restriction, increase salt diet  -Nephro to start tolvaptan.  - CT chest - b/l pleural effusions  - MRI brain - no acute changes/ no masses     Elevated troponin  -On admit, troponin 0.573->0.895  Denies chest pain, SOB  -EKG with irregular rhythm, variable P-wave morphology--new from previous  No ST elevations  -Consult cardiology  Received , plavix 300, heparin gtt, simvastatin  -trop tended out, patient denies CP overnight. Hep gtt stopped  -will f/u cards recs  -echo - hypokinetic apical septum, intermediate LV diastolic dysfunction, PHTN (41 mmHg)     AoCD  -On admit, H/H 10/7/30--at baseline  Recent w/u c/w AoCD     HTN  -On admit, normotensive  Holding home lisinopril, norvasc in setting of HCAP--can resume if becomes hypertensive  - resumed ACEi     HLD  -Continue home simvastatin     GERD  -No acute issues  Continue home omeprazole    Ppx: subQ heparin  Dispo: pending further clinical improvement of HCAP, pending improvement of Na+, nephrology recs.     Edward Chisholm  [facility] Internal Medicine/Pediatrics HO-I  [facility] medicine Service Team B    [facility] Medicine Hospitalist Pager numbers:   [facility] Hospitalist Medicine Team A (Adrien/Rufino): [phone]  [facility] Hospitalist Medicine Team B (Emily/Mekhi):  [phone]

## 2018-03-22 NOTE — PT/OT/SLP PROGRESS
Physical Therapy Treatment    Patient Name:  Willow Giron   MRN:  [id number]    Recommendations:     Discharge Recommendations:  home health PT   Discharge Equipment Recommendations: bath bench   Barriers to discharge: None    Assessment:     Willow Giron is a 87 y.o. female admitted with a medical diagnosis of HCAP (healthcare-associated pneumonia).  She presents with the following impairments/functional limitations:  weakness, impaired endurance, impaired functional mobilty, impaired self care skills, gait instability, impaired balance, impaired cardiopulmonary response to activity.  Pt would continue to benefit from P.T. To address impairments listed above.    Rehab Prognosis:  good; patient would benefit from acute skilled PT services to address these deficits and reach maximum level of function.      Recent Surgery: * No surgery found *      Plan:     During this hospitalization, patient to be seen 6 x/week to address the above listed problems via gait training, therapeutic activities, therapeutic exercises  · Plan of Care Expires:  04/19/18   Plan of Care Reviewed with: patient    Subjective     Communicated with RN (Valencia) prior to session.  Patient found supine upon PT entry to room, agreeable to treatment.        Patient comments/goals: Pt agreed to tx.  Pain/Comfort:  · Pain Rating 1: 0/10  · Pain Rating Post-Intervention 1: 0/10    Patients cultural, spiritual, Quaker conflicts given the current situation: none    Objective:     Patient found with: telemetry     General Precautions: Standard, fall, seizure   Orthopedic Precautions:N/A   Braces:       Functional Mobility:  · Bed Mobility:     · Rolling Right: modified independence  · Scooting: modified independence  · Supine to Sit: stand by assistance  · Sit to Supine: modified independence  · Transfers:     · Sit to Stand:  contact guard assistance with rolling walker  · Gait: 150ft with Rw and CGA with slow sona.    · Balance:  sitting good, standing with rw fair+, gait with rw fair      AM-PAC 6 CLICK MOBILITY  Turning over in bed (including adjusting bedclothes, sheets and blankets)?: 4  Sitting down on and standing up from a chair with arms (e.g., wheelchair, bedside commode, etc.): 3  Moving from lying on back to sitting on the side of the bed?: 3  Moving to and from a bed to a chair (including a wheelchair)?: 3  Need to walk in hospital room?: 3  Climbing 3-5 steps with a railing?: 3  Total Score: 19       Therapeutic Activities and Exercises:   Standing marching in place with HHA (Jovita) x 10 reps. Pt was unsteady and needed Jovita for balance.    Patient left supine with all lines intact, call button in reach, bed alarm on and RN notified..    GOALS:    Physical Therapy Goals        Problem: Physical Therapy Goal    Goal Priority Disciplines Outcome Goal Variances Interventions   Physical Therapy Goal     PT/OT, PT Ongoing (interventions implemented as appropriate)     Description:  Goals to be met by: 2018     Patient will increase functional independence with mobility by performin. Supine to sit with Modified [place] 3/22/18  2. Sit to supine with Modified [place] 3/22/18  3. Sit to stand transfer with Stand-by Assistance Met 3/21/18  4. Bed to chair transfer with Stand-by Assistance using Rolling Walker MET 3/21/18  5. Gait  x 75 feet with Contact Guard Assistance using Rolling Walker. MET 3/21/18  6. Ascend/descend 2 steps with left Handrails Contact Guard Assistance and Minimal Assistance   7. Lower extremity exercise program x10 reps with supervision MET 3/21/18                         Time Tracking:     PT Received On: 18  PT Start Time:      PT Stop Time:   PT Total Time (min): 15 min     Billable Minutes: Gait Training 15    Treatment Type: Treatment  PT/PTA: PTA     PTA Visit Number: 3     Kyung Wallace PTA  2018

## 2018-03-22 NOTE — PT/OT/SLP PROGRESS
Occupational Therapy   Treatment    Name: Willow Giron  MRN: [id number]  Admitting Diagnosis:  HCAP (healthcare-associated pneumonia)       Recommendations:     Discharge Recommendations: home health OT  Discharge Equipment Recommendations:  bath bench  Barriers to discharge:  None    Subjective     Communicated with: nsg prior to session.  Pain/Comfort:  · Pain Rating 1: 0/10  · Pain Rating Post-Intervention 1: 0/10    Patients cultural, spiritual, Adventism conflicts given the current situation:  (none reported)    Objective:     Patient found with: telemetry    General Precautions: Standard, fall, seizure   Orthopedic Precautions:N/A   Braces:       Occupational Performance:    Bed Mobility:    · Patient completed Supine to Sit with supervision     Functional Mobility/Transfers:  · Patient completed Sit <> Stand Transfer with supervision  with  rolling walker   · Functional Mobility: via RW around room for ADLs w/ Sup    Activities of Daily Living:  · LB Dressing: supervision don/doff socks    Patient left up in chair with all lines intact, call button in reach and nsg notified    [facility] 6 Click:  [facility] Total Score: 19    Treatment & Education:  Pt agreeable to tx this date. Perf sup-->EOB w/ Sup; amb via RW around room w/ Sup; t/f-->b/s chair w/ Sup; don/doff socks w/ Sup. Perf UE TE at b/s chair level 2x10 reps ea all major jts/planes. Edu/tx re: general safety techs; DME tx & HEP. Pt verbalized/demo'ed understanding.      Assessment:   Pt w/ good progress to date. Cont w/ min overall endurance/conditioning, balance/mobilty & coordination w/ subsequent decline in (I)/safety w/ BADLs, fxnl mobility & fxnl t/f's. Cont w/ OT per POC 5x/wk to increase phys/fxnl status & achieve established goals for d/c-->home w/ HHOT & TTB.    Willow Giron is a 87 y.o. female with a medical diagnosis of HCAP (healthcare-associated pneumonia).  She presents with ..  Performance deficits affecting function are  weakness, impaired endurance, impaired functional mobilty, impaired self care skills, impaired balance, impaired cardiopulmonary response to activity.      Rehab Prognosis:  good; patient would benefit from acute skilled OT services to address these deficits and reach maximum level of function.       Plan:     Patient to be seen 5 x/week to address the above listed problems via self-care/home management, therapeutic activities, therapeutic exercises  · Plan of Care Expires: 04/19/18  · Plan of Care Reviewed with: patient    This Plan of care has been discussed with the patient who was involved in its development and understands and is in agreement with the identified goals and treatment plan    GOALS:    Occupational Therapy Goals        Problem: Occupational Therapy Goal    Goal Priority Disciplines Outcome Interventions   Occupational Therapy Goal     OT, PT/OT Ongoing (interventions implemented as appropriate)    Description:  Goals to be met by: 04/19/18     Patient will increase functional independence with ADLs by performing:    LE Dressing with Stand-by Assistance.--met 03/22/18  Grooming while standing at sink with Stand-by Assistance.--met 03/20/18  Toileting from toilet with Modified [place] for hygiene and clothing management.   Toilet transfer to toilet with Modified [place].                        Time Tracking:     OT Date of Treatment: 03/22/18  OT Start Time: 1446  OT Stop Time: 1514  OT Total Time (min): 28 min    Billable Minutes:Self Care/Home Management 15  Therapeutic Exercise 13  Total Time 28    YOLY Martinez  3/22/2018

## 2018-03-22 NOTE — PLAN OF CARE
Problem: Physical Therapy Goal  Goal: Physical Therapy Goal  Goals to be met by: 2018     Patient will increase functional independence with mobility by performin. Supine to sit with Modified [place] 3/22/18  2. Sit to supine with Modified [place] 3/22/18  3. Sit to stand transfer with Stand-by Assistance Met 3/21/18  4. Bed to chair transfer with Stand-by Assistance using Rolling Walker MET 3/21/18  5. Gait  x 75 feet with Contact Guard Assistance using Rolling Walker. MET 3/21/18  6. Ascend/descend 2 steps with left Handrails Contact Guard Assistance and Minimal Assistance   7. Lower extremity exercise program x10 reps with supervision MET 3/21/18       Outcome: Ongoing (interventions implemented as appropriate)  Continue working toward goals.

## 2018-03-22 NOTE — PLAN OF CARE
[facility]    Improving Na on tolvaptan.      Brain MRI and CT chest without obvious cause of worsening SIADH, so likely PNA playing a role.    Recommend continuing tolvaptan for now at current dose.  Will need nephrology follow up with Dr. Brown or Dr Hill in 1-2 weeks with BMP, serum osm, urine osm, and urine sodium, at which time can determine ongoing need for tolvaptan.    Recommend discussing with  to ensure patient can receive tolvaptan at discharge for 30 day supply while waiting to follow up with nephrology.     Please call fellow with questions.    Bethany Pugh MD

## 2018-03-22 NOTE — PROGRESS NOTES
.Pharmacy New Medication Education    Patient accepted medication education.    Pharmacy educated patient on name and purpose of medications and possible side effects, using the teach-back method.     Current Inpatient Medication Orders   albuterol-ipratropium 2.5mg-0.5mg/3mL nebulizer solution 3 mL   amLODIPine tablet 10 mg   aspirin tablet 325 mg   calcium carbonate 200 mg calcium (500 mg) chewable tablet 500 mg   fluticasone 50 mcg/actuation nasal spray 100 mcg   heparin (porcine) injection 5,000 Units   moxifloxacin tablet 400 mg   pantoprazole EC tablet 40 mg   polyethylene glycol packet 17 g   ramelteon tablet 8 mg   simvastatin tablet 40 mg   sodium chloride 0.9% flush 5 mL   tolvaptan tablet 15 mg       Learners of pharmacy medication education included:  Patient    Patient +/- learner response:  Verbalized Understanding, Teachback

## 2018-03-22 NOTE — PLAN OF CARE
Problem: Occupational Therapy Goal  Goal: Occupational Therapy Goal  Goals to be met by: 04/19/18     Patient will increase functional independence with ADLs by performing:    LE Dressing with Stand-by Assistance.--met 03/22/18  Grooming while standing at sink with Stand-by Assistance.--met 03/20/18  Toileting from toilet with Modified [place] for hygiene and clothing management.   Toilet transfer to toilet with Modified [place].      Outcome: Ongoing (interventions implemented as appropriate)  Pt agreeable to tx this date. Perf sup-->EOB w/ Sup; amb via RW around room w/ Sup; t/f-->b/s chair w/ Sup; don/doff socks w/ Sup. Perf UE TE at b/s chair level 2x10 reps ea all major jts/planes. Edu/tx re: general safety techs; DME tx & HEP. Pt verbalized/demo'ed understanding.    Pt w/ good progress to date. Cont w/ min overall endurance/conditioning, balance/mobilty & coordination w/ subsequent decline in (I)/safety w/ BADLs, fxnl mobility & fxnl t/f's. Cont w/ OT per POC 5x/wk to increase phys/fxnl status & achieve established goals for d/c-->home w/ HHOT & TTB.

## 2018-03-22 NOTE — PLAN OF CARE
Problem: Patient Care Overview  Goal: Plan of Care Review  Outcome: Ongoing (interventions implemented as appropriate)  Pt on RA with sats of 98%. The proper method of use, as well as anticipated side effects, of this aerosol treatment are discussed and demonstrated to the patient. Will continue to monitor.

## 2018-03-22 NOTE — PLAN OF CARE
Problem: Patient Care Overview  Goal: Plan of Care Review  Outcome: Ongoing (interventions implemented as appropriate)  Telemetry with A fib, SA, and SR.  No fevers, breath sounds diminished on the right, on respiratory treatments.  Repositions self independently.  No pressure ulcers.  Fall precautions explained.  Bed alarm on.  Will continue to monitor Vs, telemetry, labs, and medications as ordered.

## 2018-03-22 NOTE — PLAN OF CARE
TN went to meet with patient, no family present at bedside.  TN reviewed patient's clinicals. Per patient, she informed TN she may go home tomorrow. Home Health orders need to be reordered on discharge. TN will continue to follow.    Patient is current with [facility].     Future Appointments  Date Time Provider Department Center   3/28/2018 2:00 PM Nadege العلي MD [facility]   4/4/2018 3:00 PM ADENIKE Rojas [facility]   4/4/2018 3:40 PM Noemi Wahl MD [facility]        03/22/18 1424   Discharge Reassessment   Assessment Type Discharge Planning Reassessment   Provided patient/caregiver education on the expected discharge date and the discharge plan Yes   Do you have any problems affording any of your prescribed medications? No   Discharge Plan A Home with family;Home Health   Discharge Plan B Home with family   Patient choice form signed by patient/caregiver N/A   Can the patient answer the patient profile reliably? Yes, cognitively intact   How does the patient rate their overall health at the present time? Good   Describe the patient's ability to walk at the present time. Walks with the help of equipment   How often would a person be available to care for the patient? Whenever needed     Jennifer Guerrier RN  Transition Navigator  [phone]

## 2018-03-23 ENCOUNTER — TELEPHONE (OUTPATIENT)
Dept: PHARMACY | Facility: CLINIC | Age: 83
End: 2018-03-23

## 2018-03-23 LAB
ANION GAP SERPL CALC-SCNC: 10 MMOL/L
ANION GAP SERPL CALC-SCNC: 5 MMOL/L
ANION GAP SERPL CALC-SCNC: 8 MMOL/L
BACTERIA BLD CULT: NORMAL
BACTERIA BLD CULT: NORMAL
BASOPHILS # BLD AUTO: 0.03 K/UL
BASOPHILS NFR BLD: 0.4 %
BUN SERPL-MCNC: 11 MG/DL
BUN SERPL-MCNC: 12 MG/DL
BUN SERPL-MCNC: 13 MG/DL
CALCIUM SERPL-MCNC: 8.8 MG/DL
CALCIUM SERPL-MCNC: 9 MG/DL
CALCIUM SERPL-MCNC: 9 MG/DL
CHLORIDE SERPL-SCNC: 94 MMOL/L
CHLORIDE SERPL-SCNC: 96 MMOL/L
CHLORIDE SERPL-SCNC: 96 MMOL/L
CO2 SERPL-SCNC: 23 MMOL/L
CO2 SERPL-SCNC: 26 MMOL/L
CO2 SERPL-SCNC: 29 MMOL/L
CREAT SERPL-MCNC: 0.8 MG/DL
CREAT SERPL-MCNC: 0.9 MG/DL
CREAT SERPL-MCNC: 0.9 MG/DL
DIFFERENTIAL METHOD: ABNORMAL
EOSINOPHIL # BLD AUTO: 0.1 K/UL
EOSINOPHIL NFR BLD: 1.6 %
ERYTHROCYTE [DISTWIDTH] IN BLOOD BY AUTOMATED COUNT: 12.6 %
EST. GFR  (AFRICAN AMERICAN): >60 ML/MIN/1.73 M^2
EST. GFR  (NON AFRICAN AMERICAN): 58 ML/MIN/1.73 M^2
EST. GFR  (NON AFRICAN AMERICAN): 58 ML/MIN/1.73 M^2
EST. GFR  (NON AFRICAN AMERICAN): >60 ML/MIN/1.73 M^2
GLUCOSE SERPL-MCNC: 107 MG/DL
GLUCOSE SERPL-MCNC: 114 MG/DL
GLUCOSE SERPL-MCNC: 121 MG/DL
HCT VFR BLD AUTO: 23 %
HGB BLD-MCNC: 7.8 G/DL
L PNEUMO AG UR QL IA: NOT DETECTED
LYMPHOCYTES # BLD AUTO: 1.3 K/UL
LYMPHOCYTES NFR BLD: 15.6 %
MAGNESIUM SERPL-MCNC: 1.8 MG/DL
MCH RBC QN AUTO: 31.2 PG
MCHC RBC AUTO-ENTMCNC: 33.9 G/DL
MCV RBC AUTO: 92 FL
MONOCYTES # BLD AUTO: 0.9 K/UL
MONOCYTES NFR BLD: 10.6 %
NEUTROPHILS # BLD AUTO: 5.8 K/UL
NEUTROPHILS NFR BLD: 70.9 %
PHOSPHATE SERPL-MCNC: 4.5 MG/DL
PLATELET # BLD AUTO: 252 K/UL
PMV BLD AUTO: 8.2 FL
POTASSIUM SERPL-SCNC: 4.8 MMOL/L
POTASSIUM SERPL-SCNC: 4.8 MMOL/L
POTASSIUM SERPL-SCNC: 4.9 MMOL/L
POTASSIUM SERPL-SCNC: 5 MMOL/L
RBC # BLD AUTO: 2.5 M/UL
SODIUM SERPL-SCNC: 128 MMOL/L
SODIUM SERPL-SCNC: 128 MMOL/L
SODIUM SERPL-SCNC: 129 MMOL/L
SODIUM SERPL-SCNC: 130 MMOL/L
WBC # BLD AUTO: 8.2 K/UL

## 2018-03-23 PROCEDURE — 94640 AIRWAY INHALATION TREATMENT: CPT

## 2018-03-23 PROCEDURE — G8978 MOBILITY CURRENT STATUS: HCPCS | Mod: CJ

## 2018-03-23 PROCEDURE — 97110 THERAPEUTIC EXERCISES: CPT

## 2018-03-23 PROCEDURE — 84100 ASSAY OF PHOSPHORUS: CPT

## 2018-03-23 PROCEDURE — 80048 BASIC METABOLIC PNL TOTAL CA: CPT

## 2018-03-23 PROCEDURE — 84295 ASSAY OF SERUM SODIUM: CPT

## 2018-03-23 PROCEDURE — 25000003 PHARM REV CODE 250: Performed by: STUDENT IN AN ORGANIZED HEALTH CARE EDUCATION/TRAINING PROGRAM

## 2018-03-23 PROCEDURE — 84132 ASSAY OF SERUM POTASSIUM: CPT

## 2018-03-23 PROCEDURE — 97116 GAIT TRAINING THERAPY: CPT

## 2018-03-23 PROCEDURE — G8980 MOBILITY D/C STATUS: HCPCS | Mod: CJ

## 2018-03-23 PROCEDURE — 83735 ASSAY OF MAGNESIUM: CPT

## 2018-03-23 PROCEDURE — 63600175 PHARM REV CODE 636 W HCPCS: Performed by: STUDENT IN AN ORGANIZED HEALTH CARE EDUCATION/TRAINING PROGRAM

## 2018-03-23 PROCEDURE — 94761 N-INVAS EAR/PLS OXIMETRY MLT: CPT

## 2018-03-23 PROCEDURE — 36415 COLL VENOUS BLD VENIPUNCTURE: CPT

## 2018-03-23 PROCEDURE — 25000242 PHARM REV CODE 250 ALT 637 W/ HCPCS: Performed by: STUDENT IN AN ORGANIZED HEALTH CARE EDUCATION/TRAINING PROGRAM

## 2018-03-23 PROCEDURE — 85025 COMPLETE CBC W/AUTO DIFF WBC: CPT

## 2018-03-23 PROCEDURE — 97535 SELF CARE MNGMENT TRAINING: CPT

## 2018-03-23 PROCEDURE — 11000001 HC ACUTE MED/SURG PRIVATE ROOM

## 2018-03-23 RX ADMIN — TOLVAPTAN 15 MG: 15 TABLET ORAL at 08:03

## 2018-03-23 RX ADMIN — FLUTICASONE PROPIONATE 100 MCG: 50 SPRAY, METERED NASAL at 09:03

## 2018-03-23 RX ADMIN — RAMELTEON 8 MG: 8 TABLET, FILM COATED ORAL at 10:03

## 2018-03-23 RX ADMIN — MOXIFLOXACIN HYDROCHLORIDE 400 MG: 400 TABLET, FILM COATED ORAL at 08:03

## 2018-03-23 RX ADMIN — FLUTICASONE PROPIONATE 100 MCG: 50 SPRAY, METERED NASAL at 08:03

## 2018-03-23 RX ADMIN — PANTOPRAZOLE SODIUM 40 MG: 40 TABLET, DELAYED RELEASE ORAL at 08:03

## 2018-03-23 RX ADMIN — IPRATROPIUM BROMIDE AND ALBUTEROL SULFATE 3 ML: .5; 3 SOLUTION RESPIRATORY (INHALATION) at 07:03

## 2018-03-23 RX ADMIN — AMLODIPINE BESYLATE 10 MG: 5 TABLET ORAL at 08:03

## 2018-03-23 RX ADMIN — IPRATROPIUM BROMIDE AND ALBUTEROL SULFATE 3 ML: .5; 3 SOLUTION RESPIRATORY (INHALATION) at 03:03

## 2018-03-23 RX ADMIN — SIMVASTATIN 40 MG: 20 TABLET, FILM COATED ORAL at 09:03

## 2018-03-23 RX ADMIN — IPRATROPIUM BROMIDE AND ALBUTEROL SULFATE 3 ML: .5; 3 SOLUTION RESPIRATORY (INHALATION) at 08:03

## 2018-03-23 RX ADMIN — HEPARIN SODIUM 5000 UNITS: 5000 INJECTION, SOLUTION INTRAVENOUS; SUBCUTANEOUS at 08:03

## 2018-03-23 RX ADMIN — HEPARIN SODIUM 5000 UNITS: 5000 INJECTION, SOLUTION INTRAVENOUS; SUBCUTANEOUS at 09:03

## 2018-03-23 RX ADMIN — ASPIRIN 325 MG ORAL TABLET 325 MG: 325 PILL ORAL at 08:03

## 2018-03-23 RX ADMIN — IPRATROPIUM BROMIDE AND ALBUTEROL SULFATE 3 ML: .5; 3 SOLUTION RESPIRATORY (INHALATION) at 11:03

## 2018-03-23 NOTE — PLAN OF CARE
Problem: Patient Care Overview  Goal: Plan of Care Review  Outcome: Ongoing (interventions implemented as appropriate)  Patient had  an unevenful night. Nurse went over plan of care with patient, questions encouraged. On continuous heart monitoring, NSR, HR in 80s-90s , no true red alarms . Fall precautions maintained.  Bed locked and low, side rails X3, call bell within reach. Nurse asked patient to call before ambulating, patient verbalized understanding. Will continue monitoring.

## 2018-03-23 NOTE — SUBJECTIVE & OBJECTIVE
Interval History: No acute complaints. Denies SOB, CP, N/V, dysuria, swelling. States that she was able to get up and walk around in the jones yesterday without difficulty.    Review of patient's allergies indicates:   Allergen Reactions    Pcn [penicillins] Rash     Current Facility-Administered Medications   Medication Frequency    albuterol-ipratropium 2.5mg-0.5mg/3mL nebulizer solution 3 mL Q4H WAKE    amLODIPine tablet 10 mg Daily    aspirin tablet 325 mg Daily    calcium carbonate 200 mg calcium (500 mg) chewable tablet 500 mg Daily PRN    fluticasone 50 mcg/actuation nasal spray 100 mcg BID    heparin (porcine) injection 5,000 Units Q12H    moxifloxacin tablet 400 mg Daily    pantoprazole EC tablet 40 mg Daily    polyethylene glycol packet 17 g Daily    ramelteon tablet 8 mg Nightly PRN    simvastatin tablet 40 mg QHS    sodium chloride 0.9% flush 5 mL PRN       Objective:     Vital Signs (Most Recent):  Temp: 98.9 °F (37.2 °C) (03/23/18 0742)  Pulse: 84 (03/23/18 0841)  Resp: 17 (03/23/18 0742)  BP: 131/64 (03/23/18 0742)  SpO2: 95 % (03/23/18 0705)  O2 Device (Oxygen Therapy): room air (03/23/18 0705) Vital Signs (24h Range):  Temp:  [98.2 °F (36.8 °C)-98.9 °F (37.2 °C)] 98.9 °F (37.2 °C)  Pulse:  [75-95] 84  Resp:  [16-18] 17  SpO2:  [94 %-98 %] 95 %  BP: (100-134)/(51-64) 131/64     Weight: 50.6 kg (111 lb 8 oz) (03/22/18 0500)  Body mass index is 23.3 kg/m².  Body surface area is 1.44 meters squared.    I/O last 3 completed shifts:  In: 810 [P.O.:810]  Out: 1950 [Urine:1950]    Physical Exam   Constitutional: She is oriented to person, place, and time. No distress.   HENT:   Head: Normocephalic.   Eyes: Conjunctivae are normal. No scleral icterus.   Neck: Neck supple. No tracheal deviation present.   Cardiovascular: Normal rate, regular rhythm and normal heart sounds.    Pulmonary/Chest: Effort normal and breath sounds normal.   Abdominal: Soft. Bowel sounds are normal.   Musculoskeletal: She  exhibits no edema or tenderness.   Neurological: She is alert and oriented to person, place, and time.   Skin: Skin is warm and dry. She is not diaphoretic.   Psychiatric: Her behavior is normal.   Flat affect   Vitals reviewed.      Significant Labs:  All labs within the past 24 hours have been reviewed.     Significant Imaging:  CT: Reviewed  MRI: Reviewed

## 2018-03-23 NOTE — PLAN OF CARE
Problem: Patient Care Overview  Goal: Plan of Care Review  Outcome: Ongoing (interventions implemented as appropriate)  POC reviewed with patient. Patient AAOx4 and denies any pain. Oral moxifloxacin administered for treatment of pneumonia. Plan is to get patient's sodium level WNL and discharge patient with home health tomorrow. Tele monitor on patient reading NSR with HR in the 80s-90s. No red alarms noted during shift. Bed locked and low, bed alarm on, side rails up x2, and call bell in reach. No falls or injuries to patient during shift. Patient verbalizes clear understanding of POC.

## 2018-03-23 NOTE — ASSESSMENT & PLAN NOTE
117 on presentation 128 today. SIADH. Improving on tolvaptan. Continue current dose and have pt follow up with Dr. Brown or Dr. Mercedes as an out pt to determine if this medication needs to be continued.

## 2018-03-23 NOTE — PLAN OF CARE
Problem: Patient Care Overview  Goal: Plan of Care Review  Outcome: Ongoing (interventions implemented as appropriate)  Patient on RA with sats as documented.  Patient given aerosol treatment with no adverse reactions noted.  Patient instructed on proper use. Will continue to monitor.

## 2018-03-23 NOTE — PLAN OF CARE
4329--TN spoke with Dr. Fernando again. He stated patient may be able to be on another medication. TN called the [facility], and that medication is not covered either. MD will speak with patient and daughter.    Update: TN spoke with Geeta with Pharmacy Assistance. She was aware regarding medication the doctor would like patient to go home on (Tolvaptan). She will look into patient assistance and see what she can do regarding getting this medication. TN also called [facility] to see if they had any discounts too. TN spoke with the pharmacy assistance at [facility]. She stated there is an online form that has to be filled out with prescription. TN informed Geeta and she will call patient's daughter and update her. She will start working on the medication assistance.--The medication might be covered if patient qualifies with [facility]'s pharmacy assistance. TN updated Dr. Chisholm.    TN updated patient and daughter Aliya at bedside.    Future Appointments  Date Time Provider Department Center   3/28/2018 2:00 PM Nadege العلي MD [facility]   4/4/2018 3:00 PM ADENIKE Rojas [facility]   4/4/2018 3:40 PM Noemi Wahl MD [facility]      03/23/18 7141   Discharge Reassessment   Assessment Type Discharge Planning Reassessment   Provided patient/caregiver education on the expected discharge date and the discharge plan No   Discharge Plan A Home with family;Home Health   Discharge Plan B Home with family   Patient choice form signed by patient/caregiver N/A   Can the patient answer the patient profile reliably? Yes, cognitively intact   How does the patient rate their overall health at the present time? Good   Describe the patient's ability to walk at the present time. Walks with the help of equipment   How often would a person be available to care for the patient? Often     Jennifer Guerrier RN  Transition Navigator  [phone]

## 2018-03-23 NOTE — PLAN OF CARE
Problem: Occupational Therapy Goal  Goal: Occupational Therapy Goal  Goals to be met by: 04/19/18     Patient will increase functional independence with ADLs by performing:    LE Dressing with Stand-by Assistance.--met 03/22/18  Grooming while standing at sink with Stand-by Assistance.--met 03/20/18  Toileting from toilet with Modified [place] for hygiene and clothing management.   Toilet transfer to toilet with Modified [place].       Outcome: Ongoing (interventions implemented as appropriate)  Pt agreeable to OT this date. Perf sup-->EOB w/ Min A for logrolling tech, UB TE in standing 2x10 reps ea & mod RBs, amb w/ CGA around room for toil t/f's & toileting w/ Sup; don/doff undergarments/pants w/ SBA; handwashing in standing w/ Sup. Edu/tx re: energy conservation techs, TTB rec & HEP. Pt/dgtr verbalized understanding.    Pt cont w/ decreased overall endurance/conditioning, balance/mobility & coordination w/ subsequent decline in (I)/safety w/ BADLs, fxnl mobility & fxnl t/fs. Cont w/ OT per POC to achieve established goals.

## 2018-03-23 NOTE — PROGRESS NOTES
[facility] medicine Resident HO-II Progress Note    Subjective:      Doing well this morning. Denies subjective fever/chills overnight. Denies chest pain, shortness of breath, or abdominal pain. Tolerating tolvaptan    Objective:   Last 24 Hour Vital Signs:  BP  Min: 100/53  Max: 152/65  Temp  Av.6 °F (37 °C)  Min: 98.2 °F (36.8 °C)  Max: 99.2 °F (37.3 °C)  Pulse  Av.6  Min: 75  Max: 99  Resp  Av.5  Min: 16  Max: 18  SpO2  Av.3 %  Min: 94 %  Max: 98 %  I/O last 3 completed shifts:  In: 810 [P.O.:810]  Out: 1950 [Urine:1950]    Physical Examination:  General:          Alert and awake in NAD  HEENT: NC/AT, EOMI, PERRL, OP clear, MMM  Cardio:             Ireggular rhythm, 2/6 systolic ejection murmur heard in all areas  Resp:               mild wheezing with rhonchi; no focal consolidative changes appreciated  Abdom:            Soft, NTND with normoactive bowel sounds  Extrem:            1+ edema to midshins b/l; WWP with no clubbing, cyanosis  Skin:                No rashes, lesions, or color changes  Pulses:            2+ and symmetric distally  Neuro:             AAOx3; cooperative and pleasant with no focal deficits    Laboratory:  Laboratory Data Reviewed: yes  Pertinent Findings:    Recent Labs  Lab 18  2232  18  0548  18  0323 18  0747 18  1715 18  2334 18  0436   WBC 11.02  < > 8.70  --  7.77  --   --   --  8.20   HGB 10.7*  < > 9.0*  --  8.3*  --   --   --  7.8*   HCT 30.4*  < > 26.1*  --  24.2*  --   --   --  23.0*   *  < > 325  --  278  --   --   --  252   MCV 88  < > 89  --  90  --   --   --  92   RDW 11.5  < > 12.1  --  12.4  --   --   --  12.6   *  < > 119*  119*  < > 125* 126* 127* 128* 128*   K 3.8  < > 4.0  < > 4.1 4.5 5.1 4.9 5.0   CL 76*  < >  --   < >  --  93* 93* 94*  --    CO2 26  < >  --   < >  --  27 27 26  --    BUN 10  < >  --   < >  --  9 12 13  --    CREATININE 0.8  < >  --   < >  --  0.8 0.9 0.9  --    *  < >  --    < >  --  116* 119* 121*  --    PROT 7.1  --   --   --   --   --   --   --   --    ALBUMIN 3.0*  --   --   --   --   --   --   --   --    BILITOT 0.6  --   --   --   --   --   --   --   --    AST 21  --   --   --   --   --   --   --   --    ALKPHOS 86  --   --   --   --   --   --   --   --    ALT 15  --   --   --   --   --   --   --   --    < > = values in this interval not displayed.    Microbiology Data Reviewed: yes  Pertinent Findings:    Other Results:  EKG (my interpretation): previous EKG reviewed    Radiology Data Reviewed: yes  Pertinent Findings:      Current Medications:     Infusions:       Scheduled:   albuterol-ipratropium 2.5mg-0.5mg/3mL  3 mL Nebulization Q4H WAKE    amLODIPine  10 mg Oral Daily    aspirin  325 mg Oral Daily    fluticasone  2 spray Each Nare BID    heparin (porcine)  5,000 Units Subcutaneous Q12H    moxifloxacin  400 mg Oral Daily    pantoprazole  40 mg Oral Daily    polyethylene glycol  17 g Oral Daily    simvastatin  40 mg Oral QHS    tolvaptan  15 mg Oral Daily        PRN:  calcium carbonate, ramelteon, sodium chloride 0.9%    Antibiotics and Day Number of Therapy:  vanc and cefepime 3/18 - 3/20  moxifloxacin 3.20- current    Lines and Day Number of Therapy:  piv    Assessment:     Willow Giron is a 87 y.o.female with  Patient Active Problem List    Diagnosis Date Noted    Hypo-osmolality and hyponatremia 03/19/2018    Normocytic anemia 03/19/2018    SIADH (syndrome of inappropriate ADH production)     HCAP (healthcare-associated pneumonia) 03/18/2018    Elevated troponin     Acute sphenoidal sinusitis 02/20/2018    Hyponatremia 02/17/2018    Essential hypertension 02/17/2018    Hyperlipidemia 02/17/2018    GERD (gastroesophageal reflux disease) 02/17/2018    Vomiting 02/17/2018        Plan:     HCAP  -Recent hospital admit/SNF, dry cough x 1 week, general weakness  On admit, CXR with RLL opacity  No signs of systemic infection  -Vanc, cefepime  started 3/18, continued  -blood cx remains NGTD  -patient tolerated breathing tx well this am. Continue to monitor  -descaled abx to moxifloxacin; likely discharge today with continued abx course.      Hypovolemic hyponatremia  -Admitted 1 month ago--hypovolemic hypoNa s/p several days N/V/D  This admit, recent poor PO intake, N/V x 3 day of admit  -On admit, Na 117, Cl 76, urine Na 81, urine osm 305  Uric acid 2.3  - labile Na, concerning for SIADH; nephrology consulted and appreciate assistance  - Nephro to start tolvaptan, will need to follow up with nephrology in two weeks with labs to decide on continuation of tolvapatn.   - CT chest - b/l pleural effusions; improving.   - MRI brain - no acute changes/ no masses     Elevated troponin  -On admit, troponin 0.573->0.895  Denies chest pain, SOB  -EKG with irregular rhythm, variable P-wave morphology--new from previous  No ST elevations  -Consult cardiology  Received , plavix 300, heparin gtt, simvastatin  -trop tended out, patient denies CP overnight. Hep gtt stopped  -will f/u cards recs  -echo - hypokinetic apical septum, intermediate LV diastolic dysfunction, PHTN (41 mmHg)     AoCD  -On admit, H/H 10/7/30--at baseline  Recent w/u c/w AoCD     HTN  -On admit, normotensive  Holding home lisinopril, norvasc in setting of HCAP--can resume if becomes hypertensive  - resumed ACEi     HLD  -Continue home simvastatin     GERD  -No acute issues  Continue home omeprazole    Code: full  Ppx: subQ heparin    Dispo: pending obtaining pharamacy/nephrology recs. Likely discharge today.      Austin Fernando  [facility] Internal Medicine/Pediatrics HO-II  U medicine Service Team B    [facility] Medicine Hospitalist Pager numbers:   [facility] Hospitalist Medicine Team A (Adrien/Rufino): [phone]  [facility] Hospitalist Medicine Team B (Emily/Mekhi):  [phone]

## 2018-03-23 NOTE — PROGRESS NOTES
"[facility]  Nephrology  Progress Note    Patient Name: Willow Giron  MRN: [id number]  Admission Date: 3/17/2018  Hospital Length of Stay: 5 days  Attending Provider: Andrea Diaz MD   Primary Care Physician: Qamar Bland MD  Principal Problem:HCAP (healthcare-associated pneumonia)    Subjective:     HPI:   Willow Giron 87 y.o. female  has a past medical history of Arthritis; GERD (gastroesophageal reflux disease); Hyperlipidemia; and Hypertension. who presented with   Chief Complaint   Patient presents with    Vomiting     X1 episode of vomiting this evening after eating soup. Reports pt was d/c from skilled nursing facility last week for electorlyte imblance. Denies any pain at this time. Reports "not too much" nausea.      Pt presents with SOB and cough for about one week. She previously saw here PCP who diagnosed her with bronchitis and gave her a coarse of steroids and a z-pack but her symptoms worsened. She was hospitalized here last month and was seem by us for hyponatremia. At that time it was attributed to tea and toast diet and pt was discharged to [facility] on salt tablets. Pt discontinued salt tablets because they made her nauseated. Pt has not eaten much in the past few days due to nausea. Denies F/C, sick contacts, CP, N/V/D/C, swelling, dysuria, hematuria, blood in stool, melena, rashes.    Interval History: No acute complaints. Denies SOB, CP, N/V, dysuria, swelling. States that she was able to get up and walk around in the jones yesterday without difficulty.    Review of patient's allergies indicates:   Allergen Reactions    Pcn [penicillins] Rash     Current Facility-Administered Medications   Medication Frequency    albuterol-ipratropium 2.5mg-0.5mg/3mL nebulizer solution 3 mL Q4H WAKE    amLODIPine tablet 10 mg Daily    aspirin tablet 325 mg Daily    calcium carbonate 200 mg calcium (500 mg) chewable tablet 500 mg Daily PRN    fluticasone 50 mcg/actuation " nasal spray 100 mcg BID    heparin (porcine) injection 5,000 Units Q12H    moxifloxacin tablet 400 mg Daily    pantoprazole EC tablet 40 mg Daily    polyethylene glycol packet 17 g Daily    ramelteon tablet 8 mg Nightly PRN    simvastatin tablet 40 mg QHS    sodium chloride 0.9% flush 5 mL PRN       Objective:     Vital Signs (Most Recent):  Temp: 98.9 °F (37.2 °C) (03/23/18 0742)  Pulse: 84 (03/23/18 0841)  Resp: 17 (03/23/18 0742)  BP: 131/64 (03/23/18 0742)  SpO2: 95 % (03/23/18 0705)  O2 Device (Oxygen Therapy): room air (03/23/18 0705) Vital Signs (24h Range):  Temp:  [98.2 °F (36.8 °C)-98.9 °F (37.2 °C)] 98.9 °F (37.2 °C)  Pulse:  [75-95] 84  Resp:  [16-18] 17  SpO2:  [94 %-98 %] 95 %  BP: (100-134)/(51-64) 131/64     Weight: 50.6 kg (111 lb 8 oz) (03/22/18 0500)  Body mass index is 23.3 kg/m².  Body surface area is 1.44 meters squared.    I/O last 3 completed shifts:  In: 810 [P.O.:810]  Out: 1950 [Urine:1950]    Physical Exam   Constitutional: She is oriented to person, place, and time. No distress.   HENT:   Head: Normocephalic.   Eyes: Conjunctivae are normal. No scleral icterus.   Neck: Neck supple. No tracheal deviation present.   Cardiovascular: Normal rate, regular rhythm and normal heart sounds.    Pulmonary/Chest: Effort normal and breath sounds normal.   Abdominal: Soft. Bowel sounds are normal.   Musculoskeletal: She exhibits no edema or tenderness.   Neurological: She is alert and oriented to person, place, and time.   Skin: Skin is warm and dry. She is not diaphoretic.   Psychiatric: Her behavior is normal.   Flat affect   Vitals reviewed.      Significant Labs:  All labs within the past 24 hours have been reviewed.     Significant Imaging:  CT: Reviewed  MRI: Reviewed    Assessment/Plan:     SIADH (syndrome of inappropriate ADH production)    Brain MRI and CT chest without obvious cause of worsening SIADH, so likely PNA playing a role.        Normocytic anemia    Stable. Pt reports no  blood in stool or urine. She had a hysterectomy in the past. Iron levels are normal but % saturation is low. Pt may benefit from trial of oral iron replacement.        Hypo-osmolality and hyponatremia    117 on presentation 128 today. SIADH. Improving on tolvaptan. Continue current dose and have pt follow up with Dr. Brown or Dr. Mercedes as an out pt to determine if this medication needs to be continued.        Essential hypertension    Continue home antihypertensives.        Pt has failed fluid restriction and is not able to tolerate salt tabs. Pt needs to be on tolvaptan because she has failed alternate therapy. If her insurance absolutely will not cover this medicaiton, even though it is clinically indicated, and pt's family cannot afforde this medication, then would recommend trying demeclocycline.     Thank you for your consult. I will follow-up with patient. Please contact us if you have any additional questions.    Levar Santana MD, 3/23/2018 10:04 AM  [facility] Nephrology PGY-IV  Pager: [phone]  Cell: [phone]

## 2018-03-23 NOTE — PLAN OF CARE
Problem: Patient Care Overview  Goal: Plan of Care Review  Outcome: Ongoing (interventions implemented as appropriate)  Plan of care reviewed with patient and pt's daughter. both verbalized complete understanding. Fall/safety precautions maintained. Slip resistant socks on. Bed in lowest position, locked, call light within reach. Side rails up x's 2. Nurse instructed patient to notify staff for any assistance and pt verbalized complete understanding.     Na improving, pt denies any c/o pain or SOB, pt up to chair for lunch, pt stable, NAD noted, Pt SR with PVCs on telemetry, no ectopy or true red alarms noted.

## 2018-03-23 NOTE — PLAN OF CARE
TN spoke with Dr. Fernando. He stated spoke with nephrology, and they stated patient needs to be on tolvaptan. She has been tried on another medication before. MD will call PHN. TN also informed Stanley with [facility] and updated her. Dr. Fernando informed that a prescription drug request needs to be completed, and TN will have to fax over clinicals to see if it will get covered. --Once request is completed, may not here back until Friday or Monday per PHN.    Jennifer Guerrier RN  Transition Navigator  [phone]

## 2018-03-23 NOTE — MEDICAL/APP STUDENT
[facility] Nephrology Medical Student Progress Note    Subjective  Ms. Montanez is an 87 year old female with SIADH secondary to bilateral lower lobe pneumonia.    Ms. Giron rested well overnight. She does not endorse any shortness of breath or chest pain. Denies any nausea or vomiting. She is able to ambulate to use the restroom.    Objective  Vitals:    03/23/18 0539 03/23/18 0705 03/23/18 0742 03/23/18 0841   BP: (!) 121/57  131/64    BP Location: Right arm  Right arm    Patient Position: Lying  Lying    Pulse: 75 84 82 84   Resp: 18 17 17    Temp: 98.5 °F (36.9 °C)  98.9 °F (37.2 °C)    TempSrc: Oral  Oral    SpO2:  95%     Weight:       Height:       Temp: 98.2-98.9; Pulse: 75-99; Resp: 16-18; Bp: 100-134/51-64; SpO2: 94-98    Physical Exam:  General: NAD, responds appropriately to questions  HEENT: NCAT, PERRL, no lymphadenopathy  Cv: RRR, normal S1 and S2, no S3 or S4 appreciated  Resp: Rhonchi in the lower lobes, no increased work of breathing  Abd: NTND, +BS  Ext: mild LE edema, no clubbing or cyanosis  Skin: No rashes, lesions or skin breakdown  Neuro: AAO x 3, no focal neurologic deficits    Laboratory:  WBC: 8.2  Hb: 7.8  HCT:23.0  Plt: 252  Na: 128  K: 5.0  Cl: 27  BUN: 13  Cr: 0.9  Ca: 8.8  Glucose: 121    Imaging:  CT Chest w/o Contrast 3/21:  Impression: Mild moderate bilateral pleural effusions with probable atelectasis of the overlying lower lobes.    There is scattered ill-defined ground-glass opacities within the lungs bilaterally somewhat predominantly in the periphery and central portions of the lungs which may represent atypical inflammatory/infectious process versus edema though indeterminate.  Clinical correlation and short-term follow-up recommended to assure resolution.    MRI Brain w/o Contrast 3/21:  Impression:   No significant change from prior.    Remote right basal gangliar lacunar type infarction with additional smaller remote lacunar-type infarcts right subinsular white matter and left  thalamus also unchanged.    Superimposed age-appropriate cerebral volume loss with T2 flair signal abnormality supratentorial white matter and rome suggestive for chronic ischemic change.    There is no evidence for acute infarction or new parenchymal signal abnormality.    Micro:  Blood Cx 3/18: NGTD    Medications:    Current Facility-Administered Medications:     albuterol-ipratropium 2.5mg-0.5mg/3mL nebulizer solution 3 mL, 3 mL, Nebulization, Q4H WAKE, Edward Chisholm DO, 3 mL at 03/23/18 0705    amLODIPine tablet 10 mg, 10 mg, Oral, Daily, Chris Hanley MD, 10 mg at 03/23/18 0846    aspirin tablet 325 mg, 325 mg, Oral, Daily, Austin Fernando MD, 325 mg at 03/23/18 0846    calcium carbonate 200 mg calcium (500 mg) chewable tablet 500 mg, 500 mg, Oral, Daily PRN, Jamaal Durbin MD, 500 mg at 03/22/18 0100    fluticasone 50 mcg/actuation nasal spray 100 mcg, 2 spray, Each Nare, BID, Austin Fernando MD, 100 mcg at 03/23/18 0846    heparin (porcine) injection 5,000 Units, 5,000 Units, Subcutaneous, Q12H, Austin Fernando MD, 5,000 Units at 03/23/18 0846    moxifloxacin tablet 400 mg, 400 mg, Oral, Daily, Austin Fernando MD, 400 mg at 03/23/18 0846    pantoprazole EC tablet 40 mg, 40 mg, Oral, Daily, Austin Fernando MD, 40 mg at 03/23/18 0846    polyethylene glycol packet 17 g, 17 g, Oral, Daily, Edward Chisholm DO, 17 g at 03/21/18 1829    ramelteon tablet 8 mg, 8 mg, Oral, Nightly PRN, Renae Durbin MD, 8 mg at 03/21/18 2215    simvastatin tablet 40 mg, 40 mg, Oral, QHS, Austin Fernando MD, 40 mg at 03/22/18 2121    sodium chloride 0.9% flush 5 mL, 5 mL, Intravenous, PRN, Austin Fernando MD     Assessment:  Ms. Giron is an 87 year old female with SIADH/hyponatremia secondary to pneumonia, elevated troponin, anemia, hypertension, hyperlipidemia, and GERD.    1.) SIADH/hyponatremia- Na up to 128 from 117 upon admission. No  signs of neurologic cause of SIADH per brain MRI. Continue Tolvaptan daily and consult social work for Tolvaptan administration as an outpatient until Renal clinic follow-up. Potential discharge today.    2.) Lower Lobe Pneumonia- Currently on Avelox 400 mg. Vanc and Cefepime Dc. Blood culture from 3/18 NGTD. Consider continuing Avelox po as outpatient until 3/27 for 7 days of coverage.    3.) Elevated troponin- 0.573 on admission and peaked at 0.895. Denies any chest pain or shortness of breath.     4.) Anemia- Hemoglobin 8.2 this morning. Baseline seems to around 10 and a nonacute process. Consider iron supplementation and nutrition consult prior to discharge.    5.) HTN- well-controlled while in hospital. Systolic to 134 overnight. Continue home regimen.    6.) HLD- Continue home statin    Code Status: Not on File  PPX: Heparin  Diet: Regular    Disposition: Pending Na level improvement and outpatient follow-up scheduling    Huseyin Henry  Medical Student, MSY3  3/23/18

## 2018-03-23 NOTE — PLAN OF CARE
Geeat informed TN that she told patient's daughter she need proof of income and other documentation for the pharmacy assistance form. She will be leaving around 4:30 pm and the pharmacy would be closing as well soon. TN informed MD team. Dr. Fernando stated they will keep patient over the weekend. TN informed Aliya (daughter) that Geeta will be waiting for those documents on Monday. We should hear something Monday after those documents are sent to [facility] pharmacy assistance, if patient would be approved. Will follow-up with patient and pharmacy assistance Monday.    Jennifer Guerrier RN  Transition Navigator  [phone]

## 2018-03-23 NOTE — PT/OT/SLP PROGRESS
Physical Therapy Treatment/Discharge Summary     Patient Name:  Willow Giron   MRN:  [id number]    Recommendations:     Discharge Recommendations:  home health PT   Discharge Equipment Recommendations: bath bench   Barriers to discharge: None    Assessment:     Willow Giron is a 87 y.o. female admitted with a medical diagnosis of HCAP (healthcare-associated pneumonia).  She presents with the following impairments/functional limitations:  impaired endurance, weakness. Pt performed bed mobility mod I, and transfers/supervision and therex/ac with SBA/supervision. While ambulating pt demonstrated good gait stability with no episodes of LOB.    Rehab Prognosis:  good;     Recent Surgery: * No surgery found *      Plan:   Pt to be discharged from PT as she no longer requires acute care skilled PT services. Pt would benefit from being placed on the mobility team as well as HHPT.     Subjective     Communicated with EJ Zaldivar prior to session.  Patient found supine with HOB elevated upon PT entry to room, agreeable to treatment.      Chief Complaint: wants to go home.   Patient comments/goals: to go home  Pain/Comfort:  Pain Rating 1: 0/10  Pain Rating Post-Intervention 1: 0/10    Patients cultural, spiritual, Druze conflicts given the current situation: None verbalized to PT    Objective:     Patient found with: telemetry, bed alarm     General Precautions: Standard, fall, seizure   Orthopedic Precautions:N/A   Braces: N/A     Functional Mobility:  · Bed Mobility: Mod I  · Transfers:     · Sit to Stand:  supervision with rolling walker  · Bed to Chair: supervision with  rolling walker  using  Ambulated   · Gait: ~135 feet with supervision. demonstrated good gait stability with no episodes of LOB.  · Balance: Sitting: Good, Standing with Rw: Good      AM-PAC 6 CLICK MOBILITY  Turning over in bed (including adjusting bedclothes, sheets and blankets)?: 4  Sitting down on and standing up from a chair with  arms (e.g., wheelchair, bedside commode, etc.): 4  Moving from lying on back to sitting on the side of the bed?: 4  Moving to and from a bed to a chair (including a wheelchair)?: 3  Need to walk in hospital room?: 3  Climbing 3-5 steps with a railing?: 3  Total Score: 21       Therapeutic Activities and Exercises:   -Bed mobility, transfers and ambulation as listed above.   -Therex with supervision: 2x15 BLE LAQ, seated hip marches. 2x25 BLE heel/toe raises. Sit <>Stands without RW 3x5, Standing marches [id number] with RW and supervision.      Patient left up in chair with all lines intact, call button in reach and EJ Zaldivar notified..    GOALS:    Physical Therapy Goals     Not on file          Multidisciplinary Problems (Resolved)        Problem: Physical Therapy Goal    Goal Priority Disciplines Outcome Goal Variances Interventions   Physical Therapy Goal   (Resolved)     PT/OT, PT Outcome(s) achieved     Description:  Goals to be met by: 2018     Patient will increase functional independence with mobility by performin. Supine to sit with Modified IndependenceMet 3/22/18  2. Sit to supine with Modified IndependenceMet 3/22/18  3. Sit to stand transfer with Stand-by Assistance Met 3/21/18  4. Bed to chair transfer with Stand-by Assistance using Rolling Walker MET 3/21/18  5. Gait  x 75 feet with Contact Guard Assistance using Rolling Walker. MET 3/21/18  6. Ascend/descend 2 steps with left Handrails Contact Guard Assistance and Minimal Assistance. Unable to assess, based on clinical observation pt could perform 2 steps with L HR and CGA/Min A.   7. Lower extremity exercise program x10 reps with supervision MET 3/21/18                          Time Tracking:     PT Received On: 18  PT Start Time: 1106     PT Stop Time: 1130  PT Total Time (min): 24 min     Billable Minutes: Gait Training 10 and Therapeutic Exercise 14    Treatment Type: Treatment  PT/PTA: PTA, PT     PTA Visit Number: 0     Reynaldo  Adalberto, PT, DPT  03/23/2018

## 2018-03-24 LAB
ANION GAP SERPL CALC-SCNC: 8 MMOL/L
ANION GAP SERPL CALC-SCNC: 9 MMOL/L
ANION GAP SERPL CALC-SCNC: 9 MMOL/L
BASOPHILS # BLD AUTO: 0.02 K/UL
BASOPHILS NFR BLD: 0.3 %
BUN SERPL-MCNC: 11 MG/DL
BUN SERPL-MCNC: 12 MG/DL
BUN SERPL-MCNC: 9 MG/DL
CALCIUM SERPL-MCNC: 8.4 MG/DL
CALCIUM SERPL-MCNC: 8.8 MG/DL
CALCIUM SERPL-MCNC: 9 MG/DL
CHLORIDE SERPL-SCNC: 98 MMOL/L
CHLORIDE SERPL-SCNC: 99 MMOL/L
CHLORIDE SERPL-SCNC: 99 MMOL/L
CO2 SERPL-SCNC: 22 MMOL/L
CO2 SERPL-SCNC: 24 MMOL/L
CO2 SERPL-SCNC: 24 MMOL/L
CREAT SERPL-MCNC: 0.8 MG/DL
CREAT SERPL-MCNC: 0.9 MG/DL
CREAT SERPL-MCNC: 1 MG/DL
DIFFERENTIAL METHOD: ABNORMAL
EOSINOPHIL # BLD AUTO: 0.2 K/UL
EOSINOPHIL NFR BLD: 2.1 %
ERYTHROCYTE [DISTWIDTH] IN BLOOD BY AUTOMATED COUNT: 13.1 %
EST. GFR  (AFRICAN AMERICAN): 59 ML/MIN/1.73 M^2
EST. GFR  (AFRICAN AMERICAN): >60 ML/MIN/1.73 M^2
EST. GFR  (AFRICAN AMERICAN): >60 ML/MIN/1.73 M^2
EST. GFR  (NON AFRICAN AMERICAN): 51 ML/MIN/1.73 M^2
EST. GFR  (NON AFRICAN AMERICAN): 58 ML/MIN/1.73 M^2
EST. GFR  (NON AFRICAN AMERICAN): >60 ML/MIN/1.73 M^2
GLUCOSE SERPL-MCNC: 110 MG/DL
GLUCOSE SERPL-MCNC: 116 MG/DL
GLUCOSE SERPL-MCNC: 132 MG/DL
HCT VFR BLD AUTO: 24 %
HGB BLD-MCNC: 7.9 G/DL
LYMPHOCYTES # BLD AUTO: 1.1 K/UL
LYMPHOCYTES NFR BLD: 15.1 %
MAGNESIUM SERPL-MCNC: 1.8 MG/DL
MCH RBC QN AUTO: 30.7 PG
MCHC RBC AUTO-ENTMCNC: 32.9 G/DL
MCV RBC AUTO: 93 FL
MONOCYTES # BLD AUTO: 0.8 K/UL
MONOCYTES NFR BLD: 10.7 %
NEUTROPHILS # BLD AUTO: 5.2 K/UL
NEUTROPHILS NFR BLD: 71.3 %
PHOSPHATE SERPL-MCNC: 4.8 MG/DL
PLATELET # BLD AUTO: 274 K/UL
PMV BLD AUTO: 8.2 FL
POCT GLUCOSE: 131 MG/DL (ref 70–110)
POTASSIUM SERPL-SCNC: 4.3 MMOL/L
POTASSIUM SERPL-SCNC: 4.4 MMOL/L
POTASSIUM SERPL-SCNC: 4.4 MMOL/L
POTASSIUM SERPL-SCNC: 5 MMOL/L
RBC # BLD AUTO: 2.57 M/UL
SODIUM SERPL-SCNC: 130 MMOL/L
SODIUM SERPL-SCNC: 130 MMOL/L
SODIUM SERPL-SCNC: 131 MMOL/L
SODIUM SERPL-SCNC: 132 MMOL/L
WBC # BLD AUTO: 7.3 K/UL

## 2018-03-24 PROCEDURE — 94640 AIRWAY INHALATION TREATMENT: CPT

## 2018-03-24 PROCEDURE — 25000003 PHARM REV CODE 250: Performed by: STUDENT IN AN ORGANIZED HEALTH CARE EDUCATION/TRAINING PROGRAM

## 2018-03-24 PROCEDURE — 36415 COLL VENOUS BLD VENIPUNCTURE: CPT

## 2018-03-24 PROCEDURE — 11000001 HC ACUTE MED/SURG PRIVATE ROOM

## 2018-03-24 PROCEDURE — 84100 ASSAY OF PHOSPHORUS: CPT

## 2018-03-24 PROCEDURE — 80048 BASIC METABOLIC PNL TOTAL CA: CPT | Mod: 91

## 2018-03-24 PROCEDURE — 63600175 PHARM REV CODE 636 W HCPCS: Performed by: STUDENT IN AN ORGANIZED HEALTH CARE EDUCATION/TRAINING PROGRAM

## 2018-03-24 PROCEDURE — 85025 COMPLETE CBC W/AUTO DIFF WBC: CPT

## 2018-03-24 PROCEDURE — 83735 ASSAY OF MAGNESIUM: CPT

## 2018-03-24 PROCEDURE — 94761 N-INVAS EAR/PLS OXIMETRY MLT: CPT

## 2018-03-24 PROCEDURE — 25000242 PHARM REV CODE 250 ALT 637 W/ HCPCS: Performed by: STUDENT IN AN ORGANIZED HEALTH CARE EDUCATION/TRAINING PROGRAM

## 2018-03-24 PROCEDURE — 80048 BASIC METABOLIC PNL TOTAL CA: CPT

## 2018-03-24 PROCEDURE — 84132 ASSAY OF SERUM POTASSIUM: CPT

## 2018-03-24 PROCEDURE — 84295 ASSAY OF SERUM SODIUM: CPT

## 2018-03-24 RX ADMIN — IPRATROPIUM BROMIDE AND ALBUTEROL SULFATE 3 ML: .5; 3 SOLUTION RESPIRATORY (INHALATION) at 07:03

## 2018-03-24 RX ADMIN — IPRATROPIUM BROMIDE AND ALBUTEROL SULFATE 3 ML: .5; 3 SOLUTION RESPIRATORY (INHALATION) at 11:03

## 2018-03-24 RX ADMIN — PANTOPRAZOLE SODIUM 40 MG: 40 TABLET, DELAYED RELEASE ORAL at 09:03

## 2018-03-24 RX ADMIN — MOXIFLOXACIN HYDROCHLORIDE 400 MG: 400 TABLET, FILM COATED ORAL at 09:03

## 2018-03-24 RX ADMIN — FLUTICASONE PROPIONATE 100 MCG: 50 SPRAY, METERED NASAL at 09:03

## 2018-03-24 RX ADMIN — IPRATROPIUM BROMIDE AND ALBUTEROL SULFATE 3 ML: .5; 3 SOLUTION RESPIRATORY (INHALATION) at 04:03

## 2018-03-24 RX ADMIN — ASPIRIN 325 MG ORAL TABLET 325 MG: 325 PILL ORAL at 09:03

## 2018-03-24 RX ADMIN — RAMELTEON 8 MG: 8 TABLET, FILM COATED ORAL at 11:03

## 2018-03-24 RX ADMIN — HEPARIN SODIUM 5000 UNITS: 5000 INJECTION, SOLUTION INTRAVENOUS; SUBCUTANEOUS at 09:03

## 2018-03-24 RX ADMIN — AMLODIPINE BESYLATE 10 MG: 5 TABLET ORAL at 09:03

## 2018-03-24 RX ADMIN — SIMVASTATIN 40 MG: 20 TABLET, FILM COATED ORAL at 09:03

## 2018-03-24 RX ADMIN — POLYETHYLENE GLYCOL 3350 17 G: 17 POWDER, FOR SOLUTION ORAL at 09:03

## 2018-03-24 NOTE — PLAN OF CARE
Problem: Patient Care Overview  Goal: Plan of Care Review  Outcome: Ongoing (interventions implemented as appropriate)  Pt's SpO2 96% on room air. No adverse reactions to aerosol tx. No respiratory distress noted. Continue to monitor SpO2.

## 2018-03-24 NOTE — PLAN OF CARE
Problem: Patient Care Overview  Goal: Plan of Care Review  Outcome: Ongoing (interventions implemented as appropriate)  POC reviewed, aspiration precautions Telemetry NSR with PVC's HR 80's. Bed in lowest position, bed alarm on. Will continue to monitor

## 2018-03-24 NOTE — PLAN OF CARE
Continued on telemetry and fall risk monitoring ACE inhibitor therapy was not prescribed due to True red alarm ectopy. Bed alarm on . Family  at bedsideDenies any discomfort. Pox on RA 95-96%HOB elevated .

## 2018-03-24 NOTE — PROGRESS NOTES
[facility] medicine Resident SANNA Progress Note    Subjective:      Doing well this morning. Denies subjective fever/chills overnight. Denies chest pain, shortness of breath, or abdominal pain. Tolerating tolvaptan    Objective:   Last 24 Hour Vital Signs:  BP  Min: 116/58  Max: 168/72  Temp  Av.4 °F (36.9 °C)  Min: 97.9 °F (36.6 °C)  Max: 98.6 °F (37 °C)  Pulse  Av.2  Min: 72  Max: 84  Resp  Av.8  Min: 16  Max: 20  SpO2  Av.8 %  Min: 92 %  Max: 96 %  I/O last 3 completed shifts:  In: 625 [P.O.:625]  Out: 4050 [Urine:4050]    Physical Examination:  General:          Alert and awake in NAD  HEENT: NC/AT, EOMI, PERRL, OP clear, MMM  Cardio:             Ireggular rhythm, 2/6 systolic ejection murmur heard in all areas  Resp:               mild wheezing with rhonchi; no focal consolidative changes appreciated  Abdom:            Soft, NTND with normoactive bowel sounds  Extrem:            1+ edema to midshins b/l; WWP with no clubbing, cyanosis  Skin:                No rashes, lesions, or color changes  Pulses:            2+ and symmetric distally  Neuro:             AAOx3; cooperative and pleasant with no focal deficits    Laboratory:  Laboratory Data Reviewed: yes  Pertinent Findings:    Recent Labs  Lab 18  2232  18  0323  18  0436 18  0806 18  1733 18  0140   WBC 11.02  < > 7.77  --  8.20  --   --  7.30   HGB 10.7*  < > 8.3*  --  7.8*  --   --  7.9*   HCT 30.4*  < > 24.2*  --  23.0*  --   --  24.0*   *  < > 278  --  252  --   --  274   MCV 88  < > 90  --  92  --   --  93   RDW 11.5  < > 12.4  --  12.6  --   --  13.1   *  < > 125*  < > 128* 130* 129* 132*  131*   K 3.8  < > 4.1  < > 5.0 4.8 4.8 4.4  4.4   CL 76*  < >  --   < >  --  96 96 99   CO2 26  < >  --   < >  --  29 23 24   BUN 10  < >  --   < >  --  11 12 11   CREATININE 0.8  < >  --   < >  --  0.8 0.9 0.9   *  < >  --   < >  --  107 114* 116*   PROT 7.1  --   --   --   --   --   --   --     ALBUMIN 3.0*  --   --   --   --   --   --   --    BILITOT 0.6  --   --   --   --   --   --   --    AST 21  --   --   --   --   --   --   --    ALKPHOS 86  --   --   --   --   --   --   --    ALT 15  --   --   --   --   --   --   --    < > = values in this interval not displayed.    Microbiology Data Reviewed: yes  Pertinent Findings:    Other Results:  EKG (my interpretation): previous EKG reviewed    Radiology Data Reviewed: yes  Pertinent Findings:      Current Medications:     Infusions:       Scheduled:   albuterol-ipratropium 2.5mg-0.5mg/3mL  3 mL Nebulization Q4H WAKE    amLODIPine  10 mg Oral Daily    aspirin  325 mg Oral Daily    fluticasone  2 spray Each Nare BID    heparin (porcine)  5,000 Units Subcutaneous Q12H    moxifloxacin  400 mg Oral Daily    pantoprazole  40 mg Oral Daily    polyethylene glycol  17 g Oral Daily    simvastatin  40 mg Oral QHS        PRN:  calcium carbonate, ramelteon, sodium chloride 0.9%    Antibiotics and Day Number of Therapy:  vanc and cefepime 3/17- 3/19  Moxi 3/20-current    Lines and Day Number of Therapy:  piv    Plan:     HCAP  -Recent hospital admit/SNF, dry cough x 1 week, general weakness  On admit, CXR with RLL opacity  No signs of systemic infection  -Vanc, cefepime started 3/17-3/19, moxi3/20-present  -blood cx remains NGTD  -patient tolerated breathing tx well this am. Continue to monitor     Hypovolemic hyponatremia vs SIADH  -Admitted 1 month ago--hypovolemic hypoNa s/p several days N/V/D  This admit, recent poor PO intake, N/V x 3 day of admit  -On admit, Na 117, Cl 76, urine Na 81, urine osm 305  Uric acid 2.3  - labile Na, concerning for SIADH; nephrology consulted--started tolvaptan 3/21--recommend outpatient use and f/u Risen or Brown; if patient cannot afford, should consider demeclocylcine  CM assisting with insurance approval  - fluid restriction, increase salt diet  - CT chest - b/l pleural effusions  - MRI brain - no acute changes/ no  masses     Elevated troponin  -On admit, troponin 0.573->0.895  Denies chest pain, SOB  -EKG with irregular rhythm, variable P-wave morphology--new from previous  No ST elevations  -Consult cardiology  Received , plavix 300, heparin gtt, simvastatin  -trop tended out, patient denies CP overnight. Hep gtt stopped  -will f/u cards recs  -echo - hypokinetic apical septum, intermediate LV diastolic dysfunction, PHTN (41 mmHg)     AoCD  -On admit, H/H 10/7/30--at baseline  Recent w/u c/w AoCD     HTN  -On admit, normotensive  Holding home lisinopril, norvasc in setting of HCAP--can resume if becomes hypertensive  - resumed ACEi     HLD  -Continue home simvastatin     GERD  -No acute issues  Continue home omeprazole    Ppx: subQ heparin  Dispo: pending further clinical improvement of HCAP, pending improvement of Na+, nephrology recs.     Jamaal Durbin  [facility] Internal Medicine/Pediatrics HO-I  [facility] medicine Service Team B    [facility] Medicine Hospitalist Pager numbers:   [facility] Hospitalist Medicine Team A (Adrien/Rufino): [phone]  [facility] Hospitalist Medicine Team B (Emily/Mekhi):  [phone]

## 2018-03-25 LAB
ANION GAP SERPL CALC-SCNC: 7 MMOL/L
ANION GAP SERPL CALC-SCNC: 9 MMOL/L
ANION GAP SERPL CALC-SCNC: 9 MMOL/L
BASOPHILS # BLD AUTO: 0.02 K/UL
BASOPHILS NFR BLD: 0.4 %
BUN SERPL-MCNC: 12 MG/DL
BUN SERPL-MCNC: 15 MG/DL
BUN SERPL-MCNC: 16 MG/DL
CALCIUM SERPL-MCNC: 8.4 MG/DL
CALCIUM SERPL-MCNC: 8.5 MG/DL
CALCIUM SERPL-MCNC: 8.7 MG/DL
CHLORIDE SERPL-SCNC: 97 MMOL/L
CHLORIDE SERPL-SCNC: 98 MMOL/L
CHLORIDE SERPL-SCNC: 99 MMOL/L
CO2 SERPL-SCNC: 22 MMOL/L
CO2 SERPL-SCNC: 22 MMOL/L
CO2 SERPL-SCNC: 23 MMOL/L
CREAT SERPL-MCNC: 0.8 MG/DL
CREAT SERPL-MCNC: 0.9 MG/DL
CREAT SERPL-MCNC: 1.1 MG/DL
DIFFERENTIAL METHOD: ABNORMAL
EOSINOPHIL # BLD AUTO: 0.1 K/UL
EOSINOPHIL NFR BLD: 2.4 %
ERYTHROCYTE [DISTWIDTH] IN BLOOD BY AUTOMATED COUNT: 13.2 %
EST. GFR  (AFRICAN AMERICAN): 52 ML/MIN/1.73 M^2
EST. GFR  (AFRICAN AMERICAN): >60 ML/MIN/1.73 M^2
EST. GFR  (AFRICAN AMERICAN): >60 ML/MIN/1.73 M^2
EST. GFR  (NON AFRICAN AMERICAN): 45 ML/MIN/1.73 M^2
EST. GFR  (NON AFRICAN AMERICAN): 58 ML/MIN/1.73 M^2
EST. GFR  (NON AFRICAN AMERICAN): >60 ML/MIN/1.73 M^2
GLUCOSE SERPL-MCNC: 100 MG/DL
GLUCOSE SERPL-MCNC: 106 MG/DL
GLUCOSE SERPL-MCNC: 108 MG/DL
HCT VFR BLD AUTO: 23.9 %
HGB BLD-MCNC: 7.9 G/DL
LYMPHOCYTES # BLD AUTO: 0.9 K/UL
LYMPHOCYTES NFR BLD: 18.4 %
MAGNESIUM SERPL-MCNC: 2 MG/DL
MCH RBC QN AUTO: 31.1 PG
MCHC RBC AUTO-ENTMCNC: 33.1 G/DL
MCV RBC AUTO: 94 FL
MONOCYTES # BLD AUTO: 0.5 K/UL
MONOCYTES NFR BLD: 10.4 %
NEUTROPHILS # BLD AUTO: 3.4 K/UL
NEUTROPHILS NFR BLD: 67.8 %
PHOSPHATE SERPL-MCNC: 4.4 MG/DL
PLATELET # BLD AUTO: 226 K/UL
PMV BLD AUTO: 8.2 FL
POCT GLUCOSE: 142 MG/DL (ref 70–110)
POTASSIUM SERPL-SCNC: 4.7 MMOL/L
POTASSIUM SERPL-SCNC: 5.1 MMOL/L
POTASSIUM SERPL-SCNC: 5.5 MMOL/L
RBC # BLD AUTO: 2.54 M/UL
SODIUM SERPL-SCNC: 128 MMOL/L
SODIUM SERPL-SCNC: 128 MMOL/L
SODIUM SERPL-SCNC: 130 MMOL/L
WBC # BLD AUTO: 4.99 K/UL

## 2018-03-25 PROCEDURE — 80048 BASIC METABOLIC PNL TOTAL CA: CPT

## 2018-03-25 PROCEDURE — 84100 ASSAY OF PHOSPHORUS: CPT

## 2018-03-25 PROCEDURE — 63600175 PHARM REV CODE 636 W HCPCS: Performed by: STUDENT IN AN ORGANIZED HEALTH CARE EDUCATION/TRAINING PROGRAM

## 2018-03-25 PROCEDURE — 83735 ASSAY OF MAGNESIUM: CPT

## 2018-03-25 PROCEDURE — 25000003 PHARM REV CODE 250: Performed by: STUDENT IN AN ORGANIZED HEALTH CARE EDUCATION/TRAINING PROGRAM

## 2018-03-25 PROCEDURE — 11000001 HC ACUTE MED/SURG PRIVATE ROOM

## 2018-03-25 PROCEDURE — 85025 COMPLETE CBC W/AUTO DIFF WBC: CPT

## 2018-03-25 PROCEDURE — 36415 COLL VENOUS BLD VENIPUNCTURE: CPT

## 2018-03-25 PROCEDURE — 25000242 PHARM REV CODE 250 ALT 637 W/ HCPCS: Performed by: STUDENT IN AN ORGANIZED HEALTH CARE EDUCATION/TRAINING PROGRAM

## 2018-03-25 PROCEDURE — 94640 AIRWAY INHALATION TREATMENT: CPT

## 2018-03-25 PROCEDURE — 80048 BASIC METABOLIC PNL TOTAL CA: CPT | Mod: 91

## 2018-03-25 PROCEDURE — 94761 N-INVAS EAR/PLS OXIMETRY MLT: CPT

## 2018-03-25 RX ORDER — IPRATROPIUM BROMIDE AND ALBUTEROL SULFATE 2.5; .5 MG/3ML; MG/3ML
3 SOLUTION RESPIRATORY (INHALATION) EVERY 4 HOURS PRN
Status: DISCONTINUED | OUTPATIENT
Start: 2018-03-25 | End: 2018-03-28 | Stop reason: HOSPADM

## 2018-03-25 RX ORDER — ACETAMINOPHEN 500 MG
500 TABLET ORAL ONCE
Status: COMPLETED | OUTPATIENT
Start: 2018-03-25 | End: 2018-03-25

## 2018-03-25 RX ORDER — GLYCERIN 1 G/1
1 SUPPOSITORY RECTAL ONCE AS NEEDED
Status: DISPENSED | OUTPATIENT
Start: 2018-03-25 | End: 2018-03-25

## 2018-03-25 RX ORDER — SENNOSIDES 8.6 MG/1
8.6 TABLET ORAL DAILY PRN
Status: DISCONTINUED | OUTPATIENT
Start: 2018-03-25 | End: 2018-03-28 | Stop reason: HOSPADM

## 2018-03-25 RX ADMIN — MOXIFLOXACIN HYDROCHLORIDE 400 MG: 400 TABLET, FILM COATED ORAL at 09:03

## 2018-03-25 RX ADMIN — POLYETHYLENE GLYCOL 3350 17 G: 17 POWDER, FOR SOLUTION ORAL at 09:03

## 2018-03-25 RX ADMIN — AMLODIPINE BESYLATE 10 MG: 5 TABLET ORAL at 09:03

## 2018-03-25 RX ADMIN — FLUTICASONE PROPIONATE 100 MCG: 50 SPRAY, METERED NASAL at 09:03

## 2018-03-25 RX ADMIN — HEPARIN SODIUM 5000 UNITS: 5000 INJECTION, SOLUTION INTRAVENOUS; SUBCUTANEOUS at 08:03

## 2018-03-25 RX ADMIN — IPRATROPIUM BROMIDE AND ALBUTEROL SULFATE 3 ML: .5; 3 SOLUTION RESPIRATORY (INHALATION) at 08:03

## 2018-03-25 RX ADMIN — SIMVASTATIN 40 MG: 20 TABLET, FILM COATED ORAL at 08:03

## 2018-03-25 RX ADMIN — SENNOSIDES 8.6 MG: 8.6 TABLET, FILM COATED ORAL at 09:03

## 2018-03-25 RX ADMIN — ASPIRIN 325 MG ORAL TABLET 325 MG: 325 PILL ORAL at 09:03

## 2018-03-25 RX ADMIN — RAMELTEON 8 MG: 8 TABLET, FILM COATED ORAL at 08:03

## 2018-03-25 RX ADMIN — PANTOPRAZOLE SODIUM 40 MG: 40 TABLET, DELAYED RELEASE ORAL at 09:03

## 2018-03-25 RX ADMIN — FLUTICASONE PROPIONATE 100 MCG: 50 SPRAY, METERED NASAL at 08:03

## 2018-03-25 RX ADMIN — SODIUM POLYSTYRENE SULFONATE 30 G: 15 SUSPENSION ORAL; RECTAL at 08:03

## 2018-03-25 RX ADMIN — ACETAMINOPHEN 500 MG: 500 TABLET ORAL at 12:03

## 2018-03-25 RX ADMIN — HEPARIN SODIUM 5000 UNITS: 5000 INJECTION, SOLUTION INTRAVENOUS; SUBCUTANEOUS at 09:03

## 2018-03-25 NOTE — PLAN OF CARE
Problem: Patient Care Overview  Goal: Plan of Care Review  Outcome: Ongoing (interventions implemented as appropriate)  Pt's SpO2 98% on room air. No adverse reactions to aerosol tx. No respiratory distress noted. Continue to monitor SpO2.

## 2018-03-25 NOTE — PLAN OF CARE
Problem: Patient Care Overview  Goal: Plan of Care Review  Outcome: Ongoing (interventions implemented as appropriate)  POC reviewed with patient. Blood glucose monitored per sliding scale. Aspiration precautions maintained. Telemetry HR 80's NSR. Safety maintained will continue to monitor

## 2018-03-25 NOTE — PROGRESS NOTES
[facility] medicine Resident SANNA Progress Note    Subjective:      Doing well this morning. Denies subjective fever/chills overnight. Denies chest pain, shortness of breath, or abdominal pain. Complains of constipation this am and would like stool softener    Objective:   Last 24 Hour Vital Signs:  BP  Min: 96/49  Max: 147/70  Temp  Av.9 °F (36.6 °C)  Min: 97.4 °F (36.3 °C)  Max: 98.7 °F (37.1 °C)  Pulse  Av.4  Min: 63  Max: 91  Resp  Av.7  Min: 16  Max: 20  SpO2  Av.5 %  Min: 96 %  Max: 98 %  I/O last 3 completed shifts:  In: 720 [P.O.:720]  Out: 2400 [Urine:2400]    Physical Examination:  General:          Alert and awake in NAD  HEENT: NC/AT, EOMI, PERRL, OP clear, MMM  Cardio:             Ireggular rhythm, 2/6 systolic ejection murmur heard in all areas  Resp:               mild wheezing with rhonchi; no focal consolidative changes appreciated  Abdom:            Soft, NTND with normoactive bowel sounds  Extrem:            1+ edema to midshins b/l; WWP with no clubbing, cyanosis  Skin:                No rashes, lesions, or color changes  Pulses:            2+ and symmetric distally  Neuro:             AAOx3; cooperative and pleasant with no focal deficits    Laboratory:  Laboratory Data Reviewed: yes  Pertinent Findings:    Recent Labs  Lab 18  0436  18  0140 18  0847 18  1542 18  0019 18  0756   WBC 8.20  --  7.30  --   --   --  4.99   HGB 7.8*  --  7.9*  --   --   --  7.9*   HCT 23.0*  --  24.0*  --   --   --  23.9*     --  274  --   --   --  226   MCV 92  --  93  --   --   --  94   RDW 12.6  --  13.1  --   --   --  13.2   *  < > 132*  131* 130* 130* 128*  --    K 5.0  < > 4.4  4.4 4.3 5.0 5.1  --    CL  --   < > 99 99 98 97  --    CO2  --   < > 24 22* 24 22*  --    BUN  --   < > 11 9 12 15  --    CREATININE  --   < > 0.9 0.8 1.0 0.9  --    GLU  --   < > 116* 132* 110 106  --    < > = values in this interval not displayed.    Microbiology Data  Reviewed: yes  Pertinent Findings:    Other Results:  EKG (my interpretation): previous EKG reviewed    Radiology Data Reviewed: yes  Pertinent Findings:      Current Medications:     Infusions:       Scheduled:   albuterol-ipratropium 2.5mg-0.5mg/3mL  3 mL Nebulization Q4H WAKE    amLODIPine  10 mg Oral Daily    aspirin  325 mg Oral Daily    fluticasone  2 spray Each Nare BID    heparin (porcine)  5,000 Units Subcutaneous Q12H    moxifloxacin  400 mg Oral Daily    pantoprazole  40 mg Oral Daily    polyethylene glycol  17 g Oral Daily    simvastatin  40 mg Oral QHS        PRN:  calcium carbonate, ramelteon, senna, sodium chloride 0.9%    Antibiotics and Day Number of Therapy:  vanc and cefepime 3/17- 3/19  Moxi 3/20-current    Lines and Day Number of Therapy:  piv    Plan:     HCAP  -Recent hospital admit/SNF, dry cough x 1 week, general weakness  On admit, CXR with RLL opacity  No signs of systemic infection  -Vanc, cefepime started 3/17-3/19, moxi3/20-present  -blood cx remains NGTD  -patient tolerated breathing tx well this am. Continue to monitor     Hypovolemic hyponatremia vs SIADH  -Admitted 1 month ago--hypovolemic hypoNa s/p several days N/V/D  This admit, recent poor PO intake, N/V x 3 day of admit  -On admit, Na 117, Cl 76, urine Na 81, urine osm 305  Uric acid 2.3  - labile Na, concerning for SIADH; nephrology consulted--started tolvaptan 3/21--recommend outpatient use and f/u Risen or Brown; if patient cannot afford, should consider demeclocylcine  CM assisting with insurance approval  - fluid restriction, increase salt diet  - CT chest - b/l pleural effusions  - MRI brain - no acute changes/ no masses  -Trial of fluid restriction     Elevated troponin  -On admit, troponin 0.573->0.895  Denies chest pain, SOB  -EKG with irregular rhythm, variable P-wave morphology--new from previous  No ST elevations  -Consult cardiology  Received , plavix 300, heparin gtt, simvastatin  -trop  tended out, patient denies CP overnight. Hep gtt stopped  -will f/u cards recs  -echo - hypokinetic apical septum, intermediate LV diastolic dysfunction, PHTN (41 mmHg)     AoCD  -On admit, H/H 10/7/30--at baseline  Recent w/u c/w AoCD     HTN  -On admit, normotensive  Holding home lisinopril, norvasc in setting of HCAP--can resume if becomes hypertensive  - resumed ACEi     HLD  -Continue home simvastatin     GERD  -No acute issues  Continue home omeprazole    Ppx: subQ heparin  Dispo: pending further clinical improvement of HCAP, pending improvement of Na+     Edward Chisholm  [facility] Internal Medicine/Pediatrics HO-I  [facility] medicine Service Team B    [facility] Medicine Hospitalist Pager numbers:   [facility] Hospitalist Medicine Team A (Adrien/Rufino): [phone]  [facility] Hospitalist Medicine Team B (Emily/Mekhi):  [phone]

## 2018-03-25 NOTE — PLAN OF CARE
Ambulating in room. Up in chair.Seen by Dr Chisholm. Denies any discomfort,no c/o chest pain or sob .Pox on RA 95%-96% Family  At bedside. Telemetry discontinued as ordered.

## 2018-03-26 ENCOUNTER — TELEPHONE (OUTPATIENT)
Dept: PHARMACY | Facility: HOSPITAL | Age: 83
End: 2018-03-26

## 2018-03-26 LAB
ANION GAP SERPL CALC-SCNC: 10 MMOL/L
ANION GAP SERPL CALC-SCNC: 12 MMOL/L
ANION GAP SERPL CALC-SCNC: 9 MMOL/L
BASOPHILS # BLD AUTO: 0.01 K/UL
BASOPHILS NFR BLD: 0.2 %
BUN SERPL-MCNC: 12 MG/DL
BUN SERPL-MCNC: 12 MG/DL
BUN SERPL-MCNC: 15 MG/DL
CALCIUM SERPL-MCNC: 8.4 MG/DL
CALCIUM SERPL-MCNC: 8.5 MG/DL
CALCIUM SERPL-MCNC: 8.6 MG/DL
CHLORIDE SERPL-SCNC: 98 MMOL/L
CHLORIDE SERPL-SCNC: 99 MMOL/L
CHLORIDE SERPL-SCNC: 99 MMOL/L
CO2 SERPL-SCNC: 19 MMOL/L
CO2 SERPL-SCNC: 21 MMOL/L
CO2 SERPL-SCNC: 21 MMOL/L
CREAT SERPL-MCNC: 0.8 MG/DL
CREAT SERPL-MCNC: 0.8 MG/DL
CREAT SERPL-MCNC: 1 MG/DL
DIFFERENTIAL METHOD: ABNORMAL
EOSINOPHIL # BLD AUTO: 0.2 K/UL
EOSINOPHIL NFR BLD: 3.3 %
ERYTHROCYTE [DISTWIDTH] IN BLOOD BY AUTOMATED COUNT: 13.4 %
EST. GFR  (AFRICAN AMERICAN): 59 ML/MIN/1.73 M^2
EST. GFR  (AFRICAN AMERICAN): >60 ML/MIN/1.73 M^2
EST. GFR  (AFRICAN AMERICAN): >60 ML/MIN/1.73 M^2
EST. GFR  (NON AFRICAN AMERICAN): 51 ML/MIN/1.73 M^2
EST. GFR  (NON AFRICAN AMERICAN): >60 ML/MIN/1.73 M^2
EST. GFR  (NON AFRICAN AMERICAN): >60 ML/MIN/1.73 M^2
GLUCOSE SERPL-MCNC: 105 MG/DL
GLUCOSE SERPL-MCNC: 114 MG/DL
GLUCOSE SERPL-MCNC: 117 MG/DL
HCT VFR BLD AUTO: 25.1 %
HGB BLD-MCNC: 8.3 G/DL
LYMPHOCYTES # BLD AUTO: 0.9 K/UL
LYMPHOCYTES NFR BLD: 18.2 %
MAGNESIUM SERPL-MCNC: 1.7 MG/DL
MCH RBC QN AUTO: 31.1 PG
MCHC RBC AUTO-ENTMCNC: 33.1 G/DL
MCV RBC AUTO: 94 FL
MONOCYTES # BLD AUTO: 0.6 K/UL
MONOCYTES NFR BLD: 12.4 %
NEUTROPHILS # BLD AUTO: 3.2 K/UL
NEUTROPHILS NFR BLD: 65.5 %
PHOSPHATE SERPL-MCNC: 3.9 MG/DL
PLATELET # BLD AUTO: 265 K/UL
PMV BLD AUTO: 8.6 FL
POTASSIUM SERPL-SCNC: 3.9 MMOL/L
POTASSIUM SERPL-SCNC: 4.1 MMOL/L
POTASSIUM SERPL-SCNC: 4.2 MMOL/L
POTASSIUM SERPL-SCNC: 4.3 MMOL/L
RBC # BLD AUTO: 2.67 M/UL
SODIUM SERPL-SCNC: 129 MMOL/L
SODIUM SERPL-SCNC: 130 MMOL/L
WBC # BLD AUTO: 4.84 K/UL

## 2018-03-26 PROCEDURE — 84132 ASSAY OF SERUM POTASSIUM: CPT

## 2018-03-26 PROCEDURE — 83735 ASSAY OF MAGNESIUM: CPT

## 2018-03-26 PROCEDURE — 85025 COMPLETE CBC W/AUTO DIFF WBC: CPT

## 2018-03-26 PROCEDURE — 25000003 PHARM REV CODE 250: Performed by: STUDENT IN AN ORGANIZED HEALTH CARE EDUCATION/TRAINING PROGRAM

## 2018-03-26 PROCEDURE — 36415 COLL VENOUS BLD VENIPUNCTURE: CPT

## 2018-03-26 PROCEDURE — 80048 BASIC METABOLIC PNL TOTAL CA: CPT

## 2018-03-26 PROCEDURE — 11000001 HC ACUTE MED/SURG PRIVATE ROOM

## 2018-03-26 PROCEDURE — 94761 N-INVAS EAR/PLS OXIMETRY MLT: CPT

## 2018-03-26 PROCEDURE — 84100 ASSAY OF PHOSPHORUS: CPT

## 2018-03-26 PROCEDURE — 63600175 PHARM REV CODE 636 W HCPCS: Performed by: STUDENT IN AN ORGANIZED HEALTH CARE EDUCATION/TRAINING PROGRAM

## 2018-03-26 PROCEDURE — 84295 ASSAY OF SERUM SODIUM: CPT

## 2018-03-26 RX ORDER — TOLVAPTAN 15 MG/1
15 TABLET ORAL DAILY
Status: COMPLETED | OUTPATIENT
Start: 2018-03-26 | End: 2018-03-28

## 2018-03-26 RX ORDER — TOLVAPTAN 15 MG/1
15 TABLET ORAL DAILY
Qty: 30 TABLET | Refills: 0 | Status: SHIPPED | OUTPATIENT
Start: 2018-03-26 | End: 2018-05-07

## 2018-03-26 RX ORDER — GLYCERIN 1 G/1
1 SUPPOSITORY RECTAL ONCE
Status: COMPLETED | OUTPATIENT
Start: 2018-03-26 | End: 2018-03-26

## 2018-03-26 RX ADMIN — FLUTICASONE PROPIONATE 100 MCG: 50 SPRAY, METERED NASAL at 09:03

## 2018-03-26 RX ADMIN — PANTOPRAZOLE SODIUM 40 MG: 40 TABLET, DELAYED RELEASE ORAL at 09:03

## 2018-03-26 RX ADMIN — AMLODIPINE BESYLATE 10 MG: 5 TABLET ORAL at 09:03

## 2018-03-26 RX ADMIN — TOLVAPTAN 15 MG: 15 TABLET ORAL at 11:03

## 2018-03-26 RX ADMIN — FLUTICASONE PROPIONATE 100 MCG: 50 SPRAY, METERED NASAL at 08:03

## 2018-03-26 RX ADMIN — SIMVASTATIN 40 MG: 20 TABLET, FILM COATED ORAL at 08:03

## 2018-03-26 RX ADMIN — ASPIRIN 325 MG ORAL TABLET 325 MG: 325 PILL ORAL at 09:03

## 2018-03-26 RX ADMIN — HEPARIN SODIUM 5000 UNITS: 5000 INJECTION, SOLUTION INTRAVENOUS; SUBCUTANEOUS at 08:03

## 2018-03-26 RX ADMIN — GLYCERIN 1 SUPPOSITORY: 2.1 SUPPOSITORY RECTAL at 04:03

## 2018-03-26 RX ADMIN — HEPARIN SODIUM 5000 UNITS: 5000 INJECTION, SOLUTION INTRAVENOUS; SUBCUTANEOUS at 09:03

## 2018-03-26 RX ADMIN — MOXIFLOXACIN HYDROCHLORIDE 400 MG: 400 TABLET, FILM COATED ORAL at 09:03

## 2018-03-26 RX ADMIN — RAMELTEON 8 MG: 8 TABLET, FILM COATED ORAL at 08:03

## 2018-03-26 NOTE — ASSESSMENT & PLAN NOTE
117 on presentation 129 today. SIADH. Improving on tolvaptan. Continue current dose and have pt follow up with Dr. Brown or Dr. Mercedes as an out pt to determine if this medication needs to be continued.

## 2018-03-26 NOTE — PLAN OF CARE
TN spoke with daughter Aliya this morning. She stated she emailed Ping (pharmacy assistance) patient's information. TN also messaged Geeta with pharmacy assistance and notified her.     TN clarified with Dr. Chisholm, does not want TN to send medication request to N. They want to obtain medication from the pharmaceutical company.    12:00 pm--Geeta with Pharmacy Assistance with the Specialty Team is checking to see if they should be handling this too.    1:00 pm--TN spoke with Geeta, she informed TN that the Specialty Team is working on patient's case. TN was referred to Izabella. --TN left message for Izabella.    1:32 pm--Izabella stated that they just got the rx, and once they start their process, we will let TN know if they need anything.    1406 pm--TN spoke with Izabella with Pharmacy Assistance. She stated they have to get an official denial by her insurance company too prior to obtaining the medication request from the pharmaceutical company, since she has insurance. She informed TN that the medication company will want to make sure insurance does not approve it as well. They will have to do an appeal as well, if not covered and unable to obtain. Then they would go through the pharmaceutical company since she has insurance. She informed TN that they usually go through the pharmaceutical company first if the patient doesn't have insurance.--TN notified Dr. Chisholm.    1536--TN confirmed with Izabella that they have the forms for the medicare prescription coverage for PH[facility]. They fill those out, nothing needed from TN.    1609--TN met with patient updated her as well.    Future Appointments  Date Time Provider Department Center   3/28/2018 2:00 PM Nadege العلي MD [facility]   4/4/2018 3:00 PM ADENIKE Rojas [facility]   4/4/2018 3:40 PM Noemi Wahl MD [facility]      03/26/18 1415   Discharge Reassessment   Assessment Type Discharge Planning  Reassessment   Discharge Plan A Home with family;Home Health   Discharge Plan B Home with family   Can the patient answer the patient profile reliably? Yes, cognitively intact   Describe the patient's ability to walk at the present time. Walks with the help of equipment   How often would a person be available to care for the patient? Often     Jennifer Guerrier RN  Transition Navigator  [phone]

## 2018-03-26 NOTE — PROGRESS NOTES
[facility] medicine Resident SANNA Progress Note    Subjective:      Doing well this morning. Denies subjective fever/chills overnight. Denies chest pain, shortness of breath, or abdominal pain. Had 1 BM last night after suppository    Objective:   Last 24 Hour Vital Signs:  BP  Min: 98/51  Max: 156/69  Temp  Av.3 °F (36.8 °C)  Min: 97.9 °F (36.6 °C)  Max: 99 °F (37.2 °C)  Pulse  Av  Min: 71  Max: 78  Resp  Av  Min: 14  Max: 18  SpO2  Av.6 %  Min: 92 %  Max: 98 %  I/O last 3 completed shifts:  In: 745 [P.O.:745]  Out: 1200 [Urine:1200]    Physical Examination:  General:          Alert and awake in NAD  HEENT: NC/AT, EOMI, PERRL, OP clear, MMM  Cardio:             Ireggular rhythm, 2/6 systolic ejection murmur heard in all areas  Resp:               mild wheezing with rhonchi; no focal consolidative changes appreciated  Abdom:            Soft, NTND with normoactive bowel sounds  Extrem:            1+ edema to midshins b/l; WWP with no clubbing, cyanosis  Skin:                No rashes, lesions, or color changes  Pulses:            2+ and symmetric distally  Neuro:             AAOx3; cooperative and pleasant with no focal deficits    Laboratory:  Laboratory Data Reviewed: yes  Pertinent Findings:    Recent Labs  Lab 18  0140  18  0756 18  1610 18  2341 18  0320   WBC 7.30  --  4.99  --   --  4.84   HGB 7.9*  --  7.9*  --   --  8.3*   HCT 24.0*  --  23.9*  --   --  25.1*     --  226  --   --  265   MCV 93  --  94  --   --  94   RDW 13.1  --  13.2  --   --  13.4   *  131*  < > 130* 128* 129* 129*   K 4.4  4.4  < > 4.7 5.5* 4.3 4.1   CL 99  < > 99 98 98  --    CO2 24  < > 22* 23 19*  --    BUN 11  < > 12 16 15  --    CREATININE 0.9  < > 0.8 1.1 1.0  --    *  < > 100 108 114*  --    < > = values in this interval not displayed.    Microbiology Data Reviewed: yes  Pertinent Findings:    Other Results:  EKG (my interpretation): previous EKG reviewed    Radiology  Data Reviewed: yes  Pertinent Findings:      Current Medications:     Infusions:       Scheduled:   amLODIPine  10 mg Oral Daily    aspirin  325 mg Oral Daily    fluticasone  2 spray Each Nare BID    heparin (porcine)  5,000 Units Subcutaneous Q12H    moxifloxacin  400 mg Oral Daily    pantoprazole  40 mg Oral Daily    polyethylene glycol  17 g Oral Daily    simvastatin  40 mg Oral QHS        PRN:  albuterol-ipratropium 2.5mg-0.5mg/3mL, calcium carbonate, ramelteon, senna, sodium chloride 0.9%    Antibiotics and Day Number of Therapy:  vanc and cefepime 3/17- 3/19  Moxi 3/20-current    Lines and Day Number of Therapy:  piv    Plan:     HCAP  -Recent hospital admit/SNF, dry cough x 1 week, general weakness  On admit, CXR with RLL opacity  No signs of systemic infection  -Vanc, cefepime started 3/17-3/19, moxi3/20-present  -blood cx remains NGTD  -patient tolerated breathing tx well this am. Continue to monitor     Hypovolemic hyponatremia vs SIADH  -Admitted 1 month ago--hypovolemic hypoNa s/p several days N/V/D  This admit, recent poor PO intake, N/V x 3 day of admit  -On admit, Na 117, Cl 76, urine Na 81, urine osm 305  Uric acid 2.3  - labile Na, concerning for SIADH; nephrology consulted--started tolvaptan 3/21--recommend outpatient use and f/u Risen or Brown; if patient cannot afford, should consider demeclocylcine  CM assisting with insurance approval  - fluid restriction, increase salt diet  - CT chest - b/l pleural effusions  - MRI brain - no acute changes/ no masses  -Trial of fluid restriction, Na remains stable     Elevated troponin  -On admit, troponin 0.573->0.895  Denies chest pain, SOB  -EKG with irregular rhythm, variable P-wave morphology--new from previous  No ST elevations  -Consult cardiology  Received , plavix 300, heparin gtt, simvastatin  -trop tended out, patient denies CP overnight. Hep gtt stopped  -will f/u cards recs  -echo - hypokinetic apical septum, intermediate  LV diastolic dysfunction, PHTN (41 mmHg)     AoCD  -On admit, H/H 10/7/30--at baseline  Recent w/u c/w AoCD     HTN  -On admit, normotensive  Holding home lisinopril, norvasc in setting of HCAP--can resume if becomes hypertensive  - resumed ACEi     HLD  -Continue home simvastatin     GERD  -No acute issues  Continue home omeprazole    Ppx: subQ heparin  Dispo: pending further clinical improvement of HCAP, pending improvement of Na+     Edward Chisholm  \[facility]\"" Internal Medicine/Pediatrics HO-I  [facility] medicine Service Team B    \[facility]\"" Medicine Hospitalist Pager numbers:   \[facility]\"" Hospitalist Medicine Team A (Adrien/Rufino): [phone]  \[facility]\"" Hospitalist Medicine Team B (Emily/Mekhi):  [phone]

## 2018-03-26 NOTE — TELEPHONE ENCOUNTER
Documentation Only:    Faxed Prior Authorization form and supporting documentation for [employer] to insurance on 03/26/2018.

## 2018-03-26 NOTE — PROGRESS NOTES
.Pharmacy New Medication Education    Patient accepted medication education.    Pharmacy educated patient on name and purpose of medications and possible side effects, using the teach-back method.     Current Inpatient Medication Orders   albuterol-ipratropium 2.5mg-0.5mg/3mL nebulizer solution 3 mL   amLODIPine tablet 10 mg   aspirin tablet 325 mg   calcium carbonate 200 mg calcium (500 mg) chewable tablet 500 mg   fluticasone 50 mcg/actuation nasal spray 100 mcg   heparin (porcine) injection 5,000 Units   moxifloxacin tablet 400 mg   pantoprazole EC tablet 40 mg   polyethylene glycol packet 17 g   ramelteon tablet 8 mg   senna tablet 8.6 mg   simvastatin tablet 40 mg   sodium chloride 0.9% flush 5 mL       Learners of pharmacy medication education included:  Patient    Patient +/- learner response:  Verbalized Understanding, Teachback

## 2018-03-26 NOTE — SUBJECTIVE & OBJECTIVE
Interval History: Pt with nausea and gagging this AM. Denies F/C, SOB, CP, swelling, rashes.    Review of patient's allergies indicates:   Allergen Reactions    Pcn [penicillins] Rash     Current Facility-Administered Medications   Medication Frequency    albuterol-ipratropium 2.5mg-0.5mg/3mL nebulizer solution 3 mL Q4H PRN    amLODIPine tablet 10 mg Daily    aspirin tablet 325 mg Daily    calcium carbonate 200 mg calcium (500 mg) chewable tablet 500 mg Daily PRN    fluticasone 50 mcg/actuation nasal spray 100 mcg BID    heparin (porcine) injection 5,000 Units Q12H    moxifloxacin tablet 400 mg Daily    pantoprazole EC tablet 40 mg Daily    polyethylene glycol packet 17 g Daily    ramelteon tablet 8 mg Nightly PRN    senna tablet 8.6 mg Daily PRN    simvastatin tablet 40 mg QHS    sodium chloride 0.9% flush 5 mL PRN    tolvaptan tablet 15 mg Daily       Objective:     Vital Signs (Most Recent):  Temp: 98.5 °F (36.9 °C) (03/26/18 0752)  Pulse: 77 (03/26/18 0752)  Resp: 18 (03/26/18 0752)  BP: (!) 143/64 (03/26/18 0752)  SpO2: 98 % (03/26/18 0843)  O2 Device (Oxygen Therapy): room air (03/26/18 0843) Vital Signs (24h Range):  Temp:  [97.9 °F (36.6 °C)-99 °F (37.2 °C)] 98.5 °F (36.9 °C)  Pulse:  [71-78] 77  Resp:  [14-18] 18  SpO2:  [92 %-98 %] 98 %  BP: ()/(51-69) 143/64     Weight: 50.6 kg (111 lb 8 oz) (03/22/18 0500)  Body mass index is 23.3 kg/m².  Body surface area is 1.44 meters squared.    I/O last 3 completed shifts:  In: 1095 [P.O.:1095]  Out: 1750 [Urine:1750]    Physical Exam   Constitutional: She is oriented to person, place, and time. No distress.   HENT:   Head: Normocephalic and atraumatic.   Eyes: Conjunctivae are normal. No scleral icterus.   Neck: Neck supple. No tracheal deviation present.   Cardiovascular: Normal rate and regular rhythm.    Pulmonary/Chest: Effort normal and breath sounds normal.   Abdominal: Soft. She exhibits no distension. There is no tenderness.    Musculoskeletal: She exhibits no edema or tenderness.   Neurological: She is alert and oriented to person, place, and time.   Skin: Skin is warm and dry. She is not diaphoretic.   Psychiatric: She has a normal mood and affect. Her behavior is normal.   Vitals reviewed.      Significant Labs:  All labs within the past 24 hours have been reviewed.     Significant Imaging:    None within the past 24 hours

## 2018-03-26 NOTE — PROGRESS NOTES
"[facility]  Nephrology  Progress Note    Patient Name: Willow Giron  MRN: [id number]  Admission Date: 3/17/2018  Hospital Length of Stay: 8 days  Attending Provider: Andrea Diaz MD   Primary Care Physician: Qamar Bland MD  Principal Problem:HCAP (healthcare-associated pneumonia)    Subjective:     HPI:   Willow Giron 87 y.o. female  has a past medical history of Arthritis; GERD (gastroesophageal reflux disease); Hyperlipidemia; and Hypertension. who presented with   Chief Complaint   Patient presents with    Vomiting     X1 episode of vomiting this evening after eating soup. Reports pt was d/c from skilled nursing facility last week for electorlyte imblance. Denies any pain at this time. Reports "not too much" nausea.      Pt presents with SOB and cough for about one week. She previously saw here PCP who diagnosed her with bronchitis and gave her a coarse of steroids and a z-pack but her symptoms worsened. She was hospitalized here last month and was seem by us for hyponatremia. At that time it was attributed to tea and toast diet and pt was discharged to [facility] on salt tablets. Pt discontinued salt tablets because they made her nauseated. Pt has not eaten much in the past few days due to nausea. Denies F/C, sick contacts, CP, N/V/D/C, swelling, dysuria, hematuria, blood in stool, melena, rashes.    Interval History: Pt with nausea and gagging this AM. Denies F/C, SOB, CP, swelling, rashes.    Review of patient's allergies indicates:   Allergen Reactions    Pcn [penicillins] Rash     Current Facility-Administered Medications   Medication Frequency    albuterol-ipratropium 2.5mg-0.5mg/3mL nebulizer solution 3 mL Q4H PRN    amLODIPine tablet 10 mg Daily    aspirin tablet 325 mg Daily    calcium carbonate 200 mg calcium (500 mg) chewable tablet 500 mg Daily PRN    fluticasone 50 mcg/actuation nasal spray 100 mcg BID    heparin (porcine) injection 5,000 Units Q12H    " moxifloxacin tablet 400 mg Daily    pantoprazole EC tablet 40 mg Daily    polyethylene glycol packet 17 g Daily    ramelteon tablet 8 mg Nightly PRN    senna tablet 8.6 mg Daily PRN    simvastatin tablet 40 mg QHS    sodium chloride 0.9% flush 5 mL PRN    tolvaptan tablet 15 mg Daily       Objective:     Vital Signs (Most Recent):  Temp: 98.5 °F (36.9 °C) (03/26/18 0752)  Pulse: 77 (03/26/18 0752)  Resp: 18 (03/26/18 0752)  BP: (!) 143/64 (03/26/18 0752)  SpO2: 98 % (03/26/18 0843)  O2 Device (Oxygen Therapy): room air (03/26/18 0843) Vital Signs (24h Range):  Temp:  [97.9 °F (36.6 °C)-99 °F (37.2 °C)] 98.5 °F (36.9 °C)  Pulse:  [71-78] 77  Resp:  [14-18] 18  SpO2:  [92 %-98 %] 98 %  BP: ()/(51-69) 143/64     Weight: 50.6 kg (111 lb 8 oz) (03/22/18 0500)  Body mass index is 23.3 kg/m².  Body surface area is 1.44 meters squared.    I/O last 3 completed shifts:  In: 1095 [P.O.:1095]  Out: 1750 [Urine:1750]    Physical Exam   Constitutional: She is oriented to person, place, and time. No distress.   HENT:   Head: Normocephalic and atraumatic.   Eyes: Conjunctivae are normal. No scleral icterus.   Neck: Neck supple. No tracheal deviation present.   Cardiovascular: Normal rate and regular rhythm.    Pulmonary/Chest: Effort normal and breath sounds normal.   Abdominal: Soft. She exhibits no distension. There is no tenderness.   Musculoskeletal: She exhibits no edema or tenderness.   Neurological: She is alert and oriented to person, place, and time.   Skin: Skin is warm and dry. She is not diaphoretic.   Psychiatric: She has a normal mood and affect. Her behavior is normal.   Vitals reviewed.      Significant Labs:  All labs within the past 24 hours have been reviewed.     Significant Imaging:    None within the past 24 hours    Assessment/Plan:     Hypo-osmolality and hyponatremia    117 on presentation 129 today. SIADH. Improving on tolvaptan. Continue current dose and have pt follow up with Dr. Brown or  Dr. Mercedes as an out pt to determine if this medication needs to be continued.        SIADH (syndrome of inappropriate ADH production)    Brain MRI and CT chest without obvious cause of worsening SIADH, so likely PNA playing a role.        Essential hypertension    Continue home antihypertensives.            Thank you for your consult. I will follow-up with patient. Please contact us if you have any additional questions.    Levar Santana MD, 3/26/2018 10:08 AM  [facility] Nephrology PGY-IV  Pager: [phone]  Cell: [phone]

## 2018-03-26 NOTE — PLAN OF CARE
Problem: Patient Care Overview  Goal: Plan of Care Review  Outcome: Ongoing (interventions implemented as appropriate)  Patient lying in bed, awake and alert. HOB elevated to 35 degrees.  at bedside. Denies any pain or discomfort at this time. NADN. Safety measures in place. Will continue to monitor.

## 2018-03-26 NOTE — PLAN OF CARE
Problem: Patient Care Overview  Goal: Plan of Care Review  Outcome: Ongoing (interventions implemented as appropriate)  Pt VSS and NAD noted overnight. Pt c/o abd discomfort and constipation. Pt received kayexalate as well as suppository in the middle of the night. Pt had medium, formed brown, bm by morning. Pt potasium down to 4.1. Pt sodium remains elevated 129. Pt ambulating well to the bathroom with minimal assistance. Pt inconsistent with compliance of urine measurement container, even with re-education.     Plan of care reviewed with patient. Patient verbalized complete understanding. Fall precautions maintained. Rn to continue to monitor and report to oncoming RN.

## 2018-03-27 ENCOUNTER — TELEPHONE (OUTPATIENT)
Dept: PHARMACY | Facility: HOSPITAL | Age: 83
End: 2018-03-27

## 2018-03-27 LAB
ANION GAP SERPL CALC-SCNC: 11 MMOL/L
ANION GAP SERPL CALC-SCNC: 8 MMOL/L
ANION GAP SERPL CALC-SCNC: 9 MMOL/L
BASOPHILS # BLD AUTO: 0.02 K/UL
BASOPHILS NFR BLD: 0.3 %
BUN SERPL-MCNC: 10 MG/DL
BUN SERPL-MCNC: 12 MG/DL
BUN SERPL-MCNC: 8 MG/DL
CALCIUM SERPL-MCNC: 8.7 MG/DL
CALCIUM SERPL-MCNC: 8.9 MG/DL
CALCIUM SERPL-MCNC: 9.1 MG/DL
CHLORIDE SERPL-SCNC: 100 MMOL/L
CHLORIDE SERPL-SCNC: 100 MMOL/L
CHLORIDE SERPL-SCNC: 102 MMOL/L
CO2 SERPL-SCNC: 21 MMOL/L
CO2 SERPL-SCNC: 24 MMOL/L
CO2 SERPL-SCNC: 25 MMOL/L
CREAT SERPL-MCNC: 0.7 MG/DL
CREAT SERPL-MCNC: 0.8 MG/DL
CREAT SERPL-MCNC: 0.8 MG/DL
DIFFERENTIAL METHOD: ABNORMAL
EOSINOPHIL # BLD AUTO: 0 K/UL
EOSINOPHIL NFR BLD: 0.7 %
ERYTHROCYTE [DISTWIDTH] IN BLOOD BY AUTOMATED COUNT: 13.4 %
EST. GFR  (AFRICAN AMERICAN): >60 ML/MIN/1.73 M^2
EST. GFR  (NON AFRICAN AMERICAN): >60 ML/MIN/1.73 M^2
GLUCOSE SERPL-MCNC: 112 MG/DL
GLUCOSE SERPL-MCNC: 120 MG/DL
GLUCOSE SERPL-MCNC: 152 MG/DL
HCT VFR BLD AUTO: 24.2 %
HGB BLD-MCNC: 8 G/DL
LYMPHOCYTES # BLD AUTO: 0.9 K/UL
LYMPHOCYTES NFR BLD: 14.1 %
MAGNESIUM SERPL-MCNC: 1.7 MG/DL
MCH RBC QN AUTO: 31 PG
MCHC RBC AUTO-ENTMCNC: 33.1 G/DL
MCV RBC AUTO: 94 FL
MONOCYTES # BLD AUTO: 0.6 K/UL
MONOCYTES NFR BLD: 10.6 %
NEUTROPHILS # BLD AUTO: 4.5 K/UL
NEUTROPHILS NFR BLD: 74 %
PHOSPHATE SERPL-MCNC: 3.8 MG/DL
PLATELET # BLD AUTO: 250 K/UL
PMV BLD AUTO: 8.7 FL
POTASSIUM SERPL-SCNC: 3.4 MMOL/L
POTASSIUM SERPL-SCNC: 3.4 MMOL/L
POTASSIUM SERPL-SCNC: 3.7 MMOL/L
POTASSIUM SERPL-SCNC: 3.7 MMOL/L
RBC # BLD AUTO: 2.58 M/UL
SODIUM SERPL-SCNC: 132 MMOL/L
SODIUM SERPL-SCNC: 133 MMOL/L
SODIUM SERPL-SCNC: 133 MMOL/L
SODIUM SERPL-SCNC: 135 MMOL/L
WBC # BLD AUTO: 6.03 K/UL

## 2018-03-27 PROCEDURE — 25000003 PHARM REV CODE 250: Performed by: STUDENT IN AN ORGANIZED HEALTH CARE EDUCATION/TRAINING PROGRAM

## 2018-03-27 PROCEDURE — 94761 N-INVAS EAR/PLS OXIMETRY MLT: CPT

## 2018-03-27 PROCEDURE — 11000001 HC ACUTE MED/SURG PRIVATE ROOM

## 2018-03-27 PROCEDURE — 63600175 PHARM REV CODE 636 W HCPCS: Performed by: STUDENT IN AN ORGANIZED HEALTH CARE EDUCATION/TRAINING PROGRAM

## 2018-03-27 PROCEDURE — G8978 MOBILITY CURRENT STATUS: HCPCS | Mod: CJ

## 2018-03-27 PROCEDURE — G8979 MOBILITY GOAL STATUS: HCPCS | Mod: CH

## 2018-03-27 PROCEDURE — 80048 BASIC METABOLIC PNL TOTAL CA: CPT | Mod: 91

## 2018-03-27 PROCEDURE — 85025 COMPLETE CBC W/AUTO DIFF WBC: CPT

## 2018-03-27 PROCEDURE — 97164 PT RE-EVAL EST PLAN CARE: CPT

## 2018-03-27 PROCEDURE — 83735 ASSAY OF MAGNESIUM: CPT

## 2018-03-27 PROCEDURE — 36415 COLL VENOUS BLD VENIPUNCTURE: CPT

## 2018-03-27 PROCEDURE — 84295 ASSAY OF SERUM SODIUM: CPT

## 2018-03-27 PROCEDURE — 84132 ASSAY OF SERUM POTASSIUM: CPT

## 2018-03-27 PROCEDURE — 84100 ASSAY OF PHOSPHORUS: CPT

## 2018-03-27 RX ORDER — SIMETHICONE 125 MG
125 TABLET,CHEWABLE ORAL EVERY 6 HOURS PRN
Status: DISCONTINUED | OUTPATIENT
Start: 2018-03-27 | End: 2018-03-28 | Stop reason: HOSPADM

## 2018-03-27 RX ADMIN — HEPARIN SODIUM 5000 UNITS: 5000 INJECTION, SOLUTION INTRAVENOUS; SUBCUTANEOUS at 09:03

## 2018-03-27 RX ADMIN — RAMELTEON 8 MG: 8 TABLET, FILM COATED ORAL at 10:03

## 2018-03-27 RX ADMIN — SIMVASTATIN 40 MG: 20 TABLET, FILM COATED ORAL at 10:03

## 2018-03-27 RX ADMIN — FLUTICASONE PROPIONATE 100 MCG: 50 SPRAY, METERED NASAL at 10:03

## 2018-03-27 RX ADMIN — AMLODIPINE BESYLATE 10 MG: 5 TABLET ORAL at 09:03

## 2018-03-27 RX ADMIN — ASPIRIN 325 MG ORAL TABLET 325 MG: 325 PILL ORAL at 09:03

## 2018-03-27 RX ADMIN — SIMETHICONE 125 MG: 125 TABLET, CHEWABLE ORAL at 01:03

## 2018-03-27 RX ADMIN — PANTOPRAZOLE SODIUM 40 MG: 40 TABLET, DELAYED RELEASE ORAL at 09:03

## 2018-03-27 RX ADMIN — SIMETHICONE 125 MG: 125 TABLET, CHEWABLE ORAL at 10:03

## 2018-03-27 RX ADMIN — HEPARIN SODIUM 5000 UNITS: 5000 INJECTION, SOLUTION INTRAVENOUS; SUBCUTANEOUS at 10:03

## 2018-03-27 RX ADMIN — FLUTICASONE PROPIONATE 100 MCG: 50 SPRAY, METERED NASAL at 09:03

## 2018-03-27 RX ADMIN — TOLVAPTAN 15 MG: 15 TABLET ORAL at 09:03

## 2018-03-27 NOTE — ASSESSMENT & PLAN NOTE
117 on presentation 133 today. SIADH. Pt failed fluid restriction and oral salt tablets. Improving on tolvaptan. Continue current dose and have pt follow up with Dr. Brown or Dr. Mercedes as an out pt to determine if this medication needs to be continued.

## 2018-03-27 NOTE — SUBJECTIVE & OBJECTIVE
Interval History: Nausea yesterday. Told her nurse that she did not want to eat this AM due to the nausea that she experienced yesterday. She does relate feeling like she needs to defecate but when she attempts to go to the restroom she urinates and maybe has a small amount of stool. She is not eating much.    Review of patient's allergies indicates:   Allergen Reactions    Pcn [penicillins] Rash     Current Facility-Administered Medications   Medication Frequency    albuterol-ipratropium 2.5mg-0.5mg/3mL nebulizer solution 3 mL Q4H PRN    amLODIPine tablet 10 mg Daily    aspirin tablet 325 mg Daily    calcium carbonate 200 mg calcium (500 mg) chewable tablet 500 mg Daily PRN    fluticasone 50 mcg/actuation nasal spray 100 mcg BID    heparin (porcine) injection 5,000 Units Q12H    pantoprazole EC tablet 40 mg Daily    polyethylene glycol packet 17 g Daily    ramelteon tablet 8 mg Nightly PRN    senna tablet 8.6 mg Daily PRN    simvastatin tablet 40 mg QHS    sodium chloride 0.9% flush 5 mL PRN    tolvaptan tablet 15 mg Daily       Objective:     Vital Signs (Most Recent):  Temp: 98 °F (36.7 °C) (03/27/18 0737)  Pulse: 80 (03/27/18 0737)  Resp: 18 (03/27/18 0737)  BP: (!) 142/65 (03/27/18 0737)  SpO2: 95 % (03/27/18 0756)  O2 Device (Oxygen Therapy): room air (03/27/18 0756) Vital Signs (24h Range):  Temp:  [98 °F (36.7 °C)-99.4 °F (37.4 °C)] 98 °F (36.7 °C)  Pulse:  [73-85] 80  Resp:  [16-20] 18  SpO2:  [95 %-98 %] 95 %  BP: (110-146)/(53-65) 142/65     Weight: 50.6 kg (111 lb 8 oz) (03/22/18 0500)  Body mass index is 23.3 kg/m².  Body surface area is 1.44 meters squared.    I/O last 3 completed shifts:  In: 470 [P.O.:470]  Out: 550 [Urine:550]    Physical Exam   Constitutional: She is oriented to person, place, and time. No distress.   HENT:   Head: Normocephalic and atraumatic.   Eyes: Conjunctivae are normal. No scleral icterus.   Neck: No JVD present. No tracheal deviation present. No thyromegaly  present.   Cardiovascular: Normal rate and regular rhythm.    Pulmonary/Chest: Effort normal and breath sounds normal. No stridor.   Abdominal: Soft. She exhibits no distension. There is no tenderness.   Musculoskeletal: She exhibits no edema or tenderness.   Neurological: She is alert and oriented to person, place, and time.   Skin: Skin is warm and dry. She is not diaphoretic.   Psychiatric: She has a normal mood and affect. Her behavior is normal.   Vitals reviewed.      Significant Labs:  All labs within the past 24 hours have been reviewed.     Significant Imaging:    Levar Santana MD, 3/27/2018 11:08 AM  [facility] Nephrology PGY-IV  Pager: [phone]  Cell: [phone]

## 2018-03-27 NOTE — TELEPHONE ENCOUNTER
[employer] delivery arranged - [employer] initial consultation declined by Ms. Giron's daughter, Aliya, due to extensive explanation by MD inpatient.  Risk of hepatotoxicity with extended use (>30 days) reviewed.  [facility] 10-day supply will be delivered to patient via  on 3/28 to [facility] to nursing station on 4th floor.  Co-pay $251.86; bill sent.  [facility] will continue to pursue  assistance for remaining quantity of the drug due to high co-pay.  Patient and daughter aware [facility] will reach out to family with determination of  assistance. Daughter states they aware of risk involved in therapy interruption if there is a lapse in treatment due to cost. All questions answered and addressed to patients satisfaction.    Martha Epperson, PharmD, [facility]  Clinical Pharmacist   [facility]  Phone: [phone]

## 2018-03-27 NOTE — PROGRESS NOTES
"[facility]  Nephrology  Progress Note    Patient Name: Willow Giron  MRN: [id number]  Admission Date: 3/17/2018  Hospital Length of Stay: 9 days  Attending Provider: Andrea Diaz MD   Primary Care Physician: Qamar Bland MD  Principal Problem:HCAP (healthcare-associated pneumonia)    Subjective:     HPI:   Willow Giron 87 y.o. female  has a past medical history of Arthritis; GERD (gastroesophageal reflux disease); Hyperlipidemia; and Hypertension. who presented with   Chief Complaint   Patient presents with    Vomiting     X1 episode of vomiting this evening after eating soup. Reports pt was d/c from skilled nursing facility last week for electorlyte imblance. Denies any pain at this time. Reports "not too much" nausea.      Pt presents with SOB and cough for about one week. She previously saw here PCP who diagnosed her with bronchitis and gave her a coarse of steroids and a z-pack but her symptoms worsened. She was hospitalized here last month and was seem by us for hyponatremia. At that time it was attributed to tea and toast diet and pt was discharged to [facility] on salt tablets. Pt discontinued salt tablets because they made her nauseated. Pt has not eaten much in the past few days due to nausea. Denies F/C, sick contacts, CP, N/V/D/C, swelling, dysuria, hematuria, blood in stool, melena, rashes.    No new subjective & objective note has been filed under this hospital service since the last note was generated.    Assessment/Plan:     Hypo-osmolality and hyponatremia    117 on presentation 135 today. SIADH. Pt failed fluid restriction and oral salt tablets. Improving on tolvaptan. Continue current dose and have pt follow up with Dr. Brown or Dr. Mercedes as an out pt to determine if this medication needs to be continued.        SIADH (syndrome of inappropriate ADH production)    Brain MRI and CT chest without obvious cause of worsening SIADH, so likely PNA playing a role.      "   Normocytic anemia    Stable. Pt reports no blood in stool or urine. She had a hysterectomy in the past. Iron levels are normal but % saturation is low. Pt may benefit from trial of oral iron replacement.        Essential hypertension    Continue home antihypertensives.            Thank you for your consult. I will follow-up with patient. Please contact us if you have any additional questions.    Levar Santana MD, 3/27/2018 11:10 AM  [facility] Nephrology PGY-IV  Pager: [phone]  Cell: [phone]

## 2018-03-27 NOTE — PLAN OF CARE
Problem: Patient Care Overview  Goal: Plan of Care Review  Outcome: Ongoing (interventions implemented as appropriate)  Plan of care discussed with daughter and son. Son at bedside today. Patient up in chair most of day. Na 134 today. Patient tolerated PT and OT today well. Complains of gas pain today, prn gas x ordered and given with full relief. Patient refused bath today, however linen changed. Will continue to monitor sodium level.

## 2018-03-27 NOTE — PT/OT/SLP PROGRESS
Occupational Therapy      Patient Name:  Willow Giron   MRN:  [id number]    Patient not seen today secondary to  pt declined participation in functional mobility and ADLs at this time.  Pt reports she has gas pain and waiting on nurse to bring her something.  Educated pt on benefits of therapy participation. Will follow-up .    Alhaji Samuels OT  3/27/2018

## 2018-03-27 NOTE — PROGRESS NOTES
.Pharmacy New Medication Education    Patient accepted medication education.    Pharmacy educated patient on name and purpose of medications and possible side effects, using the teach-back method.     Current Inpatient Medication Orders   albuterol-ipratropium 2.5mg-0.5mg/3mL nebulizer solution 3 mL   amLODIPine tablet 10 mg   aspirin tablet 325 mg   calcium carbonate 200 mg calcium (500 mg) chewable tablet 500 mg   fluticasone 50 mcg/actuation nasal spray 100 mcg   heparin (porcine) injection 5,000 Units   moxifloxacin tablet 400 mg   pantoprazole EC tablet 40 mg   polyethylene glycol packet 17 g   ramelteon tablet 8 mg   senna tablet 8.6 mg   simvastatin tablet 40 mg   sodium chloride 0.9% flush 5 mL   tolvaptan tablet 15 mg       Learners of pharmacy medication education included:  Patient    Patient +/- learner response:  Verbalized Understanding, Teachback

## 2018-03-27 NOTE — PLAN OF CARE
TN left message for Francisco with [facility] to inquire regarding status of medication for home.     TN spoke with Francisco, and she stated they sent to prior auth to [facility] yesterday. They are still waiting to hear back. She will inform TN if they need anything. TN spoke with Gloria at [facility], and she was notified. Gloria will look into it. TN also gave her Francisco's number to communicate with her.    TN spoke with Francisco again.[facility] has received everything, also need clinicals with failed medications. TN faxed that to Francisco, for her to send to [facility].    -UPDATE: TN SPOKE WITH FRANCISCO. SHE STATED THAT THEY ARE STILL WORKING WITH [facility] REGARDING THE MEDICATION, AND THAT IT IS A PROCESS TO OBTAIN. SHE WILL INFORM TN IF SHE NEEDS ANYTHING ELSE. THEY ARE WORKING ON IT. TN WILL NOTIFY TEAM.     --TN updated MD team.    Jennifer Guerrier RN  Transition Navigator  [phone]

## 2018-03-27 NOTE — PT/OT/SLP EVAL
Physical Therapy Evaluation    Patient Name:  Willow Giron   MRN:  [id number]    Recommendations:     Discharge Recommendations:  home health PT, home health OT   Discharge Equipment Recommendations: none   Barriers to discharge: None    Assessment:     Willow Giron is a 87 y.o. female admitted with a medical diagnosis of HCAP (healthcare-associated pneumonia).  She presents with the following impairments/functional limitations:  weakness, impaired balance, impaired endurance, decreased upper extremity function Patient with residual gait disturbance may benefit from strengthening .    Rehab Prognosis:  Good; patient would benefit from acute skilled PT services to address these deficits and reach maximum level of function.      Recent Surgery: * No surgery found *      Plan:     During this hospitalization, patient to be seen 6 x/week to address the above listed problems via gait training, therapeutic activities, therapeutic exercises  · Plan of Care Expires:  04/19/18   Plan of Care Reviewed with: patient    Subjective     Communicated with primary nurse prior to session.  Patient found seated in chair at bedside upon PT entry to room, agreeable to evaluation.      Chief Complaint: none voiced  Patient comments/goals: go home  Pain/Comfort:  · Pain Rating 1: 0/10  · Pain Rating Post-Intervention 1: 0/10    Patients cultural, spiritual, [employer] conflicts given the current situation: None verbalized to PT    Living Environment:  Lives with spouse family will assist   Prior to admission, patients level of function was independent .  Patient has the following equipment: grab bar, walker, rolling, bedside commode.  DME owned (not currently used): none.  Upon discharge, patient will have assistance from family.    Objective:     Patient found with: telemetry     General Precautions: Standard, fall   Orthopedic Precautions:N/A   Braces: N/A     Exams:  · RLE ROM: WFL  · RLE Strength: WFL  · LLE ROM:  WFL  · LLE Strength: WFL    Functional Mobility:  · Transfers:     · Sit to Stand:  supervision with rolling walker  · Gait: 50 ft with RW Supervision to SBA for safety  · Balance: Fair + with RW    AM-PAC 6 CLICK MOBILITY  Total Score:21       Therapeutic Activities and Exercises:   na    Patient left up in chair with call button in reach.    GOALS:    Physical Therapy Goals        Problem: Physical Therapy Goal    Goal Priority Disciplines Outcome Goal Variances Interventions   Physical Therapy Goal     PT/OT, PT Revised     Description:  Goals to be met by: 2018     Patient will increase functional independence with mobility by performin. Supine to sit with Modified IndependenceMet 3/22/18  2. Sit to supine with Modified IndependenceMet 3/22/18  3. Sit to stand transfer with Stand-by Assistance Met 3/21/18  4. Bed to chair transfer with Stand-by Assistance using Rolling Walker MET 3/21/18  5. Gait  x 75 feet with Contact Guard Assistance using Rolling Walker. MET 3/21/18  6. Ascend/descend 2 steps with left Handrails Contact Guard Assistance and Minimal Assistance. Unable to assess, based on clinical observation pt could perform 2 steps with L HR and CGA/Min A.   7. Lower extremity exercise program x10 reps with supervision MET 3/21/18                           History:     Past Medical History:   Diagnosis Date    Arthritis     GERD (gastroesophageal reflux disease)     Hyperlipidemia     Hypertension        Past Surgical History:   Procedure Laterality Date     SECTION, CLASSIC      HYSTERECTOMY      SHOULDER SURGERY         Clinical Decision Making:     History  Co-morbidities and personal factors that may impact the plan of care Examination  Body Structures and Functions, activity limitations and participation restrictions that may impact the plan of care Clinical Presentation   Decision Making/ Complexity Score   Co-morbidities:   [] Time since onset of injury / illness /  exacerbation  [] Status of current condition  []Patient's cognitive status and safety concerns    [] Multiple Medical Problems (see med hx)  Personal Factors:   [x] Patient's age  [x] Prior Level of function   [] Patient's home situation (environment and family support)  [] Patient's level of motivation  [] Expected progression of patient      HISTORY:(criteria)    [] 80137 - no personal factors/history    [x] 53961 - has 1-2 personal factor/comorbidity     [] 64396 - has >3 personal factor/comorbidity     Body Regions:  [] Objective examination findings  [] Head     []  Neck  [] Trunk   [] Upper Extremity  [x] Lower Extremity    Body Systems:  [] For communication ability, affect, cognition, language, and learning style: the assessment of the ability to make needs known, consciousness, orientation (person, place, and time), expected emotional /behavioral responses, and learning preferences (eg, learning barriers, education  needs)  [x] For the neuromuscular system: a general assessment of gross coordinated movement (eg, balance, gait, locomotion, transfers, and transitions) and motor function  (motor control and motor learning)  [x] For the musculoskeletal system: the assessment of gross symmetry, gross range of motion, gross strength, height, and weight  [] For the integumentary system: the assessment of pliability(texture), presence of scar formation, skin color, and skin integrity  [] For cardiovascular/pulmonary system: the assessment of heart rate, respiratory rate, blood pressure, and edema     Activity limitations:    [] Patient's cognitive status and saf ety concerns          [] Status of current condition      [] Weight bearing restriction  [] Cardiopulmunary Restriction    Participation Restrictions:   [] Goals and goal agreement with the patient     [] Rehab potential (prognosis) and probable outcome      Examination of Body System: (criteria)    [x] 74854 - addressing 1-2 elements    [] 67245 -  addressing a total of 3 or more elements     [] 87951 -  Addressing a total of 4 or more elements         Clinical Presentation: (criteria)  Stable - 02841     On examination of body system using standardized tests and measures patient presents with 1-2 elements from any of the following: body structures and functions, activity limitations, and/or participation restrictions.  Leading to a clinical presentation that is considered stable and/or uncomplicated                              Clinical Decision Making  (Eval Complexity):  Low- 89760     Time Tracking:     PT Received On: 03/27/18  PT Start Time: 1305     PT Stop Time: 1326  PT Total Time (min): 21 min     Billable Minutes: Re-eval 21      Levar Hurley, PT  03/27/2018

## 2018-03-27 NOTE — PLAN OF CARE
Problem: Patient Care Overview  Goal: Plan of Care Review  Outcome: Ongoing (interventions implemented as appropriate)  AAO x 4. NAD. No c/o pain. Patient ambulates to the bathroom and refuses to the urine measurement. Patient has multiple formed BMs. Free from falls. Room near nursing station, side rails up x 3, call light within reach, bed alarm set. Nurse instructed patient to call for assistance. Patient verbalized understanding. Continue to monitor

## 2018-03-27 NOTE — PROGRESS NOTES
[facility] medicine Resident SANNA Progress Note    Subjective:      Doing well this morning. Denies subjective fever/chills overnight. Denies chest pain, shortness of breath, or abdominal pain. Had 4 episodes of BM overnight. Eager to leave today     Objective:   Last 24 Hour Vital Signs:  BP  Min: 110/53  Max: 146/63  Temp  Av.7 °F (37.1 °C)  Min: 98 °F (36.7 °C)  Max: 99.4 °F (37.4 °C)  Pulse  Av.2  Min: 73  Max: 85  Resp  Av  Min: 16  Max: 20  SpO2  Av.8 %  Min: 96 %  Max: 98 %  I/O last 3 completed shifts:  In: 470 [P.O.:470]  Out: 550 [Urine:550]    Physical Examination:  General:          Alert and awake in NAD  HEENT: NC/AT, EOMI, PERRL, OP clear, MMM  Cardio:             Ireggular rhythm, 2/6 systolic ejection murmur heard in all areas  Resp:               mild wheezing with rhonchi; no focal consolidative changes appreciated  Abdom:            Soft, NTND with normoactive bowel sounds  Extrem:            1+ edema to midshins b/l; WWP with no clubbing, cyanosis  Skin:                No rashes, lesions, or color changes  Pulses:            2+ and symmetric distally  Neuro:             AAOx3; cooperative and pleasant with no focal deficits    Laboratory:  Laboratory Data Reviewed: yes  Pertinent Findings:    Recent Labs  Lab 18  0756  18  0320 18  0758 18  1541 18  2351 18  0327   WBC 4.99  --  4.84  --   --   --  6.03   HGB 7.9*  --  8.3*  --   --   --  8.0*   HCT 23.9*  --  25.1*  --   --   --  24.2*     --  265  --   --   --  250   MCV 94  --  94  --   --   --  94   RDW 13.2  --  13.4  --   --   --  13.4   *  < > 129* 129* 130* 132* 133*   K 4.7  < > 4.1 3.9 4.2 3.7 3.7   CL 99  < >  --  99 99 100  --    CO2 22*  < >  --  21* 21* 21*  --    BUN 12  < >  --  12 12 10  --    CREATININE 0.8  < >  --  0.8 0.8 0.8  --      < >  --  105 117* 120*  --    < > = values in this interval not displayed.    Microbiology Data Reviewed: yes  Pertinent  Findings:    Other Results:  EKG (my interpretation): previous EKG reviewed    Radiology Data Reviewed: yes  Pertinent Findings:      Current Medications:     Infusions:       Scheduled:   amLODIPine  10 mg Oral Daily    aspirin  325 mg Oral Daily    fluticasone  2 spray Each Nare BID    heparin (porcine)  5,000 Units Subcutaneous Q12H    pantoprazole  40 mg Oral Daily    polyethylene glycol  17 g Oral Daily    simvastatin  40 mg Oral QHS    tolvaptan  15 mg Oral Daily        PRN:  albuterol-ipratropium 2.5mg-0.5mg/3mL, calcium carbonate, ramelteon, senna, sodium chloride 0.9%    Antibiotics and Day Number of Therapy:  vanc and cefepime 3/17- 3/19  Moxi 3/20-current    Lines and Day Number of Therapy:  piv    Plan:     HCAP  -Recent hospital admit/SNF, dry cough x 1 week, general weakness  On admit, CXR with RLL opacity  No signs of systemic infection  -Vanc, cefepime started 3/17-3/19, moxi3/20-present, stopped 3/27  -blood cx remains NGTD  -patient tolerated breathing tx well this am. Continue to monitor     Hypovolemic hyponatremia vs SIADH  -Admitted 1 month ago--hypovolemic hypoNa s/p several days N/V/D  This admit, recent poor PO intake, N/V x 3 day of admit  -On admit, Na 117, Cl 76, urine Na 81, urine osm 305  Uric acid 2.3  - labile Na, concerning for SIADH; nephrology consulted--started tolvaptan 3/21--recommend outpatient use and f/u Risen or Brown; if patient cannot afford, should consider demeclocylcine  CM assisting with insurance approval  - fluid restriction, increase salt diet  - CT chest - b/l pleural effusions  - MRI brain - no acute changes/ no masses  -Trial of fluid restriction, Na trended down. Restarted tolvaptan     Elevated troponin  -On admit, troponin 0.573->0.895  Denies chest pain, SOB  -EKG with irregular rhythm, variable P-wave morphology--new from previous  No ST elevations  -Consult cardiology  Received , plavix 300, heparin gtt, simvastatin  -trop tended out,  patient denies CP overnight. Hep gtt stopped  -will f/u cards recs  -echo - hypokinetic apical septum, intermediate LV diastolic dysfunction, PHTN (41 mmHg)     AoCD  -On admit, H/H 10/7/30--at baseline  Recent w/u c/w AoCD     HTN  -On admit, normotensive  Holding home lisinopril, norvasc in setting of HCAP--can resume if becomes hypertensive  - resumed ACEi     HLD  -Continue home simvastatin     GERD  -No acute issues  Continue home omeprazole    Ppx: subQ heparin  Dispo: pending further clinical improvement of HCAP, pending improvement of Na+, approval of richie Chisholm  [facility] Internal Medicine/Pediatrics HO-I  U medicine Service Team B    [facility] Medicine Hospitalist Pager numbers:   [facility] Hospitalist Medicine Team A (Adrien/Rufino): [phone]  [facility] Hospitalist Medicine Team B (Emily/Mekhi):  [phone]

## 2018-03-27 NOTE — PLAN OF CARE
Problem: Physical Therapy Goal  Goal: Physical Therapy Goal  Goals to be met by: 2018     Patient will increase functional independence with mobility by performin. Supine to sit with Modified IndependenceMet 3/22/18  2. Sit to supine with Modified IndependenceMet 3/22/18  3. Sit to stand transfer with Stand-by Assistance Met 3/21/18  4. Bed to chair transfer with Stand-by Assistance using Rolling Walker MET 3/21/18  5. Gait  x 75 feet with Contact Guard Assistance using Rolling Walker. MET 3/21/18  6. Ascend/descend 2 steps with left Handrails Contact Guard Assistance and Minimal Assistance. Unable to assess, based on clinical observation pt could perform 2 steps with L HR and CGA/Min A.   7. Lower extremity exercise program x10 reps with supervision MET 3/21/18         Outcome: Revised  Patient without DME needs all DME in place  New Goal  1 Patient will ambulate Mod I with RW ~ 200 ft   Recommend Home with Home Health

## 2018-03-28 VITALS
SYSTOLIC BLOOD PRESSURE: 119 MMHG | BODY MASS INDEX: 23.41 KG/M2 | DIASTOLIC BLOOD PRESSURE: 57 MMHG | OXYGEN SATURATION: 97 % | RESPIRATION RATE: 18 BRPM | HEART RATE: 74 BPM | HEIGHT: 58 IN | TEMPERATURE: 99 F | WEIGHT: 111.5 LBS

## 2018-03-28 LAB
ANION GAP SERPL CALC-SCNC: 11 MMOL/L
BASOPHILS # BLD AUTO: 0.02 K/UL
BASOPHILS NFR BLD: 0.5 %
BUN SERPL-MCNC: 12 MG/DL
CALCIUM SERPL-MCNC: 8.6 MG/DL
CHLORIDE SERPL-SCNC: 100 MMOL/L
CO2 SERPL-SCNC: 22 MMOL/L
CREAT SERPL-MCNC: 0.8 MG/DL
DIFFERENTIAL METHOD: ABNORMAL
EOSINOPHIL # BLD AUTO: 0.1 K/UL
EOSINOPHIL NFR BLD: 2.6 %
ERYTHROCYTE [DISTWIDTH] IN BLOOD BY AUTOMATED COUNT: 13.6 %
EST. GFR  (AFRICAN AMERICAN): >60 ML/MIN/1.73 M^2
EST. GFR  (NON AFRICAN AMERICAN): >60 ML/MIN/1.73 M^2
GLUCOSE SERPL-MCNC: 111 MG/DL
HCT VFR BLD AUTO: 25.1 %
HGB BLD-MCNC: 8.3 G/DL
LYMPHOCYTES # BLD AUTO: 0.9 K/UL
LYMPHOCYTES NFR BLD: 21.3 %
MAGNESIUM SERPL-MCNC: 1.8 MG/DL
MCH RBC QN AUTO: 31.3 PG
MCHC RBC AUTO-ENTMCNC: 33.1 G/DL
MCV RBC AUTO: 95 FL
MONOCYTES # BLD AUTO: 0.6 K/UL
MONOCYTES NFR BLD: 14.9 %
NEUTROPHILS # BLD AUTO: 2.6 K/UL
NEUTROPHILS NFR BLD: 60.5 %
PHOSPHATE SERPL-MCNC: 3.8 MG/DL
PLATELET # BLD AUTO: 246 K/UL
PMV BLD AUTO: 8.5 FL
POTASSIUM SERPL-SCNC: 3.3 MMOL/L
POTASSIUM SERPL-SCNC: 3.3 MMOL/L
RBC # BLD AUTO: 2.65 M/UL
SODIUM SERPL-SCNC: 133 MMOL/L
SODIUM SERPL-SCNC: 133 MMOL/L
WBC # BLD AUTO: 4.23 K/UL

## 2018-03-28 PROCEDURE — 97530 THERAPEUTIC ACTIVITIES: CPT

## 2018-03-28 PROCEDURE — 80048 BASIC METABOLIC PNL TOTAL CA: CPT

## 2018-03-28 PROCEDURE — 25000003 PHARM REV CODE 250: Performed by: STUDENT IN AN ORGANIZED HEALTH CARE EDUCATION/TRAINING PROGRAM

## 2018-03-28 PROCEDURE — 84100 ASSAY OF PHOSPHORUS: CPT

## 2018-03-28 PROCEDURE — 84295 ASSAY OF SERUM SODIUM: CPT

## 2018-03-28 PROCEDURE — 63600175 PHARM REV CODE 636 W HCPCS: Performed by: STUDENT IN AN ORGANIZED HEALTH CARE EDUCATION/TRAINING PROGRAM

## 2018-03-28 PROCEDURE — 85025 COMPLETE CBC W/AUTO DIFF WBC: CPT

## 2018-03-28 PROCEDURE — 97535 SELF CARE MNGMENT TRAINING: CPT

## 2018-03-28 PROCEDURE — 36415 COLL VENOUS BLD VENIPUNCTURE: CPT

## 2018-03-28 PROCEDURE — 84132 ASSAY OF SERUM POTASSIUM: CPT

## 2018-03-28 PROCEDURE — 83735 ASSAY OF MAGNESIUM: CPT

## 2018-03-28 PROCEDURE — 94761 N-INVAS EAR/PLS OXIMETRY MLT: CPT

## 2018-03-28 RX ORDER — POTASSIUM CHLORIDE 750 MG/1
20 TABLET, EXTENDED RELEASE ORAL
Status: COMPLETED | OUTPATIENT
Start: 2018-03-28 | End: 2018-03-28

## 2018-03-28 RX ORDER — MAGNESIUM SULFATE HEPTAHYDRATE 40 MG/ML
2 INJECTION, SOLUTION INTRAVENOUS ONCE
Status: COMPLETED | OUTPATIENT
Start: 2018-03-28 | End: 2018-03-28

## 2018-03-28 RX ORDER — MAGNESIUM SULFATE 1 G/100ML
1 INJECTION INTRAVENOUS
Status: DISCONTINUED | OUTPATIENT
Start: 2018-03-28 | End: 2018-03-28

## 2018-03-28 RX ADMIN — HEPARIN SODIUM 5000 UNITS: 5000 INJECTION, SOLUTION INTRAVENOUS; SUBCUTANEOUS at 08:03

## 2018-03-28 RX ADMIN — MAGNESIUM SULFATE HEPTAHYDRATE 2 G: 40 INJECTION, SOLUTION INTRAVENOUS at 08:03

## 2018-03-28 RX ADMIN — TOLVAPTAN 15 MG: 15 TABLET ORAL at 08:03

## 2018-03-28 RX ADMIN — POTASSIUM CHLORIDE 20 MEQ: 10 TABLET, EXTENDED RELEASE ORAL at 08:03

## 2018-03-28 RX ADMIN — AMLODIPINE BESYLATE 10 MG: 5 TABLET ORAL at 08:03

## 2018-03-28 RX ADMIN — FLUTICASONE PROPIONATE 100 MCG: 50 SPRAY, METERED NASAL at 08:03

## 2018-03-28 RX ADMIN — ASPIRIN 325 MG ORAL TABLET 325 MG: 325 PILL ORAL at 08:03

## 2018-03-28 RX ADMIN — PANTOPRAZOLE SODIUM 40 MG: 40 TABLET, DELAYED RELEASE ORAL at 08:03

## 2018-03-28 RX ADMIN — POTASSIUM CHLORIDE 20 MEQ: 10 TABLET, EXTENDED RELEASE ORAL at 10:03

## 2018-03-28 RX ADMIN — POLYETHYLENE GLYCOL 3350 17 G: 17 POWDER, FOR SOLUTION ORAL at 08:03

## 2018-03-28 NOTE — PROGRESS NOTES
"[facility]  Nephrology  Progress Note    Patient Name: Willow Giron  MRN: [id number]  Admission Date: 3/17/2018  Hospital Length of Stay: 10 days  Attending Provider: Andrea Diaz MD   Primary Care Physician: Qamar Bland MD  Principal Problem:HCAP (healthcare-associated pneumonia)    Subjective:     HPI:   Willow Giron 87 y.o. female  has a past medical history of Arthritis; GERD (gastroesophageal reflux disease); Hyperlipidemia; and Hypertension. who presented with   Chief Complaint   Patient presents with    Vomiting     X1 episode of vomiting this evening after eating soup. Reports pt was d/c from skilled nursing facility last week for electorlyte imblance. Denies any pain at this time. Reports "not too much" nausea.      Pt presents with SOB and cough for about one week. She previously saw here PCP who diagnosed her with bronchitis and gave her a coarse of steroids and a z-pack but her symptoms worsened. She was hospitalized here last month and was seem by us for hyponatremia. At that time it was attributed to tea and toast diet and pt was discharged to [facility] on salt tablets. Pt discontinued salt tablets because they made her nauseated. Pt has not eaten much in the past few days due to nausea. Denies F/C, sick contacts, CP, N/V/D/C, swelling, dysuria, hematuria, blood in stool, melena, rashes.    Interval History: Still having gas. No N/VC/D    Review of patient's allergies indicates:   Allergen Reactions    Pcn [penicillins] Rash     Current Facility-Administered Medications   Medication Frequency    albuterol-ipratropium 2.5mg-0.5mg/3mL nebulizer solution 3 mL Q4H PRN    amLODIPine tablet 10 mg Daily    aspirin tablet 325 mg Daily    calcium carbonate 200 mg calcium (500 mg) chewable tablet 500 mg Daily PRN    fluticasone 50 mcg/actuation nasal spray 100 mcg BID    heparin (porcine) injection 5,000 Units Q12H    magnesium sulfate 2g in water 50mL IVPB (premix) " Once    pantoprazole EC tablet 40 mg Daily    polyethylene glycol packet 17 g Daily    potassium chloride SA CR tablet 20 mEq Q2H    ramelteon tablet 8 mg Nightly PRN    senna tablet 8.6 mg Daily PRN    simethicone chewable tablet 125 mg Q6H PRN    simvastatin tablet 40 mg QHS    sodium chloride 0.9% flush 5 mL PRN       Objective:     Vital Signs (Most Recent):  Temp: 98.3 °F (36.8 °C) (03/28/18 0738)  Pulse: 81 (03/28/18 0738)  Resp: 18 (03/28/18 0738)  BP: (!) 145/65 (03/28/18 0738)  SpO2: (!) 94 % (03/28/18 0605)  O2 Device (Oxygen Therapy): room air (03/28/18 0605) Vital Signs (24h Range):  Temp:  [97.6 °F (36.4 °C)-98.4 °F (36.9 °C)] 98.3 °F (36.8 °C)  Pulse:  [75-81] 81  Resp:  [18] 18  SpO2:  [94 %-98 %] 94 %  BP: (119-150)/(58-67) 145/65     Weight: 50.6 kg (111 lb 8 oz) (03/22/18 0500)  Body mass index is 23.3 kg/m².  Body surface area is 1.44 meters squared.    I/O last 3 completed shifts:  In: 600 [P.O.:600]  Out: -     Physical Exam   Constitutional: She is oriented to person, place, and time. No distress.   HENT:   Head: Normocephalic and atraumatic.   Eyes: Conjunctivae are normal. No scleral icterus.   Neck: Neck supple. No tracheal deviation present.   Cardiovascular: Normal rate and regular rhythm.    Pulmonary/Chest: Effort normal and breath sounds normal.   Abdominal: Soft. Bowel sounds are normal.   Musculoskeletal: She exhibits no edema or tenderness.   Neurological: She is alert and oriented to person, place, and time.   Skin: Skin is warm and dry. She is not diaphoretic.   Psychiatric: She has a normal mood and affect. Her behavior is normal.       Significant Labs:  All labs within the past 24 hours have been reviewed.     Significant Imaging:    None within the past 24 hours    Assessment/Plan:     Hypo-osmolality and hyponatremia    117 on presentation 133 today. SIADH. Pt failed fluid restriction and oral salt tablets. Improving on tolvaptan. Continue current dose and have pt  follow up with Dr. Brown or Dr. Mercedes as an out pt to determine if this medication needs to be continued.        SIADH (syndrome of inappropriate ADH production)    Brain MRI and CT chest without obvious cause of worsening SIADH, so likely PNA playing a role.        Normocytic anemia    Stable. Pt reports no blood in stool or urine. She had a hysterectomy in the past. Iron levels are normal but % saturation is low. Pt may benefit from trial of oral iron replacement.        Essential hypertension    Continue home antihypertensives.            Thank you for your consult. I will follow-up with patient. Please contact us if you have any additional questions.    Levar Santana MD, 3/28/2018 9:14 AM  [facility] Nephrology PGY-IV  Pager: [phone]  Cell: [phone]

## 2018-03-28 NOTE — NURSING
Pt rested comfortable all night, reported no pain. She asked for simethicone and remelteon for sleep. Called for nothing else.       No tele monitroring.     Bed in lowed position, wheels locked, bed alarm set, personal item with in reach, ID band and allergy band worn.

## 2018-03-28 NOTE — PROGRESS NOTES
[employer] medicine Resident SANNA Progress Note    Subjective:      Doing well this morning. Denies subjective fever/chills overnight. Denies chest pain, shortness of breath, or abdominal pain. Participating with PT/OT everyday to good results. Strength improved    Objective:   Last 24 Hour Vital Signs:  BP  Min: 119/58  Max: 150/65  Temp  Av.1 °F (36.7 °C)  Min: 97.6 °F (36.4 °C)  Max: 98.4 °F (36.9 °C)  Pulse  Av.8  Min: 75  Max: 81  Resp  Av  Min: 18  Max: 18  SpO2  Av.8 %  Min: 94 %  Max: 98 %  I/O last 3 completed shifts:  In: 600 [P.O.:600]  Out: -     Physical Examination:  General:          Alert and awake in NAD  HEENT: NC/AT, EOMI, PERRL, OP clear, MMM  Cardio:             Ireggular rhythm, 2/6 systolic ejection murmur heard in all areas  Resp:               mild wheezing with rhonchi; no focal consolidative changes appreciated  Abdom:            Soft, NTND with normoactive bowel sounds  Extrem:           WWP with no clubbing, acyanotic  Skin:                No rashes, lesions, or color changes  Pulses:            2+ and symmetric distally  Neuro:             AAOx3; cooperative and pleasant with no focal deficits    Laboratory:  Laboratory Data Reviewed: yes  Pertinent Findings:    Recent Labs  Lab 18  0320  18  0327 18  0816 18  1311 18  0011 18  0356   WBC 4.84  --  6.03  --   --   --  4.23   HGB 8.3*  --  8.0*  --   --   --  8.3*   HCT 25.1*  --  24.2*  --   --   --  25.1*     --  250  --   --   --  246   MCV 94  --  94  --   --   --  95   RDW 13.4  --  13.4  --   --   --  13.6   *  < > 133* 135* 133* 133* 133*   K 4.1  < > 3.7 3.4* 3.4* 3.3* 3.3*   CL  --   < >  --  102 100 100  --    CO2  --   < >  --  24 25 22*  --    BUN  --   < >  --  8 12 12  --    CREATININE  --   < >  --  0.7 0.8 0.8  --    GLU  --   < >  --  112* 152* 111*  --    < > = values in this interval not displayed.    Microbiology Data Reviewed: yes  Pertinent  Findings:    Other Results:  EKG (my interpretation): previous EKG reviewed    Radiology Data Reviewed: yes  Pertinent Findings:      Current Medications:     Infusions:       Scheduled:   amLODIPine  10 mg Oral Daily    aspirin  325 mg Oral Daily    fluticasone  2 spray Each Nare BID    heparin (porcine)  5,000 Units Subcutaneous Q12H    magnesium sulfate IVPB  2 g Intravenous Once    pantoprazole  40 mg Oral Daily    polyethylene glycol  17 g Oral Daily    potassium chloride  20 mEq Oral Q2H    simvastatin  40 mg Oral QHS    tolvaptan  15 mg Oral Daily        PRN:  albuterol-ipratropium 2.5mg-0.5mg/3mL, calcium carbonate, ramelteon, senna, simethicone, sodium chloride 0.9%    Antibiotics and Day Number of Therapy:  vanc and cefepime 3/17- 3/19  Moxi 3/20-3/26     Lines and Day Number of Therapy:  piv    Plan:     HCAP  -Recent hospital admit/SNF, dry cough x 1 week, general weakness  On admit, CXR with RLL opacity  No signs of systemic infection  -Vanc, cefepime started 3/17-3/19, moxi3/20-present, stopped 3/27  -blood cx remains NGTD  -patient tolerated breathing tx well this am. Continue to monitor     Hypovolemic hyponatremia vs SIADH  -Admitted 1 month ago--hypovolemic hypoNa s/p several days N/V/D  This admit, recent poor PO intake, N/V x 3 day of admit  -On admit, Na 117, Cl 76, urine Na 81, urine osm 305  Uric acid 2.3  - labile Na, concerning for SIADH; nephrology consulted--started tolvaptan 3/21--recommend outpatient use and f/u Risen or Brown; if patient cannot afford, should consider demeclocylcine  CM assisting with insurance approval  - fluid restriction, increase salt diet  - CT chest - b/l pleural effusions  - MRI brain - no acute changes/ no masses  -Trial of fluid restriction, Na trended down. Restarted tolvaptan     Elevated troponin  -On admit, troponin 0.573->0.895  Denies chest pain, SOB  -EKG with irregular rhythm, variable P-wave morphology--new from previous  No ST  elevations  -Consult cardiology  Received , plavix 300, heparin gtt, simvastatin  -trop tended out, patient denies CP overnight. Hep gtt stopped  -will f/u cards recs  -echo - hypokinetic apical septum, intermediate LV diastolic dysfunction, PHTN (41 mmHg)     AoCD  -On admit, H/H 10/7/30--at baseline  Recent w/u c/w AoCD     HTN  -On admit, normotensive  Holding home lisinopril, norvasc in setting of HCAP--can resume if becomes hypertensive  - resumed ACEi     HLD  -Continue home simvastatin     GERD  -No acute issues  Continue home omeprazole    Ppx: subQ heparin  Dispo: pending further clinical improvement of HCAP, pending improvement of Na+, approval of richie Chisholm  [facility] Internal Medicine/Pediatrics HO-I  [facility] medicine Service Team B    [facility] Medicine Hospitalist Pager numbers:   [facility] Hospitalist Medicine Team A (Adrien/Rufino): [phone]  [facility] Hospitalist Medicine Team B (Emily/Mekhi):  [phone]

## 2018-03-28 NOTE — PLAN OF CARE
TN spoke with Martha with the specialty pharmacy. They have already spoken with patient's daughter, and are having the medication delivered to the nurse's station. TN will notify team.    TN called and updated Dr. Santana. TN wanted to see if there was anyway he could have patient seen sooner. She had a follow-up in May with Dr. Brown and Dr. Tobias is not accepting new patients.    --TN spoke with Dr. Fernando. He was informed HH orders need to have PT/OT/SN/AIDE on them. He was also notified unable to get an early nephrology follow-up. MD will call office too.     1351--TN faxed [facility] HH orders. They were aware of discharge today. TN also left message for Dr. العلي office to see about getting an earlier follow-up. Patient's medication delivered at bedside for home.    1400--Dr. Fernando was able to get a nephrology appointment for next week (with labs) with Dr. Brown. TN met with patient and family at bedside. All follow-up information reviewed, verbalized understanding. TN also verified medication delivered at bedside. No further questions, nurse Becky also at bedside.    Pharmacy Assistance is still working on obtaining the prescription from the .    Senior Resource Guide also given to patient and family.    Future Appointments  Date Time Provider Department Center   4/4/2018 3:00 PM ADENIKE Rojas [facility] [place] [facility]   4/4/2018 3:40 PM Noemi Wahl MD [facility] [place] [facility]   4/6/2018 3:00 PM Nadege العلي MD [facility] IM [facility] [place] [facility]     Follow-up With  Details  Why  Contact Info   Nadege العلي MD  On 4/6/2018  at 3:00 pm--[facility]  [address]  [address]  [place] [address]  [phone]   Donny Brown,   Go on 4/5/2018  at 11:30am--obtain labs prior to nephrology follow up.   [address]  [address]  [place] [address]  [phone]   [facility]  On 4/3/2018  for lab draw  [address]  [place]  [address]  [phone]   [facility]      [address]  [address]  [facility] [address]  [phone]        03/28/18 1407   Final Note   Assessment Type Final Discharge Note   Discharge Disposition Home-Health   What phone number can be called within the next 1-3 days to see how you are doing after discharge? [phone]   Hospital Follow Up  Appt(s) scheduled? Yes   Discharge plans and expectations educations in teach back method with documentation complete? Yes   Right Care Referral Info   Post Acute Recommendation Home-care   Referral Type Home Health   Facility Name [facility]     Jennifer Guerrier RN  Transition Navigator  [phone]

## 2018-03-28 NOTE — PLAN OF CARE
Problem: Occupational Therapy Goal  Goal: Occupational Therapy Goal  Goals to be met by: 04/19/18     Patient will increase functional independence with ADLs by performing:    LE Dressing with Stand-by Assistance.--met 03/22/18  Grooming while standing at sink with Stand-by Assistance.--met 03/20/18  Toileting from toilet with Modified Teller for hygiene and clothing management.   Toilet transfer to toilet with Modified Teller.       Outcome: Ongoing (interventions implemented as appropriate)  Pt. Making good progress toward goals. Continue with OT POC.  HHOT recommended at DC.

## 2018-03-28 NOTE — PT/OT/SLP PROGRESS
Physical Therapy      Patient Name:  Willow Giron   MRN:  [id number]    Patient refused gait 2/2 to being with IV. Will follow-up as able.    Levar Hurley, PT

## 2018-03-28 NOTE — SUBJECTIVE & OBJECTIVE
Interval History: Still having gas. No N/VC/D    Review of patient's allergies indicates:   Allergen Reactions    Pcn [penicillins] Rash     Current Facility-Administered Medications   Medication Frequency    albuterol-ipratropium 2.5mg-0.5mg/3mL nebulizer solution 3 mL Q4H PRN    amLODIPine tablet 10 mg Daily    aspirin tablet 325 mg Daily    calcium carbonate 200 mg calcium (500 mg) chewable tablet 500 mg Daily PRN    fluticasone 50 mcg/actuation nasal spray 100 mcg BID    heparin (porcine) injection 5,000 Units Q12H    magnesium sulfate 2g in water 50mL IVPB (premix) Once    pantoprazole EC tablet 40 mg Daily    polyethylene glycol packet 17 g Daily    potassium chloride SA CR tablet 20 mEq Q2H    ramelteon tablet 8 mg Nightly PRN    senna tablet 8.6 mg Daily PRN    simethicone chewable tablet 125 mg Q6H PRN    simvastatin tablet 40 mg QHS    sodium chloride 0.9% flush 5 mL PRN       Objective:     Vital Signs (Most Recent):  Temp: 98.3 °F (36.8 °C) (03/28/18 0738)  Pulse: 81 (03/28/18 0738)  Resp: 18 (03/28/18 0738)  BP: (!) 145/65 (03/28/18 0738)  SpO2: (!) 94 % (03/28/18 0605)  O2 Device (Oxygen Therapy): room air (03/28/18 0605) Vital Signs (24h Range):  Temp:  [97.6 °F (36.4 °C)-98.4 °F (36.9 °C)] 98.3 °F (36.8 °C)  Pulse:  [75-81] 81  Resp:  [18] 18  SpO2:  [94 %-98 %] 94 %  BP: (119-150)/(58-67) 145/65     Weight: 50.6 kg (111 lb 8 oz) (03/22/18 0500)  Body mass index is 23.3 kg/m².  Body surface area is 1.44 meters squared.    I/O last 3 completed shifts:  In: 600 [P.O.:600]  Out: -     Physical Exam   Constitutional: She is oriented to person, place, and time. No distress.   HENT:   Head: Normocephalic and atraumatic.   Eyes: Conjunctivae are normal. No scleral icterus.   Neck: Neck supple. No tracheal deviation present.   Cardiovascular: Normal rate and regular rhythm.    Pulmonary/Chest: Effort normal and breath sounds normal.   Abdominal: Soft. Bowel sounds are normal.    Musculoskeletal: She exhibits no edema or tenderness.   Neurological: She is alert and oriented to person, place, and time.   Skin: Skin is warm and dry. She is not diaphoretic.   Psychiatric: She has a normal mood and affect. Her behavior is normal.       Significant Labs:  All labs within the past 24 hours have been reviewed.     Significant Imaging:    None within the past 24 hours

## 2018-03-28 NOTE — PLAN OF CARE
TN spoke with Izabella with Pharmacy Assistance. She stated [facility] got back with them and it would be $2000. They already spoke with patient and daughter and now they are going through the . They have already starting to submit patient's information. Izabella stated could not go straight to  without going to insurance first. TN updated Gloria at [facility].     TN spoke with Will ALY. MD team wanted to know if Case Management Department would pay for a 7 day supply for medication. TN called ANA Smith and informed her of this. TN called [facility] to ask them regarding this. Daria stated 30 day supply would be $2300. She is calling to see if specialty pharmacy would be able to obtain this medication and to see how much a 7 day supply would be.    TN called Izabella with Pharmacy  Assistance. She stated it would be $18 for 7 days. She also informed TN that they are still unsure if the  would cover patient's medication after those 7 days. Izabella stated medication only comes in 10, so they would have to supply for 10 days. That would be $251.86. She stated they do have the medication available at the Specialty Pharmacy. She is unsure if this would factor in the manufacturers decision regarding this medication. She also believes patient would need to admitted for 24 hr before each refill.--Dr. Chisholm will call the Pharmacy Assistance to clarify.    Jennifer Guerrier RN  Transition Navigator  [phone]

## 2018-03-28 NOTE — PT/OT/SLP PROGRESS
Occupational Therapy   Treatment    Name: Willow Giron  MRN: [id number]  Admitting Diagnosis:  HCAP (healthcare-associated pneumonia)       Recommendations:     Discharge Recommendations: home with home health, home health OT  Discharge Equipment Recommendations:  none  Barriers to discharge:  None    Subjective     Communicated with: nurse prior to session.  Pain/Comfort:  · Pain Rating 1: 2/10  · Location - Side 1: Bilateral  · Location - Orientation 1: midline  · Location 1: abdomen (gas pains)  · Pain Addressed 1: Reposition, Distraction, Pre-medicate for activity  · Pain Rating Post-Intervention 1: 0/10    Patients cultural, spiritual, Faith conflicts given the current situation:  (none reported)    Objective:     Patient found with:      General Precautions: Standard, fall, seizure   Orthopedic Precautions:N/A   Braces:       Occupational Performance:    Bed Mobility:    ·  NA    Functional Mobility/Transfers:  · Patient completed Sit <> Stand Transfer with stand by assistance  with  rolling walker   · Patient completed Bed <> Chair Transfer using Step Transfer technique with stand by assistance with rolling walker  · Patient completed Toilet Transfer Step Transfer technique with stand by assistance with  rolling walker  · Functional Mobility: SBA with RW in room    Activities of Daily Living:  · Grooming: stand by assistance standing at sink with RW  · LB Dressing: stand by assistance with sock aid and reacher to don and doff socks; limted ROM RLE  · Toileting: stand by assistance with RW    Patient left up in chair with all lines intact, call button in reach, nurse notified and son present    [facility] 6 Click:  [facility] Total Score:      Treatment & Education:  Safety cues with RW and hand placement on chair and RW  Education:    Assessment:     Willow Giron is a 87 y.o. female with a medical diagnosis of HCAP (healthcare-associated pneumonia).  She presents with  good progress toward goals.  Continue with OT POC.  HHOT recommended at [place].  Performance deficits affecting function are impaired endurance, impaired self care skills, gait instability, decreased lower extremity function, impaired functional mobilty, weakness, impaired balance.      Rehab Prognosis:  good; patient would benefit from acute skilled OT services to address these deficits and reach maximum level of function.       Plan:     Patient to be seen 5 x/week to address the above listed problems via self-care/home management, therapeutic exercises, therapeutic activities  · Plan of Care Expires: 04/19/18  · Plan of Care Reviewed with: patient, son    This Plan of care has been discussed with the patient who was involved in its development and understands and is in agreement with the identified goals and treatment plan    GOALS:    Occupational Therapy Goals        Problem: Occupational Therapy Goal    Goal Priority Disciplines Outcome Interventions   Occupational Therapy Goal     OT, PT/OT Ongoing (interventions implemented as appropriate)    Description:  Goals to be met by: 04/19/18     Patient will increase functional independence with ADLs by performing:    LE Dressing with Stand-by Assistance.--met 03/22/18  Grooming while standing at sink with Stand-by Assistance.--met 03/20/18  Toileting from toilet with Modified [place] for hygiene and clothing management.   Toilet transfer to toilet with Modified [place].                        Time Tracking:     OT Date of Treatment: 03/28/18  OT Start Time: 1042  OT Stop Time: 1121  OT Total Time (min): 39 min    Billable Minutes:Self Care/Home Management 25  Therapeutic Activity 14  Total Time 39    Sharonda Neumann OT  3/28/2018

## 2018-03-29 ENCOUNTER — TELEPHONE (OUTPATIENT)
Dept: OTOLARYNGOLOGY | Facility: CLINIC | Age: 83
End: 2018-03-29

## 2018-03-29 NOTE — TELEPHONE ENCOUNTER
FOR DOCUMENTATION ONLY:  Financial Assistance for Samsca  Approved for a one time shipment of 20 tablets.  Source: [facility] Patient [employer]

## 2018-03-29 NOTE — DISCHARGE SUMMARY
"[facility] Discharge Summary    Primary Team: [facility] Team B  Attending Physician: Dr. Domingo Reyes  Resident: Dr. Galdino Fernando  Intern: Dr. Levi Chisholm    Date of Admit: 3/17/2018  Date of Discharge: 3/28/2018    Discharge to: Home w/ Family Care  Condition: Stable    Discharge Diagnoses     Patient Active Problem List   Diagnosis    Hyponatremia    Essential hypertension    Hyperlipidemia    GERD (gastroesophageal reflux disease)    Vomiting    Acute sphenoidal sinusitis    HCAP (healthcare-associated pneumonia)    Elevated troponin    Hypo-osmolality and hyponatremia    Normocytic anemia    SIADH (syndrome of inappropriate ADH production)       Consultants and Procedures     Consultants:  Nephrology  Cardiology  PT/OT  Case Management    Procedures:   none    Brief History of Present Illness      Willow Giron is a 87 y.o. female who  has a past medical history of Arthritis; GERD (gastroesophageal reflux disease); Hyperlipidemia; and Hypertension.  The patient presented to [facility] on 3/17/2018 with a primary complaint of Vomiting (X1 episode of vomiting this evening after eating soup. Reports pt was d/c from skilled nursing facility last week for electorlyte imblance. Denies any pain at this time. Reports "not too much" nausea. )  .     Patient was in her USOH, which is independent with ADLs, 1 month ago. She was admitted to [facility] with N/V/D and was treated for hypovolemic hyponatremia. She was d/c to SNF x several weeks, then returned home 1 week ago. Per her daughter, she was never feeling back to normal. She continued to say she felt weak. 1 week ago she began have chest and head "heaviness" and dry cough. She was give steroid shot and z-pack by PCP, but did not improve. She has has poor PO over this time as well. Today she vomited 3 times (non-blood) and was feeling worse, so she came to ED. She denies chest pain, SOB, palpitations, fevers, chills, dysuria. "        For the full HPI please refer to the History & Physical from this admission.    Hospital Course By Problem with Pertinent Findings     HCAP  -Recent hospital admit/SNF, dry cough x 1 week, general weakness  On admit, CXR with RLL opacity  No signs of systemic infection  -Vanc, cefepime started 3/17-3/19, moxi3/20-present, stopped 3/27  -blood cx remains NGTD  -patient tolerated breathing tx well this am. Continue to monitor  -completed Abx regimen     Hypovolemic hyponatremia vs SIADH  -Admitted 1 month ago--hypovolemic hypoNa s/p several days N/V/D  This admit, recent poor PO intake, N/V x 3 day of admit  -On admit, Na 117, Cl 76, urine Na 81, urine osm 305  Uric acid 2.3  - labile Na, concerning for SIADH; nephrology consulted--started tolvaptan 3/21--recommend outpatient use and f/u Risen or Brown; if patient cannot afford, should consider demeclocylcine  CM assisting with insurance approval  - fluid restriction, increase salt diet  - CT chest - b/l pleural effusions  - MRI brain - no acute changes/ no masses  -Trial of fluid restriction, Na trended down. Restarted tolvaptan  -Discharged on tolvaptan     Elevated troponin  -On admit, troponin 0.573->0.895  Denies chest pain, SOB  -EKG with irregular rhythm, variable P-wave morphology--new from previous  No ST elevations  -Consult cardiology  Received , plavix 300, heparin gtt, simvastatin  -trop tended out, patient denies CP overnight. Hep gtt stopped  -will f/u cards recs  -echo - hypokinetic apical septum, intermediate LV diastolic dysfunction, PHTN (41 mmHg)     AoCD  -On admit, H/H 10/7/30--at baseline  Recent w/u c/w AoCD     HTN  -On admit, normotensive  Holding home lisinopril, norvasc in setting of HCAP--can resume if becomes hypertensive  - resumed ACEi     HLD  -Continue home simvastatin     GERD  -No acute issues  Continue home omeprazole    Discharge Medications        Medication List      START taking these medications     tolvaptan 15 mg Tab  Commonly known as:  SAMSCA  Take 1 tablet (15 mg total) by mouth once daily.        CONTINUE taking these medications    ALPRAZolam 0.25 MG tablet  Commonly known as:  XANAX     amLODIPine 10 MG tablet  Commonly known as:  NORVASC     docusate sodium 100 MG capsule  Commonly known as:  COLACE     fluticasone 50 mcg/actuation nasal spray  Commonly known as:  FLONASE  2 sprays (100 mcg total) by Each Nare route 2 (two) times daily.     furosemide 20 MG tablet  Commonly known as:  LASIX  Take 1 tablet (20 mg total) by mouth 2 (two) times daily.     lisinopril 40 MG tablet  Commonly known as:  PRINIVIL,ZESTRIL     nabumetone 500 MG tablet  Commonly known as:  RELAFEN     omeprazole 20 MG capsule  Commonly known as:  PRILOSEC     simvastatin 40 MG tablet  Commonly known as:  ZOCOR     tiZANidine 4 mg Cap  Take 1 capsule by mouth every evening.        STOP taking these medications    celecoxib 200 MG capsule  Commonly known as:  CeleBREX           Where to Get Your Medications      These medications were sent to [facility] [address]  [address] [facility] [address]    Phone:  [phone]   · tolvaptan 15 mg Tab         Discharge Information:   Diet:  Cardiac    Physical Activity:  As tolerated    Instructions:  1. Take all medications as prescribed  2. Keep all follow-up appointments  3. Return to the hospital or call your primary care physicians if any worsening symptoms such as SOB, chest pain, abdominal pain, N/V, dizziness, LOC occur.    Follow-Up Appointments:  Follow-up Information     Nadege العلي MD On 4/6/2018.    Specialty:  Hospitalist  Why:  at 3:00 pm--[facility]  Contact information:  [address]  [address]  [facility] [address]  [phone]             Donny Brown DO. Go on 4/5/2018.    Specialty:  Nephrology  Why:  at 11:30am--obtain labs prior to nephrology follow up.   Contact information:  [address]  [address]  [place] [address]  [phone]             [facility] On 4/3/2018.    Why:  for lab draw  Contact information:  [address] [id number] [phone]             [facility].    Specialties:  Home Health Services, Physical Therapy, Occupational Therapy  Contact information:  [address]  [address]  [facility] [address]  [phone]                     Edward Chisholm  [facility] Internal Medicine/Pediatrics, \[facility]\""

## 2018-03-29 NOTE — PROGRESS NOTES
.Pharmacy New Medication Education    Patient accepted medication education.    Pharmacy educated patient on name and purpose of medications and possible side effects, using the teach-back method.     Current Inpatient Medication Orders   albuterol-ipratropium 2.5mg-0.5mg/3mL nebulizer solution 3 mL   amLODIPine tablet 10 mg   aspirin tablet 325 mg   calcium carbonate 200 mg calcium (500 mg) chewable tablet 500 mg   fluticasone 50 mcg/actuation nasal spray 100 mcg   heparin (porcine) injection 5,000 Units   * magnesium sulfate in dextrose IVPB (premix) 1 g   pantoprazole EC tablet 40 mg   polyethylene glycol packet 17 g   * potassium chloride SA CR tablet 20 mEq   ramelteon tablet 8 mg   senna tablet 8.6 mg   simethicone chewable tablet 125 mg   simvastatin tablet 40 mg   sodium chloride 0.9% flush 5 mL   tolvaptan tablet 15 mg       Learners of pharmacy medication education included:  Patient    Patient +/- learner response:  Verbalized Understanding, Teachback

## 2018-03-30 ENCOUNTER — PATIENT OUTREACH (OUTPATIENT)
Dept: ADMINISTRATIVE | Facility: CLINIC | Age: 83
End: 2018-03-30

## 2018-03-30 RX ORDER — POLYETHYLENE GLYCOL 3350 17 G/17G
17 POWDER, FOR SOLUTION ORAL
Status: ON HOLD | COMMUNITY
Start: 2018-03-30 | End: 2023-01-01

## 2018-03-30 RX ORDER — ASPIRIN 81 MG/1
81 TABLET ORAL DAILY
Status: ON HOLD | COMMUNITY
Start: 2018-03-28 | End: 2023-01-01

## 2018-03-30 NOTE — PATIENT INSTRUCTIONS
When You Have Pneumonia  You have been diagnosed with pneumonia. This is a serious lung infection. Most cases of pneumonia are caused by bacteria. Pneumonia most often occurs in older adults, young children, and people with chronic health problems.  Home care  · Take your medicine exactly as directed. Dont skip doses. Continue taking your antibiotics as until they are all gone, even if you start to feel better. This will prevent the pneumonia from coming back.  · Drink at least 8 glasses of water daily, unless directed otherwise. This helps to loosen and thin secretions so that you can cough them up.  · Use a cool-mist humidifier in your bedroom. Be sure to clean the humidifier daily.  · Dont use medicines to suppress your cough unless your cough is dry, painful, or interferes with your sleep. Coughing up mucus is normal. You may use an expectorant if your healthcare provider says its okay.  · You can use warm compresses or a heating pad on the lowest setting to relieve chest discomfort. Use several times a day for 15-20 minutes at a time. To prevent injury to your skin, set the temperature to warm, not hot. Dont put the compress or pad directly on your skin. Make certain it has a cover or wrap it in a towel. This is to prevent skin burns.  · Get plenty of rest until your fever, shortness of breath, and chest pain go away.  · Plan to get a flu shot every year. The flu is a common cause of pneumonia. Getting a flu shot every year can help prevent both the flu and pneumonia.  Getting the pneumococcal vaccine  Talk with your healthcare provider about getting the pneumococcalvaccine. Pneumococcal pneumonia is caused by bacteria that spread from person to person. It can cause minor problems, such as ear infections. But it can also turn into life-threatening illnesses of the lungs (pneumonia), the covering of the brain and spinal cord (meningitis), and the blood (bacteremia).  Children under 2 years of age, adults  over age 65, people with certain health conditions, and smokers are at the highest risk of pneumococcal disease. This vaccine can help prevent pneumococcal disease in both adults and children. Some people should not have the vaccine. Make sure to ask your healthcare provider if you should have the vaccine.   Follow-up care  Make a follow-up appointment as directed by our staff.  When to call your healthcare provider  Call your healthcare provider if you have any of the following:  · Fever above 100.4°F (38°C), or as directed by your healthcare provider  · Mucus from the lungs (sputum) thats yellow, green, bloody, or smells bad  · A large amount of sputum  · Vomiting  · Symptoms that get worse  When to call 911  Call 911 right away if you have any of the following:  · Chest pain  · Trouble breathing  · Blue lips or fingernails   Date Last Reviewed: 11/1/2016  © 5779-3559 [employer]. [address] [place] [address]. All rights reserved. This information is not intended as a substitute for professional medical care. Always follow your healthcare professional's instructions.        Discharge Instructions for Hyponatremia  You were diagnosed with hyponatremia, which means your blood level of sodium (salt) is too low. Salt is needed for the body and brain to work. Very low blood levels of sodium can be fatal. Symptoms can include headache, confusion, fatigue, muscle cramps, hallucinations, seizures, and coma. You have been treated to raise your blood levels of sodium. The following instructions will help you care for yourself at home as you have been instructed.  Home care  · Limit your intake of fluids. Drink only the amounts directed by your healthcare provider.  · Ask your healthcare provider what you should use to replace fluids if you are throwing up.  · Keep all follow-up appointments. Your provider needs to watch your condition closely.  To help prevent hyponatremia:  · Take all medicines  exactly as directed. Certain medicines can lower blood sodium levels.  · If you have done something that makes you sweat a lot, drink fluids that contain salt and other electrolytes.   · Tell all healthcare providers what medicines you take. Mention all prescription and over-the-counter drugs and herbs.  · Have your sodium levels checked often. This is vital if you take a diuretic (medicine that helps your body get rid of water).  Follow-up  Schedule a follow-up visit as directed.  When to call your healthcare provider  Call your provider right away if you have any of the following:  · Severe tiredness  · Fainting  · Dizziness  · Loss of appetite  · Nausea or vomiting  · Confusion or forgetfullness  · Muscle spasms, cramping, or twitching  · Seizures  · Gait disturbances   Date Last Reviewed: 6/8/2015  © 1054-8074 [employer]. [address] [place] [address], [place] [address]. All rights reserved. This information is not intended as a substitute for professional medical care. Always follow your healthcare professional's instructions.

## 2018-04-02 ENCOUNTER — TELEPHONE (OUTPATIENT)
Dept: PRIMARY CARE CLINIC | Facility: CLINIC | Age: 83
End: 2018-04-02

## 2018-04-02 NOTE — TELEPHONE ENCOUNTER
Spoke to patient about rescheduling her appointment, she stated that she would have her daughter call us to reschedule.

## 2018-04-03 ENCOUNTER — LAB VISIT (OUTPATIENT)
Dept: LAB | Facility: HOSPITAL | Age: 83
End: 2018-04-03
Attending: STUDENT IN AN ORGANIZED HEALTH CARE EDUCATION/TRAINING PROGRAM
Payer: MEDICARE

## 2018-04-03 DIAGNOSIS — J18.9 HCAP (HEALTHCARE-ASSOCIATED PNEUMONIA): ICD-10-CM

## 2018-04-03 DIAGNOSIS — E22.2 SIADH (SYNDROME OF INAPPROPRIATE ADH PRODUCTION): ICD-10-CM

## 2018-04-03 DIAGNOSIS — E87.1 HYPONATREMIA: ICD-10-CM

## 2018-04-03 LAB
ANION GAP SERPL CALC-SCNC: 9 MMOL/L
BASOPHILS # BLD AUTO: 0.02 K/UL
BASOPHILS NFR BLD: 0.4 %
BUN SERPL-MCNC: 10 MG/DL
CALCIUM SERPL-MCNC: 9.6 MG/DL
CHLORIDE SERPL-SCNC: 102 MMOL/L
CO2 SERPL-SCNC: 28 MMOL/L
CREAT SERPL-MCNC: 0.6 MG/DL
DIFFERENTIAL METHOD: ABNORMAL
EOSINOPHIL # BLD AUTO: 0.2 K/UL
EOSINOPHIL NFR BLD: 3.8 %
ERYTHROCYTE [DISTWIDTH] IN BLOOD BY AUTOMATED COUNT: 13.5 %
EST. GFR  (AFRICAN AMERICAN): >60 ML/MIN/1.73 M^2
EST. GFR  (NON AFRICAN AMERICAN): >60 ML/MIN/1.73 M^2
GLUCOSE SERPL-MCNC: 88 MG/DL
HCT VFR BLD AUTO: 28.4 %
HGB BLD-MCNC: 9.1 G/DL
LYMPHOCYTES # BLD AUTO: 1 K/UL
LYMPHOCYTES NFR BLD: 21.3 %
MAGNESIUM SERPL-MCNC: 1.9 MG/DL
MCH RBC QN AUTO: 31.1 PG
MCHC RBC AUTO-ENTMCNC: 32 G/DL
MCV RBC AUTO: 97 FL
MONOCYTES # BLD AUTO: 0.5 K/UL
MONOCYTES NFR BLD: 10.1 %
NEUTROPHILS # BLD AUTO: 3.1 K/UL
NEUTROPHILS NFR BLD: 64.4 %
OSMOLALITY SERPL: 288 MOSM/KG
PHOSPHATE SERPL-MCNC: 4 MG/DL
PLATELET # BLD AUTO: 316 K/UL
PMV BLD AUTO: 8.8 FL
POTASSIUM SERPL-SCNC: 3.9 MMOL/L
RBC # BLD AUTO: 2.93 M/UL
SODIUM SERPL-SCNC: 139 MMOL/L
WBC # BLD AUTO: 4.74 K/UL

## 2018-04-03 PROCEDURE — 80048 BASIC METABOLIC PNL TOTAL CA: CPT

## 2018-04-03 PROCEDURE — 84100 ASSAY OF PHOSPHORUS: CPT

## 2018-04-03 PROCEDURE — 83735 ASSAY OF MAGNESIUM: CPT

## 2018-04-03 PROCEDURE — 85025 COMPLETE CBC W/AUTO DIFF WBC: CPT

## 2018-04-03 PROCEDURE — 36415 COLL VENOUS BLD VENIPUNCTURE: CPT

## 2018-04-03 PROCEDURE — 83930 ASSAY OF BLOOD OSMOLALITY: CPT

## 2018-04-12 NOTE — TELEPHONE ENCOUNTER
Ms. Giron received remaining Samsca from the  as planned. Aware to call [facility] with any questions or concerns.

## 2018-04-13 ENCOUNTER — OFFICE VISIT (OUTPATIENT)
Dept: PRIMARY CARE CLINIC | Facility: CLINIC | Age: 83
End: 2018-04-13
Payer: MEDICARE

## 2018-04-13 VITALS
DIASTOLIC BLOOD PRESSURE: 75 MMHG | HEART RATE: 70 BPM | WEIGHT: 103.94 LBS | OXYGEN SATURATION: 97 % | TEMPERATURE: 99 F | SYSTOLIC BLOOD PRESSURE: 139 MMHG | BODY MASS INDEX: 21.72 KG/M2

## 2018-04-13 DIAGNOSIS — E22.2 SIADH (SYNDROME OF INAPPROPRIATE ADH PRODUCTION): ICD-10-CM

## 2018-04-13 DIAGNOSIS — E87.1 HYPONATREMIA: ICD-10-CM

## 2018-04-13 DIAGNOSIS — J18.9 HCAP (HEALTHCARE-ASSOCIATED PNEUMONIA): Primary | ICD-10-CM

## 2018-04-13 DIAGNOSIS — I10 ESSENTIAL HYPERTENSION: ICD-10-CM

## 2018-04-13 PROCEDURE — 99999 PR PBB SHADOW E&M-EST. PATIENT-LVL III: CPT | Mod: PBBFAC,,, | Performed by: INTERNAL MEDICINE

## 2018-04-13 PROCEDURE — 99495 TRANSJ CARE MGMT MOD F2F 14D: CPT | Mod: S$GLB,,, | Performed by: INTERNAL MEDICINE

## 2018-04-13 RX ORDER — HYDROCODONE BITARTRATE AND ACETAMINOPHEN 7.5; 325 MG/1; MG/1
1 TABLET ORAL EVERY 6 HOURS PRN
COMMUNITY
End: 2020-06-02 | Stop reason: SDUPTHER

## 2018-04-13 NOTE — PROGRESS NOTES
[facility]  New Visit Progress Note   Recent Hospital Discharge     PRESENTING HISTORY     Chief Complaint/Reason for Admission:  Follow up Hospital Discharge     PCP: Qamar Bland MD    History of Present Illness:  Ms. Willow Giron is a 87 y.o. female who was recently admitted to the hospital.    Primary Team: [facility] Team B  Attending Physician: Dr. Domingo Reyes  Resident: Dr. Galdino Fernando  Intern: Dr. Levi Chisholm     Date of Admit: 3/17/2018  Date of Discharge: 3/28/2018     Discharge to: Home [facility]/ [facility]  Condition: Stable    ___________________________________________________________________    Today:  Presents to [facility] for hospital follow up.  Recently hospitalized for HCAP- Right lower lobe, no organism identified.  Patient discharged from SNF approximately one week prior to HCAP presentation.  Was in SNF for rehabilitation following hospitalization in March 2018 for hypovolemic hyponatremia.   HCAP hospitalization further complicated by ongoing, symptomatic hyponatremia.  She completed course of empiric ABX prior to discharge.  Samsca initiated at hospital discharge, with plans in place for nephrology follow up.  She was discharged to home with home health.    Accompanied today by her daughter.  Patient ambulatory and independent with ADL's.  No falls reported.  Getting home health skilled nursing and therapy visits from [facility].  Reports compliance with all medication.    Had nephrology follow up with [facility], Dr Dutton, including labs.  Sodium has normalized on Samsca.  Has scheduled follow up and scheduled repeat labs in 2 weeks.      Review of Systems  General ROS: negative for chills, fever or weight loss  Psychological ROS: negative for hallucination, depression or suicidal ideation  Ophthalmic ROS: negative for blurry vision, photophobia or eye pain  ENT ROS: negative for epistaxis, sore throat or rhinorrhea  Respiratory ROS: no cough, shortness of breath, or  wheezing  Cardiovascular ROS: no chest pain or dyspnea on exertion  Gastrointestinal ROS: no abdominal pain, change in bowel habits, or black/ bloody stools  Genito-Urinary ROS: no dysuria, trouble voiding, or hematuria  Musculoskeletal ROS: negative for gait disturbance or muscular weakness  Neurological ROS: no syncope or seizures; no ataxia  Dermatological ROS: negative for pruritis, rash and jaundice      PAST HISTORY:     Past Medical History:   Diagnosis Date    Arthritis     GERD (gastroesophageal reflux disease)     Hyperlipidemia     Hypertension     Hyponatremia        Past Surgical History:   Procedure Laterality Date     SECTION, CLASSIC      HYSTERECTOMY      SHOULDER SURGERY         History reviewed. No pertinent family history.    Social History     Social History    Marital status:      Spouse name: N/A    Number of children: N/A    Years of education: N/A     Social History Main Topics    Smoking status: Former Smoker    Smokeless tobacco: None    Alcohol use Yes      Comment: social    Drug use: No    Sexual activity: Not Asked     Other Topics Concern    None     Social History Narrative    None       MEDICATIONS & ALLERGIES:     Current Outpatient Prescriptions on File Prior to Visit   Medication Sig Dispense Refill    ALPRAZolam (XANAX) 0.25 MG tablet Take 0.5 mg by mouth every evening.       amlodipine (NORVASC) 10 MG tablet Take 10 mg by mouth once daily.      aspirin (ECOTRIN) 81 MG EC tablet Take 81 mg by mouth once daily.      docusate sodium (COLACE) 100 MG capsule Take 100 mg by mouth daily as needed for Constipation.      fluticasone (FLONASE) 50 mcg/actuation nasal spray 2 sprays (100 mcg total) by Each Nare route 2 (two) times daily. 1 Bottle 0    furosemide (LASIX) 20 MG tablet Take 1 tablet (20 mg total) by mouth 2 (two) times daily. (Patient taking differently: Take 20 mg by mouth once daily. ) 30 tablet 0    lisinopril (PRINIVIL,ZESTRIL) 40  MG tablet Take 40 mg by mouth once daily.      omeprazole (PRILOSEC) 20 MG capsule Take 20 mg by mouth once daily.      polyethylene glycol (MIRALAX) 17 gram/dose powder Take 17 g by mouth twice a week.      simvastatin (ZOCOR) 40 MG tablet Take 40 mg by mouth every evening.      tolvaptan (SAMSCA) 15 mg Tab Take 1 tablet (15 mg total) by mouth once daily. 30 tablet 0    nabumetone (RELAFEN) 500 MG tablet Take 500 mg by mouth 2 (two) times daily.       No current facility-administered medications on file prior to visit.         Review of patient's allergies indicates:   Allergen Reactions    Pcn [penicillins] Rash       OBJECTIVE:     Vital Signs:  Vitals:    04/13/18 0946   BP: 139/75   Pulse: 70   Temp: 98.9 °F (37.2 °C)     Wt Readings from Last 1 Encounters:   04/13/18 0946 47.1 kg (103 lb 15.2 oz)     Body mass index is 21.72 kg/m².   97%    Physical Exam:  /75 (BP Location: Right arm, Patient Position: Sitting, BP Method: Small (Automatic))   Pulse 70   Temp 98.9 °F (37.2 °C) (Oral)   Wt 47.1 kg (103 lb 15.2 oz)   SpO2 97%   BMI 21.72 kg/m²   General appearance: alert, cooperative, no distress  Constitutional:Oriented to person, place, and time.appears well-developed and well-nourished.   HEENT: Normocephalic, atraumatic, neck symmetrical, no nasal discharge   Eyes: conjunctivae/corneas clear, PERRL, EOM's intact  Lungs: clear to auscultation bilaterally, no dullness to percussion bilaterally  Heart: regular rate and rhythm without rub; no displacement of the PMI   Abdomen: soft, non-tender; bowel sounds normoactive; no organomegaly  Extremities: extremities symmetric; no clubbing, cyanosis, or edema  Integument: Skin color, texture, turgor normal; no rashes; hair distrubution normal  Neurologic: Alert and oriented X 3, normal strength, normal coordination and gait  Psychiatric: no pressured speech; normal affect; no evidence of impaired cognition     Laboratory  Lab Results   Component Value  Date    WBC 4.74 04/03/2018    HGB 9.1 (L) 04/03/2018    HCT 28.4 (L) 04/03/2018    MCV 97 04/03/2018     04/03/2018     BMP  Lab Results   Component Value Date     04/03/2018    K 3.9 04/03/2018     04/03/2018    CO2 28 04/03/2018    BUN 10 04/03/2018    CREATININE 0.6 04/03/2018    CALCIUM 9.6 04/03/2018    ANIONGAP 9 04/03/2018    ESTGFRAFRICA >60 04/03/2018    EGFRNONAA >60 04/03/2018     Lab Results   Component Value Date    ALT 15 03/17/2018    AST 21 03/17/2018    ALKPHOS 86 03/17/2018    BILITOT 0.6 03/17/2018     Lab Results   Component Value Date    INR 1.1 03/18/2018     Lab Results   Component Value Date    HGBA1C 4.9 02/18/2018     No results for input(s): POCTGLUCOSE in the last 72 hours.      TRANSITION OF CARE:     [facility] On Call Contact Note: 3/30/18    Family and/or Caretaker present at visit?  Yes.  Diagnostic tests reviewed/disposition: I have reviewed all completed as well as pending diagnostic tests at the time of discharge.  Disease/illness education: Yes  Home health/community services discussion/referrals: Patient has home health established at Home. .   Establishment or re-establishment of referral orders for community resources: No other necessary community resources.   Discussion with other health care providers: No discussion with other health care providers necessary.     ASSESSMENT & PLAN:         HCAP (healthcare-associated pneumonia)  - recent hospitalization for HCAP   - PNA occurred following a hospitalization for hypovolemic hyponatremia with subsequent                 SNF stay for physical rehabilitation   - no organism identified  - patient completed course of empiric ABX during hospitalization     Hyponatremia  SIADH (syndrome of inappropriate ADH production)  - unclear etiology of hyponatremia, possibly SIADH  - patient being followed by Nephrology, Dr Lobo  - serum sodium has normalized with trial of Samsca   - patient has appropriate follow and and labs  planned with Dr Dutton     Essential hypertension  - stable     I will [facility] the [facility] again [facility] 5[facility]7/18.   Instructions for the patient:      Scheduled Follow-up :  Future Appointments  Date Time Provider Department Center   4/19/2018 8:00 AM ADENIKE Rojas [facility] [place] [facility]   4/19/2018 9:20 AM Noemi Wahl MD [facility] [place] [facility]   5/7/2018 10:00 AM Nadege العلي MD [facility] [place] [facility]       Post Visit Medication List:     Medication List          Accurate as of 4/13/18 11:59 PM. If you have any questions, ask your nurse or doctor.               CHANGE how you take these medications    furosemide 20 MG tablet  Commonly known as:  LASIX  Take 1 tablet (20 mg total) by mouth 2 (two) times daily.  What changed:  when to take this        CONTINUE taking these medications    ALPRAZolam 0.25 MG tablet  Commonly known as:  XANAX     amLODIPine 10 MG tablet  Commonly known as:  NORVASC     aspirin 81 MG EC tablet  Commonly known as:  ECOTRIN     docusate sodium 100 MG capsule  Commonly known as:  COLACE     fluticasone 50 mcg/actuation nasal spray  Commonly known as:  FLONASE  2 sprays (100 mcg total) by Each Nare route 2 (two) times daily.     hydrocodone-acetaminophen 7.5-325mg 7.5-325 mg per tablet  Commonly known as:  NORCO     lisinopril 40 MG tablet  Commonly known as:  PRINIVIL,ZESTRIL     MIRALAX 17 gram/dose powder  Generic drug:  polyethylene glycol     nabumetone 500 MG tablet  Commonly known as:  RELAFEN     omeprazole 20 MG capsule  Commonly known as:  PRILOSEC     simvastatin 40 MG tablet  Commonly known as:  ZOCOR     tolvaptan 15 mg Tab  Commonly known as:  SAMSCA  Take 1 tablet (15 mg total) by mouth once daily.            Signing Physician:  Nadege العلي MD

## 2018-04-15 PROBLEM — J01.30 ACUTE SPHENOIDAL SINUSITIS: Status: RESOLVED | Noted: 2018-02-20 | Resolved: 2018-04-15

## 2018-04-15 PROBLEM — E87.1 HYPO-OSMOLALITY AND HYPONATREMIA: Status: RESOLVED | Noted: 2018-03-19 | Resolved: 2018-04-15

## 2018-04-15 PROBLEM — R11.10 VOMITING: Status: RESOLVED | Noted: 2018-02-17 | Resolved: 2018-04-15

## 2018-04-19 ENCOUNTER — CLINICAL SUPPORT (OUTPATIENT)
Dept: OTOLARYNGOLOGY | Facility: CLINIC | Age: 83
End: 2018-04-19
Payer: MEDICARE

## 2018-04-19 ENCOUNTER — OFFICE VISIT (OUTPATIENT)
Dept: OTOLARYNGOLOGY | Facility: CLINIC | Age: 83
End: 2018-04-19
Payer: MEDICARE

## 2018-04-19 ENCOUNTER — TELEPHONE (OUTPATIENT)
Dept: PRIMARY CARE CLINIC | Facility: CLINIC | Age: 83
End: 2018-04-19

## 2018-04-19 ENCOUNTER — LAB VISIT (OUTPATIENT)
Dept: LAB | Facility: HOSPITAL | Age: 83
End: 2018-04-19
Attending: INTERNAL MEDICINE
Payer: MEDICARE

## 2018-04-19 VITALS — DIASTOLIC BLOOD PRESSURE: 71 MMHG | SYSTOLIC BLOOD PRESSURE: 152 MMHG | HEART RATE: 71 BPM | TEMPERATURE: 98 F

## 2018-04-19 DIAGNOSIS — H90.A31 MIXED CONDUCTIVE AND SENSORINEURAL HEARING LOSS OF RIGHT EAR WITH RESTRICTED HEARING OF LEFT EAR: Primary | ICD-10-CM

## 2018-04-19 DIAGNOSIS — H66.3X3 CHRONIC SUPPURATIVE OTITIS MEDIA OF BOTH EARS, UNSPECIFIED OTITIS MEDIA LOCATION: ICD-10-CM

## 2018-04-19 DIAGNOSIS — E22.2 SIADH (SYNDROME OF INAPPROPRIATE ADH PRODUCTION): ICD-10-CM

## 2018-04-19 DIAGNOSIS — E22.2 SIADH (SYNDROME OF INAPPROPRIATE ADH PRODUCTION): Primary | ICD-10-CM

## 2018-04-19 LAB
ANION GAP SERPL CALC-SCNC: 11 MMOL/L
BUN SERPL-MCNC: 10 MG/DL
CALCIUM SERPL-MCNC: 9.8 MG/DL
CHLORIDE SERPL-SCNC: 105 MMOL/L
CO2 SERPL-SCNC: 22 MMOL/L
CREAT SERPL-MCNC: 0.8 MG/DL
EST. GFR  (AFRICAN AMERICAN): >60 ML/MIN/1.73 M^2
EST. GFR  (NON AFRICAN AMERICAN): >60 ML/MIN/1.73 M^2
GLUCOSE SERPL-MCNC: 91 MG/DL
POTASSIUM SERPL-SCNC: 4.3 MMOL/L
SODIUM SERPL-SCNC: 138 MMOL/L

## 2018-04-19 PROCEDURE — 99999 PR PBB SHADOW E&M-EST. PATIENT-LVL III: CPT | Mod: PBBFAC,,, | Performed by: OTOLARYNGOLOGY

## 2018-04-19 PROCEDURE — 36415 COLL VENOUS BLD VENIPUNCTURE: CPT

## 2018-04-19 PROCEDURE — 99213 OFFICE O/P EST LOW 20 MIN: CPT | Mod: S$GLB,,, | Performed by: OTOLARYNGOLOGY

## 2018-04-19 PROCEDURE — 80048 BASIC METABOLIC PNL TOTAL CA: CPT

## 2018-04-19 PROCEDURE — 99999 PR PBB SHADOW E&M-EST. PATIENT-LVL I: CPT | Mod: PBBFAC,,, | Performed by: AUDIOLOGIST-HEARING AID FITTER

## 2018-04-19 PROCEDURE — 92567 TYMPANOMETRY: CPT | Mod: S$GLB,,, | Performed by: AUDIOLOGIST-HEARING AID FITTER

## 2018-04-19 PROCEDURE — 92557 COMPREHENSIVE HEARING TEST: CPT | Mod: S$GLB,,, | Performed by: AUDIOLOGIST-HEARING AID FITTER

## 2018-04-19 NOTE — PROGRESS NOTES
"  Chief Complaint   Patient presents with    Hearing Loss     bilateral   .     HPI:  Willow Giron is a very pleasant 87 y.o. female here to see me today for the first time for evaluation of hearing loss and "cloudy brain".  She reports hearing loss that has been gradually progressing over the last 40 years.  She has noted any difference in hearing between the ears, with right ear being the worse hearing ear.  She has not noted any tinnitus in both ears.  She has not had any recent issues with ear pain or ear drainage.  She denies a family history of hearing loss. 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 She denies any history of significant loud noise exposure.She denies issues with dizziness. She denies true vertigo. She states that since she was discharged from the hospital 2 weeks ago she feels she has a "cloudy brain" and that she "can't think straight." She is having more difficulty with recall.         Past Medical History:   Diagnosis Date    Arthritis     GERD (gastroesophageal reflux disease)     Hyperlipidemia     Hypertension     Hyponatremia      Social History     Social History    Marital status:      Spouse name: N/A    Number of children: N/A    Years of education: N/A     Occupational History    Not on file.     Social History Main Topics    Smoking status: Former Smoker    Smokeless tobacco: Not on file    Alcohol use Yes      Comment: social    Drug use: No    Sexual activity: Not on file     Other Topics Concern    Not on file     Social History Narrative    No narrative on file     Past Surgical History:   Procedure Laterality Date     SECTION, CLASSIC      HYSTERECTOMY      SHOULDER SURGERY       No family history on file.      Review of Systems  General: negative for chills, fever or weight loss  Psychological: negative for mood changes or depression  Ophthalmic: negative for blurry " vision, photophobia or eye pain  ENT: see HPI  Respiratory: no cough, shortness of breath, or wheezing  Cardiovascular: no chest pain or dyspnea on exertion  Gastrointestinal: no abdominal pain, change in bowel habits, or black/ bloody stools  Musculoskeletal: negative for gait disturbance or muscular weakness  Neurological: no syncope or seizures; no ataxia  Dermatological: negative for puritis,  rash and jaundice  Hematologic/lymphatic: no easy bruising, no new lumps or bumps      Physical Exam:    Vitals:    04/19/18 0840   BP: (!) 152/71   Pulse: 71   Temp: 97.6 °F (36.4 °C)       Constitutional: Well appearing / communicating without difficutly.  NAD.  Eyes: EOM I Bilaterally  Head/Face: Normocephalic.  Negative paranasal sinus pressure/tenderness.  Salivary glands WNL.  House Brackmann I Bilaterally.    Right Ear: Auricle normal appearance. External Auditory Canal within normal limits no lesions or masses,TM w/o masses/lesions/perforations. TM mobility noted.   Left Ear: Auricle normal appearance. External Auditory Canal within normal limits no lesions or masses,TM w/o masses/lesions/perforations. TM mobility noted.  Rinne Air conduction >bone conduction bilaterally, Guerrier midline.   Nose: No gross nasal septal deviation. Inferior Turbinates 3+ bilaterally. No septal perforation. No masses/lesions. External nasal skin appears normal without masses/lesions.  Oral Cavity: Gingiva/lips within normal limits.  Dentition/gingiva healthy appearing. Mucus membranes moist. Floor of mouth soft, no masses palpated. Oral Tongue mobile. Hard Palate appears normal.    Oropharynx: Base of tongue appears normal. No masses/lesions noted. Tonsillar fossa/pharyngeal wall without lesions. Posterior oropharynx WNL.  Soft palate without masses. Midline uvula.   Neck/Lymphatic: No LAD I-VI bilaterally.  No thyromegaly.  No masses noted on exam.    Mirror laryngoscopy/nasopharyngoscopy: Active gag reflex.  Unable to  perform.    Neuro/Psychiatric: AOx3.  Normal mood and affect.   Cardiovascular: Normal carotid pulses bilaterally, no increasing jugular venous distention noted at cervical region bilaterally.    Respiratory: Normal respiratory effort, no stridor, no retractions noted.      Audiogram reviewed personally by myself and in detail with the patient today.       Assessment:    ICD-10-CM ICD-9-CM    1. Mixed conductive and sensorineural hearing loss of right ear with restricted hearing of left ear H90.A31 389.22    2. Chronic suppurative otitis media of both ears, unspecified otitis media location H66.3X3 382.3      The primary encounter diagnosis was Mixed conductive and sensorineural hearing loss of right ear with restricted hearing of left ear. A diagnosis of Chronic suppurative otitis media of both ears, unspecified otitis media location was also pertinent to this visit.      Plan:  No orders of the defined types were placed in this encounter.      We reviewed the patient's recent audiogram and hearing loss in detail.  We also discussed that she is a good candidate for hearing aids, if and when she  is motivated.  She was given handouts with information and pricing of hearing aids, and will contact audiology when ready to proceed.  We also discussed the use hearing protection when exposed to loud noise, including lawn equipment.     Decreased mentation- Discussed with Dr. العلي, perhaps Na level is out of normal range. She will obtain BMP and notify patient of results. If this is normal perhaps Neurology consultation would be appropriate.       30 minutes was spent with the patient with more than half of the time spent counseling regarding above.       Noemi Wahl MD

## 2018-04-30 ENCOUNTER — LAB VISIT (OUTPATIENT)
Dept: LAB | Facility: HOSPITAL | Age: 83
End: 2018-04-30
Attending: INTERNAL MEDICINE
Payer: MEDICARE

## 2018-04-30 DIAGNOSIS — E87.1 HYPO-OSMOLALITY AND HYPONATREMIA: Primary | ICD-10-CM

## 2018-04-30 LAB
ALBUMIN SERPL BCP-MCNC: 3.7 G/DL
ALP SERPL-CCNC: 70 U/L
ALT SERPL W/O P-5'-P-CCNC: 10 U/L
ANION GAP SERPL CALC-SCNC: 9 MMOL/L
AST SERPL-CCNC: 17 U/L
BASOPHILS # BLD AUTO: 0.03 K/UL
BASOPHILS NFR BLD: 0.7 %
BILIRUB SERPL-MCNC: 0.5 MG/DL
BUN SERPL-MCNC: 18 MG/DL
CALCIUM SERPL-MCNC: 9.8 MG/DL
CHLORIDE SERPL-SCNC: 103 MMOL/L
CO2 SERPL-SCNC: 24 MMOL/L
CREAT SERPL-MCNC: 0.8 MG/DL
DIFFERENTIAL METHOD: ABNORMAL
EOSINOPHIL # BLD AUTO: 0.1 K/UL
EOSINOPHIL NFR BLD: 2.4 %
ERYTHROCYTE [DISTWIDTH] IN BLOOD BY AUTOMATED COUNT: 12.5 %
EST. GFR  (AFRICAN AMERICAN): >60 ML/MIN/1.73 M^2
EST. GFR  (NON AFRICAN AMERICAN): >60 ML/MIN/1.73 M^2
GLUCOSE SERPL-MCNC: 95 MG/DL
HCT VFR BLD AUTO: 32.4 %
HGB BLD-MCNC: 10.5 G/DL
LYMPHOCYTES # BLD AUTO: 1.6 K/UL
LYMPHOCYTES NFR BLD: 38.3 %
MCH RBC QN AUTO: 30.4 PG
MCHC RBC AUTO-ENTMCNC: 32.4 G/DL
MCV RBC AUTO: 94 FL
MONOCYTES # BLD AUTO: 0.4 K/UL
MONOCYTES NFR BLD: 10 %
NEUTROPHILS # BLD AUTO: 2 K/UL
NEUTROPHILS NFR BLD: 48.6 %
OSMOLALITY SERPL: 295 MOSM/KG
PLATELET # BLD AUTO: 304 K/UL
PMV BLD AUTO: 8.8 FL
POTASSIUM SERPL-SCNC: 4.2 MMOL/L
PROT SERPL-MCNC: 7.1 G/DL
RBC # BLD AUTO: 3.45 M/UL
SODIUM SERPL-SCNC: 136 MMOL/L
SODIUM SERPL-SCNC: 136 MMOL/L
WBC # BLD AUTO: 4.18 K/UL

## 2018-04-30 PROCEDURE — 36415 COLL VENOUS BLD VENIPUNCTURE: CPT

## 2018-04-30 PROCEDURE — 83930 ASSAY OF BLOOD OSMOLALITY: CPT

## 2018-04-30 PROCEDURE — 85025 COMPLETE CBC W/AUTO DIFF WBC: CPT

## 2018-04-30 PROCEDURE — 80053 COMPREHEN METABOLIC PANEL: CPT

## 2018-05-07 ENCOUNTER — OFFICE VISIT (OUTPATIENT)
Dept: PRIMARY CARE CLINIC | Facility: CLINIC | Age: 83
End: 2018-05-07
Payer: MEDICARE

## 2018-05-07 VITALS
BODY MASS INDEX: 22.21 KG/M2 | TEMPERATURE: 98 F | OXYGEN SATURATION: 97 % | DIASTOLIC BLOOD PRESSURE: 77 MMHG | HEART RATE: 67 BPM | SYSTOLIC BLOOD PRESSURE: 143 MMHG | WEIGHT: 106.25 LBS

## 2018-05-07 DIAGNOSIS — E22.2 SIADH (SYNDROME OF INAPPROPRIATE ADH PRODUCTION): ICD-10-CM

## 2018-05-07 DIAGNOSIS — E87.1 HYPONATREMIA: ICD-10-CM

## 2018-05-07 DIAGNOSIS — I10 ESSENTIAL HYPERTENSION: ICD-10-CM

## 2018-05-07 DIAGNOSIS — J18.9 HCAP (HEALTHCARE-ASSOCIATED PNEUMONIA): Primary | ICD-10-CM

## 2018-05-07 PROBLEM — R11.10 VOMITING: Status: RESOLVED | Noted: 2018-02-17 | Resolved: 2018-05-07

## 2018-05-07 PROCEDURE — 99213 OFFICE O/P EST LOW 20 MIN: CPT | Mod: S$GLB,,, | Performed by: INTERNAL MEDICINE

## 2018-05-07 PROCEDURE — 99999 PR PBB SHADOW E&M-EST. PATIENT-LVL III: CPT | Mod: PBBFAC,,, | Performed by: INTERNAL MEDICINE

## 2018-05-07 NOTE — PROGRESS NOTES
Subjective:       Patient ID: Willow Giron is a 87 y.o. female.    Chief Complaint: Hospital Follow up    HPI:  Presents to [facility] for [facility] follow up.  Recently hospitalized for HCAP- Right lower lobe, no organism identified.  Patient discharged from SNF approximately one week prior to HCAP presentation.  Was in SNF for rehabilitation following hospitalization in March 2018 for hypovolemic hyponatremia.   HCAP hospitalization further complicated by ongoing, symptomatic hyponatremia.  She completed course of empiric ABX prior to discharge.  Samsca initiated at hospital discharge, with plans in place for nephrology follow up.  She was discharged to home with home health.    Dr Dutton with [facility] following Samsca therapy and repeat labs.   Took last dose of Samsca 4/27/18.   Had repeat labs 4/30/18; Na was 136.  Patient tells me she has repeat labs in 2 weeks at [facility] per Dr Dutton orders.     Accompanied today by her son.  Patient tells me she has returned to her baseline functional status.  Ambulatory and independent with ADL's.     Review of Systems  General ROS: negative for chills, fever or weight loss  Psychological ROS: negative for hallucination, depression or suicidal ideation  Ophthalmic ROS: negative for blurry vision, photophobia or eye pain  ENT ROS: negative for epistaxis, sore throat or rhinorrhea  Respiratory ROS: no cough, shortness of breath, or wheezing  Cardiovascular ROS: no chest pain or dyspnea on exertion  Gastrointestinal ROS: no abdominal pain, change in bowel habits, or black/ bloody stools  Genito-Urinary ROS: no dysuria, trouble voiding, or hematuria  Musculoskeletal ROS: negative for gait disturbance or muscular weakness  Neurological ROS: no syncope or seizures; no ataxia  Dermatological ROS: negative for pruritis, rash and jaundice        Objective:      Vital Signs:  Vitals:    05/07/18 0956   BP: (!) 143/77   Pulse: 67   Temp: 97.5 °F (36.4 °C)     Wt  Readings from Last 1 Encounters:   04/13/18 0946 47.1 kg (103 lb 15.2 oz)     Body mass index is 22.21 kg/m².   SpO2 Readings from Last 1 Encounters:   04/13/18 97%       Physical Exam:  BP (!) 143/77 (BP Location: Right arm, Patient Position: Sitting, BP Method: Small (Automatic))   Pulse 67   Temp 97.5 °F (36.4 °C) (Oral)   Wt 48.2 kg (106 lb 4.2 oz)   SpO2 97%   BMI 22.21 kg/m²   General appearance: alert, cooperative, no distress  Constitutional:Oriented to person, place, and time.appears well-developed and well-nourished.   HEENT: Normocephalic, atraumatic, neck symmetrical, no nasal discharge   Eyes: conjunctivae/corneas clear, PERRL, EOM's intact  Lungs: clear to auscultation bilaterally, no dullness to percussion bilaterally  Heart: regular rate and rhythm without rub; no displacement of the PMI   Abdomen: soft, non-tender; bowel sounds normoactive; no organomegaly  Extremities: extremities symmetric; no clubbing, cyanosis,   + trace edema to left ankle and foot  Integument: Skin color, texture, turgor normal; no rashes; hair distrubution normal  Neurologic: Alert and oriented X 3, normal strength, normal coordination and gait  Psychiatric: no pressured speech; normal affect; no evidence of impaired cognition    Assessment:       1. HCAP (healthcare-associated pneumonia)    2. Essential hypertension    3. Hyponatremia    4. SIADH (syndrome of inappropriate ADH production)        Plan:       Diagnoses and all orders for this visit:    HCAP (healthcare-associated pneumonia)  - recent hospitalization for HCAP              - PNA occurred following a hospitalization for hypovolemic hyponatremia with  subsequent SNF stay for physical rehabilitation   - no organism identified  - patient completed course of empiric ABX during hospitalization       Essential hypertension  - not at goal today  - no medication changes today, counseled patient to keep home blood pressure log to present to her PCP at next visit      Hyponatremia  SIADH (syndrome of inappropriate ADH production)  - unclear etiology of hyponatremia, possibly SIADH  - patient being followed by [facility], Dr Dutton  - serum sodium has normalized with trial of Samsca - Samsca discontinued 4/27/18.   - last serum sodium 136 on 4/30/18  - patient has appropriate follow and and labs planned with Dr Dutton, and repeat labs at [facility] scheduled 2 weeks from now          Patient will be released from [facility].  Has PCP follow up with Dr Bland in 3 weeks.     Scheduled Follow-up :  No future appointments.    Post Visit Medication List:     Medication List          Accurate as of 5/7/18 10:25 AM. If you have any questions, ask your nurse or doctor.               CONTINUE taking these medications    ALPRAZolam 0.25 MG tablet  Commonly known as:  XANAX     amLODIPine 10 MG tablet  Commonly known as:  NORVASC     aspirin 81 MG EC tablet  Commonly known as:  ECOTRIN     docusate sodium 100 MG capsule  Commonly known as:  COLACE     fluticasone 50 mcg/actuation nasal spray  Commonly known as:  FLONASE  2 sprays (100 mcg total) by Each Nare route 2 (two) times daily.     hydrocodone-acetaminophen 7.5-325mg 7.5-325 mg per tablet  Commonly known as:  NORCO     lisinopril 40 MG tablet  Commonly known as:  PRINIVIL,ZESTRIL     MIRALAX 17 gram/dose powder  Generic drug:  polyethylene glycol     nabumetone 500 MG tablet  Commonly known as:  RELAFEN     omeprazole 20 MG capsule  Commonly known as:  PRILOSEC     simvastatin 40 MG tablet  Commonly known as:  ZOCOR        STOP taking these medications    furosemide 20 MG tablet  Commonly known as:  LASIX  Stopped by:  Nadege العلي MD     tolvaptan 15 mg Tab  Commonly known as:  SAMSCA  Stopped by:  Nadege العلي MD

## 2018-06-12 ENCOUNTER — OFFICE VISIT (OUTPATIENT)
Dept: URGENT CARE | Facility: CLINIC | Age: 83
End: 2018-06-12
Payer: MEDICARE

## 2018-06-12 VITALS
TEMPERATURE: 99 F | DIASTOLIC BLOOD PRESSURE: 73 MMHG | OXYGEN SATURATION: 98 % | HEART RATE: 56 BPM | BODY MASS INDEX: 22.88 KG/M2 | WEIGHT: 109 LBS | HEIGHT: 58 IN | SYSTOLIC BLOOD PRESSURE: 136 MMHG

## 2018-06-12 DIAGNOSIS — L03.119 CELLULITIS AND ABSCESS OF HAND: Primary | ICD-10-CM

## 2018-06-12 DIAGNOSIS — L02.519 CELLULITIS AND ABSCESS OF HAND: Primary | ICD-10-CM

## 2018-06-12 PROCEDURE — 99214 OFFICE O/P EST MOD 30 MIN: CPT | Mod: S$GLB,,, | Performed by: FAMILY MEDICINE

## 2018-06-12 RX ORDER — SULFAMETHOXAZOLE AND TRIMETHOPRIM 800; 160 MG/1; MG/1
1 TABLET ORAL 2 TIMES DAILY
Qty: 20 TABLET | Refills: 0 | Status: SHIPPED | OUTPATIENT
Start: 2018-06-12 | End: 2021-01-26

## 2018-06-12 RX ORDER — DILTIAZEM HYDROCHLORIDE 240 MG/1
240 CAPSULE, EXTENDED RELEASE ORAL DAILY
COMMUNITY
End: 2021-06-15 | Stop reason: SDUPTHER

## 2018-06-12 RX ORDER — MUPIROCIN 20 MG/G
OINTMENT TOPICAL
Qty: 22 G | Refills: 1 | Status: SHIPPED | OUTPATIENT
Start: 2018-06-12 | End: 2022-01-01

## 2018-06-12 NOTE — PROGRESS NOTES
"Subjective:       Patient ID: Willow Giron is a 88 y.o. female.    Vitals:  height is 4' 10" (1.473 m) and weight is 49.4 kg (109 lb). Her tympanic temperature is 98.6 °F (37 °C). Her blood pressure is 136/73 and her pulse is 56 (abnormal). Her oxygen saturation is 98%.     Chief Complaint: Edema    This is a 88 y.o. female with   Past Surgical History:  No date:  SECTION, CLASSIC  No date: HYSTERECTOMY  No date: SHOULDER SURGERY  who presents today with a chief complaint of left hand swelling. Denies trauma or bug bite. Swelling began after breakfast. She had coffee and raisin toast. Applied ice without relief. Wedding rings are stuck on hand.She is on limited fluids and low salt diet.      Edema   This is a new problem. The current episode started today. The problem occurs constantly. The problem has been gradually worsening. Pertinent negatives include no abdominal pain, chest pain, chills, fever, headaches, nausea, rash, sore throat or vomiting. The symptoms are aggravated by bending. She has tried ice for the symptoms. The treatment provided no relief.     Review of Systems   Constitution: Negative for chills and fever.   HENT: Negative for sore throat.    Eyes: Negative for blurred vision.   Cardiovascular: Negative for chest pain.   Respiratory: Negative for shortness of breath.    Skin: Negative for rash.   Musculoskeletal: Positive for arthritis and stiffness. Negative for back pain, gout and joint pain.   Gastrointestinal: Negative for abdominal pain, diarrhea, nausea and vomiting.   Neurological: Negative for headaches.   Psychiatric/Behavioral: The patient is not nervous/anxious.        Objective:      Physical Exam   Constitutional: She appears well-developed and well-nourished.   Cardiovascular: Normal rate and regular rhythm.    Pulmonary/Chest: Effort normal and breath sounds normal.   Skin: There is erythema (swelling dorsum of left hand overly 3-5th digit, tender. small abrasion " noted).   Nursing note and vitals reviewed.      Assessment:       1. Cellulitis and abscess of hand        Plan:         Cellulitis and abscess of hand  -     X-Ray Hand Complete Left; Future; Expected date: 06/12/2018  -     sulfamethoxazole-trimethoprim 800-160mg (BACTRIM DS) 800-160 mg Tab; Take 1 tablet by mouth 2 (two) times daily.  Dispense: 20 tablet; Refill: 0  -     mupirocin (BACTROBAN) 2 % ointment; Apply to affected area 3 times daily  Dispense: 22 g; Refill: 1      Will treat presumptively as cellulitis given pain and swelling and small puncture wound. To followup with PCP this week. RTC prn worsening symptoms

## 2018-06-12 NOTE — PATIENT INSTRUCTIONS
Cellulitis  Cellulitis is an infection of the deep layers of skin. A break in the skin, such as a cut or scratch, can let bacteria under the skin. If the bacteria get to deep layers of the skin, it can be serious. If not treated, cellulitis can get into the bloodstream and lymph nodes. The infection can then spread throughout the body. This causes serious illness.  Cellulitis causes the affected skin to become red, swollen, warm, and sore. The reddened areas have a visible border. An open sore may leak fluid (pus). You may have a fever, chills, and pain.  Cellulitis is treated with antibiotics taken for 7 to 10 days. An open sore may be cleaned and covered with cool wet gauze. Symptoms should get better 1 to 2 days after treatment is started. Make sure to take all the antibiotics for the full number of days until they are gone. Keep taking the medicine even if your symptoms go away.  Home care  Follow these tips:  · Limit the use of the part of your body with cellulitis.   · If the infection is on your leg, keep your leg raised while sitting. This will help to reduce swelling.  · Take all of the antibiotic medicine exactly as directed until it is gone. Do not miss any doses, especially during the first 7 days. Dont stop taking the medicine when your symptoms get better.  · Keep the affected area clean and dry.  · Wash your hands with soap and warm water before and after touching your skin. Anyone else who touches your skin should also wash his or her hands. Don't share towels.  Follow-up care  Follow up with your healthcare provider, or as advised. If your infection does not go away on the first antibiotic, your healthcare provider will prescribe a different one.  When to seek medical advice  Call your healthcare provider right away if any of these occur:  · Red areas that spread  · Swelling or pain that gets worse  · Fluid leaking from the skin (pus)  · Fever higher of 100.4º F (38.0º C) or higher after 2 days  on antibiotics  Date Last Reviewed: 9/1/2016  © 7701-6825 [employer]. [address] [place] [address], [place] [address]. All rights reserved. This information is not intended as a substitute for professional medical care. Always follow your healthcare professional's instructions.

## 2018-06-15 ENCOUNTER — TELEPHONE (OUTPATIENT)
Dept: URGENT CARE | Facility: CLINIC | Age: 83
End: 2018-06-15

## 2019-09-21 NOTE — PLAN OF CARE
Patient Seen in: [address]      History   Patient presents with:  Fall (musculoskeletal, neurologic)    Stated Complaint: body pain    HPI    This patient stated she fell yesterday tripping on a sidewalk falling forward Problem: Occupational Therapy Goal  Goal: Occupational Therapy Goal  Goals to be met by: 03/18/18     Patient will increase functional independence with ADLs by performing:    Feeding with Set-up Assistance. --MET 2/21  UE Dressing with Supervision.  LE Dressing with Minimal Assistance.  Grooming while standing at sink with Stand-by Assistance.  Toileting from toilet with Contact Guard Assistance for hygiene and clothing management.   Toilet transfer to toilet with Contact Guard Assistance. --GOAL MET 2/22/2018.  Increased functional strength to WFL for ADLs. --MET 2/21       Outcome: Ongoing (interventions implemented as appropriate)  Patient continues to make progress with activity tolerance and endurance. Will benefit from continued skilled OT to address functional deficits.       noted in HPI.   Constitutional no fever  Eyes no drainage  ENT no sore throat  Respiratory no change in cough  Cardiac no dizziness   GI no vomiting or diarrhea   no dysuria  Musculoskeletal positive  swelling  Neurologic denies headache   Skin positive a could be performed at this facility and likely need for advanced imaging of the abdomen to rule out internal injury and possibly chest imaging as well . Advised  patient go to the emergency department.   Patient appears stable to go via private vehicle

## 2020-01-24 ENCOUNTER — HOSPITAL ENCOUNTER (OUTPATIENT)
Dept: RADIOLOGY | Facility: HOSPITAL | Age: 85
Discharge: HOME | End: 2020-01-24
Attending: INTERNAL MEDICINE
Payer: MEDICARE

## 2020-01-24 DIAGNOSIS — M53.3 COCCYX PAIN: ICD-10-CM

## 2020-01-24 PROCEDURE — 72220 X-RAY EXAM SACRUM TAILBONE: CPT | Mod: 26,,, | Performed by: RADIOLOGY

## 2020-01-24 PROCEDURE — 72100 XR LUMBAR SPINE AP AND LATERAL: ICD-10-PCS | Mod: 26,,, | Performed by: RADIOLOGY

## 2020-01-24 PROCEDURE — 72100 X-RAY EXAM L-S SPINE 2/3 VWS: CPT | Mod: TC,FY

## 2020-01-24 PROCEDURE — 72220 X-RAY EXAM SACRUM TAILBONE: CPT | Mod: TC,FY

## 2020-01-24 PROCEDURE — 72220 XR SACRUM AND COCCYX: ICD-10-PCS | Mod: 26,,, | Performed by: RADIOLOGY

## 2020-01-24 PROCEDURE — 72100 X-RAY EXAM L-S SPINE 2/3 VWS: CPT | Mod: 26,,, | Performed by: RADIOLOGY

## 2020-02-12 ENCOUNTER — HOSPITAL ENCOUNTER (OUTPATIENT)
Dept: RADIOLOGY | Facility: HOSPITAL | Age: 85
Discharge: HOME | End: 2020-02-12
Attending: INTERNAL MEDICINE
Payer: MEDICARE

## 2020-02-12 DIAGNOSIS — M54.16 RADICULOPATHY, LUMBAR REGION: ICD-10-CM

## 2020-02-12 PROCEDURE — 72148 MRI LUMBAR SPINE WITHOUT CONTRAST: ICD-10-PCS | Mod: 26,,, | Performed by: RADIOLOGY

## 2020-02-12 PROCEDURE — 72148 MRI LUMBAR SPINE W/O DYE: CPT | Mod: TC

## 2020-02-12 PROCEDURE — 72148 MRI LUMBAR SPINE W/O DYE: CPT | Mod: 26,,, | Performed by: RADIOLOGY

## 2021-01-26 PROBLEM — M06.4 INFLAMMATORY POLYARTHROPATHY: Status: ACTIVE | Noted: 2021-01-26

## 2021-01-26 PROBLEM — Z86.718 HISTORY OF THROMBOEMBOLISM OF VEIN: Status: ACTIVE | Noted: 2017-10-23

## 2021-01-26 PROBLEM — F33.0 MILD RECURRENT MAJOR DEPRESSION: Status: ACTIVE | Noted: 2017-01-23

## 2021-01-26 PROBLEM — M25.473 ANKLE EDEMA: Status: ACTIVE | Noted: 2018-01-30

## 2021-01-26 PROBLEM — D69.2 SENILE PURPURA: Status: ACTIVE | Noted: 2017-07-24

## 2021-01-26 PROBLEM — F11.20 OPIOID DEPENDENCE: Status: ACTIVE | Noted: 2018-03-12

## 2021-01-26 PROBLEM — I73.9 PERIPHERAL VASCULAR DISEASE: Status: ACTIVE | Noted: 2017-01-23

## 2021-01-26 PROBLEM — M12.9 ARTHRITIS, MULTIPLE JOINT INVOLVEMENT: Status: ACTIVE | Noted: 2019-07-29

## 2022-01-01 ENCOUNTER — TELEPHONE (OUTPATIENT)
Dept: NEUROSURGERY | Facility: CLINIC | Age: 87
End: 2022-01-01
Payer: MEDICARE

## 2022-01-01 ENCOUNTER — HOSPITAL ENCOUNTER (OUTPATIENT)
Dept: RADIOLOGY | Facility: HOSPITAL | Age: 87
Discharge: HOME | End: 2022-08-11
Attending: INTERNAL MEDICINE
Payer: MEDICARE

## 2022-01-01 ENCOUNTER — OFFICE VISIT (OUTPATIENT)
Dept: URGENT CARE | Facility: CLINIC | Age: 87
End: 2022-01-01
Payer: MEDICARE

## 2022-01-01 ENCOUNTER — LAB VISIT (OUTPATIENT)
Dept: LAB | Facility: HOSPITAL | Age: 87
End: 2022-01-01
Attending: INTERNAL MEDICINE
Payer: MEDICARE

## 2022-01-01 VITALS
HEART RATE: 62 BPM | BODY MASS INDEX: 19.52 KG/M2 | WEIGHT: 93 LBS | HEIGHT: 58 IN | OXYGEN SATURATION: 95 % | SYSTOLIC BLOOD PRESSURE: 172 MMHG | TEMPERATURE: 98 F | DIASTOLIC BLOOD PRESSURE: 90 MMHG | RESPIRATION RATE: 17 BRPM

## 2022-01-01 DIAGNOSIS — I10 ESSENTIAL HYPERTENSION: ICD-10-CM

## 2022-01-01 DIAGNOSIS — M53.3 COCCYX PAIN: ICD-10-CM

## 2022-01-01 DIAGNOSIS — L30.9 DERMATITIS: Primary | ICD-10-CM

## 2022-01-01 DIAGNOSIS — M54.50 ACUTE MIDLINE LOW BACK PAIN WITHOUT SCIATICA: ICD-10-CM

## 2022-01-01 DIAGNOSIS — M54.50 ACUTE LOW BACK PAIN, UNSPECIFIED BACK PAIN LATERALITY, UNSPECIFIED WHETHER SCIATICA PRESENT: ICD-10-CM

## 2022-01-01 LAB
ALBUMIN SERPL BCP-MCNC: 4.1 G/DL (ref 3.5–5.2)
ALP SERPL-CCNC: 80 U/L (ref 55–135)
ALT SERPL W/O P-5'-P-CCNC: 10 U/L (ref 10–44)
ANION GAP SERPL CALC-SCNC: 12 MMOL/L (ref 8–16)
AST SERPL-CCNC: 21 U/L (ref 10–40)
BASOPHILS # BLD AUTO: 0.03 K/UL (ref 0–0.2)
BASOPHILS NFR BLD: 0.6 % (ref 0–1.9)
BILIRUB SERPL-MCNC: 0.4 MG/DL (ref 0.1–1)
BUN SERPL-MCNC: 15 MG/DL (ref 10–30)
CALCIUM SERPL-MCNC: 9.9 MG/DL (ref 8.7–10.5)
CHLORIDE SERPL-SCNC: 98 MMOL/L (ref 95–110)
CHOLEST SERPL-MCNC: 216 MG/DL (ref 120–199)
CHOLEST/HDLC SERPL: 2.2 {RATIO} (ref 2–5)
CO2 SERPL-SCNC: 26 MMOL/L (ref 23–29)
CREAT SERPL-MCNC: 0.9 MG/DL (ref 0.5–1.4)
DIFFERENTIAL METHOD: ABNORMAL
EOSINOPHIL # BLD AUTO: 0 K/UL (ref 0–0.5)
EOSINOPHIL NFR BLD: 0.8 % (ref 0–8)
ERYTHROCYTE [DISTWIDTH] IN BLOOD BY AUTOMATED COUNT: 11.9 % (ref 11.5–14.5)
EST. GFR  (AFRICAN AMERICAN): >60 ML/MIN/1.73 M^2
EST. GFR  (NON AFRICAN AMERICAN): 56 ML/MIN/1.73 M^2
GLUCOSE SERPL-MCNC: 97 MG/DL (ref 70–110)
HCT VFR BLD AUTO: 37.4 % (ref 37–48.5)
HDLC SERPL-MCNC: 100 MG/DL (ref 40–75)
HDLC SERPL: 46.3 % (ref 20–50)
HGB BLD-MCNC: 12.5 G/DL (ref 12–16)
IMM GRANULOCYTES # BLD AUTO: 0.01 K/UL (ref 0–0.04)
IMM GRANULOCYTES NFR BLD AUTO: 0.2 % (ref 0–0.5)
LDLC SERPL CALC-MCNC: 104.6 MG/DL (ref 63–159)
LYMPHOCYTES # BLD AUTO: 0.9 K/UL (ref 1–4.8)
LYMPHOCYTES NFR BLD: 18.8 % (ref 18–48)
MCH RBC QN AUTO: 31.9 PG (ref 27–31)
MCHC RBC AUTO-ENTMCNC: 33.4 G/DL (ref 32–36)
MCV RBC AUTO: 95 FL (ref 82–98)
MONOCYTES # BLD AUTO: 0.4 K/UL (ref 0.3–1)
MONOCYTES NFR BLD: 8.5 % (ref 4–15)
NEUTROPHILS # BLD AUTO: 3.5 K/UL (ref 1.8–7.7)
NEUTROPHILS NFR BLD: 71.1 % (ref 38–73)
NONHDLC SERPL-MCNC: 116 MG/DL
NRBC BLD-RTO: 0 /100 WBC
PLATELET # BLD AUTO: 279 K/UL (ref 150–450)
PMV BLD AUTO: 8.7 FL (ref 9.2–12.9)
POTASSIUM SERPL-SCNC: 4.4 MMOL/L (ref 3.5–5.1)
PROT SERPL-MCNC: 7.1 G/DL (ref 6–8.4)
RBC # BLD AUTO: 3.92 M/UL (ref 4–5.4)
SODIUM SERPL-SCNC: 136 MMOL/L (ref 136–145)
TRIGL SERPL-MCNC: 57 MG/DL (ref 30–150)
WBC # BLD AUTO: 4.94 K/UL (ref 3.9–12.7)

## 2022-01-01 PROCEDURE — 1159F PR MEDICATION LIST DOCUMENTED IN MEDICAL RECORD: ICD-10-PCS | Mod: CPTII,S$GLB,, | Performed by: PHYSICIAN ASSISTANT

## 2022-01-01 PROCEDURE — 72110 X-RAY EXAM L-2 SPINE 4/>VWS: CPT | Mod: 26,,, | Performed by: STUDENT IN AN ORGANIZED HEALTH CARE EDUCATION/TRAINING PROGRAM

## 2022-01-01 PROCEDURE — 80053 COMPREHEN METABOLIC PANEL: CPT | Performed by: INTERNAL MEDICINE

## 2022-01-01 PROCEDURE — 99214 OFFICE O/P EST MOD 30 MIN: CPT | Mod: S$GLB,,, | Performed by: PHYSICIAN ASSISTANT

## 2022-01-01 PROCEDURE — 72220 X-RAY EXAM SACRUM TAILBONE: CPT | Mod: 26,,, | Performed by: RADIOLOGY

## 2022-01-01 PROCEDURE — 80061 LIPID PANEL: CPT | Performed by: INTERNAL MEDICINE

## 2022-01-01 PROCEDURE — 1160F RVW MEDS BY RX/DR IN RCRD: CPT | Mod: CPTII,S$GLB,, | Performed by: PHYSICIAN ASSISTANT

## 2022-01-01 PROCEDURE — 1160F PR REVIEW ALL MEDS BY PRESCRIBER/CLIN PHARMACIST DOCUMENTED: ICD-10-PCS | Mod: CPTII,S$GLB,, | Performed by: PHYSICIAN ASSISTANT

## 2022-01-01 PROCEDURE — 36415 COLL VENOUS BLD VENIPUNCTURE: CPT | Performed by: INTERNAL MEDICINE

## 2022-01-01 PROCEDURE — 1159F MED LIST DOCD IN RCRD: CPT | Mod: CPTII,S$GLB,, | Performed by: PHYSICIAN ASSISTANT

## 2022-01-01 PROCEDURE — 72220 XR SACRUM AND COCCYX: ICD-10-PCS | Mod: 26,,, | Performed by: RADIOLOGY

## 2022-01-01 PROCEDURE — 85025 COMPLETE CBC W/AUTO DIFF WBC: CPT | Performed by: INTERNAL MEDICINE

## 2022-01-01 PROCEDURE — 99214 PR OFFICE/OUTPT VISIT, EST, LEVL IV, 30-39 MIN: ICD-10-PCS | Mod: S$GLB,,, | Performed by: PHYSICIAN ASSISTANT

## 2022-01-01 PROCEDURE — 72110 X-RAY EXAM L-2 SPINE 4/>VWS: CPT | Mod: TC,FY

## 2022-01-01 PROCEDURE — 72220 X-RAY EXAM SACRUM TAILBONE: CPT | Mod: TC,FY

## 2022-01-01 PROCEDURE — 72110 XR LUMBAR SPINE COMPLETE 5 VIEW: ICD-10-PCS | Mod: 26,,, | Performed by: STUDENT IN AN ORGANIZED HEALTH CARE EDUCATION/TRAINING PROGRAM

## 2022-01-01 RX ORDER — PREDNISONE 10 MG/1
10 TABLET ORAL DAILY
Qty: 3 TABLET | Refills: 0 | Status: SHIPPED | OUTPATIENT
Start: 2022-01-01 | End: 2022-01-01

## 2022-01-01 RX ORDER — TRIAMCINOLONE ACETONIDE 1 MG/G
CREAM TOPICAL 2 TIMES DAILY
Qty: 28.4 G | Refills: 0 | Status: SHIPPED | OUTPATIENT
Start: 2022-01-01 | End: 2022-01-01

## 2022-03-16 NOTE — PT/OT/SLP PROGRESS
Occupational Therapy   Treatment    Name: Willow Giron  MRN: [id number]  Admitting Diagnosis:  HCAP (healthcare-associated pneumonia)       Recommendations:     Discharge Recommendations: home health OT  Discharge Equipment Recommendations:  bath bench  Barriers to discharge:  None    Subjective     Communicated with: nsg prior to session.  Pain/Comfort:  · Pain Rating 1: 0/10  · Pain Rating Post-Intervention 1: 0/10    Patients cultural, spiritual, Alevism conflicts given the current situation:  (none reported)    Objective:     Patient found with: telemetry, bed alarm    General Precautions: Standard, fall, seizure   Orthopedic Precautions:N/A   Braces:       Occupational Performance:    Bed Mobility:    · Patient completed Supine to Sit with minimum assistance     Functional Mobility/Transfers:  · Patient completed Sit <> Stand Transfer with contact guard assistance  with  rolling walker   · Patient completed Toilet Transfer Step Transfer technique with stand by assistance with  rolling walker  · Functional Mobility: via RW around room w/ CG-Sup    Activities of Daily Living:  · Grooming: supervision standing at sink  · LB Dressing: stand by assistance don/doff undergarments/pants  · Toileting: supervision on toilet     Patient left at EOB with all lines intact, call button in reach and dgtr present    [facility] 6 Click:  [facility] Total Score: 20    Treatment & Education:  Pt agreeable to OT this date. Perf sup-->EOB w/ Min A for logrolling tech, UB TE in standing 2x10 reps ea & mod RBs, amb w/ CGA around room for toil t/f's & toileting w/ Sup; don/doff undergarments/pants w/ SBA; handwashing in standing w/ Sup. Edu/tx re: energy conservation techs, TTB rec & HEP. Pt/dgtr verbalized understanding.      Education:    Assessment:   Pt cont w/ decreased overall endurance/conditioning, balance/mobility & coordination w/ subsequent decline in (I)/safety w/ BADLs, fxnl mobility & fxnl t/fs. Cont w/ OT per POC to  achieve established goals.      Willow Giron is a 87 y.o. female with a medical diagnosis of HCAP (healthcare-associated pneumonia).  She presents with ..  Performance deficits affecting function are weakness, impaired endurance, impaired self care skills, impaired functional mobilty, impaired balance, decreased coordination, decreased upper extremity function, decreased lower extremity function, decreased safety awareness.      Rehab Prognosis:  good; patient would benefit from acute skilled OT services to address these deficits and reach maximum level of function.       Plan:     Patient to be seen 5 x/week to address the above listed problems via self-care/home management, therapeutic activities, therapeutic exercises  · Plan of Care Expires: 04/19/18  · Plan of Care Reviewed with: patient, daughter    This Plan of care has been discussed with the patient who was involved in its development and understands and is in agreement with the identified goals and treatment plan    GOALS:    Occupational Therapy Goals        Problem: Occupational Therapy Goal    Goal Priority Disciplines Outcome Interventions   Occupational Therapy Goal     OT, PT/OT Ongoing (interventions implemented as appropriate)    Description:  Goals to be met by: 04/19/18     Patient will increase functional independence with ADLs by performing:    LE Dressing with Stand-by Assistance.--met 03/22/18  Grooming while standing at sink with Stand-by Assistance.--met 03/20/18  Toileting from toilet with Modified McDonald for hygiene and clothing management.   Toilet transfer to toilet with Modified McDonald.                        Time Tracking:     OT Date of Treatment: 03/23/18  OT Start Time: 1357  OT Stop Time: 1430  OT Total Time (min): 33 min    Billable Minutes:Self Care/Home Management 15  Therapeutic Exercise 17  Total Time 33    YOLY Martinez  3/23/2018     Topical Clindamycin Counseling: Patient counseled that this medication may cause skin irritation or allergic reactions.  In the event of skin irritation, the patient was advised to reduce the amount of the drug applied or use it less frequently.   The patient verbalized understanding of the proper use and possible adverse effects of clindamycin.  All of the patient's questions and concerns were addressed.

## 2022-07-05 NOTE — PLAN OF CARE
Contacted pt, lvm informing Dr. Chase out of office, will address upon her return.    Problem: Occupational Therapy Goal  Goal: Occupational Therapy Goal  Goals to be met by: 03/18/18     Patient will increase functional independence with ADLs by performing:      .UE Dressing with Supervision using amor dressing techs  LE Dressing with Minimal Assistance.  Grooming while standing at sink with Stand-by Assistance.  Toileting from toilet with Contact Guard Assistance for hygiene and clothing management.   Toilet transfer to toilet with Contact Guard Assistance.  Increased functional strength to WFL for ADLs.    Outcome: Ongoing (interventions implemented as appropriate)  OT marilee completed w/ PT this date. Pt found in b/s chair w/ noted chronic R shldr sublux s/p 2-3 yrs w/ failed Sx x 2 attempts. Pt lives w/ spouse on 1st level of 2st w/ 0STE & has T/S combo. PLOF: Mod (I) w/o amb DME w/ BADLs incl standing tub showers/ t/'fs w/ GB use; perf light meal prep. Has, but doesn't use sh ch. Pt currently req'ed HHA x 2 amb short dist b/s chair<--> BSC w/ R UE support thru elbow. Req'ed CGA for static standing balance during toileting hygiene & during Mod A clothing mgmt task. Pt left at EOB for PT to complete LB assess. Edu to pt/fly re: d/c recs & DME. Pt/fly verbalized understanding.    Pt presents w/ decreased overall endurance/conditioning, balance/mobility & coordination w/ subsequent decline in (I)/safety w/ BADLs, fxnl mobility & fxnl t/f's. OT 5x/wk to increase phys/fxnl status & maximize potential to achieve established goals for d/c--> SNF & DME recs for RW, BSC & TTB deferred.

## 2022-08-12 NOTE — TELEPHONE ENCOUNTER
Returned pt call. Pt's daughter states she is in severe pain after fall a few days ago. States she  would like her mother to be seen to discuss minimally invasive surgical options. Sent note to provider to discuss what he would like to do.

## 2022-08-12 NOTE — TELEPHONE ENCOUNTER
----- Message from Sterling Crabtree sent at 8/12/2022 10:15 AM CDT -----  Contact: pt. daughter Aliya  .Type:  Sooner Apoointment Request    Caller is requesting a sooner appointment.  Caller declined first available appointment listed below.  Caller will not accept being placed on the waitlist and is requesting a message be sent to doctor.  Name of Caller:pt. Lisha Pisano  When is the first available appointment?11/02/2022  Symptoms:crushed vertrabra  Would the patient rather a call back or a response via [facility]? Call back  Best Call Back Number:[phone]  Additional Information: Pt. Is in pain.

## 2022-08-13 NOTE — PATIENT INSTRUCTIONS
PLEASE READ YOUR DISCHARGE INSTRUCTIONS ENTIRELY AS IT CONTAINS IMPORTANT INFORMATION.  You received a steroid today - this can elevate your blood pressure, elevate your blood sugar, water weight gain, nervous energy, redness to the face and dimpling of the skin where the shot goes in if you had an injection.   Do not use steroids more than 3 times per year.   If you have diabetes, please check you blood sugar frequently.  If you have high blood pressure, please check your blood pressure frequently.   Please follow up with Dr. Bland on Monday regarding your pain control.  - Rest.    - Drink plenty of fluids.    - Tylenol or Ibuprofen as directed as needed for fever/pain.    - If you were prescribed antibiotics, please take them to completion.  - If you are female and on birth control pills - please use additional methods of contraception to prevent pregnancy while on antibiotics and for one cycle after.   - If you were prescribed a narcotic medication, a cough syrup, or a muscle relaxer, do not drive or operate heavy equipment or machinery while taking these medications, as they can cause drowsiness.   - If a referral to a specialty was made today, you should receive a phone call in the next few days to schedule an appt.  Please call [phone] to schedule an appt if have not gotten a phone call in the next few days.  - If you smoke, please stop smoking.  -You must understand that you've received an Urgent Care treatment only and that you may be released before all your medical problems are known or treated. You, the patient, will arrange for follow up care as instructed. Please arrange follow up with your primary medical clinic as soon as possible.   - Follow up with your PCP or specialty clinic as directed in the next 1-2 weeks if not improved or as needed.  You can call [phone] to schedule an appointment with the appropriate provider.    - Please return to Urgent Care or to the Emergency Department if  your symptoms worsen.    Patient aware and verbalized understanding.

## 2022-08-13 NOTE — PROGRESS NOTES
"Subjective:       Patient ID: Willow Giron is a 89 y.o. female.    Vitals:  height is 4' 10" (1.473 m) and weight is 42.2 kg (93 lb). Her temperature is 98 °F (36.7 °C). Her blood pressure is 172/90 (abnormal) and her pulse is 62. Her respiration is 17 and oxygen saturation is 95%.     Chief Complaint: Rash    Ms. Giron presents with her daughter for evaluation of rash.  She has had some red bumps on her left buttock for over a week.  Her daughter reports they appeared and dissipated and then returned a few days ago.  She denies any pain or itching.  No blistering or drainage.  No fevers or chills.  She did have a recent fall resulting in a new T12 compression fracture, so she has been using a brace & icy hot patch in the area for the past few days.    She is also complaining of pain that is not controlled with her current regimen.  She receives monthly hydrocodone from her PCP for OA, which she has been taking as prescribed.  These have not been controlling her back pain.  She has tried voltaren gel and tylenol and icy hot patches with little relief. Her daughter is requesting a steroid shot.     Rash  This is a new problem. The current episode started today. The problem has been gradually worsening since onset. The affected locations include the back. The rash is characterized by redness, swelling and scaling. Pertinent negatives include no congestion, cough, diarrhea, eye pain, fatigue, fever, shortness of breath, sore throat or vomiting. Past treatments include nothing. The treatment provided no relief.       Constitution: Negative for appetite change, chills, sweating, fatigue and fever.   HENT: Negative for ear pain, ear discharge, hearing loss, drooling, congestion, postnasal drip, sinus pain, sinus pressure and sore throat.    Neck: Negative for neck stiffness and painful lymph nodes.   Cardiovascular: Negative for chest trauma, chest pain, leg swelling, palpitations, sob on exertion and passing " out.   Eyes: Negative for eye pain and blurred vision.   Respiratory: Negative for chest tightness, cough, sputum production, shortness of breath and wheezing.    Gastrointestinal: Negative for abdominal pain, nausea, vomiting and diarrhea.   Genitourinary: Negative for dysuria, frequency and urgency.   Musculoskeletal: Positive for pain and back pain. Negative for joint pain, joint swelling, muscle cramps and muscle ache.   Skin: Positive for rash.   Allergic/Immunologic: Negative for itching and sneezing.   Neurological: Negative for dizziness, history of vertigo, light-headedness, passing out, facial drooping, speech difficulty, coordination disturbances, loss of balance, headaches and altered mental status.   Hematologic/Lymphatic: Negative for swollen lymph nodes and easy bruising/bleeding. Does not bruise/bleed easily.   Psychiatric/Behavioral: Negative for altered mental status.       Objective:      Physical Exam   Constitutional: She is oriented to person, place, and time. She appears well-developed. She is cooperative.  Non-toxic appearance. She does not appear ill. No distress.   HENT:   Head: Normocephalic and atraumatic.   Ears:   Right Ear: Hearing, tympanic membrane, external ear and ear canal normal.   Left Ear: Hearing, tympanic membrane, external ear and ear canal normal.   Nose: Nose normal. No mucosal edema, rhinorrhea or nasal deformity. No epistaxis. Right sinus exhibits no maxillary sinus tenderness and no frontal sinus tenderness. Left sinus exhibits no maxillary sinus tenderness and no frontal sinus tenderness.   Mouth/Throat: Uvula is midline, oropharynx is clear and moist and mucous membranes are normal. No trismus in the jaw. Normal dentition. No uvula swelling. No posterior oropharyngeal erythema.   Eyes: Conjunctivae and lids are normal. Right eye exhibits no discharge. Left eye exhibits no discharge. No scleral icterus.   Neck: Trachea normal and phonation normal. Neck supple.    Cardiovascular: Normal rate, regular rhythm, normal heart sounds and normal pulses.   Pulmonary/Chest: Effort normal and breath sounds normal. No stridor. No respiratory distress. She has no decreased breath sounds. She has no wheezes. She has no rhonchi. She has no rales.   Abdominal: Normal appearance and bowel sounds are normal. She exhibits no distension and no mass. Soft. There is no abdominal tenderness.   Musculoskeletal: Normal range of motion.         General: No deformity. Normal range of motion.        Back:       Comments: BEACH x 4 with appropriate strength   Neurological: She is alert and oriented to person, place, and time. She exhibits normal muscle tone. Coordination normal.   Skin: Skin is warm, dry, intact, not diaphoretic and not pale.   Psychiatric: Her speech is normal and behavior is normal. Judgment and thought content normal.   Nursing note and vitals reviewed.        Assessment:       1. Dermatitis    2. Acute midline low back pain without sciatica          Plan:         Dermatitis    Acute midline low back pain without sciatica    Other orders  -     triamcinolone acetonide 0.1% (KENALOG) 0.1 % cream; Apply topically 2 (two) times daily. for 7 days  Dispense: 28.4 g; Refill: 0  -     predniSONE (DELTASONE) 10 MG tablet; Take 1 tablet (10 mg total) by mouth once daily. for 3 days  Dispense: 3 tablet; Refill: 0    89yoF who presents with rash & back pain.  Rash appears to be a contact dermatitis from either the brace or patch.  Will make sure there is no skin contact with the brace & that icy hot patch is away from rash.  D/C patch If rash continues.  Kenalog cream BID.  Regarding her pain - she has CKD3, NSAIDs CI.  She already gets monthly pain medication from her PCP.   She has tried topicals.  We will try a low dose oral prednisone for a few days as adjunctive. Advised fu with her PCP for further pain control    Diagnoses and plan discussed with the patient and daugther, as well as the  expected course and duration of her symptoms. All questions and concerns were addressed prior to discharge.  She was advised to follow up with her PCP within 1 week if symptoms do not improve. Emergency department precautions were given. Patient and daughter verbalized understanding and was happy with the plan of care.   Note dictated with voice recognition software, please excuse any grammatical errors.    Patient Instructions   PLEASE READ YOUR DISCHARGE INSTRUCTIONS ENTIRELY AS IT CONTAINS IMPORTANT INFORMATION.  You received a steroid today - this can elevate your blood pressure, elevate your blood sugar, water weight gain, nervous energy, redness to the face and dimpling of the skin where the shot goes in if you had an injection.   Do not use steroids more than 3 times per year.   If you have diabetes, please check you blood sugar frequently.  If you have high blood pressure, please check your blood pressure frequently.   Please follow up with Dr. Bland on Monday regarding your pain control.  - Rest.    - Drink plenty of fluids.    - Tylenol or Ibuprofen as directed as needed for fever/pain.    - If you were prescribed antibiotics, please take them to completion.  - If you are female and on birth control pills - please use additional methods of contraception to prevent pregnancy while on antibiotics and for one cycle after.   - If you were prescribed a narcotic medication, a cough syrup, or a muscle relaxer, do not drive or operate heavy equipment or machinery while taking these medications, as they can cause drowsiness.   - If a referral to a specialty was made today, you should receive a phone call in the next few days to schedule an appt.  Please call [phone] to schedule an appt if have not gotten a phone call in the next few days.  - If you smoke, please stop smoking.  -You must understand that you've received an Urgent Care treatment only and that you may be released before all your medical  problems are known or treated. You, the patient, will arrange for follow up care as instructed. Please arrange follow up with your primary medical clinic as soon as possible.   - Follow up with your PCP or specialty clinic as directed in the next 1-2 weeks if not improved or as needed.  You can call [phone] to schedule an appointment with the appropriate provider.    - Please return to Urgent Care or to the Emergency Department if your symptoms worsen.    Patient aware and verbalized understanding.

## 2022-12-08 PROBLEM — M48.00 SPINAL STENOSIS: Status: ACTIVE | Noted: 2022-01-01

## 2023-01-01 ENCOUNTER — HOSPITAL ENCOUNTER (INPATIENT)
Facility: HOSPITAL | Age: 88
LOS: 11 days | DRG: 643 | End: 2023-02-02
Attending: EMERGENCY MEDICINE | Admitting: INTERNAL MEDICINE
Payer: MEDICARE

## 2023-01-01 ENCOUNTER — OFFICE VISIT (OUTPATIENT)
Dept: URGENT CARE | Facility: CLINIC | Age: 88
End: 2023-01-01
Payer: MEDICARE

## 2023-01-01 VITALS
BODY MASS INDEX: 17.77 KG/M2 | OXYGEN SATURATION: 98 % | SYSTOLIC BLOOD PRESSURE: 156 MMHG | WEIGHT: 85 LBS | TEMPERATURE: 100 F | DIASTOLIC BLOOD PRESSURE: 74 MMHG | HEART RATE: 72 BPM | RESPIRATION RATE: 16 BRPM

## 2023-01-01 VITALS
RESPIRATION RATE: 23 BRPM | OXYGEN SATURATION: 52 % | DIASTOLIC BLOOD PRESSURE: 58 MMHG | HEIGHT: 59 IN | BODY MASS INDEX: 14.93 KG/M2 | SYSTOLIC BLOOD PRESSURE: 119 MMHG | WEIGHT: 74.06 LBS | TEMPERATURE: 99 F | HEART RATE: 71 BPM

## 2023-01-01 DIAGNOSIS — E87.1 HYPONATREMIA: ICD-10-CM

## 2023-01-01 DIAGNOSIS — N18.30 STAGE 3 CHRONIC KIDNEY DISEASE, UNSPECIFIED WHETHER STAGE 3A OR 3B CKD: ICD-10-CM

## 2023-01-01 DIAGNOSIS — L89.159 PRESSURE INJURY OF SKIN OF SACRAL REGION, UNSPECIFIED INJURY STAGE: ICD-10-CM

## 2023-01-01 DIAGNOSIS — E87.6 HYPOKALEMIA: ICD-10-CM

## 2023-01-01 DIAGNOSIS — Z11.59 SCREENING FOR VIRAL DISEASE: ICD-10-CM

## 2023-01-01 DIAGNOSIS — U07.1 COVID-19 VIRUS DETECTED: ICD-10-CM

## 2023-01-01 DIAGNOSIS — M54.16 LUMBAR RADICULOPATHY: ICD-10-CM

## 2023-01-01 DIAGNOSIS — D64.9 NORMOCYTIC ANEMIA: ICD-10-CM

## 2023-01-01 DIAGNOSIS — R05.9 COUGH, UNSPECIFIED TYPE: ICD-10-CM

## 2023-01-01 DIAGNOSIS — U07.1 COVID-19: Primary | ICD-10-CM

## 2023-01-01 DIAGNOSIS — R09.02 HYPOXIA: ICD-10-CM

## 2023-01-01 DIAGNOSIS — R53.83 FATIGUE, UNSPECIFIED TYPE: ICD-10-CM

## 2023-01-01 DIAGNOSIS — U07.1 COVID-19 VIRUS INFECTION: Primary | ICD-10-CM

## 2023-01-01 DIAGNOSIS — R94.31 PROLONGED Q-T INTERVAL ON ECG: ICD-10-CM

## 2023-01-01 DIAGNOSIS — J18.9 HCAP (HEALTHCARE-ASSOCIATED PNEUMONIA): ICD-10-CM

## 2023-01-01 LAB
ALBUMIN SERPL BCP-MCNC: 2.1 G/DL (ref 3.5–5.2)
ALBUMIN SERPL BCP-MCNC: 2.1 G/DL (ref 3.5–5.2)
ALBUMIN SERPL BCP-MCNC: 2.2 G/DL (ref 3.5–5.2)
ALBUMIN SERPL BCP-MCNC: 2.3 G/DL (ref 3.5–5.2)
ALBUMIN SERPL BCP-MCNC: 2.4 G/DL (ref 3.5–5.2)
ALBUMIN SERPL BCP-MCNC: 2.6 G/DL (ref 3.5–5.2)
ALBUMIN SERPL BCP-MCNC: 2.6 G/DL (ref 3.5–5.2)
ALBUMIN SERPL BCP-MCNC: 2.7 G/DL (ref 3.5–5.2)
ALBUMIN SERPL BCP-MCNC: 2.7 G/DL (ref 3.5–5.2)
ALBUMIN SERPL BCP-MCNC: 3.1 G/DL (ref 3.5–5.2)
ALP SERPL-CCNC: 113 U/L (ref 55–135)
ALP SERPL-CCNC: 129 U/L (ref 55–135)
ALP SERPL-CCNC: 130 U/L (ref 55–135)
ALP SERPL-CCNC: 148 U/L (ref 55–135)
ALP SERPL-CCNC: 156 U/L (ref 55–135)
ALP SERPL-CCNC: 156 U/L (ref 55–135)
ALP SERPL-CCNC: 175 U/L (ref 55–135)
ALP SERPL-CCNC: 199 U/L (ref 55–135)
ALP SERPL-CCNC: 220 U/L (ref 55–135)
ALP SERPL-CCNC: 228 U/L (ref 55–135)
ALP SERPL-CCNC: 236 U/L (ref 55–135)
ALP SERPL-CCNC: 262 U/L (ref 55–135)
ALT SERPL W/O P-5'-P-CCNC: 12 U/L (ref 10–44)
ALT SERPL W/O P-5'-P-CCNC: 12 U/L (ref 10–44)
ALT SERPL W/O P-5'-P-CCNC: 14 U/L (ref 10–44)
ALT SERPL W/O P-5'-P-CCNC: 15 U/L (ref 10–44)
ALT SERPL W/O P-5'-P-CCNC: 16 U/L (ref 10–44)
ALT SERPL W/O P-5'-P-CCNC: 17 U/L (ref 10–44)
ALT SERPL W/O P-5'-P-CCNC: 19 U/L (ref 10–44)
ALT SERPL W/O P-5'-P-CCNC: 25 U/L (ref 10–44)
ALT SERPL W/O P-5'-P-CCNC: 29 U/L (ref 10–44)
ALT SERPL W/O P-5'-P-CCNC: 38 U/L (ref 10–44)
ALT SERPL W/O P-5'-P-CCNC: 39 U/L (ref 10–44)
ALT SERPL W/O P-5'-P-CCNC: 39 U/L (ref 10–44)
ANION GAP SERPL CALC-SCNC: 10 MMOL/L (ref 8–16)
ANION GAP SERPL CALC-SCNC: 11 MMOL/L (ref 8–16)
ANION GAP SERPL CALC-SCNC: 12 MMOL/L (ref 8–16)
ANION GAP SERPL CALC-SCNC: 13 MMOL/L (ref 8–16)
ANION GAP SERPL CALC-SCNC: 14 MMOL/L (ref 8–16)
ANION GAP SERPL CALC-SCNC: 5 MMOL/L (ref 8–16)
ANION GAP SERPL CALC-SCNC: 6 MMOL/L (ref 8–16)
ANION GAP SERPL CALC-SCNC: 7 MMOL/L (ref 8–16)
ANION GAP SERPL CALC-SCNC: 8 MMOL/L (ref 8–16)
ANION GAP SERPL CALC-SCNC: 9 MMOL/L (ref 8–16)
APTT BLDCRRT: 25.7 SEC (ref 21–32)
AST SERPL-CCNC: 16 U/L (ref 10–40)
AST SERPL-CCNC: 17 U/L (ref 10–40)
AST SERPL-CCNC: 18 U/L (ref 10–40)
AST SERPL-CCNC: 21 U/L (ref 10–40)
AST SERPL-CCNC: 22 U/L (ref 10–40)
AST SERPL-CCNC: 33 U/L (ref 10–40)
AST SERPL-CCNC: 47 U/L (ref 10–40)
AST SERPL-CCNC: 48 U/L (ref 10–40)
AST SERPL-CCNC: 55 U/L (ref 10–40)
AV INDEX (PROSTH): 0.91
AV MEAN GRADIENT: 6 MMHG
AV PEAK GRADIENT: 10 MMHG
AV REGURGITATION PRESSURE HALF TIME: 345.34 MS
AV VALVE AREA: 2.08 CM2
AV VELOCITY RATIO: 0.96
BACTERIA #/AREA URNS HPF: NORMAL /HPF
BASOPHILS # BLD AUTO: 0 K/UL (ref 0–0.2)
BASOPHILS # BLD AUTO: 0 K/UL (ref 0–0.2)
BASOPHILS # BLD AUTO: 0.01 K/UL (ref 0–0.2)
BASOPHILS # BLD AUTO: 0.02 K/UL (ref 0–0.2)
BASOPHILS # BLD AUTO: 0.03 K/UL (ref 0–0.2)
BASOPHILS NFR BLD: 0 % (ref 0–1.9)
BASOPHILS NFR BLD: 0.1 % (ref 0–1.9)
BILIRUB SERPL-MCNC: 0.5 MG/DL (ref 0.1–1)
BILIRUB SERPL-MCNC: 0.6 MG/DL (ref 0.1–1)
BILIRUB SERPL-MCNC: 0.7 MG/DL (ref 0.1–1)
BILIRUB SERPL-MCNC: 0.7 MG/DL (ref 0.1–1)
BILIRUB SERPL-MCNC: 0.8 MG/DL (ref 0.1–1)
BILIRUB UR QL STRIP: NEGATIVE
BNP SERPL-MCNC: 849 PG/ML (ref 0–99)
BSA FOR ECHO PROCEDURE: 1.27 M2
BUN SERPL-MCNC: 12 MG/DL (ref 10–30)
BUN SERPL-MCNC: 13 MG/DL (ref 10–30)
BUN SERPL-MCNC: 13 MG/DL (ref 10–30)
BUN SERPL-MCNC: 14 MG/DL (ref 10–30)
BUN SERPL-MCNC: 15 MG/DL (ref 10–30)
BUN SERPL-MCNC: 15 MG/DL (ref 10–30)
BUN SERPL-MCNC: 16 MG/DL (ref 10–30)
BUN SERPL-MCNC: 17 MG/DL (ref 10–30)
BUN SERPL-MCNC: 18 MG/DL (ref 10–30)
BUN SERPL-MCNC: 19 MG/DL (ref 10–30)
BUN SERPL-MCNC: 19 MG/DL (ref 10–30)
BUN SERPL-MCNC: 32 MG/DL (ref 10–30)
BUN SERPL-MCNC: 36 MG/DL (ref 10–30)
BUN SERPL-MCNC: 37 MG/DL (ref 10–30)
BUN SERPL-MCNC: 37 MG/DL (ref 10–30)
BUN SERPL-MCNC: 38 MG/DL (ref 10–30)
BUN SERPL-MCNC: 38 MG/DL (ref 10–30)
BUN SERPL-MCNC: 39 MG/DL (ref 10–30)
BUN SERPL-MCNC: 39 MG/DL (ref 10–30)
BUN SERPL-MCNC: 40 MG/DL (ref 10–30)
BUN SERPL-MCNC: 40 MG/DL (ref 10–30)
BUN SERPL-MCNC: 42 MG/DL (ref 10–30)
BUN SERPL-MCNC: 42 MG/DL (ref 10–30)
BUN SERPL-MCNC: 44 MG/DL (ref 10–30)
BUN SERPL-MCNC: 52 MG/DL (ref 10–30)
BUN SERPL-MCNC: 58 MG/DL (ref 10–30)
BUN SERPL-MCNC: 64 MG/DL (ref 10–30)
BUN SERPL-MCNC: 69 MG/DL (ref 10–30)
CALCIUM SERPL-MCNC: 7.9 MG/DL (ref 8.7–10.5)
CALCIUM SERPL-MCNC: 8 MG/DL (ref 8.7–10.5)
CALCIUM SERPL-MCNC: 8.1 MG/DL (ref 8.7–10.5)
CALCIUM SERPL-MCNC: 8.2 MG/DL (ref 8.7–10.5)
CALCIUM SERPL-MCNC: 8.3 MG/DL (ref 8.7–10.5)
CALCIUM SERPL-MCNC: 8.4 MG/DL (ref 8.7–10.5)
CALCIUM SERPL-MCNC: 8.5 MG/DL (ref 8.7–10.5)
CALCIUM SERPL-MCNC: 8.6 MG/DL (ref 8.7–10.5)
CALCIUM SERPL-MCNC: 8.7 MG/DL (ref 8.7–10.5)
CALCIUM SERPL-MCNC: 8.8 MG/DL (ref 8.7–10.5)
CALCIUM SERPL-MCNC: 8.9 MG/DL (ref 8.7–10.5)
CALCIUM SERPL-MCNC: 9 MG/DL (ref 8.7–10.5)
CALCIUM SERPL-MCNC: 9.1 MG/DL (ref 8.7–10.5)
CALCIUM SERPL-MCNC: 9.6 MG/DL (ref 8.7–10.5)
CHLORIDE SERPL-SCNC: 100 MMOL/L (ref 95–110)
CHLORIDE SERPL-SCNC: 102 MMOL/L (ref 95–110)
CHLORIDE SERPL-SCNC: 103 MMOL/L (ref 95–110)
CHLORIDE SERPL-SCNC: 108 MMOL/L (ref 95–110)
CHLORIDE SERPL-SCNC: 84 MMOL/L (ref 95–110)
CHLORIDE SERPL-SCNC: 85 MMOL/L (ref 95–110)
CHLORIDE SERPL-SCNC: 86 MMOL/L (ref 95–110)
CHLORIDE SERPL-SCNC: 87 MMOL/L (ref 95–110)
CHLORIDE SERPL-SCNC: 88 MMOL/L (ref 95–110)
CHLORIDE SERPL-SCNC: 89 MMOL/L (ref 95–110)
CHLORIDE SERPL-SCNC: 89 MMOL/L (ref 95–110)
CHLORIDE SERPL-SCNC: 90 MMOL/L (ref 95–110)
CHLORIDE SERPL-SCNC: 91 MMOL/L (ref 95–110)
CHLORIDE SERPL-SCNC: 92 MMOL/L (ref 95–110)
CHLORIDE SERPL-SCNC: 92 MMOL/L (ref 95–110)
CHLORIDE SERPL-SCNC: 93 MMOL/L (ref 95–110)
CHLORIDE SERPL-SCNC: 94 MMOL/L (ref 95–110)
CHLORIDE SERPL-SCNC: 95 MMOL/L (ref 95–110)
CHLORIDE SERPL-SCNC: 98 MMOL/L (ref 95–110)
CHLORIDE SERPL-SCNC: 99 MMOL/L (ref 95–110)
CK SERPL-CCNC: 108 U/L (ref 20–180)
CLARITY UR: CLEAR
CO2 SERPL-SCNC: 19 MMOL/L (ref 23–29)
CO2 SERPL-SCNC: 20 MMOL/L (ref 23–29)
CO2 SERPL-SCNC: 20 MMOL/L (ref 23–29)
CO2 SERPL-SCNC: 21 MMOL/L (ref 23–29)
CO2 SERPL-SCNC: 22 MMOL/L (ref 23–29)
CO2 SERPL-SCNC: 23 MMOL/L (ref 23–29)
CO2 SERPL-SCNC: 24 MMOL/L (ref 23–29)
CO2 SERPL-SCNC: 25 MMOL/L (ref 23–29)
CO2 SERPL-SCNC: 26 MMOL/L (ref 23–29)
CO2 SERPL-SCNC: 27 MMOL/L (ref 23–29)
CO2 SERPL-SCNC: 28 MMOL/L (ref 23–29)
CO2 SERPL-SCNC: 29 MMOL/L (ref 23–29)
COLOR UR: YELLOW
CORTIS SERPL-MCNC: 33 UG/DL (ref 4.3–22.4)
CREAT SERPL-MCNC: 0.6 MG/DL (ref 0.5–1.4)
CREAT SERPL-MCNC: 0.7 MG/DL (ref 0.5–1.4)
CREAT SERPL-MCNC: 0.8 MG/DL (ref 0.5–1.4)
CREAT SERPL-MCNC: 1.2 MG/DL (ref 0.5–1.4)
CREAT SERPL-MCNC: 1.7 MG/DL (ref 0.5–1.4)
CREAT UR-MCNC: 44.2 MG/DL (ref 15–325)
CTP QC/QA: YES
CTP QC/QA: YES
CV ECHO LV RWT: 0.55 CM
DIFFERENTIAL METHOD: ABNORMAL
DOP CALC AO PEAK VEL: 1.57 M/S
DOP CALC AO VTI: 34 CM
DOP CALC LVOT AREA: 2.3 CM2
DOP CALC LVOT DIAMETER: 1.71 CM
DOP CALC LVOT PEAK VEL: 1.51 M/S
DOP CALC LVOT STROKE VOLUME: 70.7 CM3
DOP CALC MV VTI: 28.1 CM
DOP CALCLVOT PEAK VEL VTI: 30.8 CM
E WAVE DECELERATION TIME: 167.92 MSEC
E/A RATIO: 1.46
E/E' RATIO: 24.8 M/S
ECHO LV POSTERIOR WALL: 1.01 CM (ref 0.6–1.1)
EJECTION FRACTION: 50 %
EOSINOPHIL # BLD AUTO: 0 K/UL (ref 0–0.5)
EOSINOPHIL NFR BLD: 0 % (ref 0–8)
EOSINOPHIL NFR BLD: 0.1 % (ref 0–8)
ERYTHROCYTE [DISTWIDTH] IN BLOOD BY AUTOMATED COUNT: 11.3 % (ref 11.5–14.5)
ERYTHROCYTE [DISTWIDTH] IN BLOOD BY AUTOMATED COUNT: 11.4 % (ref 11.5–14.5)
ERYTHROCYTE [DISTWIDTH] IN BLOOD BY AUTOMATED COUNT: 11.5 % (ref 11.5–14.5)
ERYTHROCYTE [DISTWIDTH] IN BLOOD BY AUTOMATED COUNT: 11.8 % (ref 11.5–14.5)
ERYTHROCYTE [DISTWIDTH] IN BLOOD BY AUTOMATED COUNT: 12 % (ref 11.5–14.5)
ERYTHROCYTE [DISTWIDTH] IN BLOOD BY AUTOMATED COUNT: 12 % (ref 11.5–14.5)
ERYTHROCYTE [DISTWIDTH] IN BLOOD BY AUTOMATED COUNT: 12.2 % (ref 11.5–14.5)
ERYTHROCYTE [DISTWIDTH] IN BLOOD BY AUTOMATED COUNT: 12.2 % (ref 11.5–14.5)
ERYTHROCYTE [DISTWIDTH] IN BLOOD BY AUTOMATED COUNT: 12.3 % (ref 11.5–14.5)
ERYTHROCYTE [DISTWIDTH] IN BLOOD BY AUTOMATED COUNT: 12.3 % (ref 11.5–14.5)
EST. GFR  (NO RACE VARIABLE): 28 ML/MIN/1.73 M^2
EST. GFR  (NO RACE VARIABLE): 42 ML/MIN/1.73 M^2
EST. GFR  (NO RACE VARIABLE): >60 ML/MIN/1.73 M^2
FERRITIN SERPL-MCNC: 272 NG/ML (ref 20–300)
FIBRINOGEN PPP-MCNC: 332 MG/DL (ref 182–400)
FRACTIONAL SHORTENING: 24 % (ref 28–44)
GLUCOSE SERPL-MCNC: 100 MG/DL (ref 70–110)
GLUCOSE SERPL-MCNC: 102 MG/DL (ref 70–110)
GLUCOSE SERPL-MCNC: 103 MG/DL (ref 70–110)
GLUCOSE SERPL-MCNC: 104 MG/DL (ref 70–110)
GLUCOSE SERPL-MCNC: 105 MG/DL (ref 70–110)
GLUCOSE SERPL-MCNC: 106 MG/DL (ref 70–110)
GLUCOSE SERPL-MCNC: 106 MG/DL (ref 70–110)
GLUCOSE SERPL-MCNC: 107 MG/DL (ref 70–110)
GLUCOSE SERPL-MCNC: 109 MG/DL (ref 70–110)
GLUCOSE SERPL-MCNC: 112 MG/DL (ref 70–110)
GLUCOSE SERPL-MCNC: 112 MG/DL (ref 70–110)
GLUCOSE SERPL-MCNC: 113 MG/DL (ref 70–110)
GLUCOSE SERPL-MCNC: 117 MG/DL (ref 70–110)
GLUCOSE SERPL-MCNC: 119 MG/DL (ref 70–110)
GLUCOSE SERPL-MCNC: 124 MG/DL (ref 70–110)
GLUCOSE SERPL-MCNC: 131 MG/DL (ref 70–110)
GLUCOSE SERPL-MCNC: 132 MG/DL (ref 70–110)
GLUCOSE SERPL-MCNC: 133 MG/DL (ref 70–110)
GLUCOSE SERPL-MCNC: 134 MG/DL (ref 70–110)
GLUCOSE SERPL-MCNC: 136 MG/DL (ref 70–110)
GLUCOSE SERPL-MCNC: 141 MG/DL (ref 70–110)
GLUCOSE SERPL-MCNC: 145 MG/DL (ref 70–110)
GLUCOSE SERPL-MCNC: 145 MG/DL (ref 70–110)
GLUCOSE SERPL-MCNC: 147 MG/DL (ref 70–110)
GLUCOSE SERPL-MCNC: 149 MG/DL (ref 70–110)
GLUCOSE SERPL-MCNC: 164 MG/DL (ref 70–110)
GLUCOSE SERPL-MCNC: 90 MG/DL (ref 70–110)
GLUCOSE SERPL-MCNC: 90 MG/DL (ref 70–110)
GLUCOSE SERPL-MCNC: 91 MG/DL (ref 70–110)
GLUCOSE SERPL-MCNC: 93 MG/DL (ref 70–110)
GLUCOSE SERPL-MCNC: 97 MG/DL (ref 70–110)
GLUCOSE SERPL-MCNC: 97 MG/DL (ref 70–110)
GLUCOSE UR QL STRIP: ABNORMAL
HCT VFR BLD AUTO: 26.7 % (ref 37–48.5)
HCT VFR BLD AUTO: 27.4 % (ref 37–48.5)
HCT VFR BLD AUTO: 27.8 % (ref 37–48.5)
HCT VFR BLD AUTO: 29 % (ref 37–48.5)
HCT VFR BLD AUTO: 29 % (ref 37–48.5)
HCT VFR BLD AUTO: 29.4 % (ref 37–48.5)
HCT VFR BLD AUTO: 29.9 % (ref 37–48.5)
HCT VFR BLD AUTO: 30.2 % (ref 37–48.5)
HCT VFR BLD AUTO: 30.4 % (ref 37–48.5)
HCT VFR BLD AUTO: 30.6 % (ref 37–48.5)
HCT VFR BLD AUTO: 32.6 % (ref 37–48.5)
HCT VFR BLD AUTO: 37.5 % (ref 37–48.5)
HGB BLD-MCNC: 10 G/DL (ref 12–16)
HGB BLD-MCNC: 10.1 G/DL (ref 12–16)
HGB BLD-MCNC: 10.3 G/DL (ref 12–16)
HGB BLD-MCNC: 10.5 G/DL (ref 12–16)
HGB BLD-MCNC: 10.8 G/DL (ref 12–16)
HGB BLD-MCNC: 11.6 G/DL (ref 12–16)
HGB BLD-MCNC: 13.5 G/DL (ref 12–16)
HGB BLD-MCNC: 9.3 G/DL (ref 12–16)
HGB BLD-MCNC: 9.3 G/DL (ref 12–16)
HGB BLD-MCNC: 9.6 G/DL (ref 12–16)
HGB UR QL STRIP: ABNORMAL
HYALINE CASTS #/AREA URNS LPF: 0 /LPF
IMM GRANULOCYTES # BLD AUTO: 0.03 K/UL (ref 0–0.04)
IMM GRANULOCYTES # BLD AUTO: 0.03 K/UL (ref 0–0.04)
IMM GRANULOCYTES # BLD AUTO: 0.06 K/UL (ref 0–0.04)
IMM GRANULOCYTES # BLD AUTO: 0.07 K/UL (ref 0–0.04)
IMM GRANULOCYTES # BLD AUTO: 0.08 K/UL (ref 0–0.04)
IMM GRANULOCYTES # BLD AUTO: 0.09 K/UL (ref 0–0.04)
IMM GRANULOCYTES # BLD AUTO: 0.1 K/UL (ref 0–0.04)
IMM GRANULOCYTES # BLD AUTO: 0.11 K/UL (ref 0–0.04)
IMM GRANULOCYTES # BLD AUTO: 0.12 K/UL (ref 0–0.04)
IMM GRANULOCYTES # BLD AUTO: ABNORMAL K/UL (ref 0–0.04)
IMM GRANULOCYTES NFR BLD AUTO: 0.4 % (ref 0–0.5)
IMM GRANULOCYTES NFR BLD AUTO: 0.5 % (ref 0–0.5)
IMM GRANULOCYTES NFR BLD AUTO: 0.6 % (ref 0–0.5)
IMM GRANULOCYTES NFR BLD AUTO: 0.6 % (ref 0–0.5)
IMM GRANULOCYTES NFR BLD AUTO: 0.7 % (ref 0–0.5)
IMM GRANULOCYTES NFR BLD AUTO: 0.8 % (ref 0–0.5)
IMM GRANULOCYTES NFR BLD AUTO: ABNORMAL % (ref 0–0.5)
INR PPP: 1 (ref 0.8–1.2)
INTERVENTRICULAR SEPTUM: 1.34 CM (ref 0.6–1.1)
IRON SERPL-MCNC: 20 UG/DL (ref 30–160)
IVC DIAMETER: 1.49 CM
KETONES UR QL STRIP: ABNORMAL
LA MAJOR: 3.91 CM
LA MINOR: 4.5 CM
LA WIDTH: 3.8 CM
LEFT ATRIUM SIZE: 3.09 CM
LEFT ATRIUM VOLUME INDEX MOD: 27.4 ML/M2
LEFT ATRIUM VOLUME INDEX: 32.4 ML/M2
LEFT ATRIUM VOLUME MOD: 35.33 CM3
LEFT ATRIUM VOLUME: 41.76 CM3
LEFT INTERNAL DIMENSION IN SYSTOLE: 2.81 CM (ref 2.1–4)
LEFT VENTRICLE DIASTOLIC VOLUME INDEX: 44.67 ML/M2
LEFT VENTRICLE DIASTOLIC VOLUME: 57.63 ML
LEFT VENTRICLE MASS INDEX: 110 G/M2
LEFT VENTRICLE SYSTOLIC VOLUME INDEX: 23 ML/M2
LEFT VENTRICLE SYSTOLIC VOLUME: 29.69 ML
LEFT VENTRICULAR INTERNAL DIMENSION IN DIASTOLE: 3.69 CM (ref 3.5–6)
LEFT VENTRICULAR MASS: 142.13 G
LEUKOCYTE ESTERASE UR QL STRIP: NEGATIVE
LV LATERAL E/E' RATIO: 20.67 M/S
LV SEPTAL E/E' RATIO: 31 M/S
LVOT MG: 4.97 MMHG
LVOT MV: 1.04 CM/S
LYMPHOCYTES # BLD AUTO: 0.5 K/UL (ref 1–4.8)
LYMPHOCYTES # BLD AUTO: 0.6 K/UL (ref 1–4.8)
LYMPHOCYTES # BLD AUTO: 0.7 K/UL (ref 1–4.8)
LYMPHOCYTES # BLD AUTO: 0.9 K/UL (ref 1–4.8)
LYMPHOCYTES # BLD AUTO: 1 K/UL (ref 1–4.8)
LYMPHOCYTES # BLD AUTO: 1.1 K/UL (ref 1–4.8)
LYMPHOCYTES NFR BLD: 11.8 % (ref 18–48)
LYMPHOCYTES NFR BLD: 13.4 % (ref 18–48)
LYMPHOCYTES NFR BLD: 2.4 % (ref 18–48)
LYMPHOCYTES NFR BLD: 2.9 % (ref 18–48)
LYMPHOCYTES NFR BLD: 3.4 % (ref 18–48)
LYMPHOCYTES NFR BLD: 3.6 % (ref 18–48)
LYMPHOCYTES NFR BLD: 4.2 % (ref 18–48)
LYMPHOCYTES NFR BLD: 4.8 % (ref 18–48)
LYMPHOCYTES NFR BLD: 5.8 % (ref 18–48)
LYMPHOCYTES NFR BLD: 8 % (ref 18–48)
LYMPHOCYTES NFR BLD: 9.1 % (ref 18–48)
LYMPHOCYTES NFR BLD: 9.6 % (ref 18–48)
MAGNESIUM SERPL-MCNC: 1.8 MG/DL (ref 1.6–2.6)
MCH RBC QN AUTO: 30.3 PG (ref 27–31)
MCH RBC QN AUTO: 30.6 PG (ref 27–31)
MCH RBC QN AUTO: 30.6 PG (ref 27–31)
MCH RBC QN AUTO: 30.7 PG (ref 27–31)
MCH RBC QN AUTO: 30.7 PG (ref 27–31)
MCH RBC QN AUTO: 30.8 PG (ref 27–31)
MCH RBC QN AUTO: 30.9 PG (ref 27–31)
MCH RBC QN AUTO: 30.9 PG (ref 27–31)
MCH RBC QN AUTO: 31.3 PG (ref 27–31)
MCH RBC QN AUTO: 31.3 PG (ref 27–31)
MCH RBC QN AUTO: 31.5 PG (ref 27–31)
MCH RBC QN AUTO: 31.9 PG (ref 27–31)
MCHC RBC AUTO-ENTMCNC: 31.4 G/DL (ref 32–36)
MCHC RBC AUTO-ENTMCNC: 32.1 G/DL (ref 32–36)
MCHC RBC AUTO-ENTMCNC: 33.1 G/DL (ref 32–36)
MCHC RBC AUTO-ENTMCNC: 33.2 G/DL (ref 32–36)
MCHC RBC AUTO-ENTMCNC: 33.9 G/DL (ref 32–36)
MCHC RBC AUTO-ENTMCNC: 34.8 G/DL (ref 32–36)
MCHC RBC AUTO-ENTMCNC: 35.5 G/DL (ref 32–36)
MCHC RBC AUTO-ENTMCNC: 35.6 G/DL (ref 32–36)
MCHC RBC AUTO-ENTMCNC: 35.7 G/DL (ref 32–36)
MCHC RBC AUTO-ENTMCNC: 35.8 G/DL (ref 32–36)
MCHC RBC AUTO-ENTMCNC: 36 G/DL (ref 32–36)
MCHC RBC AUTO-ENTMCNC: 36 G/DL (ref 32–36)
MCV RBC AUTO: 86 FL (ref 82–98)
MCV RBC AUTO: 86 FL (ref 82–98)
MCV RBC AUTO: 87 FL (ref 82–98)
MCV RBC AUTO: 87 FL (ref 82–98)
MCV RBC AUTO: 88 FL (ref 82–98)
MCV RBC AUTO: 88 FL (ref 82–98)
MCV RBC AUTO: 89 FL (ref 82–98)
MCV RBC AUTO: 90 FL (ref 82–98)
MCV RBC AUTO: 93 FL (ref 82–98)
MCV RBC AUTO: 94 FL (ref 82–98)
MCV RBC AUTO: 96 FL (ref 82–98)
MCV RBC AUTO: 98 FL (ref 82–98)
MICROSCOPIC COMMENT: NORMAL
MONOCYTES # BLD AUTO: 0.2 K/UL (ref 0.3–1)
MONOCYTES # BLD AUTO: 0.3 K/UL (ref 0.3–1)
MONOCYTES # BLD AUTO: 0.4 K/UL (ref 0.3–1)
MONOCYTES # BLD AUTO: 0.4 K/UL (ref 0.3–1)
MONOCYTES # BLD AUTO: 0.5 K/UL (ref 0.3–1)
MONOCYTES # BLD AUTO: 0.6 K/UL (ref 0.3–1)
MONOCYTES # BLD AUTO: 0.7 K/UL (ref 0.3–1)
MONOCYTES # BLD AUTO: 0.7 K/UL (ref 0.3–1)
MONOCYTES # BLD AUTO: 0.8 K/UL (ref 0.3–1)
MONOCYTES # BLD AUTO: 1 K/UL (ref 0.3–1)
MONOCYTES # BLD AUTO: 1 K/UL (ref 0.3–1)
MONOCYTES NFR BLD: 0 % (ref 4–15)
MONOCYTES NFR BLD: 1.1 % (ref 4–15)
MONOCYTES NFR BLD: 10.3 % (ref 4–15)
MONOCYTES NFR BLD: 10.5 % (ref 4–15)
MONOCYTES NFR BLD: 2.1 % (ref 4–15)
MONOCYTES NFR BLD: 2.3 % (ref 4–15)
MONOCYTES NFR BLD: 3.3 % (ref 4–15)
MONOCYTES NFR BLD: 4.1 % (ref 4–15)
MONOCYTES NFR BLD: 4.5 % (ref 4–15)
MONOCYTES NFR BLD: 5.8 % (ref 4–15)
MV A" WAVE DURATION": 114.18 MSEC
MV MEAN GRADIENT: 2 MMHG
MV PEAK A VEL: 0.85 M/S
MV PEAK E VEL: 1.24 M/S
MV PEAK GRADIENT: 6 MMHG
MV VALVE AREA BY CONTINUITY EQUATION: 2.52 CM2
NEUTROPHILS # BLD AUTO: 10.6 K/UL (ref 1.8–7.7)
NEUTROPHILS # BLD AUTO: 11.5 K/UL (ref 1.8–7.7)
NEUTROPHILS # BLD AUTO: 12.1 K/UL (ref 1.8–7.7)
NEUTROPHILS # BLD AUTO: 12.4 K/UL (ref 1.8–7.7)
NEUTROPHILS # BLD AUTO: 15.5 K/UL (ref 1.8–7.7)
NEUTROPHILS # BLD AUTO: 19.3 K/UL (ref 1.8–7.7)
NEUTROPHILS # BLD AUTO: 5.2 K/UL (ref 1.8–7.7)
NEUTROPHILS # BLD AUTO: 5.4 K/UL (ref 1.8–7.7)
NEUTROPHILS # BLD AUTO: 7.2 K/UL (ref 1.8–7.7)
NEUTROPHILS # BLD AUTO: 7.3 K/UL (ref 1.8–7.7)
NEUTROPHILS # BLD AUTO: 9.7 K/UL (ref 1.8–7.7)
NEUTROPHILS NFR BLD: 75.5 % (ref 38–73)
NEUTROPHILS NFR BLD: 77.2 % (ref 38–73)
NEUTROPHILS NFR BLD: 79.1 % (ref 38–73)
NEUTROPHILS NFR BLD: 79.9 % (ref 38–73)
NEUTROPHILS NFR BLD: 87.6 % (ref 38–73)
NEUTROPHILS NFR BLD: 90.2 % (ref 38–73)
NEUTROPHILS NFR BLD: 90.9 % (ref 38–73)
NEUTROPHILS NFR BLD: 92 % (ref 38–73)
NEUTROPHILS NFR BLD: 92.5 % (ref 38–73)
NEUTROPHILS NFR BLD: 92.9 % (ref 38–73)
NEUTROPHILS NFR BLD: 94.2 % (ref 38–73)
NEUTROPHILS NFR BLD: 95.9 % (ref 38–73)
NITRITE UR QL STRIP: NEGATIVE
NRBC BLD-RTO: 0 /100 WBC
OSMOLALITY SERPL: 261 MOSM/KG (ref 275–295)
OSMOLALITY SERPL: 261 MOSM/KG (ref 275–295)
OSMOLALITY SERPL: 262 MOSM/KG (ref 275–295)
OSMOLALITY UR: 371 MOSM/KG (ref 50–1200)
OSMOLALITY UR: 829 MOSM/KG (ref 50–1200)
PH UR STRIP: 7 [PH] (ref 5–8)
PHOSPHATE SERPL-MCNC: 2.6 MG/DL (ref 2.7–4.5)
PISA AR MAX VEL: 4.3 M/S
PISA TR MAX VEL: 3.3 M/S
PLATELET # BLD AUTO: 244 K/UL (ref 150–450)
PLATELET # BLD AUTO: 261 K/UL (ref 150–450)
PLATELET # BLD AUTO: 273 K/UL (ref 150–450)
PLATELET # BLD AUTO: 278 K/UL (ref 150–450)
PLATELET # BLD AUTO: 280 K/UL (ref 150–450)
PLATELET # BLD AUTO: 328 K/UL (ref 150–450)
PLATELET # BLD AUTO: 338 K/UL (ref 150–450)
PLATELET # BLD AUTO: 350 K/UL (ref 150–450)
PLATELET # BLD AUTO: 406 K/UL (ref 150–450)
PLATELET # BLD AUTO: 438 K/UL (ref 150–450)
PLATELET # BLD AUTO: 446 K/UL (ref 150–450)
PLATELET # BLD AUTO: 454 K/UL (ref 150–450)
PLATELET BLD QL SMEAR: ABNORMAL
PMV BLD AUTO: 8.2 FL (ref 9.2–12.9)
PMV BLD AUTO: 8.4 FL (ref 9.2–12.9)
PMV BLD AUTO: 8.5 FL (ref 9.2–12.9)
PMV BLD AUTO: 8.7 FL (ref 9.2–12.9)
PMV BLD AUTO: 8.8 FL (ref 9.2–12.9)
PMV BLD AUTO: 9.2 FL (ref 9.2–12.9)
PMV BLD AUTO: 9.3 FL (ref 9.2–12.9)
PMV BLD AUTO: 9.4 FL (ref 9.2–12.9)
PMV BLD AUTO: 9.5 FL (ref 9.2–12.9)
PMV BLD AUTO: 9.7 FL (ref 9.2–12.9)
PMV BLD AUTO: 9.8 FL (ref 9.2–12.9)
PMV BLD AUTO: 9.8 FL (ref 9.2–12.9)
POC MOLECULAR INFLUENZA A AGN: NEGATIVE
POC MOLECULAR INFLUENZA B AGN: NEGATIVE
POCT GLUCOSE: 129 MG/DL (ref 70–110)
POCT GLUCOSE: 96 MG/DL (ref 70–110)
POTASSIUM SERPL-SCNC: 2.5 MMOL/L (ref 3.5–5.1)
POTASSIUM SERPL-SCNC: 3 MMOL/L (ref 3.5–5.1)
POTASSIUM SERPL-SCNC: 3.1 MMOL/L (ref 3.5–5.1)
POTASSIUM SERPL-SCNC: 3.3 MMOL/L (ref 3.5–5.1)
POTASSIUM SERPL-SCNC: 3.4 MMOL/L (ref 3.5–5.1)
POTASSIUM SERPL-SCNC: 3.4 MMOL/L (ref 3.5–5.1)
POTASSIUM SERPL-SCNC: 3.5 MMOL/L (ref 3.5–5.1)
POTASSIUM SERPL-SCNC: 3.5 MMOL/L (ref 3.5–5.1)
POTASSIUM SERPL-SCNC: 3.6 MMOL/L (ref 3.5–5.1)
POTASSIUM SERPL-SCNC: 3.6 MMOL/L (ref 3.5–5.1)
POTASSIUM SERPL-SCNC: 3.7 MMOL/L (ref 3.5–5.1)
POTASSIUM SERPL-SCNC: 3.8 MMOL/L (ref 3.5–5.1)
POTASSIUM SERPL-SCNC: 3.9 MMOL/L (ref 3.5–5.1)
POTASSIUM SERPL-SCNC: 3.9 MMOL/L (ref 3.5–5.1)
POTASSIUM SERPL-SCNC: 4 MMOL/L (ref 3.5–5.1)
POTASSIUM SERPL-SCNC: 4.1 MMOL/L (ref 3.5–5.1)
POTASSIUM SERPL-SCNC: 4.2 MMOL/L (ref 3.5–5.1)
POTASSIUM SERPL-SCNC: 4.3 MMOL/L (ref 3.5–5.1)
POTASSIUM SERPL-SCNC: 4.4 MMOL/L (ref 3.5–5.1)
POTASSIUM SERPL-SCNC: 4.7 MMOL/L (ref 3.5–5.1)
POTASSIUM SERPL-SCNC: 4.8 MMOL/L (ref 3.5–5.1)
POTASSIUM SERPL-SCNC: 4.8 MMOL/L (ref 3.5–5.1)
POTASSIUM SERPL-SCNC: 5 MMOL/L (ref 3.5–5.1)
POTASSIUM SERPL-SCNC: 5 MMOL/L (ref 3.5–5.1)
POTASSIUM SERPL-SCNC: 5.1 MMOL/L (ref 3.5–5.1)
PROCALCITONIN SERPL IA-MCNC: 0.13 NG/ML
PROCALCITONIN SERPL IA-MCNC: 0.29 NG/ML
PROT SERPL-MCNC: 5.1 G/DL (ref 6–8.4)
PROT SERPL-MCNC: 5.1 G/DL (ref 6–8.4)
PROT SERPL-MCNC: 5.2 G/DL (ref 6–8.4)
PROT SERPL-MCNC: 5.3 G/DL (ref 6–8.4)
PROT SERPL-MCNC: 5.4 G/DL (ref 6–8.4)
PROT SERPL-MCNC: 5.5 G/DL (ref 6–8.4)
PROT SERPL-MCNC: 5.7 G/DL (ref 6–8.4)
PROT SERPL-MCNC: 5.9 G/DL (ref 6–8.4)
PROT SERPL-MCNC: 6 G/DL (ref 6–8.4)
PROT SERPL-MCNC: 6.1 G/DL (ref 6–8.4)
PROT UR QL STRIP: ABNORMAL
PROTHROMBIN TIME: 10.7 SEC (ref 9–12.5)
PV MV: 0.93 M/S
PV PEAK VELOCITY: 1.36 CM/S
RA MAJOR: 4.02 CM
RA PRESSURE: 3 MMHG
RA WIDTH: 2.19 CM
RBC # BLD AUTO: 3.01 M/UL (ref 4–5.4)
RBC # BLD AUTO: 3.01 M/UL (ref 4–5.4)
RBC # BLD AUTO: 3.03 M/UL (ref 4–5.4)
RBC # BLD AUTO: 3.14 M/UL (ref 4–5.4)
RBC # BLD AUTO: 3.17 M/UL (ref 4–5.4)
RBC # BLD AUTO: 3.2 M/UL (ref 4–5.4)
RBC # BLD AUTO: 3.28 M/UL (ref 4–5.4)
RBC # BLD AUTO: 3.29 M/UL (ref 4–5.4)
RBC # BLD AUTO: 3.42 M/UL (ref 4–5.4)
RBC # BLD AUTO: 3.53 M/UL (ref 4–5.4)
RBC # BLD AUTO: 3.75 M/UL (ref 4–5.4)
RBC # BLD AUTO: 4.28 M/UL (ref 4–5.4)
RBC #/AREA URNS HPF: 1 /HPF (ref 0–4)
RV TISSUE DOPPLER FREE WALL SYSTOLIC VELOCITY 1 (APICAL 4 CHAMBER VIEW): 0.01 CM/S
SARS-COV-2 AG RESP QL IA.RAPID: POSITIVE
SARS-COV-2 RDRP RESP QL NAA+PROBE: POSITIVE
SATURATED IRON: 8 % (ref 20–50)
SODIUM SERPL-SCNC: 116 MMOL/L (ref 136–145)
SODIUM SERPL-SCNC: 116 MMOL/L (ref 136–145)
SODIUM SERPL-SCNC: 117 MMOL/L (ref 136–145)
SODIUM SERPL-SCNC: 117 MMOL/L (ref 136–145)
SODIUM SERPL-SCNC: 118 MMOL/L (ref 136–145)
SODIUM SERPL-SCNC: 119 MMOL/L (ref 136–145)
SODIUM SERPL-SCNC: 120 MMOL/L (ref 136–145)
SODIUM SERPL-SCNC: 122 MMOL/L (ref 136–145)
SODIUM SERPL-SCNC: 123 MMOL/L (ref 136–145)
SODIUM SERPL-SCNC: 124 MMOL/L (ref 136–145)
SODIUM SERPL-SCNC: 125 MMOL/L (ref 136–145)
SODIUM SERPL-SCNC: 126 MMOL/L (ref 136–145)
SODIUM SERPL-SCNC: 127 MMOL/L (ref 136–145)
SODIUM SERPL-SCNC: 128 MMOL/L (ref 136–145)
SODIUM SERPL-SCNC: 129 MMOL/L (ref 136–145)
SODIUM SERPL-SCNC: 132 MMOL/L (ref 136–145)
SODIUM SERPL-SCNC: 134 MMOL/L (ref 136–145)
SODIUM SERPL-SCNC: 137 MMOL/L (ref 136–145)
SODIUM SERPL-SCNC: 140 MMOL/L (ref 136–145)
SODIUM SERPL-SCNC: 140 MMOL/L (ref 136–145)
SODIUM SERPL-SCNC: 143 MMOL/L (ref 136–145)
SODIUM UR-SCNC: 39 MMOL/L (ref 20–250)
SODIUM UR-SCNC: 41 MMOL/L (ref 20–250)
SP GR UR STRIP: 1.01 (ref 1–1.03)
SQUAMOUS #/AREA URNS HPF: 0 /HPF
TDI LATERAL: 0.06 M/S
TDI SEPTAL: 0.04 M/S
TDI: 0.05 M/S
TOTAL IRON BINDING CAPACITY: 238 UG/DL (ref 250–450)
TR MAX PG: 44 MMHG
TRANSFERRIN SERPL-MCNC: 161 MG/DL (ref 200–375)
TRICUSPID ANNULAR PLANE SYSTOLIC EXCURSION: 2.03 CM
TROPONIN I SERPL DL<=0.01 NG/ML-MCNC: 0.62 NG/ML (ref 0–0.03)
TROPONIN I SERPL DL<=0.01 NG/ML-MCNC: 0.83 NG/ML (ref 0–0.03)
TROPONIN I SERPL DL<=0.01 NG/ML-MCNC: 0.83 NG/ML (ref 0–0.03)
TSH SERPL DL<=0.005 MIU/L-ACNC: 0.53 UIU/ML (ref 0.4–4)
TV REST PULMONARY ARTERY PRESSURE: 47 MMHG
URN SPEC COLLECT METH UR: ABNORMAL
UROBILINOGEN UR STRIP-ACNC: NEGATIVE EU/DL
UUN UR-MCNC: 421 MG/DL (ref 140–1050)
WBC # BLD AUTO: 10.76 K/UL (ref 3.9–12.7)
WBC # BLD AUTO: 12.07 K/UL (ref 3.9–12.7)
WBC # BLD AUTO: 12.35 K/UL (ref 3.9–12.7)
WBC # BLD AUTO: 13.26 K/UL (ref 3.9–12.7)
WBC # BLD AUTO: 13.45 K/UL (ref 3.9–12.7)
WBC # BLD AUTO: 16.48 K/UL (ref 3.9–12.7)
WBC # BLD AUTO: 20.17 K/UL (ref 3.9–12.7)
WBC # BLD AUTO: 6.49 K/UL (ref 3.9–12.7)
WBC # BLD AUTO: 7.11 K/UL (ref 3.9–12.7)
WBC # BLD AUTO: 9.28 K/UL (ref 3.9–12.7)
WBC # BLD AUTO: 9.33 K/UL (ref 3.9–12.7)
WBC # BLD AUTO: 9.68 K/UL (ref 3.9–12.7)
WBC #/AREA URNS HPF: 1 /HPF (ref 0–5)

## 2023-01-01 PROCEDURE — 36415 COLL VENOUS BLD VENIPUNCTURE: CPT | Performed by: STUDENT IN AN ORGANIZED HEALTH CARE EDUCATION/TRAINING PROGRAM

## 2023-01-01 PROCEDURE — 84443 ASSAY THYROID STIM HORMONE: CPT | Performed by: STUDENT IN AN ORGANIZED HEALTH CARE EDUCATION/TRAINING PROGRAM

## 2023-01-01 PROCEDURE — 63600175 PHARM REV CODE 636 W HCPCS: Performed by: STUDENT IN AN ORGANIZED HEALTH CARE EDUCATION/TRAINING PROGRAM

## 2023-01-01 PROCEDURE — 11000001 HC ACUTE MED/SURG PRIVATE ROOM

## 2023-01-01 PROCEDURE — 94660 CPAP INITIATION&MGMT: CPT

## 2023-01-01 PROCEDURE — 25000003 PHARM REV CODE 250: Performed by: STUDENT IN AN ORGANIZED HEALTH CARE EDUCATION/TRAINING PROGRAM

## 2023-01-01 PROCEDURE — 96361 HYDRATE IV INFUSION ADD-ON: CPT

## 2023-01-01 PROCEDURE — C9113 INJ PANTOPRAZOLE SODIUM, VIA: HCPCS | Performed by: INTERNAL MEDICINE

## 2023-01-01 PROCEDURE — 51798 US URINE CAPACITY MEASURE: CPT

## 2023-01-01 PROCEDURE — A9698 NON-RAD CONTRAST MATERIALNOC: HCPCS | Performed by: INTERNAL MEDICINE

## 2023-01-01 PROCEDURE — 1152F PR DOC ADVANCED DISEASE DX, GOAL OF CARE PRIORITIZE COMFORT: ICD-10-PCS | Mod: CPTII,,, | Performed by: STUDENT IN AN ORGANIZED HEALTH CARE EDUCATION/TRAINING PROGRAM

## 2023-01-01 PROCEDURE — 85025 COMPLETE CBC W/AUTO DIFF WBC: CPT | Performed by: STUDENT IN AN ORGANIZED HEALTH CARE EDUCATION/TRAINING PROGRAM

## 2023-01-01 PROCEDURE — 99497 ADVNCD CARE PLAN 30 MIN: CPT | Mod: 25,,, | Performed by: STUDENT IN AN ORGANIZED HEALTH CARE EDUCATION/TRAINING PROGRAM

## 2023-01-01 PROCEDURE — 27000207 HC ISOLATION

## 2023-01-01 PROCEDURE — 93010 EKG 12-LEAD: ICD-10-PCS | Mod: ,,, | Performed by: INTERNAL MEDICINE

## 2023-01-01 PROCEDURE — 80053 COMPREHEN METABOLIC PANEL: CPT | Performed by: INTERNAL MEDICINE

## 2023-01-01 PROCEDURE — 97530 THERAPEUTIC ACTIVITIES: CPT

## 2023-01-01 PROCEDURE — 80048 BASIC METABOLIC PNL TOTAL CA: CPT | Performed by: STUDENT IN AN ORGANIZED HEALTH CARE EDUCATION/TRAINING PROGRAM

## 2023-01-01 PROCEDURE — 92526 ORAL FUNCTION THERAPY: CPT

## 2023-01-01 PROCEDURE — 99900035 HC TECH TIME PER 15 MIN (STAT)

## 2023-01-01 PROCEDURE — 82533 TOTAL CORTISOL: CPT | Performed by: STUDENT IN AN ORGANIZED HEALTH CARE EDUCATION/TRAINING PROGRAM

## 2023-01-01 PROCEDURE — 97535 SELF CARE MNGMENT TRAINING: CPT

## 2023-01-01 PROCEDURE — 93010 ELECTROCARDIOGRAM REPORT: CPT | Mod: ,,, | Performed by: INTERNAL MEDICINE

## 2023-01-01 PROCEDURE — 84145 PROCALCITONIN (PCT): CPT

## 2023-01-01 PROCEDURE — 85610 PROTHROMBIN TIME: CPT | Performed by: INTERNAL MEDICINE

## 2023-01-01 PROCEDURE — 94761 N-INVAS EAR/PLS OXIMETRY MLT: CPT

## 2023-01-01 PROCEDURE — 82728 ASSAY OF FERRITIN: CPT | Performed by: STUDENT IN AN ORGANIZED HEALTH CARE EDUCATION/TRAINING PROGRAM

## 2023-01-01 PROCEDURE — 27000221 HC OXYGEN, UP TO 24 HOURS

## 2023-01-01 PROCEDURE — 84484 ASSAY OF TROPONIN QUANT: CPT | Mod: 91 | Performed by: STUDENT IN AN ORGANIZED HEALTH CARE EDUCATION/TRAINING PROGRAM

## 2023-01-01 PROCEDURE — 93010 ELECTROCARDIOGRAM REPORT: CPT | Mod: 76,,, | Performed by: INTERNAL MEDICINE

## 2023-01-01 PROCEDURE — 25000003 PHARM REV CODE 250: Performed by: INTERNAL MEDICINE

## 2023-01-01 PROCEDURE — 97116 GAIT TRAINING THERAPY: CPT | Mod: CQ

## 2023-01-01 PROCEDURE — 99221 PR INITIAL HOSPITAL CARE,LEVL I: ICD-10-PCS | Mod: ,,, | Performed by: NURSE PRACTITIONER

## 2023-01-01 PROCEDURE — 27100171 HC OXYGEN HIGH FLOW UP TO 24 HOURS

## 2023-01-01 PROCEDURE — 94799 UNLISTED PULMONARY SVC/PX: CPT

## 2023-01-01 PROCEDURE — 80048 BASIC METABOLIC PNL TOTAL CA: CPT | Mod: 91,XB | Performed by: STUDENT IN AN ORGANIZED HEALTH CARE EDUCATION/TRAINING PROGRAM

## 2023-01-01 PROCEDURE — 84466 ASSAY OF TRANSFERRIN: CPT | Performed by: STUDENT IN AN ORGANIZED HEALTH CARE EDUCATION/TRAINING PROGRAM

## 2023-01-01 PROCEDURE — 96374 THER/PROPH/DIAG INJ IV PUSH: CPT

## 2023-01-01 PROCEDURE — 25000003 PHARM REV CODE 250

## 2023-01-01 PROCEDURE — 1159F PR MEDICATION LIST DOCUMENTED IN MEDICAL RECORD: ICD-10-PCS | Mod: CPTII,S$GLB,, | Performed by: NURSE PRACTITIONER

## 2023-01-01 PROCEDURE — 93005 ELECTROCARDIOGRAM TRACING: CPT

## 2023-01-01 PROCEDURE — 63600175 PHARM REV CODE 636 W HCPCS: Performed by: INTERNAL MEDICINE

## 2023-01-01 PROCEDURE — 80053 COMPREHEN METABOLIC PANEL: CPT | Performed by: STUDENT IN AN ORGANIZED HEALTH CARE EDUCATION/TRAINING PROGRAM

## 2023-01-01 PROCEDURE — 80048 BASIC METABOLIC PNL TOTAL CA: CPT | Performed by: EMERGENCY MEDICINE

## 2023-01-01 PROCEDURE — 97110 THERAPEUTIC EXERCISES: CPT

## 2023-01-01 PROCEDURE — 94640 AIRWAY INHALATION TREATMENT: CPT

## 2023-01-01 PROCEDURE — 25000242 PHARM REV CODE 250 ALT 637 W/ HCPCS

## 2023-01-01 PROCEDURE — 97535 SELF CARE MNGMENT TRAINING: CPT | Mod: CO

## 2023-01-01 PROCEDURE — 80048 BASIC METABOLIC PNL TOTAL CA: CPT | Mod: XB | Performed by: STUDENT IN AN ORGANIZED HEALTH CARE EDUCATION/TRAINING PROGRAM

## 2023-01-01 PROCEDURE — 63600175 PHARM REV CODE 636 W HCPCS

## 2023-01-01 PROCEDURE — 81000 URINALYSIS NONAUTO W/SCOPE: CPT | Performed by: EMERGENCY MEDICINE

## 2023-01-01 PROCEDURE — 97116 GAIT TRAINING THERAPY: CPT

## 2023-01-01 PROCEDURE — U0002 COVID-19 LAB TEST NON-CDC: HCPCS

## 2023-01-01 PROCEDURE — 99221 1ST HOSP IP/OBS SF/LOW 40: CPT | Mod: ,,, | Performed by: NURSE PRACTITIONER

## 2023-01-01 PROCEDURE — 87502 POCT INFLUENZA A/B MOLECULAR: ICD-10-PCS | Mod: QW,S$GLB,, | Performed by: NURSE PRACTITIONER

## 2023-01-01 PROCEDURE — 83935 ASSAY OF URINE OSMOLALITY: CPT | Performed by: STUDENT IN AN ORGANIZED HEALTH CARE EDUCATION/TRAINING PROGRAM

## 2023-01-01 PROCEDURE — 94668 MNPJ CHEST WALL SBSQ: CPT

## 2023-01-01 PROCEDURE — 84300 ASSAY OF URINE SODIUM: CPT | Performed by: EMERGENCY MEDICINE

## 2023-01-01 PROCEDURE — 99223 1ST HOSP IP/OBS HIGH 75: CPT | Mod: ,,, | Performed by: STUDENT IN AN ORGANIZED HEALTH CARE EDUCATION/TRAINING PROGRAM

## 2023-01-01 PROCEDURE — 94667 MNPJ CHEST WALL 1ST: CPT

## 2023-01-01 PROCEDURE — 82550 ASSAY OF CK (CPK): CPT | Performed by: INTERNAL MEDICINE

## 2023-01-01 PROCEDURE — 92610 EVALUATE SWALLOWING FUNCTION: CPT

## 2023-01-01 PROCEDURE — 36415 COLL VENOUS BLD VENIPUNCTURE: CPT

## 2023-01-01 PROCEDURE — 27100092 HC HIGH FLOW DELIVERY CANNULA

## 2023-01-01 PROCEDURE — 82570 ASSAY OF URINE CREATININE: CPT | Performed by: EMERGENCY MEDICINE

## 2023-01-01 PROCEDURE — 84484 ASSAY OF TROPONIN QUANT: CPT | Performed by: STUDENT IN AN ORGANIZED HEALTH CARE EDUCATION/TRAINING PROGRAM

## 2023-01-01 PROCEDURE — 99497 PR ADVNCD CARE PLAN 30 MIN: ICD-10-PCS | Mod: 25,,, | Performed by: STUDENT IN AN ORGANIZED HEALTH CARE EDUCATION/TRAINING PROGRAM

## 2023-01-01 PROCEDURE — 99285 EMERGENCY DEPT VISIT HI MDM: CPT | Mod: 25

## 2023-01-01 PROCEDURE — 99204 OFFICE O/P NEW MOD 45 MIN: CPT | Mod: S$GLB,,, | Performed by: NURSE PRACTITIONER

## 2023-01-01 PROCEDURE — 83880 ASSAY OF NATRIURETIC PEPTIDE: CPT

## 2023-01-01 PROCEDURE — 83930 ASSAY OF BLOOD OSMOLALITY: CPT | Performed by: STUDENT IN AN ORGANIZED HEALTH CARE EDUCATION/TRAINING PROGRAM

## 2023-01-01 PROCEDURE — 83935 ASSAY OF URINE OSMOLALITY: CPT | Performed by: EMERGENCY MEDICINE

## 2023-01-01 PROCEDURE — 25000003 PHARM REV CODE 250: Performed by: EMERGENCY MEDICINE

## 2023-01-01 PROCEDURE — 1126F AMNT PAIN NOTED NONE PRSNT: CPT | Mod: CPTII,S$GLB,, | Performed by: NURSE PRACTITIONER

## 2023-01-01 PROCEDURE — 99204 PR OFFICE/OUTPT VISIT, NEW, LEVL IV, 45-59 MIN: ICD-10-PCS | Mod: S$GLB,,, | Performed by: NURSE PRACTITIONER

## 2023-01-01 PROCEDURE — 27000249 HC VAPOTHERM CIRCUIT

## 2023-01-01 PROCEDURE — 85025 COMPLETE CBC W/AUTO DIFF WBC: CPT | Performed by: EMERGENCY MEDICINE

## 2023-01-01 PROCEDURE — 85730 THROMBOPLASTIN TIME PARTIAL: CPT | Performed by: INTERNAL MEDICINE

## 2023-01-01 PROCEDURE — 1159F MED LIST DOCD IN RCRD: CPT | Mod: CPTII,S$GLB,, | Performed by: NURSE PRACTITIONER

## 2023-01-01 PROCEDURE — 1158F ADVNC CARE PLAN TLK DOCD: CPT | Mod: CPTII,,, | Performed by: STUDENT IN AN ORGANIZED HEALTH CARE EDUCATION/TRAINING PROGRAM

## 2023-01-01 PROCEDURE — 84300 ASSAY OF URINE SODIUM: CPT | Performed by: STUDENT IN AN ORGANIZED HEALTH CARE EDUCATION/TRAINING PROGRAM

## 2023-01-01 PROCEDURE — 1160F PR REVIEW ALL MEDS BY PRESCRIBER/CLIN PHARMACIST DOCUMENTED: ICD-10-PCS | Mod: CPTII,S$GLB,, | Performed by: NURSE PRACTITIONER

## 2023-01-01 PROCEDURE — 84145 PROCALCITONIN (PCT): CPT | Performed by: INTERNAL MEDICINE

## 2023-01-01 PROCEDURE — 84540 ASSAY OF URINE/UREA-N: CPT | Performed by: EMERGENCY MEDICINE

## 2023-01-01 PROCEDURE — 1160F RVW MEDS BY RX/DR IN RCRD: CPT | Mod: CPTII,S$GLB,, | Performed by: NURSE PRACTITIONER

## 2023-01-01 PROCEDURE — 92611 MOTION FLUOROSCOPY/SWALLOW: CPT

## 2023-01-01 PROCEDURE — 87811 SARS CORONAVIRUS 2 ANTIGEN POCT, MANUAL READ: ICD-10-PCS | Mod: QW,S$GLB,, | Performed by: NURSE PRACTITIONER

## 2023-01-01 PROCEDURE — 87040 BLOOD CULTURE FOR BACTERIA: CPT | Performed by: STUDENT IN AN ORGANIZED HEALTH CARE EDUCATION/TRAINING PROGRAM

## 2023-01-01 PROCEDURE — 1152F DOC ADVNCD DIS COMFORT 1ST: CPT | Mod: CPTII,,, | Performed by: STUDENT IN AN ORGANIZED HEALTH CARE EDUCATION/TRAINING PROGRAM

## 2023-01-01 PROCEDURE — 1158F PR ADVANCE CARE PLANNING DISCUSS DOCUMENTED IN MEDICAL RECORD: ICD-10-PCS | Mod: CPTII,,, | Performed by: STUDENT IN AN ORGANIZED HEALTH CARE EDUCATION/TRAINING PROGRAM

## 2023-01-01 PROCEDURE — 99223 PR INITIAL HOSPITAL CARE,LEVL III: ICD-10-PCS | Mod: ,,, | Performed by: STUDENT IN AN ORGANIZED HEALTH CARE EDUCATION/TRAINING PROGRAM

## 2023-01-01 PROCEDURE — 87502 INFLUENZA DNA AMP PROBE: CPT | Mod: QW,S$GLB,, | Performed by: NURSE PRACTITIONER

## 2023-01-01 PROCEDURE — 97165 OT EVAL LOW COMPLEX 30 MIN: CPT

## 2023-01-01 PROCEDURE — 84100 ASSAY OF PHOSPHORUS: CPT | Performed by: INTERNAL MEDICINE

## 2023-01-01 PROCEDURE — 85384 FIBRINOGEN ACTIVITY: CPT | Performed by: INTERNAL MEDICINE

## 2023-01-01 PROCEDURE — 97530 THERAPEUTIC ACTIVITIES: CPT | Mod: CO

## 2023-01-01 PROCEDURE — 20000000 HC ICU ROOM

## 2023-01-01 PROCEDURE — 27000190 HC CPAP FULL FACE MASK W/VALVE

## 2023-01-01 PROCEDURE — 63600175 PHARM REV CODE 636 W HCPCS: Performed by: EMERGENCY MEDICINE

## 2023-01-01 PROCEDURE — 84484 ASSAY OF TROPONIN QUANT: CPT | Performed by: INTERNAL MEDICINE

## 2023-01-01 PROCEDURE — 97162 PT EVAL MOD COMPLEX 30 MIN: CPT

## 2023-01-01 PROCEDURE — 87811 SARS-COV-2 COVID19 W/OPTIC: CPT | Mod: QW,S$GLB,, | Performed by: NURSE PRACTITIONER

## 2023-01-01 PROCEDURE — 1126F PR PAIN SEVERITY QUANTIFIED, NO PAIN PRESENT: ICD-10-PCS | Mod: CPTII,S$GLB,, | Performed by: NURSE PRACTITIONER

## 2023-01-01 PROCEDURE — 25500020 PHARM REV CODE 255: Performed by: INTERNAL MEDICINE

## 2023-01-01 PROCEDURE — 83735 ASSAY OF MAGNESIUM: CPT | Performed by: EMERGENCY MEDICINE

## 2023-01-01 PROCEDURE — 12000002 HC ACUTE/MED SURGE SEMI-PRIVATE ROOM

## 2023-01-01 RX ORDER — LISINOPRIL 20 MG/1
40 TABLET ORAL DAILY
Status: DISCONTINUED | OUTPATIENT
Start: 2023-01-01 | End: 2023-01-01

## 2023-01-01 RX ORDER — IBUPROFEN 200 MG
16 TABLET ORAL
Status: DISCONTINUED | OUTPATIENT
Start: 2023-01-01 | End: 2023-01-01

## 2023-01-01 RX ORDER — SODIUM CHLORIDE 9 MG/ML
INJECTION, SOLUTION INTRAVENOUS CONTINUOUS
Status: DISCONTINUED | OUTPATIENT
Start: 2023-01-01 | End: 2023-01-01

## 2023-01-01 RX ORDER — SODIUM BICARBONATE 650 MG/1
650 TABLET ORAL ONCE
Status: DISCONTINUED | OUTPATIENT
Start: 2023-01-01 | End: 2023-01-01

## 2023-01-01 RX ORDER — LISINOPRIL 10 MG/1
40 TABLET ORAL
Status: COMPLETED | OUTPATIENT
Start: 2023-01-01 | End: 2023-01-01

## 2023-01-01 RX ORDER — ASPIRIN 81 MG/1
81 TABLET ORAL DAILY
Status: DISCONTINUED | OUTPATIENT
Start: 2023-01-01 | End: 2023-01-01

## 2023-01-01 RX ORDER — DIPHENHYDRAMINE CITRATE AND IBUPROFEN 38; 200 MG/1; MG/1
1 TABLET, COATED ORAL NIGHTLY PRN
Status: ON HOLD | COMMUNITY
End: 2023-01-01

## 2023-01-01 RX ORDER — BALSAM PERU/CASTOR OIL
OINTMENT (GRAM) TOPICAL 2 TIMES DAILY
Status: DISCONTINUED | OUTPATIENT
Start: 2023-01-01 | End: 2023-01-01 | Stop reason: HOSPADM

## 2023-01-01 RX ORDER — CETIRIZINE HYDROCHLORIDE 10 MG/1
10 TABLET ORAL DAILY
Qty: 30 TABLET | Refills: 0 | Status: ON HOLD | OUTPATIENT
Start: 2023-01-01 | End: 2023-01-01

## 2023-01-01 RX ORDER — ACETAMINOPHEN 325 MG/1
650 TABLET ORAL EVERY 6 HOURS PRN
Status: DISCONTINUED | OUTPATIENT
Start: 2023-01-01 | End: 2023-01-01

## 2023-01-01 RX ORDER — POTASSIUM CHLORIDE 20 MEQ/1
40 TABLET, EXTENDED RELEASE ORAL EVERY 6 HOURS SCHEDULED
Status: COMPLETED | OUTPATIENT
Start: 2023-01-01 | End: 2023-01-01

## 2023-01-01 RX ORDER — DILTIAZEM HYDROCHLORIDE 120 MG/1
240 CAPSULE, COATED, EXTENDED RELEASE ORAL
Status: COMPLETED | OUTPATIENT
Start: 2023-01-01 | End: 2023-01-01

## 2023-01-01 RX ORDER — SENNOSIDES 8.6 MG/1
8.6 TABLET ORAL 2 TIMES DAILY
Status: DISCONTINUED | OUTPATIENT
Start: 2023-01-01 | End: 2023-01-01

## 2023-01-01 RX ORDER — SODIUM CHLORIDE 0.9 % (FLUSH) 0.9 %
10 SYRINGE (ML) INJECTION EVERY 12 HOURS PRN
Status: DISCONTINUED | OUTPATIENT
Start: 2023-01-01 | End: 2023-01-01 | Stop reason: HOSPADM

## 2023-01-01 RX ORDER — FUROSEMIDE 10 MG/ML
40 INJECTION INTRAMUSCULAR; INTRAVENOUS ONCE
Status: COMPLETED | OUTPATIENT
Start: 2023-01-01 | End: 2023-01-01

## 2023-01-01 RX ORDER — SODIUM CHLORIDE 9 MG/ML
INJECTION, SOLUTION INTRAVENOUS
Status: DISCONTINUED | OUTPATIENT
Start: 2023-01-01 | End: 2023-01-01

## 2023-01-01 RX ORDER — SCOLOPAMINE TRANSDERMAL SYSTEM 1 MG/1
1 PATCH, EXTENDED RELEASE TRANSDERMAL
Status: DISCONTINUED | OUTPATIENT
Start: 2023-01-01 | End: 2023-01-01 | Stop reason: HOSPADM

## 2023-01-01 RX ORDER — SODIUM CHLORIDE, SODIUM LACTATE, POTASSIUM CHLORIDE, CALCIUM CHLORIDE 600; 310; 30; 20 MG/100ML; MG/100ML; MG/100ML; MG/100ML
INJECTION, SOLUTION INTRAVENOUS CONTINUOUS
Status: DISCONTINUED | OUTPATIENT
Start: 2023-01-01 | End: 2023-01-01

## 2023-01-01 RX ORDER — ATROPINE SULFATE 10 MG/ML
1 SOLUTION/ DROPS OPHTHALMIC EVERY 4 HOURS PRN
Status: DISCONTINUED | OUTPATIENT
Start: 2023-01-01 | End: 2023-01-01 | Stop reason: HOSPADM

## 2023-01-01 RX ORDER — ONDANSETRON 2 MG/ML
4 INJECTION INTRAMUSCULAR; INTRAVENOUS
Status: COMPLETED | OUTPATIENT
Start: 2023-01-01 | End: 2023-01-01

## 2023-01-01 RX ORDER — LORAZEPAM 2 MG/ML
2 INJECTION INTRAMUSCULAR ONCE
Status: COMPLETED | OUTPATIENT
Start: 2023-01-01 | End: 2023-01-01

## 2023-01-01 RX ORDER — MORPHINE SULFATE 4 MG/ML
4 INJECTION, SOLUTION INTRAMUSCULAR; INTRAVENOUS
Status: DISCONTINUED | OUTPATIENT
Start: 2023-01-01 | End: 2023-01-01 | Stop reason: HOSPADM

## 2023-01-01 RX ORDER — PRAVASTATIN SODIUM 40 MG/1
80 TABLET ORAL DAILY
Status: DISCONTINUED | OUTPATIENT
Start: 2023-01-01 | End: 2023-01-01

## 2023-01-01 RX ORDER — MAGNESIUM SULFATE HEPTAHYDRATE 40 MG/ML
2 INJECTION, SOLUTION INTRAVENOUS ONCE
Status: COMPLETED | OUTPATIENT
Start: 2023-01-01 | End: 2023-01-01

## 2023-01-01 RX ORDER — POLYETHYLENE GLYCOL 3350 17 G/17G
17 POWDER, FOR SOLUTION ORAL DAILY
Status: DISCONTINUED | OUTPATIENT
Start: 2023-01-01 | End: 2023-01-01

## 2023-01-01 RX ORDER — ONDANSETRON 2 MG/ML
8 INJECTION INTRAMUSCULAR; INTRAVENOUS EVERY 8 HOURS PRN
Status: DISCONTINUED | OUTPATIENT
Start: 2023-01-01 | End: 2023-01-01 | Stop reason: HOSPADM

## 2023-01-01 RX ORDER — METOCLOPRAMIDE HYDROCHLORIDE 5 MG/ML
5 INJECTION INTRAMUSCULAR; INTRAVENOUS EVERY 6 HOURS PRN
Status: DISCONTINUED | OUTPATIENT
Start: 2023-01-01 | End: 2023-01-01 | Stop reason: HOSPADM

## 2023-01-01 RX ORDER — ENOXAPARIN SODIUM 100 MG/ML
30 INJECTION SUBCUTANEOUS EVERY 24 HOURS
Status: DISCONTINUED | OUTPATIENT
Start: 2023-01-01 | End: 2023-01-01

## 2023-01-01 RX ORDER — BENZONATATE 100 MG/1
100 CAPSULE ORAL 3 TIMES DAILY PRN
Qty: 30 CAPSULE | Refills: 0 | Status: ON HOLD | OUTPATIENT
Start: 2023-01-01 | End: 2023-01-01

## 2023-01-01 RX ORDER — BENZONATATE 100 MG/1
100 CAPSULE ORAL 3 TIMES DAILY PRN
Status: DISCONTINUED | OUTPATIENT
Start: 2023-01-01 | End: 2023-01-01

## 2023-01-01 RX ORDER — CHLORPHENIR/PHENYLEPH/ASPIRIN 2-7.8-325
1 TABLET, EFFERVESCENT ORAL DAILY
Status: ON HOLD | COMMUNITY
End: 2023-01-01

## 2023-01-01 RX ORDER — PANTOPRAZOLE SODIUM 40 MG/1
40 TABLET, DELAYED RELEASE ORAL DAILY
Status: DISCONTINUED | OUTPATIENT
Start: 2023-01-01 | End: 2023-01-01

## 2023-01-01 RX ORDER — NAPROXEN SODIUM 220 MG/1
81 TABLET, FILM COATED ORAL DAILY
Status: DISCONTINUED | OUTPATIENT
Start: 2023-01-01 | End: 2023-01-01

## 2023-01-01 RX ORDER — FUROSEMIDE 20 MG/1
20 TABLET ORAL
Status: COMPLETED | OUTPATIENT
Start: 2023-01-01 | End: 2023-01-01

## 2023-01-01 RX ORDER — LORAZEPAM 0.5 MG/1
2 TABLET ORAL
Status: DISCONTINUED | OUTPATIENT
Start: 2023-01-01 | End: 2023-01-01

## 2023-01-01 RX ORDER — DILTIAZEM HYDROCHLORIDE 120 MG/1
240 CAPSULE, COATED, EXTENDED RELEASE ORAL DAILY
Status: DISCONTINUED | OUTPATIENT
Start: 2023-01-01 | End: 2023-01-01

## 2023-01-01 RX ORDER — POTASSIUM CHLORIDE 7.45 MG/ML
10 INJECTION INTRAVENOUS
Status: COMPLETED | OUTPATIENT
Start: 2023-01-01 | End: 2023-01-01

## 2023-01-01 RX ORDER — PANTOPRAZOLE SODIUM 40 MG/10ML
40 INJECTION, POWDER, LYOPHILIZED, FOR SOLUTION INTRAVENOUS DAILY
Status: DISCONTINUED | OUTPATIENT
Start: 2023-01-01 | End: 2023-01-01

## 2023-01-01 RX ORDER — POTASSIUM CHLORIDE 20 MEQ/1
40 TABLET, EXTENDED RELEASE ORAL ONCE
Status: COMPLETED | OUTPATIENT
Start: 2023-01-01 | End: 2023-01-01

## 2023-01-01 RX ORDER — IPRATROPIUM BROMIDE AND ALBUTEROL SULFATE 2.5; .5 MG/3ML; MG/3ML
3 SOLUTION RESPIRATORY (INHALATION)
Status: DISCONTINUED | OUTPATIENT
Start: 2023-01-01 | End: 2023-01-01

## 2023-01-01 RX ORDER — HYDROCODONE BITARTRATE AND ACETAMINOPHEN 5; 325 MG/1; MG/1
1 TABLET ORAL ONCE AS NEEDED
Status: DISCONTINUED | OUTPATIENT
Start: 2023-01-01 | End: 2023-01-01

## 2023-01-01 RX ORDER — TALC
6 POWDER (GRAM) TOPICAL NIGHTLY PRN
Status: DISCONTINUED | OUTPATIENT
Start: 2023-01-01 | End: 2023-01-01

## 2023-01-01 RX ORDER — GLUCAGON 1 MG
1 KIT INJECTION
Status: DISCONTINUED | OUTPATIENT
Start: 2023-01-01 | End: 2023-01-01

## 2023-01-01 RX ORDER — HYDROCODONE BITARTRATE AND ACETAMINOPHEN 5; 325 MG/1; MG/1
1 TABLET ORAL ONCE
Status: COMPLETED | OUTPATIENT
Start: 2023-01-01 | End: 2023-01-01

## 2023-01-01 RX ORDER — MORPHINE SULFATE 4 MG/ML
4 INJECTION, SOLUTION INTRAMUSCULAR; INTRAVENOUS ONCE
Status: COMPLETED | OUTPATIENT
Start: 2023-01-01 | End: 2023-01-01

## 2023-01-01 RX ORDER — LIDOCAINE 50 MG/G
1 PATCH TOPICAL
Status: DISCONTINUED | OUTPATIENT
Start: 2023-01-01 | End: 2023-01-01 | Stop reason: HOSPADM

## 2023-01-01 RX ORDER — NIFEDIPINE 30 MG/1
30 TABLET, EXTENDED RELEASE ORAL DAILY
Status: DISCONTINUED | OUTPATIENT
Start: 2023-01-01 | End: 2023-01-01

## 2023-01-01 RX ORDER — OLANZAPINE 10 MG/2ML
5 INJECTION, POWDER, FOR SOLUTION INTRAMUSCULAR ONCE AS NEEDED
Status: DISCONTINUED | OUTPATIENT
Start: 2023-01-01 | End: 2023-01-01 | Stop reason: HOSPADM

## 2023-01-01 RX ORDER — FUROSEMIDE 10 MG/ML
20 INJECTION INTRAMUSCULAR; INTRAVENOUS ONCE
Status: DISCONTINUED | OUTPATIENT
Start: 2023-01-01 | End: 2023-01-01

## 2023-01-01 RX ORDER — NALOXONE HCL 0.4 MG/ML
0.02 VIAL (ML) INJECTION
Status: DISCONTINUED | OUTPATIENT
Start: 2023-01-01 | End: 2023-01-01

## 2023-01-01 RX ORDER — POTASSIUM CHLORIDE 7.45 MG/ML
20 INJECTION INTRAVENOUS
Status: DISCONTINUED | OUTPATIENT
Start: 2023-01-01 | End: 2023-01-01

## 2023-01-01 RX ORDER — KETOROLAC TROMETHAMINE 10 MG/1
10 TABLET, FILM COATED ORAL ONCE
Status: COMPLETED | OUTPATIENT
Start: 2023-01-01 | End: 2023-01-01

## 2023-01-01 RX ORDER — IBUPROFEN 200 MG
24 TABLET ORAL
Status: DISCONTINUED | OUTPATIENT
Start: 2023-01-01 | End: 2023-01-01

## 2023-01-01 RX ORDER — HEPARIN SODIUM 5000 [USP'U]/ML
5000 INJECTION, SOLUTION INTRAVENOUS; SUBCUTANEOUS EVERY 12 HOURS
Status: DISCONTINUED | OUTPATIENT
Start: 2023-01-01 | End: 2023-01-01

## 2023-01-01 RX ORDER — SODIUM BICARBONATE 650 MG/1
650 TABLET ORAL 3 TIMES DAILY
Status: DISCONTINUED | OUTPATIENT
Start: 2023-01-01 | End: 2023-01-01

## 2023-01-01 RX ORDER — MUPIROCIN 20 MG/G
OINTMENT TOPICAL 2 TIMES DAILY
Status: DISCONTINUED | OUTPATIENT
Start: 2023-01-01 | End: 2023-01-01

## 2023-01-01 RX ORDER — LORAZEPAM 2 MG/ML
2 INJECTION INTRAMUSCULAR
Status: DISCONTINUED | OUTPATIENT
Start: 2023-01-01 | End: 2023-01-01 | Stop reason: HOSPADM

## 2023-01-01 RX ORDER — POTASSIUM CHLORIDE 20 MEQ/1
40 TABLET, EXTENDED RELEASE ORAL 3 TIMES DAILY
Status: DISPENSED | OUTPATIENT
Start: 2023-01-01 | End: 2023-01-01

## 2023-01-01 RX ORDER — FLUTICASONE PROPIONATE 50 MCG
2 SPRAY, SUSPENSION (ML) NASAL DAILY
Qty: 18.2 ML | Refills: 0 | Status: ON HOLD | OUTPATIENT
Start: 2023-01-01 | End: 2023-01-01

## 2023-01-01 RX ADMIN — DILTIAZEM HYDROCHLORIDE 240 MG: 120 CAPSULE, COATED, EXTENDED RELEASE ORAL at 08:01

## 2023-01-01 RX ADMIN — LISINOPRIL 40 MG: 20 TABLET ORAL at 08:02

## 2023-01-01 RX ADMIN — LIDOCAINE 1 PATCH: 50 PATCH CUTANEOUS at 10:01

## 2023-01-01 RX ADMIN — CEFEPIME 2 G: 2 INJECTION, POWDER, FOR SOLUTION INTRAVENOUS at 12:02

## 2023-01-01 RX ADMIN — POTASSIUM BICARBONATE 50 MEQ: 978 TABLET, EFFERVESCENT ORAL at 11:01

## 2023-01-01 RX ADMIN — SODIUM BICARBONATE 650 MG: 650 TABLET ORAL at 03:01

## 2023-01-01 RX ADMIN — CEFEPIME 2 G: 2 INJECTION, POWDER, FOR SOLUTION INTRAVENOUS at 01:01

## 2023-01-01 RX ADMIN — Medication: at 03:01

## 2023-01-01 RX ADMIN — NIFEDIPINE 30 MG: 30 TABLET, FILM COATED, EXTENDED RELEASE ORAL at 08:01

## 2023-01-01 RX ADMIN — ASPIRIN 81 MG CHEWABLE TABLET 81 MG: 81 TABLET CHEWABLE at 08:01

## 2023-01-01 RX ADMIN — DILTIAZEM HYDROCHLORIDE 240 MG: 120 CAPSULE, COATED, EXTENDED RELEASE ORAL at 08:02

## 2023-01-01 RX ADMIN — IPRATROPIUM BROMIDE AND ALBUTEROL SULFATE 3 ML: 2.5; .5 SOLUTION RESPIRATORY (INHALATION) at 07:02

## 2023-01-01 RX ADMIN — ENOXAPARIN SODIUM 30 MG: 40 INJECTION SUBCUTANEOUS at 04:01

## 2023-01-01 RX ADMIN — LISINOPRIL 40 MG: 20 TABLET ORAL at 08:01

## 2023-01-01 RX ADMIN — Medication 1 G: at 10:01

## 2023-01-01 RX ADMIN — BENZONATATE 100 MG: 100 CAPSULE ORAL at 09:01

## 2023-01-01 RX ADMIN — MAGNESIUM SULFATE 2 G: 2 INJECTION INTRAVENOUS at 10:01

## 2023-01-01 RX ADMIN — SODIUM CHLORIDE, SODIUM LACTATE, POTASSIUM CHLORIDE, AND CALCIUM CHLORIDE 500 ML: .6; .31; .03; .02 INJECTION, SOLUTION INTRAVENOUS at 06:02

## 2023-01-01 RX ADMIN — ENOXAPARIN SODIUM 30 MG: 40 INJECTION SUBCUTANEOUS at 05:01

## 2023-01-01 RX ADMIN — POTASSIUM CHLORIDE 40 MEQ: 1500 TABLET, EXTENDED RELEASE ORAL at 02:01

## 2023-01-01 RX ADMIN — ASPIRIN 81 MG: 81 TABLET, COATED ORAL at 08:01

## 2023-01-01 RX ADMIN — PANTOPRAZOLE SODIUM 40 MG: 40 INJECTION, POWDER, LYOPHILIZED, FOR SOLUTION INTRAVENOUS at 08:01

## 2023-01-01 RX ADMIN — POTASSIUM CHLORIDE 10 MEQ: 7.46 INJECTION, SOLUTION INTRAVENOUS at 02:01

## 2023-01-01 RX ADMIN — Medication 6 MG: at 10:01

## 2023-01-01 RX ADMIN — ENOXAPARIN SODIUM 30 MG: 40 INJECTION SUBCUTANEOUS at 04:02

## 2023-01-01 RX ADMIN — STANDARDIZED SENNA CONCENTRATE 8.6 MG: 8.6 TABLET ORAL at 08:02

## 2023-01-01 RX ADMIN — ASPIRIN 81 MG: 81 TABLET, COATED ORAL at 10:01

## 2023-01-01 RX ADMIN — LORAZEPAM 2 MG: 2 INJECTION INTRAMUSCULAR; INTRAVENOUS at 02:02

## 2023-01-01 RX ADMIN — MORPHINE SULFATE 4 MG/HR: 10 INJECTION, SOLUTION INTRAMUSCULAR; INTRAVENOUS at 02:02

## 2023-01-01 RX ADMIN — Medication 15 G: at 08:01

## 2023-01-01 RX ADMIN — MORPHINE SULFATE 4 MG: 4 INJECTION INTRAVENOUS at 02:02

## 2023-01-01 RX ADMIN — ASPIRIN 81 MG CHEWABLE TABLET 81 MG: 81 TABLET CHEWABLE at 08:02

## 2023-01-01 RX ADMIN — Medication 6 MG: at 09:01

## 2023-01-01 RX ADMIN — BENZONATATE 100 MG: 100 CAPSULE ORAL at 10:01

## 2023-01-01 RX ADMIN — ACETAMINOPHEN 650 MG: 325 TABLET ORAL at 02:01

## 2023-01-01 RX ADMIN — PRAVASTATIN SODIUM 80 MG: 40 TABLET ORAL at 08:01

## 2023-01-01 RX ADMIN — DILTIAZEM HYDROCHLORIDE 240 MG: 120 CAPSULE, COATED, EXTENDED RELEASE ORAL at 02:01

## 2023-01-01 RX ADMIN — LISINOPRIL 40 MG: 20 TABLET ORAL at 09:01

## 2023-01-01 RX ADMIN — IPRATROPIUM BROMIDE AND ALBUTEROL SULFATE 3 ML: 2.5; .5 SOLUTION RESPIRATORY (INHALATION) at 02:01

## 2023-01-01 RX ADMIN — LIDOCAINE 1 PATCH: 50 PATCH CUTANEOUS at 04:01

## 2023-01-01 RX ADMIN — LIDOCAINE 1 PATCH: 50 PATCH CUTANEOUS at 01:01

## 2023-01-01 RX ADMIN — BENZONATATE 100 MG: 100 CAPSULE ORAL at 06:01

## 2023-01-01 RX ADMIN — POTASSIUM CHLORIDE 10 MEQ: 7.46 INJECTION, SOLUTION INTRAVENOUS at 12:01

## 2023-01-01 RX ADMIN — ASPIRIN 81 MG CHEWABLE TABLET 81 MG: 81 TABLET CHEWABLE at 09:01

## 2023-01-01 RX ADMIN — PRAVASTATIN SODIUM 80 MG: 40 TABLET ORAL at 09:01

## 2023-01-01 RX ADMIN — LIDOCAINE 1 PATCH: 50 PATCH CUTANEOUS at 11:01

## 2023-01-01 RX ADMIN — DILTIAZEM HYDROCHLORIDE 240 MG: 120 CAPSULE, COATED, EXTENDED RELEASE ORAL at 11:01

## 2023-01-01 RX ADMIN — BENZONATATE 100 MG: 100 CAPSULE ORAL at 12:01

## 2023-01-01 RX ADMIN — FUROSEMIDE 20 MG: 20 TABLET ORAL at 02:01

## 2023-01-01 RX ADMIN — BARIUM SULFATE 30 ML: 0.81 POWDER, FOR SUSPENSION ORAL at 02:01

## 2023-01-01 RX ADMIN — KETOROLAC TROMETHAMINE 10 MG: 10 TABLET, FILM COATED ORAL at 10:01

## 2023-01-01 RX ADMIN — STANDARDIZED SENNA CONCENTRATE 8.6 MG: 8.6 TABLET ORAL at 09:01

## 2023-01-01 RX ADMIN — POLYETHYLENE GLYCOL 3350 17 G: 17 POWDER, FOR SOLUTION ORAL at 08:01

## 2023-01-01 RX ADMIN — PANTOPRAZOLE SODIUM 40 MG: 40 TABLET, DELAYED RELEASE ORAL at 09:01

## 2023-01-01 RX ADMIN — LISINOPRIL 40 MG: 20 TABLET ORAL at 10:01

## 2023-01-01 RX ADMIN — MORPHINE SULFATE 4 MG: 4 INJECTION, SOLUTION INTRAMUSCULAR; INTRAVENOUS at 02:02

## 2023-01-01 RX ADMIN — IPRATROPIUM BROMIDE AND ALBUTEROL SULFATE 3 ML: 2.5; .5 SOLUTION RESPIRATORY (INHALATION) at 07:01

## 2023-01-01 RX ADMIN — IPRATROPIUM BROMIDE AND ALBUTEROL SULFATE 3 ML: 2.5; .5 SOLUTION RESPIRATORY (INHALATION) at 01:02

## 2023-01-01 RX ADMIN — IPRATROPIUM BROMIDE AND ALBUTEROL SULFATE 3 ML: 2.5; .5 SOLUTION RESPIRATORY (INHALATION) at 08:01

## 2023-01-01 RX ADMIN — CEFEPIME 2 G: 2 INJECTION, POWDER, FOR SOLUTION INTRAVENOUS at 11:02

## 2023-01-01 RX ADMIN — POTASSIUM CHLORIDE 10 MEQ: 7.46 INJECTION, SOLUTION INTRAVENOUS at 09:01

## 2023-01-01 RX ADMIN — SODIUM BICARBONATE 650 MG: 650 TABLET ORAL at 10:01

## 2023-01-01 RX ADMIN — SODIUM BICARBONATE 650 MG: 650 TABLET ORAL at 08:01

## 2023-01-01 RX ADMIN — ACETAMINOPHEN 650 MG: 325 TABLET ORAL at 09:01

## 2023-01-01 RX ADMIN — CEFEPIME 2 G: 2 INJECTION, POWDER, FOR SOLUTION INTRAVENOUS at 12:01

## 2023-01-01 RX ADMIN — ENOXAPARIN SODIUM 30 MG: 40 INJECTION SUBCUTANEOUS at 06:01

## 2023-01-01 RX ADMIN — SCOPALAMINE 1 PATCH: 1 PATCH, EXTENDED RELEASE TRANSDERMAL at 02:01

## 2023-01-01 RX ADMIN — POTASSIUM CHLORIDE 40 MEQ: 1500 TABLET, EXTENDED RELEASE ORAL at 02:02

## 2023-01-01 RX ADMIN — SODIUM BICARBONATE 650 MG: 650 TABLET ORAL at 02:01

## 2023-01-01 RX ADMIN — POLYETHYLENE GLYCOL 3350 17 G: 17 POWDER, FOR SOLUTION ORAL at 08:02

## 2023-01-01 RX ADMIN — LIDOCAINE 1 PATCH: 50 PATCH CUTANEOUS at 11:02

## 2023-01-01 RX ADMIN — SODIUM CHLORIDE, SODIUM LACTATE, POTASSIUM CHLORIDE, AND CALCIUM CHLORIDE: .6; .31; .03; .02 INJECTION, SOLUTION INTRAVENOUS at 06:01

## 2023-01-01 RX ADMIN — PRAVASTATIN SODIUM 80 MG: 40 TABLET ORAL at 08:02

## 2023-01-01 RX ADMIN — ACETAMINOPHEN 650 MG: 325 TABLET ORAL at 10:01

## 2023-01-01 RX ADMIN — Medication 15 G: at 10:01

## 2023-01-01 RX ADMIN — FUROSEMIDE 40 MG: 10 INJECTION, SOLUTION INTRAMUSCULAR; INTRAVENOUS at 10:01

## 2023-01-01 RX ADMIN — Medication: at 03:02

## 2023-01-01 RX ADMIN — NIFEDIPINE 30 MG: 30 TABLET, FILM COATED, EXTENDED RELEASE ORAL at 09:01

## 2023-01-01 RX ADMIN — SODIUM CHLORIDE: 9 INJECTION, SOLUTION INTRAVENOUS at 01:01

## 2023-01-01 RX ADMIN — Medication: at 09:01

## 2023-01-01 RX ADMIN — LIDOCAINE 1 PATCH: 50 PATCH CUTANEOUS at 04:02

## 2023-01-01 RX ADMIN — PANTOPRAZOLE SODIUM 40 MG: 40 TABLET, DELAYED RELEASE ORAL at 10:01

## 2023-01-01 RX ADMIN — NIFEDIPINE 30 MG: 30 TABLET, FILM COATED, EXTENDED RELEASE ORAL at 11:01

## 2023-01-01 RX ADMIN — PANTOPRAZOLE SODIUM 40 MG: 40 INJECTION, POWDER, LYOPHILIZED, FOR SOLUTION INTRAVENOUS at 08:02

## 2023-01-01 RX ADMIN — STANDARDIZED SENNA CONCENTRATE 8.6 MG: 8.6 TABLET ORAL at 08:01

## 2023-01-01 RX ADMIN — IPRATROPIUM BROMIDE AND ALBUTEROL SULFATE 3 ML: 2.5; .5 SOLUTION RESPIRATORY (INHALATION) at 10:02

## 2023-01-01 RX ADMIN — Medication 15 G: at 11:01

## 2023-01-01 RX ADMIN — Medication 6 MG: at 12:01

## 2023-01-01 RX ADMIN — FUROSEMIDE 40 MG: 10 INJECTION, SOLUTION INTRAMUSCULAR; INTRAVENOUS at 08:01

## 2023-01-01 RX ADMIN — FUROSEMIDE 40 MG: 10 INJECTION, SOLUTION INTRAMUSCULAR; INTRAVENOUS at 02:02

## 2023-01-01 RX ADMIN — ONDANSETRON HYDROCHLORIDE 4 MG: 2 SOLUTION INTRAMUSCULAR; INTRAVENOUS at 12:01

## 2023-01-01 RX ADMIN — IPRATROPIUM BROMIDE AND ALBUTEROL SULFATE 3 ML: 2.5; .5 SOLUTION RESPIRATORY (INHALATION) at 09:01

## 2023-01-01 RX ADMIN — FUROSEMIDE 40 MG: 10 INJECTION, SOLUTION INTRAMUSCULAR; INTRAVENOUS at 04:01

## 2023-01-01 RX ADMIN — Medication 2 G: at 08:01

## 2023-01-01 RX ADMIN — PRAVASTATIN SODIUM 80 MG: 40 TABLET ORAL at 10:01

## 2023-01-01 RX ADMIN — DILTIAZEM HYDROCHLORIDE 240 MG: 120 CAPSULE, COATED, EXTENDED RELEASE ORAL at 10:01

## 2023-01-01 RX ADMIN — ACETAMINOPHEN 650 MG: 325 TABLET ORAL at 04:01

## 2023-01-01 RX ADMIN — POTASSIUM CHLORIDE 40 MEQ: 1500 TABLET, EXTENDED RELEASE ORAL at 05:01

## 2023-01-01 RX ADMIN — SODIUM CHLORIDE, SODIUM LACTATE, POTASSIUM CHLORIDE, AND CALCIUM CHLORIDE 1000 ML: .6; .31; .03; .02 INJECTION, SOLUTION INTRAVENOUS at 12:01

## 2023-01-01 RX ADMIN — BENZONATATE 100 MG: 100 CAPSULE ORAL at 04:01

## 2023-01-01 RX ADMIN — DILTIAZEM HYDROCHLORIDE 240 MG: 120 CAPSULE, COATED, EXTENDED RELEASE ORAL at 09:01

## 2023-01-01 RX ADMIN — SODIUM CHLORIDE 250 ML: 9 INJECTION, SOLUTION INTRAVENOUS at 02:01

## 2023-01-01 RX ADMIN — IPRATROPIUM BROMIDE AND ALBUTEROL SULFATE 3 ML: 2.5; .5 SOLUTION RESPIRATORY (INHALATION) at 01:01

## 2023-01-01 RX ADMIN — ASPIRIN 81 MG: 81 TABLET, COATED ORAL at 09:01

## 2023-01-01 RX ADMIN — PANTOPRAZOLE SODIUM 40 MG: 40 TABLET, DELAYED RELEASE ORAL at 08:01

## 2023-01-01 RX ADMIN — SODIUM CHLORIDE: 0.9 INJECTION, SOLUTION INTRAVENOUS at 07:01

## 2023-01-01 RX ADMIN — POTASSIUM CHLORIDE 10 MEQ: 7.46 INJECTION, SOLUTION INTRAVENOUS at 01:01

## 2023-01-01 RX ADMIN — POTASSIUM CHLORIDE 40 MEQ: 1500 TABLET, EXTENDED RELEASE ORAL at 11:01

## 2023-01-01 RX ADMIN — NIFEDIPINE 30 MG: 30 TABLET, FILM COATED, EXTENDED RELEASE ORAL at 08:02

## 2023-01-01 RX ADMIN — Medication: at 04:01

## 2023-01-01 RX ADMIN — Medication: at 10:02

## 2023-01-01 RX ADMIN — IPRATROPIUM BROMIDE AND ALBUTEROL SULFATE 3 ML: 2.5; .5 SOLUTION RESPIRATORY (INHALATION) at 04:01

## 2023-01-01 RX ADMIN — HYDROCODONE BITARTRATE AND ACETAMINOPHEN 1 TABLET: 5; 325 TABLET ORAL at 07:01

## 2023-01-01 RX ADMIN — POTASSIUM CHLORIDE 10 MEQ: 7.46 INJECTION, SOLUTION INTRAVENOUS at 04:01

## 2023-01-01 RX ADMIN — POTASSIUM CHLORIDE 40 MEQ: 1500 TABLET, EXTENDED RELEASE ORAL at 08:02

## 2023-01-01 RX ADMIN — PANTOPRAZOLE SODIUM 40 MG: 40 INJECTION, POWDER, LYOPHILIZED, FOR SOLUTION INTRAVENOUS at 09:01

## 2023-01-01 RX ADMIN — SODIUM CHLORIDE 500 ML: 0.9 INJECTION, SOLUTION INTRAVENOUS at 03:01

## 2023-01-01 RX ADMIN — SCOPALAMINE 1 PATCH: 1 PATCH, EXTENDED RELEASE TRANSDERMAL at 01:01

## 2023-01-01 RX ADMIN — LISINOPRIL 40 MG: 10 TABLET ORAL at 02:01

## 2023-01-18 NOTE — PROGRESS NOTES
Subjective:       Patient ID: Willow Giron is a 89 y.o. female.    Vitals:  weight is 38.6 kg (85 lb). Her oral temperature is 99.8 °F (37.7 °C). Her blood pressure is 156/74 (abnormal) and her pulse is 72. Her respiration is 16 and oxygen saturation is 98%.     Chief Complaint: Cough    89yo female pt presents with daughter, reports body aches, dry cough, sore throat, fatigue, postnasal drip and runny nose, hoarse voice, and worsening of chronic back pain due to coughing that started yesterday.  Denies fever/chills, denies n/v/d, denies chest pain, wheezing, or SOB.  Daughter reports that 1 of pt's sitters is sick, but unsure of specific exposure.  Reports receiving COVID vaccination, denies flu vaccination.  Reports no improvement with Tylenol Cold & Sinus last night, back pain not resolving with usual Norco dose.    Cough  The problem has been gradually worsening. The cough is Non-productive. Associated symptoms include headaches, postnasal drip and a sore throat. Pertinent negatives include no chest pain, chills, ear congestion, ear pain, fever, nasal congestion, shortness of breath or wheezing. There is no history of asthma, bronchitis, environmental allergies or pneumonia.     Constitution: Positive for fatigue. Negative for chills and fever.   HENT:  Positive for congestion, postnasal drip and sore throat. Negative for ear pain, sinus pain, sinus pressure and trouble swallowing.    Cardiovascular:  Negative for chest pain.   Respiratory:  Positive for cough. Negative for chest tightness, sputum production, shortness of breath and wheezing.    Gastrointestinal:  Negative for nausea, vomiting and diarrhea.   Musculoskeletal:  Positive for back pain.   Allergic/Immunologic: Negative for environmental allergies.   Neurological:  Positive for headaches.     Objective:      Physical Exam   Constitutional: She is oriented to person, place, and time. She appears well-developed. She is cooperative.  Non-toxic  appearance. She does not appear ill. No distress.   HENT:   Head: Normocephalic and atraumatic.   Ears:   Right Ear: Hearing, external ear and ear canal normal. Tympanic membrane is retracted (dull TM). Tympanic membrane is not erythematous and not bulging. No middle ear effusion.   Left Ear: Hearing, external ear and ear canal normal. Tympanic membrane is retracted (dull TM). Tympanic membrane is not erythematous and not bulging.  No middle ear effusion.   Nose: Rhinorrhea (clear to BL nares) present. No mucosal edema, purulent discharge or nasal deformity. No epistaxis. Right sinus exhibits no maxillary sinus tenderness and no frontal sinus tenderness. Left sinus exhibits no maxillary sinus tenderness and no frontal sinus tenderness.   Mouth/Throat: Uvula is midline and mucous membranes are normal. No trismus in the jaw. Normal dentition. No uvula swelling. Oropharyngeal exudate (clear postnasal drip) present. No posterior oropharyngeal edema or posterior oropharyngeal erythema. Tonsils are 1+ on the right. Tonsils are 1+ on the left. No tonsillar exudate.   Eyes: Conjunctivae and lids are normal. No scleral icterus.   Neck: Trachea normal and phonation normal. Neck supple. No edema present. No erythema present. No neck rigidity present.   Cardiovascular: Normal rate, regular rhythm, normal heart sounds and normal pulses.   Pulmonary/Chest: Effort normal and breath sounds normal. No accessory muscle usage or stridor. No tachypnea. No respiratory distress. She has no decreased breath sounds. She has no wheezes. She has no rhonchi. She has no rales.   Dry cough witnessed on exam, no bronchospasm, no s/s of respiratory distress.         Comments: Dry cough witnessed on exam, no bronchospasm, no s/s of respiratory distress.    Abdominal: Normal appearance.   Musculoskeletal: Normal range of motion.         General: No deformity. Normal range of motion.   Lymphadenopathy:        Head (right side): No submandibular  adenopathy present.        Head (left side): No submandibular adenopathy present.     She has no cervical adenopathy.   Neurological: She is alert and oriented to person, place, and time. She exhibits normal muscle tone. Coordination normal.   Skin: Skin is warm, dry, intact, not diaphoretic and not pale.   Psychiatric: Her speech is normal and behavior is normal. Judgment and thought content normal.   Nursing note and vitals reviewed.    Results for orders placed or performed in visit on 01/18/23   SARS Coronavirus 2 Antigen, POCT Manual Read   Result Value Ref Range    SARS Coronavirus 2 Antigen Positive (A) Negative     Acceptable Yes    POCT Influenza A/B MOLECULAR   Result Value Ref Range    POC Molecular Influenza A Ag Negative Negative, Not Reported    POC Molecular Influenza B Ag Negative Negative, Not Reported     Acceptable Yes            Assessment:       1. COVID-19 virus infection    2. Screening for viral disease    3. Cough, unspecified type          Plan:       COVID-19 Risk Score:  4    Provided education on molnupiravir and antiviral treatment, pt unable to take Paxlovid due to multiple drug interactions with current prescriptions (simvastatin, alprazolam, diltiazem, hydrocodone).  Provided with printed information sheet.    Provided education on OTC symptomatic treatment (Claritin/Zyrtec with Flonase nasal spray for sinus congestion and cough and Tylenol/ibuprofen for sinus pain and elevated temperature, if needed).  Provided education on CDC recommendations for isolation/masking and for return/ER precautions.  Pt and daughter both verbalized understanding and agreed to treatment plan.      COVID-19 virus infection  -     molnupiravir 200 mg capsule (EUA); Take 4 capsules (800 mg total) by mouth every 12 (twelve) hours. for 5 days  Dispense: 40 capsule; Refill: 0  -     cetirizine (ZYRTEC) 10 MG tablet; Take 1 tablet (10 mg total) by mouth once daily.  Dispense: 30  "tablet; Refill: 0  -     fluticasone propionate (FLONASE) 50 mcg/actuation nasal spray; 2 sprays (100 mcg total) by Each Nostril route once daily.  Dispense: 18.2 mL; Refill: 0    Screening for viral disease  -     SARS Coronavirus 2 Antigen, POCT Manual Read  -     POCT Influenza A/B MOLECULAR    Cough, unspecified type  -     benzonatate (TESSALON) 100 MG capsule; Take 1 capsule (100 mg total) by mouth 3 (three) times daily as needed for Cough.  Dispense: 30 capsule; Refill: 0      Patient Instructions   You have tested positive for COVID-19 today.      ISOLATION  If you tested positive and do not have symptoms, you must isolate for 5 days starting on the day of the positive test.    If you tested positive and have symptoms, you must isolate for 5 days starting on the day of the first symptoms, not the day of the positive test.     This is the most important part, both the CDC and the [facility] emphasize that you do not test out of isolation.     After 5 days, if your symptoms have improved and you have not had fever on day 5, you can return to the community on day 6- NO TESTING REQUIRED!      In fact, we do not re-test if you were positive in the last 90 days.    After your 5 days of isolation are completed, the CDC recommends strict mask use for the first 5 days that you come out of isolation.    -----    If your condition worsens or fails to improve, we recommend that you receive another evaluation at the ER immediately contact your PCP to discuss your concerns, or return here.  You must understand that you've received an urgent care treatment only, and that you may be released before all your medical problems are known or treated.  You, the patient, will arrange for follow-up care as instructed.     If we discussed that I think your illness is viral, it will not respond to antibiotics and will last 10-14 days.  If we discussed "wait and see" antibiotics, and if over the next few days the symptoms worsen, start the " "antibiotics I have given you.     If you are female and on birth control pills and do take the antibiotics, use additional methods to prevent pregnancy while on the antibiotics and for one cycle after.     Flonase (fluticasone) is a nasal spray which is available over the counter and may help with your symptoms.  Zyrtec D, Claritin D, or Allegra D can also help with symptoms of congestion and drainage.  If you have hypertension, avoid using the "D" which is the decongestant formula.    If you just have drainage, you can take plain Zyrtec, Claritin, or Allegra.  If you just have a congested feeling, you can take pseudoephedrine (unless you have high blood pressure), which you have to sign for behind the counter.  Do not buy phenylephrine OTC, as it is not effective.    Rest and fluids are also important.  Tylenol or ibuprofen can also be used as directed for pain, unless you have an allergy to them or medical condition (such as stomach ulcers, kidney or liver disease, or use blood thinners, etc.) for which you should not be taking these type of medications.     If you are flying in the next few days, Afrin nose drops for the airplane flight upon take off and landing may help.  Other than at those times, refrain from using Afrin.     If you were prescribed a narcotic or sedating cough medicine, do not drive or operate heavy machinery while taking these medications.                        "

## 2023-01-22 NOTE — PROGRESS NOTES
Pharmacist Renal Dose Adjustment Note    Willow Giron is a 89 y.o. female being treated with the medication Enoxaparin    Patient Data:    Vital Signs (Most Recent):  Temp: 97.7 °F (36.5 °C) (01/22/23 1139)  Pulse: 72 (01/22/23 1309)  Resp: 17 (01/22/23 1309)  BP: (!) 168/85 (01/22/23 1139)  SpO2: 98 % (01/22/23 1310)   Vital Signs (72h Range):  Temp:  [97.7 °F (36.5 °C)]   Pulse:  [72-79]   Resp:  [17-18]   BP: (168)/(85)   SpO2:  [97 %-98 %]      Recent Labs   Lab 01/22/23  1238   CREATININE 0.8     Serum creatinine: 0.8 mg/dL 01/22/23 1238  Estimated creatinine clearance: 27.3 mL/min    Medication:Enoxaparin dose: 40 mg frequency q24h will be changed to medication:Enoxaparin dose:30 mg frequency:q24h    Pharmacist's Name: Fabian Austin  Pharmacist's Extension: [phone]

## 2023-01-22 NOTE — Clinical Note
Diagnosis: Chest pain [[id number]]   Future Attending Provider: TOLU BENTLEY [[id number]]   Admitting Provider:: BERNARDO HUNTLEY [[id number]]

## 2023-01-22 NOTE — PHARMACY MED REC
"Admission Medication History     The home medication history was taken by Stacey Bentley CPhT.    Medication history obtained from,  Patient's Daughter Verified    You may go to "Admission" then "Reconcile Home Medications" tabs to review and/or act upon these items.     The home medication list has been updated by the Pharmacy department.   Please read ALL comments highlighted in yellow.   Please address this information as you see fit.    Feel free to contact us if you have any questions or require assistance.        Stacey Bentley CPhT.  Ext [phone]              .        "

## 2023-01-22 NOTE — ED PROVIDER NOTES
Encounter Date: 2023       History     Chief Complaint   Patient presents with    COVID-19 Concerns     Dx with covid on , family reported decreased po intake and diarrhea, family concerned for dehydration, pt appears weak, but aaox3      89-year-old female with past medical history as below presents with complaint of generalized weakness.  She is here with her daughter who reports the patient tested positive for COVID .  She was started on antiviral and the next day had onset of nausea, vomiting and diarrhea.  The daughter stopped the antiviral 2 days after they were started due to her concern the sxs were a side effect.  She says that patient has had a difficult time tolerating anything by mouth.  She had Zofran at home and gave patient 1 dose yesterday.  Notes that patient is having about 3 episodes of nonbloody diarrhea a day.  She brought her to the emergency department today because symptoms have persisted. Notes occasional coughing. Has still been able to ambulate at home.     The history is provided by the patient and a relative.   Review of patient's allergies indicates:   Allergen Reactions    Pcn [penicillins] Rash    Sulfa (sulfonamide antibiotics) Hives    Codeine Rash     Past Medical History:   Diagnosis Date    Arthritis     GERD (gastroesophageal reflux disease)     Hyperlipidemia     Hypertension     Hyponatremia      Past Surgical History:   Procedure Laterality Date     SECTION, CLASSIC      HYSTERECTOMY      SHOULDER SURGERY       No family history on file.  Social History     Tobacco Use    Smoking status: Former     Passive exposure: Never    Smokeless tobacco: Never    Tobacco comments:     Quit 50+ yrs. ago   Substance Use Topics    Alcohol use: Yes     Comment: social    Drug use: No     Review of Systems   Constitutional:  Negative for fever.   HENT:  Negative for sore throat.    Respiratory:  Negative for shortness of breath.    Cardiovascular:  Negative for  chest pain.   Gastrointestinal:  Positive for diarrhea, nausea and vomiting. Negative for abdominal pain.   Genitourinary:  Negative for dysuria.   Musculoskeletal:  Negative for back pain.   Skin:  Negative for rash.   Neurological:  Negative for weakness.   Hematological:  Does not bruise/bleed easily.     Physical Exam     Initial Vitals [01/22/23 1139]   BP Pulse Resp Temp SpO2   (!) 168/85 79 18 97.7 °F (36.5 °C) 97 %      MAP       --         Physical Exam    Nursing note and vitals reviewed.  Constitutional: She appears well-developed. She is not diaphoretic. No distress.   HENT:   Head: Normocephalic and atraumatic.   Eyes: EOM are normal. Pupils are equal, round, and reactive to light.   Neck:   Normal range of motion.  Cardiovascular:  Normal rate.           Pulmonary/Chest: No respiratory distress.   Abdominal: Abdomen is soft. She exhibits no distension. There is no abdominal tenderness.   Musculoskeletal:         General: Normal range of motion.      Cervical back: Normal range of motion.     Neurological: She is alert and oriented to person, place, and time.   Skin: Skin is warm and dry.   Psychiatric: She has a normal mood and affect.       ED Course   Procedures  Labs Reviewed   BASIC METABOLIC PANEL - Abnormal; Notable for the following components:       Result Value    Sodium 124 (*)     Potassium 3.1 (*)     Chloride 84 (*)     Glucose 131 (*)     All other components within normal limits   CBC W/ AUTO DIFFERENTIAL - Abnormal; Notable for the following components:    MCH 31.5 (*)     Lymph # 0.6 (*)     Gran % 79.9 (*)     Lymph % 9.1 (*)     All other components within normal limits   URINALYSIS, REFLEX TO URINE CULTURE - Abnormal; Notable for the following components:    Protein, UA 3+ (*)     Glucose, UA Trace (*)     Ketones, UA 1+ (*)     Occult Blood UA Trace (*)     All other components within normal limits    Narrative:     Specimen Source->Urine   MAGNESIUM   URINALYSIS MICROSCOPIC     Narrative:     Specimen Source->Urine   COMPREHENSIVE METABOLIC PANEL   PHOSPHORUS   PROCALCITONIN   CK   PROTIME-INR   APTT   FIBRINOGEN     EKG Readings: (Independently Interpreted)   Initial Reading: No STEMI. Previous EKG: Compared with most recent EKG Rhythm: Atrial Flutter. Heart Rate: 74. Axis: Left Axis Deviation. Other Findings: Prolonged QT Interval. Clinical Impression: Atrial Flutter     Imaging Results              X-Ray Chest 1 View (Final result)  Result time 01/22/23 13:10:53   Procedure changed from X-Ray Chest PA And Lateral     Final result by Wyatt Menendez MD (01/22/23 13:10:53)                   Impression:      1. Chronic appearing interstitial findings, correlation with any history of COPD/emphysema.  No large focal consolidation.      Electronically signed by: Wyatt Menendez MD  Date:    01/22/2023  Time:    13:10               Narrative:    EXAMINATION:  XR CHEST 1 VIEW    CLINICAL HISTORY:  Chest pain; Chest pain, unspecified    TECHNIQUE:  Single frontal view of the chest was performed.    COMPARISON:  03/17/2018    FINDINGS:  The cardiomediastinal silhouette is not enlarged noting calcification of the aorta..  There is no pleural effusion.  The trachea is midline.  The lungs are symmetrically expanded bilaterally with coarse interstitial attenuation, similar to the previous examination..  No large focal consolidation seen.  There is no pneumothorax.  The osseous structures are remarkable for degenerative changes and osteopenia.  Prominent degenerative change noted of the right glenohumeral joint.  There are remote appearing right rib injuries..                                       Medications   sodium chloride 0.9% bolus 250 mL 250 mL (250 mLs Intravenous New Bag 1/22/23 1450)   sodium chloride 0.9% flush 10 mL (has no administration in time range)   naloxone 0.4 mg/mL injection 0.02 mg (has no administration in time range)   glucose chewable tablet 16 g (has no administration in  time range)   glucose chewable tablet 24 g (has no administration in time range)   glucagon (human recombinant) injection 1 mg (has no administration in time range)   dextrose 10% bolus 125 mL 125 mL (has no administration in time range)   dextrose 10% bolus 250 mL 250 mL (has no administration in time range)   enoxaparin injection 30 mg (has no administration in time range)   aspirin EC tablet 81 mg (has no administration in time range)   benzonatate capsule 100 mg (has no administration in time range)   pantoprazole EC tablet 40 mg (has no administration in time range)   pravastatin tablet 80 mg (has no administration in time range)   lactated ringers bolus 1,000 mL (0 mLs Intravenous Stopped 1/22/23 1400)   ondansetron injection 4 mg (4 mg Intravenous Given 1/22/23 1241)   potassium chloride SA CR tablet 40 mEq (40 mEq Oral Given 1/22/23 1422)   diltiaZEM 24 hr capsule 240 mg (240 mg Oral Given 1/22/23 1422)   lisinopriL tablet 40 mg (40 mg Oral Given 1/22/23 1422)   furosemide tablet 20 mg (20 mg Oral Given 1/22/23 1422)   potassium chloride 10 mEq in 100 mL IVPB (10 mEq Intravenous New Bag 1/22/23 1426)     Medical Decision Making:   History:   Old Records Summarized: records from clinic visits.       <> Summary of Records: Hx as above   ED Management:  89-year-old female brought in by daughter due to concern about inability to tolerate p.o. after starting antiviral for COVID a few days apparently been having nausea, vomiting diarrhea at home.  Upon arrival she is afebrile with stable vital signs.  No abdominal tenderness.  Ordered IV fluids, Zofran, labs, CXR and urine.  Under to be in atrial flutter on EKG.  Patient's daughter says that she has a history of intermittent atrial flutter, not on anticoagulation.  QTC prolonged at 497. ECG similar to 3/17/2018 when pt presented with hyponatremia. Patient found to be hyponatremic to 124 and hypokalemic at 3.1.  Discontinued the LR bolus at this time to avoid  over-correction, she had received 500 cc at this point. K ordered.  Pt noted to be hypertensive in the ED with > 200 systolic; pt says she has not taken her BP meds over the past few days due to her sxs, but feels she can tolerate them now and so they were ordered.Will admit to [facility] for further management.  Patient's daughter says that she has had a similar episode of hyponatremia and hypokalemia years ago and required rehab afterwards.  Other:   I have discussed this case with another health care provider.           ED Course as of 01/22/23 1516   Sun Jan 22, 2023   1256 WBC: 6.49  Normal  [AT]   1346 Potassium(!): 3.1  Repletion ordered [AT]   1347 Sodium(!): 124  S/p 500 cc of LR bolus, will hold on additional IVF to avoid overcorrection [AT]      ED Course User Index  [AT] Sammie Blair MD                 Clinical Impression:   Final diagnoses:  [E87.1] Hyponatremia (Primary)  [E87.6] Hypokalemia  [U07.1] COVID-19  [R53.83] Fatigue, unspecified type  [R94.31] Prolonged Q-T interval on ECG        ED Disposition Condition    Admit                 Sammie Blair MD  01/22/23 1516       Sammie Blair MD  01/22/23 1710

## 2023-01-22 NOTE — H&P
[facility] Medicine H&P Note     Admitting Team: [facility] Hospitalist Team A  Attending Physician: Amy  Resident: Cachorro  Intern: Juan    Date of Admit: 2023    Chief Complaint     Feeling bad x 4days    Subjective:      History of Present Illness:    Willow Giron is a 89yo female with PMH of GERD, Arthritis, Hyperlipidemia, HTN, Afib s/p cardioversion +5yrs, and Hyponatremia who presented on 2023 to [facility] with primary complaint of weakness, nausea, and diarrhea. Per daughter, patient COVID + on  and prescribed anti-virals. Daughter discontinued antivirals due to nausea, one episode of vomiting, and diarrhea 2 days following start of therapy. Patient endorses rhinorrhea, cough, diaphoresis, decreased po intake, and weakness. Denies chest pain or SOB. Has been unable to take home meds 2/2 nausea.     Upon ED presentation, found to be hyponatremic.U IM was consulted for the evaluation and management of hyponatremia.    History obtained by patient interview and supplemented by nursing documentation and chart review.     Past Medical History:  Past Medical History:   Diagnosis Date    Arthritis     GERD (gastroesophageal reflux disease)     Hyperlipidemia     Hypertension     Hyponatremia      Past Surgical History:  Past Surgical History:   Procedure Laterality Date     SECTION, CLASSIC      HYSTERECTOMY      SHOULDER SURGERY       Allergies:  Review of patient's allergies indicates:   Allergen Reactions    Pcn [penicillins] Rash    Sulfa (sulfonamide antibiotics) Hives    Codeine Rash     Home Medications:  Current Outpatient Medications   Medication Instructions    ALPRAZolam (XANAX) 0.5 mg, Oral, 2 times daily PRN    aspirin (ECOTRIN) 81 mg, Oral, Daily    benzonatate (TESSALON) 100 mg, Oral, 3 times daily PRN    cetirizine (ZYRTEC) 10 mg, Oral, Daily    diltiaZEM (DILACOR XR) 240 mg, Oral, Daily    diphenhydrAMINE-acetaminophen (TYLENOL PM)  mg  Tab 1 tablet, Oral, Nightly PRN    docusate sodium (COLACE) 100 mg, Oral, Daily PRN    fluticasone propionate (FLONASE) 100 mcg, Each Nostril, Daily    furosemide (LASIX) 20 mg, Oral, Daily    HYDROcodone-acetaminophen (NORCO) 7.5-325 mg per tablet 1 tablet, Oral, Every 8 hours PRN    ibuprofen-diphenhydrAMINE cit (ADVIL PM) 200-38 mg Tab 1 tablet, Oral, Nightly PRN    lisinopriL (PRINIVIL,ZESTRIL) 40 MG tablet TAKE 1 TABLET BY MOUTH EVERY DAY    meloxicam (MOBIC) 15 mg, Oral, Daily    molnupiravir 800 mg, Oral, Every 12 hours    mv,Ca,min-folic acid-vit K1 (ONE-A-DAY WOMEN'S 50 PLUS) 400-20 mcg Tab 1 tablet, Oral, Daily    nystatin (MYCOSTATIN) cream Topical (Top), 2 times daily    omeprazole (PRILOSEC) 20 MG capsule TAKE 1 CAPSULE BY MOUTH EVERY DAY    polyethylene glycol (GLYCOLAX) 17 g, Oral, Twice weekly    simvastatin (ZOCOR) 40 MG tablet TAKE 1 TABLET BY MOUTH EVERY DAY IN THE EVENING     Family History:  No family history on file.  Social History:  Social History     Tobacco Use    Smoking status: Former     Passive exposure: Never    Smokeless tobacco: Never    Tobacco comments:     Quit 50+ yrs. ago   Substance Use Topics    Alcohol use: Yes     Comment: social    Drug use: No     Review of Systems:  Constitutional:  Positive for diaphoresis and malaise/fatigue.   HENT:  Positive for congestion.         Cough   Respiratory:  Positive for cough. Negative for hemoptysis and sputum production.    Cardiovascular:  Negative for palpitations and leg swelling.   Gastrointestinal:  Positive for diarrhea and nausea. Negative for blood in stool.   Neurological:  Positive for weakness. Negative for tremors and headaches.     Health Care Maintenance :   Primary Care Physician: Qamar Bland MD   Immunizations:   Immunization History   Administered Date(s) Administered    COVID-19, MRNA, LN-S, PF (Pfizer) (Purple Cap) 08/09/2021, 09/23/2021    Influenza - High Dose - PF (65 years and older) 10/26/2011, 10/31/2014,  10/22/2015, 10/05/2017, 10/01/2018, 2019    Influenza - Quadrivalent - High Dose - PF (65 years and older) 2020, 2021    PPD Test 2018    Pneumococcal Conjugate - 13 Valent 2018    Pneumococcal Polysaccharide - 23 Valent 10/16/2013    Zoster 2012    Tdap- Yes  Flu- Yes  Pna- Yes  COVID- x3 doses        Objective:   Last 24 Hour Vital Signs:  BP  Min: 143/75  Max: 217/96  Temp  Av.7 °F (36.5 °C)  Min: 97.7 °F (36.5 °C)  Max: 97.7 °F (36.5 °C)  Pulse  Av.5  Min: 72  Max: 79  Resp  Av.8  Min: 17  Max: 21  SpO2  Av.9 %  Min: 97 %  Max: 99 %  Weight  Av.6 kg (85 lb)  Min: 38.6 kg (85 lb)  Max: 38.6 kg (85 lb)  Body mass index is 17.77 kg/m².  I/O last 3 completed shifts:  In: 500 [IV Piggyback:500]  Out: -   Physical Examination:  Orthostatic Blood Pressure normal  JVP= 7cm  Physical Exam  HENT:      Head: Normocephalic and atraumatic.      Nose: Congestion and rhinorrhea present.   Eyes:      Extraocular Movements: Extraocular movements intact.   Cardiovascular:      Pulses: Normal pulses.      Heart sounds: Normal heart sounds.   Pulmonary:      Effort: Pulmonary effort is normal.      Breath sounds: Normal breath sounds.   Abdominal:      Tenderness: There is no abdominal tenderness. There is no guarding.   Skin:     General: Skin is dry.   Neurological:      Mental Status: She is alert.   Psychiatric:         Mood and Affect: Mood normal.         Behavior: Behavior normal.   Laboratory:  Recent Labs   Lab 23  1238 23  1552 23   WBC 6.49  --   --    HGB 13.5  --   --    HCT 37.5  --   --      --   --    MCV 88  --   --    RDW 11.5  --   --    * 124* 124*   K 3.1* 3.3* 2.5*   CL 84* 85* 86*   CO2 28 25 25   BUN 16 16 16   CREATININE 0.8 0.8 0.7   * 112* 113*   PROT  --  6.0  --    ALBUMIN  --  3.1*  --    BILITOT  --  0.6  --    AST  --  47*  --    ALKPHOS  --  262*  --    ALT  --  38  --      Cardiac Data:  Recent  Labs   Lab 01/22/23  1627   TROPONINI 0.834*     Microbiology:  Microbiology Results (last 7 days)       ** No results found for the last 168 hours. **             Recent Labs   Lab 01/22/23  1401   COLORU Yellow   SPECGRAV 1.015   PHUR 7.0   PROTEINUA 3+*   BACTERIA Rare   NITRITE Negative   LEUKOCYTESUR Negative   UROBILINOGEN Negative   HYALINECASTS 0       Other Results:  EKG (my interpretation): A-fib vs flutter, LBBB    Radiology:  X-Ray Chest 1 View   Final Result      1. Chronic appearing interstitial findings, correlation with any history of COPD/emphysema.  No large focal consolidation.         Electronically signed by: Wyatt Menendez MD   Date:    01/22/2023   Time:    13:10          Assessment:     Willow Giron is a 89 y.o. female with COVID and hyponatremia of 124 on admission. Mentation at baseline per daughter.           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    Arthritis, multiple joint involvement 07/29/2019    Normocytic anemia 03/19/2018    HCAP (healthcare-associated pneumonia) 03/18/2018    Opioid dependence 03/12/2018    Hyponatremia 02/17/2018    Essential hypertension 02/17/2018    Hyperlipidemia 02/17/2018    GERD (gastroesophageal reflux disease) 02/17/2018    Ankle edema 01/30/2018    History of thromboembolism of vein 10/23/2017    Other nonthrombocytopenic purpura 07/24/2017    Major depressive disorder, recurrent, mild 01/23/2017    Peripheral vascular disease 01/23/2017    Edema 09/21/2015    Insomnia 01/13/2015    Stage 3 chronic kidney disease 01/13/2015    Lumbar radiculopathy 08/26/2013    Menopause 08/26/2013    Degeneration of lumbar intervertebral disc 08/08/2013         Plan:     Hyponatremia:  -Na= 124 on admission  -likely 2/2 poor po intake  -LR repletion with no more than 4-6 in 24 hour period. Goal Ju=198-544  -Q4 CMP checks     Hypokalemia:  -K= 3.1 on admission, low of 2.5 on  1/22  -likely 2/2 poor po intake   -gave K  -Q4 CMP checks     Cardiac Arrhythmia:  -EKG showing LBBB and prolonged QTC  -A fib s/p cardioversion  -telemetry  -trend troponin Q6 hours  -TTE   -cards consult  -start home med of lisinopril and diltiazem, hold lasix     COVID induced N/V, weakness:  - COVID + test on 1/18  - dehydration noted on physical exam  -1L LR bolus given     HTN:  -BP on admission 206/107  -unable to take home meds 2/2 nausea prior to admission  -restart home meds      DVT Ppx: LMWH  Diet: Cardiac  Code: No intubation, CPR okay    Disposition: pending resolution/improvement of hyponatremia    Alhaji Martinez M.D.  [facility] PGY-1    [facility] Medicine Hospitalist Pager numbers:   [facility] Hospitalist Medicine Team A (Adrien/Rufino): [phone]  [facility] Hospitalist Medicine Team B (Emily/Mekhi):  [phone]

## 2023-01-22 NOTE — ED NOTES
Repeat EKG showed to ER physician. Currently a change from prior EKG. Paged [facility] medicine and spoke with team to review. They report will review now. Pt instructed to remain NPO at this time until review and verdict made by MD.

## 2023-01-23 PROBLEM — I16.0 HYPERTENSIVE URGENCY: Status: ACTIVE | Noted: 2023-01-01

## 2023-01-23 NOTE — ASSESSMENT & PLAN NOTE
Troponin elevated at baseline, flat 0.8  No chest pain  EKG AF LBBB  Elevation demand in setting of hypertensive urgency and COVID   Will obtain TTE for completeness

## 2023-01-23 NOTE — PLAN OF CARE
SHEEBA contacted pt daughter Aliya to discuss dc planning. All information confirmed on chart. Pt resides in home with her . Pt is independent in ADLs. Pt receives assistance from her  caretakers if needed. Per pts daughter pts  is on hospice at home with 24/7 caretakers 7x a week. Pt has RW at home for use. Pt is not current with  services. SHEEBA provided pts daughter her contact information for any questions or concerns. SHEEBA will continue to follow pt throughout her transitions of care and assist with any dc needs.     Wolf,Kim (Daughter)   [phone]     SHEEBA requested PCP follow up.      01/23/23 1059   Discharge Assessment   Assessment Type Discharge Planning Assessment   Confirmed/corrected address, phone number and insurance Yes   Confirmed Demographics Correct on Facesheet   Source of Information family   Communicated EUGENIO with patient/caregiver Yes   People in Home spouse   Do you expect to return to your current living situation? Yes   Do you have help at home or someone to help you manage your care at home? Yes   Who are your caregiver(s) and their phone number(s)? Wolf,Kim (Daughter)   [phone]   Prior to hospitilization cognitive status: Unable to Assess   Current cognitive status: Unable to Assess   Home Layout Able to live on 1st floor   Equipment Currently Used at Home walker, rolling   Readmission within 30 days? No   Patient currently being followed by outpatient case management? No   Do you currently have service(s) that help you manage your care at home? No   Do you take prescription medications? Yes   Do you have prescription coverage? Yes   Coverage pHN   Do you have any problems affording any of your prescribed medications? No   Is the patient taking medications as prescribed? yes   Who is going to help you get home at discharge? Wolf,Kim (Daughter)   [phone]   How do you get to doctors appointments? family or friend will provide   Are you on dialysis?  No   Do you take coumadin? No   DME Needed Upon Discharge    (tbd)   Discharge Plan discussed with: Adult children   Discharge Barriers Identified None   Social Connections   Are you , , , , never , or living with a partner?    Alcohol Use   Q1: How often do you have a drink containing alcohol? 4 or more ti   Q2: How many drinks containing alcohol do you have on a typical day when you are drinking? 1 or 2   Q3: How often do you have six or more drinks on one occasion? Weekly   OTHER   Name(s) of People in Home

## 2023-01-23 NOTE — ASSESSMENT & PLAN NOTE
SBP >210 on admission  Has not been able to take medications in several days 2/2 nausea in setting of COVID  Resume home Cardizem and Lisinopril if no contraindication   If unable to tolerate p.o., use Hydralazine PRN

## 2023-01-23 NOTE — PROGRESS NOTES
Pt arrived to unit. Introduced self as VN for this shift. Admission questions completed by VN. Educated pt on safety precautions, and VN's role in pt care. Opportunity given for pt's questions. All questions answered.      01/23/23 0919   Nurse Notification   Bedside Nurse Notified? Yes   Name of Bedside Nurse Bridget Valentín PEDRO   Nurse Notfication Method Secure Chat   Nurse Notified Of Patient Request   Admission   Initial VN Admission Questions Complete   Communication Issues? None   Shift   Virtual Nurse - Patient Verbalized Approval Of Camera Use   Type of Frequent Check   Type Other (see comments)  (Admission)   Safety/Activity   Patient Rounds bed in low position;call light in patient/parent reach;visualized patient   Safety Promotion/Fall Prevention assistive device/personal item within reach;Fall Risk reviewed with patient/family;side rails raised x 2;instructed to call staff for mobility   Positioning   Body Position position changed independently   Head of Bed (HOB) Positioning HOB at 30-45 degrees   Pain/Comfort/Sleep   Preferred Pain Scale number (Numeric Rating Pain Scale)   Comfort/Acceptable Pain Level 0

## 2023-01-23 NOTE — PT/OT/SLP EVAL
"Physical Therapy Evaluation    Patient Name:  Willow Giron   MRN:  [id number]    Recommendations:     Discharge Recommendations: home health PT, home health OT, other (see comments) (with assist of family and sitter services)   Discharge Equipment Recommendations: none   Barriers to discharge:  patient's current functional mobility status is requiring assist    Assessment:     Willow Giron is a 89 y.o. female admitted with a medical diagnosis of Hyponatremia.  She presents with the following impairments/functional limitations: weakness, gait instability, impaired endurance, impaired balance, decreased lower extremity function, impaired self care skills, impaired skin, impaired functional mobility     Patient seen for physical therapy evaluation on this date.  Patient's son present during evaluation.  Patient agreeable to therapy, limited by feeling weak this am.  Full report to follow.  Anticipate patient will be able to return home with resumption of Home Health PT/OT services as well as assist from sitters and family.    Rehab Prognosis: Good; patient would benefit from acute skilled PT services to address these deficits and reach maximum level of function.    Recent Surgery: * No surgery found *      Plan:     During this hospitalization, patient to be seen 4 x/week to address the identified rehab impairments via gait training, therapeutic activities, therapeutic exercises, neuromuscular re-education and progress toward the following goals:    Plan of Care Expires:  02/22/23    Subjective     Chief Complaint: "I just feel weak"  Patient/Family Comments/goals: To return home at [facility]  Pain/Comfort:  Pain Rating 1: 4/10  Location - Orientation 1: generalized  Location 1: sacral spine  Pain Addressed 1: Reposition, Distraction, Nurse notified  Pain Rating Post-Intervention 1: 4/10    Patients cultural, spiritual, Quaker conflicts given the current situation: no    Living Environment:  Patient lives " "with spouse (on hospice, 24/7 care provided in the home) in 2 SH (patient does not go upstairs) with ramp to enter home.  WIS with shower chair in bathroom.    Prior to admission, patients level of function was patient walked at modified independent level x household distances with rollator, daughter assists patient with bathing, patient is modified independent with all other ADLs and reports she still cooks.  Equipment used at home: rollator, shower chair, bedside commode, wheelchair, cane, straight.  DME owned (not currently used): bedside commode and wheelchair.  Upon discharge, patient will have assistance from sitters and family.    Objective:     Communicated with nurse Bridget prior to session.  Patient found supine with telemetry, peripheral IV, bed alarm  upon PT entry to room.    General Precautions: Standard, airborne, contact, droplet, fall  Orthopedic Precautions:N/A   Braces: N/A  Respiratory Status: Room air    Exams:  Cognitive Exam:  Patient is oriented to Person, Place, Time, and Situation  Gross Motor Coordination:  WFL B LEs, R UE limited due to dislocated shoulder  Postural Exam:  Patient presented with the following abnormalities:    -       Rounded shoulders  -       Forward head  -       Kyphosis  Sensation:    -       Intact  Skin Integrity/Edema:      -       Skin integrity: Visible skin intact  -       Edema: None noted BLEs  RLE ROM: WFL  RLE Strength: 3+/5 grossly  LLE ROM: WFL  LLE Strength: 3+/5 grossly  R UE:  limited in shoulder ROM and strength due to dislocated shoulder x several years.  Patient is right handed and states she "makes do" and does not want to have surgery for her shoulder.  Patient has good elbow and hand strength.  L UE is WFL and 3+ to 4/5 grossly.  Please see OT eval for more detailed report    Functional Mobility:  Bed Mobility:     Rolling Left:  minimum assistance  Scooting: minimum assistance  Supine to Sit: minimum assistance  Sit to Supine: minimum " assistance  Transfers:     Sit to Stand:  minimum assistance with patient's own rollator  Gait: x 20' with rollator and Min assist, flexed posture, slow pace, short step lengths  Balance: Seated:  SBA at EOB   Standing:  no LOB, requires Rollator      AM-PAC 6 CLICK MOBILITY  Total Score:16       Treatment & Education:  Patient and son educated on role of physical therapy and POC.  Patient states she will participate, but is feeling weak.  Patient's spouse is living at home on hospice care with / sitters.  Per patient and son, sitters also assist patient at times since she has limitations with her R UE.  Daughter assists with bathing the patient, but she is otherwise at Modified Independent level.   Patient wishes to return home to be near her spouse, but states she would like to resume home health PT/OT.      Patient left supine with all lines intact, call button in reach, bed alarm on, and nurse notified.    GOALS:   Multidisciplinary Problems       Physical Therapy Goals          Problem: Physical Therapy    Goal Priority Disciplines Outcome Goal Variances Interventions   Physical Therapy Goal     PT, PT/OT Ongoing, Progressing     Description: Goals to be met by: 2023     Patient will increase functional independence with mobility by performin. Supine to sit with Modified [place]  2. Sit to supine with Modified [place]  3. Rolling to Left and Right with Modified [place].  4. Sit to stand transfer with Modified [place]  5. Bed to chair transfer with Modified [place] using Rolling Walker  6. Gait  x 100 feet with Stand-by Assistance using Rolling Walker.                          History:     Past Medical History:   Diagnosis Date    Arthritis     GERD (gastroesophageal reflux disease)     Hyperlipidemia     Hypertension     Hyponatremia        Past Surgical History:   Procedure Laterality Date     SECTION, CLASSIC      HYSTERECTOMY      SHOULDER SURGERY         Time  Tracking:     PT Received On: 01/23/23  PT Start Time: 1040     PT Stop Time: 1115  PT Total Time (min): 35 min     Billable Minutes: Evaluation 12, Gait Training 12, and Therapeutic Activity 11      01/23/2023

## 2023-01-23 NOTE — PROGRESS NOTES
"[facility] Medicine Progress Note    Primary Team: [facility] Hospitalist Team B  Attending Physician: Domingo Gaming MD  Resident: Vandana Ivory  Intern: Alhaji Martinez    Subjective:      Overnight tolerating PO; states she feels "a little better" but still weak. Eating jello this AM.      Objective:     Last 24 Hour Vital Signs:  BP  Min: 143/75  Max: 217/96  Temp  Av.2 °F (36.8 °C)  Min: 97.7 °F (36.5 °C)  Max: 98.7 °F (37.1 °C)  Pulse  Av.6  Min: 64  Max: 79  Resp  Av.7  Min: 16  Max: 21  SpO2  Av.8 %  Min: 93 %  Max: 99 %  Height  Av' 11" (149.9 cm)  Min: 4' 11" (149.9 cm)  Max: 4' 11" (149.9 cm)  Weight  Av.9 kg (85 lb 11.4 oz)  Min: 38.6 kg (85 lb)  Max: 39.2 kg (86 lb 6.7 oz)  I/O last 3 completed shifts:  In: 750 [I.V.:50; IV Piggyback:700]  Out: -     Physical Examination:  Physical Exam  Constitutional:       General: She is not in acute distress.     Appearance: She is underweight. She is not toxic-appearing.   HENT:      Head: Normocephalic and atraumatic.      Mouth/Throat:      Mouth: Mucous membranes are moist.      Pharynx: Oropharynx is clear.   Eyes:      General: No scleral icterus.     Extraocular Movements: Extraocular movements intact.      Conjunctiva/sclera: Conjunctivae normal.   Cardiovascular:      Rate and Rhythm: Normal rate and regular rhythm.      Pulses: Normal pulses.      Heart sounds: Normal heart sounds. No murmur heard.  Pulmonary:      Effort: Pulmonary effort is normal. No respiratory distress.      Breath sounds: Normal breath sounds.      Comments: +cough, nonproductive  Abdominal:      General: Bowel sounds are normal. There is no distension.      Palpations: Abdomen is soft.      Tenderness: There is no abdominal tenderness. There is no guarding or rebound.   Musculoskeletal:         General: No swelling or tenderness.      Right lower leg: No edema.      Left lower leg: No edema.   Skin:     General: Skin is warm and dry.      Comments: +skin " tenting on forehead and sternum   Neurological:      General: No focal deficit present.      Mental Status: She is alert. Mental status is at baseline.   Psychiatric:         Mood and Affect: Mood normal.         Behavior: Behavior normal.         Laboratory:  Na: 126 <- 125 <- 124  K: 4.7 <- 2.5<- 3.1  Serum Osm: 261  Urine Osm: 371  Urine Na: 41  Urine Creat: 44.2  TSH=0.53  Troponin= .63<- .83    Microbiology Data Reviewed:   COVID + 1/18/23  UA with rare bacteria    Other Results:    CXR with coarse interstitial marks, no focal consolidation    Current Medications:     Infusions:       Scheduled:   aspirin  81 mg Oral Daily    enoxaparin  30 mg Subcutaneous Daily    pantoprazole  40 mg Oral Daily    potassium chloride  10 mEq Intravenous Q2H    pravastatin  80 mg Oral Daily        PRN:  benzonatate, dextrose 10%, dextrose 10%, glucagon (human recombinant), glucose, glucose, naloxone, sodium chloride 0.9%        Assessment:     Willow Giron is a 89 y.o.female with PMHx spinal stenosis with injury, HTN, HLD, past SIADH episode years ago who presents with 1 week history of COVID-19 with nausea, poor po intake, cough. Found to have Na 124; admitted to [facility] for severe hyponatremia likely 2/2 volume depletion.      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    Arthritis, multiple joint involvement 07/29/2019    Normocytic anemia 03/19/2018    HCAP (healthcare-associated pneumonia) 03/18/2018    Opioid dependence 03/12/2018    Hyponatremia 02/17/2018    Essential hypertension 02/17/2018    Hyperlipidemia 02/17/2018    GERD (gastroesophageal reflux disease) 02/17/2018    Ankle edema 01/30/2018    History of thromboembolism of vein 10/23/2017    Other nonthrombocytopenic purpura 07/24/2017    Major depressive disorder, recurrent, mild 01/23/2017    Peripheral vascular disease 01/23/2017    Edema 09/21/2015    Insomnia  01/13/2015    Stage 3 chronic kidney disease 01/13/2015    Lumbar radiculopathy 08/26/2013    Menopause 08/26/2013    Degeneration of lumbar intervertebral disc 08/08/2013        Plan:     Hyponatremia, hypovolemic vs ?SIADH:  -Na 124 on admission.   -likely 2/2 poor po intake x several days; patient very thin on exam with profound skin tenting  -Urine sodium 41, Urine osm 371; ?SIADH component; AM cortisol ordered  -Gentle fluid resuscitation with LR; goal correction no more than 4-6 in 24 hour period; Goal Na for today 130  - Currently no acute hyponatremic symptoms; mental status at baseline, AOx3  -Q4 BMP checks for Na; DDAVP if overcorrects; continue to monitor closely     Cardiac Arrhythmia, New?:  -EKG showing LBBB and prolonged QTC  -Per daughter, patient has history of A fib s/p cardioversion, now unclear if paroxysmal or resolved? Not on anticoagulation at home. Unknown baseline rhythm  -Cards consulted for anticoagulation recommendations and in setting of new EKG changes, appreciate recs  - Suspect EKG changes 2/2 electrolyte derangements but concern for paroxysmal afib/flutter  -telemetry  -TTE ordered  -Resumed home med of lisinopril and diltiazem (as below); holding lasix given hypovolemia      HTN:  -BP on admission 206/107  -unable to take home meds 2/2 nausea prior to admission  -Home meds: diltiazem 240, lisinopril 40, lasix 20  -Resume dilt and lisinopril at this time  -Monitor, improving with home meds    COVID+ induced N/V, weakness:  - COVID + test on 1/18  - dehydration noted on physical exam  - Continue IVF resuscitation  - PT/OT consulted, appreciate recs    Hypokalemia:  -K= 3.1 on admission, low of 2.5 on 1/22  -likely 2/2 poor po intake   -Continue to replete PRN    Chronic back pain with opioid dependence  -Lumbar radiculopathy, arthritis, fall within last 6mths with trauma to spine with fracture  -Prescribed large quantity of opioids at home (norco 7.5 PRN TID, 75 pills monthly) as well  as xanax  - Avoid opioids if possible given advanced patient age and concern for worsening of mental status and stability (has had recent falls)  - Will approach with multimodal pain control; starting with lidocaine patch to sacrum PRN  -  on safe medication practices for geriatric patients as several of home meds are contraindicated (Beer's list)    HLD  -Continue home simvastatin    GERD  -Continue home omeprazole        Edinson Penaloza, MS-4  Vandana Ivory MD  [facility] HO-3    [facility] Hospitalist Pager numbers:   [facility] Hospitalist Medicine Team A (Adrien/Rufino): [phone]  [facility] Hospitalist Medicine Team B (Emily/Mekhi):  [phone]

## 2023-01-23 NOTE — CONSULTS
[place] - Telemetry  Cardiology  Consult Note    Patient Name: Willow Giron  MRN: [id number]  Admission Date: 2023  Hospital Length of Stay: 1 days  Code Status: Partial Code   Attending Provider: Domingo Gaming MD   Consulting Provider: Aric Parra NP  Primary Care Physician: Qamar Bland MD  Principal Problem:Hyponatremia    Patient information was obtained from patient, past medical records and ER records.     Inpatient consult to Cardiology-[facility]  Consult performed by: Aric Parra NP  Consult ordered by: Vandana Ivory MD        Subjective:     Chief Complaint:  Nausea, weakness, diarrhea     HPI:   Willow Giron is a 89yo female with GERD, Arthritis, Hyperlipidemia, HTN, Afib, and Hyponatremia. Patient presented to the ED on 2023 with weakness, nausea, and diarrhea. Patient noted to be COVID +.  Patient has rhinorrhea, cough, diaphoresis, decreased po intake, and weakness. Denies chest pain or SOB. Has been unable to take home meds 2/2 nausea.  SBP 160s -210s. Troponin mildly elevated at 0.8 which is baseline. EKG AF with LBBB.      Past Medical History:   Diagnosis Date    Arthritis     GERD (gastroesophageal reflux disease)     Hyperlipidemia     Hypertension     Hyponatremia        Past Surgical History:   Procedure Laterality Date     SECTION, CLASSIC      HYSTERECTOMY      SHOULDER SURGERY         Review of patient's allergies indicates:   Allergen Reactions    Pcn [penicillins] Rash    Sulfa (sulfonamide antibiotics) Hives    Codeine Rash       No current facility-administered medications on file prior to encounter.     Current Outpatient Medications on File Prior to Encounter   Medication Sig    aspirin (ECOTRIN) 81 MG EC tablet Take 81 mg by mouth once daily.    benzonatate (TESSALON) 100 MG capsule Take 1 capsule (100 mg total) by mouth 3 (three) times daily as needed for Cough.    cetirizine (ZYRTEC) 10 MG tablet Take 1 tablet  (10 mg total) by mouth once daily.    diltiaZEM (DILACOR XR) 240 MG CDCR TAKE 1 CAPSULE (240 MG TOTAL) BY MOUTH ONCE DAILY.    diphenhydrAMINE-acetaminophen (TYLENOL PM)  mg Tab Take 1 tablet by mouth nightly as needed.    docusate sodium (COLACE) 100 MG capsule Take 100 mg by mouth daily as needed for Constipation.    fluticasone propionate (FLONASE) 50 mcg/actuation nasal spray 2 sprays (100 mcg total) by Each Nostril route once daily.    furosemide (LASIX) 20 MG tablet Take 1 tablet (20 mg total) by mouth once daily.    HYDROcodone-acetaminophen (NORCO) 7.5-325 mg per tablet Take 1 tablet by mouth every 8 (eight) hours as needed for Pain (only for SEVERE PAIN).    ibuprofen-diphenhydrAMINE cit (ADVIL PM) 200-38 mg Tab Take 1 tablet by mouth nightly as needed.    lisinopriL (PRINIVIL,ZESTRIL) 40 MG tablet TAKE 1 TABLET BY MOUTH EVERY DAY    meloxicam (MOBIC) 15 MG tablet Take 1 tablet (15 mg total) by mouth once daily.    molnupiravir 200 mg capsule (EUA) Take 4 capsules (800 mg total) by mouth every 12 (twelve) hours. for 5 days    mv,Ca,min-folic acid-vit K1 (ONE-A-DAY WOMEN'S 50 PLUS) 400-20 mcg Tab Take 1 tablet by mouth once daily.    nystatin (MYCOSTATIN) cream Apply topically 2 (two) times daily.    omeprazole (PRILOSEC) 20 MG capsule TAKE 1 CAPSULE BY MOUTH EVERY DAY    polyethylene glycol (GLYCOLAX) 17 gram/dose powder Take 17 g by mouth twice a week.    simvastatin (ZOCOR) 40 MG tablet TAKE 1 TABLET BY MOUTH EVERY DAY IN THE EVENING    ALPRAZolam (XANAX) 0.5 MG tablet Take 1 tablet (0.5 mg total) by mouth 2 (two) times daily as needed for Anxiety. (Patient not taking: Reported on 1/18/2023)     Family History    None       Tobacco Use    Smoking status: Former     Passive exposure: Never    Smokeless tobacco: Never    Tobacco comments:     Quit 50+ yrs. ago   Substance and Sexual Activity    Alcohol use: Yes     Comment: social    Drug use: No    Sexual activity: Not on file      ROS  Objective:     Vital Signs (Most Recent):  Temp: 98.7 °F (37.1 °C) (01/23/23 0756)  Pulse: 71 (01/23/23 0756)  Resp: 18 (01/23/23 0756)  BP: (!) 156/83 (01/23/23 0756)  SpO2: (!) 93 % (01/23/23 0756)   Vital Signs (24h Range):  Temp:  [97.7 °F (36.5 °C)-98.7 °F (37.1 °C)] 98.7 °F (37.1 °C)  Pulse:  [64-79] 71  Resp:  [16-21] 18  SpO2:  [93 %-99 %] 93 %  BP: (143-217)/() 156/83     Weight: 39.2 kg (86 lb 6.7 oz)  Body mass index is 17.45 kg/m².    SpO2: (!) 93 %         Intake/Output Summary (Last 24 hours) at 1/23/2023 1001  Last data filed at 1/23/2023 0521  Gross per 24 hour   Intake 750 ml   Output --   Net 750 ml       Lines/Drains/Airways       Peripheral Intravenous Line  Duration                  Peripheral IV - Single Lumen 01/22/23 2223 20 G Anterior;Right Forearm <1 day                    Physical Exam    Significant Labs: BMP:   Recent Labs   Lab 01/22/23  1238 01/22/23  1552 01/23/23  0002 01/23/23  0512 01/23/23  0748   *   < > 104 90  90 91   *   < > 125* 125*  125* 126*   K 3.1*   < > 3.3* 4.7  4.7 4.2   CL 84*   < > 87* 88*  88* 89*   CO2 28   < > 26 26  26 27   BUN 16   < > 16 17  17 17   CREATININE 0.8   < > 0.8 0.8  0.8 0.8   CALCIUM 9.6   < > 8.5* 8.4*  8.4* 8.5*   MG 1.8  --   --   --   --     < > = values in this interval not displayed.   , CMP   Recent Labs   Lab 01/22/23  1552 01/22/23  2031 01/23/23  0002 01/23/23  0512 01/23/23  0748   *   < > 125* 125*  125* 126*   K 3.3*   < > 3.3* 4.7  4.7 4.2   CL 85*   < > 87* 88*  88* 89*   CO2 25   < > 26 26  26 27   *   < > 104 90  90 91   BUN 16   < > 16 17  17 17   CREATININE 0.8   < > 0.8 0.8  0.8 0.8   CALCIUM 9.0   < > 8.5* 8.4*  8.4* 8.5*   PROT 6.0  --   --  5.2*  --    ALBUMIN 3.1*  --   --  2.7*  --    BILITOT 0.6  --   --  0.6  --    ALKPHOS 262*  --   --  236*  --    AST 47*  --   --  55*  --    ALT 38  --   --  39  --    ANIONGAP 14   < > 12 11  11 10    < > = values in this  interval not displayed.   , CBC   Recent Labs   Lab 01/22/23  1238 01/23/23  0748   WBC 6.49 7.11   HGB 13.5 11.6*   HCT 37.5 32.6*    261   , INR   Recent Labs   Lab 01/22/23  1552   INR 1.0   , Lipid Panel No results for input(s): CHOL, HDL, LDLCALC, TRIG, CHOLHDL in the last 48 hours., Troponin   Recent Labs   Lab 01/22/23  1627 01/22/23  2211 01/23/23  0512   TROPONINI 0.834* 0.830* 0.625*   , and All pertinent lab results from the last 24 hours have been reviewed.    Significant Imaging: Echocardiogram: Transthoracic echo (TTE) complete (Cupid Only): No results found for this or any previous visit.    Assessment and Plan:     Hypertensive urgency  SBP >210 on admission  Has not been able to take medications in several days 2/2 nausea in setting of COVID  Resume home Cardizem and Lisinopril if no contraindication   If unable to tolerate p.o., use Hydralazine PRN    Elevated troponin  Troponin elevated at baseline, flat 0.8  No chest pain  EKG AF LBBB  Elevation demand in setting of hypertensive urgency and COVID   Will obtain TTE for completeness         VTE Risk Mitigation (From admission, onward)         Ordered     enoxaparin injection 30 mg  Daily         01/22/23 1504     IP VTE HIGH RISK PATIENT  Once         01/22/23 1504     Place sequential compression device  Until discontinued         01/22/23 1504                Thank you for your consult. I will sign off. Please contact us if you have any additional questions.    Aric Parra NP  Cardiology   [place] [facility]

## 2023-01-23 NOTE — PT/OT/SLP PROGRESS
Occupational Therapy attempt      Patient Name:  Willow Giron   MRN:  [id number]    Patient not seen today secondary to Other (Comment) (cardiology diagnostics (1450; 145)). Will follow-up as able.    1/23/2023

## 2023-01-23 NOTE — SUBJECTIVE & OBJECTIVE
Past Medical History:   Diagnosis Date    Arthritis     GERD (gastroesophageal reflux disease)     Hyperlipidemia     Hypertension     Hyponatremia        Past Surgical History:   Procedure Laterality Date     SECTION, CLASSIC      HYSTERECTOMY      SHOULDER SURGERY         Review of patient's allergies indicates:   Allergen Reactions    Pcn [penicillins] Rash    Sulfa (sulfonamide antibiotics) Hives    Codeine Rash       No current facility-administered medications on file prior to encounter.     Current Outpatient Medications on File Prior to Encounter   Medication Sig    aspirin (ECOTRIN) 81 MG EC tablet Take 81 mg by mouth once daily.    benzonatate (TESSALON) 100 MG capsule Take 1 capsule (100 mg total) by mouth 3 (three) times daily as needed for Cough.    cetirizine (ZYRTEC) 10 MG tablet Take 1 tablet (10 mg total) by mouth once daily.    diltiaZEM (DILACOR XR) 240 MG CDCR TAKE 1 CAPSULE (240 MG TOTAL) BY MOUTH ONCE DAILY.    diphenhydrAMINE-acetaminophen (TYLENOL PM)  mg Tab Take 1 tablet by mouth nightly as needed.    docusate sodium (COLACE) 100 MG capsule Take 100 mg by mouth daily as needed for Constipation.    fluticasone propionate (FLONASE) 50 mcg/actuation nasal spray 2 sprays (100 mcg total) by Each Nostril route once daily.    furosemide (LASIX) 20 MG tablet Take 1 tablet (20 mg total) by mouth once daily.    HYDROcodone-acetaminophen (NORCO) 7.5-325 mg per tablet Take 1 tablet by mouth every 8 (eight) hours as needed for Pain (only for SEVERE PAIN).    ibuprofen-diphenhydrAMINE cit (ADVIL PM) 200-38 mg Tab Take 1 tablet by mouth nightly as needed.    lisinopriL (PRINIVIL,ZESTRIL) 40 MG tablet TAKE 1 TABLET BY MOUTH EVERY DAY    meloxicam (MOBIC) 15 MG tablet Take 1 tablet (15 mg total) by mouth once daily.    molnupiravir 200 mg capsule (EUA) Take 4 capsules (800 mg total) by mouth every 12 (twelve) hours. for 5 days    mv,Ca,min-folic acid-vit K1 (ONE-A-DAY WOMEN'S 50 PLUS) 400-20  mcg Tab Take 1 tablet by mouth once daily.    nystatin (MYCOSTATIN) cream Apply topically 2 (two) times daily.    omeprazole (PRILOSEC) 20 MG capsule TAKE 1 CAPSULE BY MOUTH EVERY DAY    polyethylene glycol (GLYCOLAX) 17 gram/dose powder Take 17 g by mouth twice a week.    simvastatin (ZOCOR) 40 MG tablet TAKE 1 TABLET BY MOUTH EVERY DAY IN THE EVENING    ALPRAZolam (XANAX) 0.5 MG tablet Take 1 tablet (0.5 mg total) by mouth 2 (two) times daily as needed for Anxiety. (Patient not taking: Reported on 1/18/2023)     Family History    None       Tobacco Use    Smoking status: Former     Passive exposure: Never    Smokeless tobacco: Never    Tobacco comments:     Quit 50+ yrs. ago   Substance and Sexual Activity    Alcohol use: Yes     Comment: social    Drug use: No    Sexual activity: Not on file     ROS  Objective:     Vital Signs (Most Recent):  Temp: 98.7 °F (37.1 °C) (01/23/23 0756)  Pulse: 71 (01/23/23 0756)  Resp: 18 (01/23/23 0756)  BP: (!) 156/83 (01/23/23 0756)  SpO2: (!) 93 % (01/23/23 0756)   Vital Signs (24h Range):  Temp:  [97.7 °F (36.5 °C)-98.7 °F (37.1 °C)] 98.7 °F (37.1 °C)  Pulse:  [64-79] 71  Resp:  [16-21] 18  SpO2:  [93 %-99 %] 93 %  BP: (143-217)/() 156/83     Weight: 39.2 kg (86 lb 6.7 oz)  Body mass index is 17.45 kg/m².    SpO2: (!) 93 %         Intake/Output Summary (Last 24 hours) at 1/23/2023 1001  Last data filed at 1/23/2023 0521  Gross per 24 hour   Intake 750 ml   Output --   Net 750 ml       Lines/Drains/Airways       Peripheral Intravenous Line  Duration                  Peripheral IV - Single Lumen 01/22/23 2223 20 G Anterior;Right Forearm <1 day                    Physical Exam    Significant Labs: BMP:   Recent Labs   Lab 01/22/23  1238 01/22/23  1552 01/23/23  0002 01/23/23  0512 01/23/23  0748   *   < > 104 90  90 91   *   < > 125* 125*  125* 126*   K 3.1*   < > 3.3* 4.7  4.7 4.2   CL 84*   < > 87* 88*  88* 89*   CO2 28   < > 26 26  26 27   BUN 16   < > 16  17  17 17   CREATININE 0.8   < > 0.8 0.8  0.8 0.8   CALCIUM 9.6   < > 8.5* 8.4*  8.4* 8.5*   MG 1.8  --   --   --   --     < > = values in this interval not displayed.   , CMP   Recent Labs   Lab 01/22/23  1552 01/22/23  2031 01/23/23  0002 01/23/23  0512 01/23/23  0748   *   < > 125* 125*  125* 126*   K 3.3*   < > 3.3* 4.7  4.7 4.2   CL 85*   < > 87* 88*  88* 89*   CO2 25   < > 26 26  26 27   *   < > 104 90  90 91   BUN 16   < > 16 17  17 17   CREATININE 0.8   < > 0.8 0.8  0.8 0.8   CALCIUM 9.0   < > 8.5* 8.4*  8.4* 8.5*   PROT 6.0  --   --  5.2*  --    ALBUMIN 3.1*  --   --  2.7*  --    BILITOT 0.6  --   --  0.6  --    ALKPHOS 262*  --   --  236*  --    AST 47*  --   --  55*  --    ALT 38  --   --  39  --    ANIONGAP 14   < > 12 11  11 10    < > = values in this interval not displayed.   , CBC   Recent Labs   Lab 01/22/23  1238 01/23/23  0748   WBC 6.49 7.11   HGB 13.5 11.6*   HCT 37.5 32.6*    261   , INR   Recent Labs   Lab 01/22/23  1552   INR 1.0   , Lipid Panel No results for input(s): CHOL, HDL, LDLCALC, TRIG, CHOLHDL in the last 48 hours., Troponin   Recent Labs   Lab 01/22/23  1627 01/22/23  2211 01/23/23  0512   TROPONINI 0.834* 0.830* 0.625*   , and All pertinent lab results from the last 24 hours have been reviewed.    Significant Imaging: Echocardiogram: Transthoracic echo (TTE) complete (Cupid Only): No results found for this or any previous visit.

## 2023-01-23 NOTE — PLAN OF CARE
Patient seen for physical therapy evaluation on this date.  Patient's son present during evaluation.  Patient agreeable to therapy, limited by feeling weak this am.  Full report to follow.  Anticipate patient will be able to return home with resumption of Home Health PT/OT services as well as assist from sitters and family.      Problem: Physical Therapy  Goal: Physical Therapy Goal  Description: Goals to be met by: 2023     Patient will increase functional independence with mobility by performin. Supine to sit with Modified [place]  2. Sit to supine with Modified [place]  3. Rolling to Left and Right with Modified [place].  4. Sit to stand transfer with Modified [place]  5. Bed to chair transfer with Modified [place] using Rolling Walker  6. Gait  x 100 feet with Stand-by Assistance using Rolling Walker.     Outcome: Ongoing, Progressing

## 2023-01-23 NOTE — HPI
Willow Giron is a 89yo female with GERD, Arthritis, Hyperlipidemia, HTN, Afib, and Hyponatremia. Patient presented to the ED on 01/22/2023 with weakness, nausea, and diarrhea. Patient noted to be COVID +.  Patient has rhinorrhea, cough, diaphoresis, decreased po intake, and weakness. Denies chest pain or SOB. Has been unable to take home meds 2/2 nausea.  SBP 160s -210s. Troponin mildly elevated at 0.8 which is baseline. EKG AF with LBBB.

## 2023-01-23 NOTE — ED NOTES
Family has left the bedside but requested contact number be left for chart.  Aliya Mejia, [phone]

## 2023-01-24 NOTE — NURSING
At this time informed by pt care technician of pt output of only 50 mL of yellow urine during noc shift; pt bladder scanned at this time and 203 mL of residual urine present upon bladder scan; Dr. Briceno (physician on-call) notified in person on floor at this time; no new orders.

## 2023-01-24 NOTE — PT/OT/SLP PROGRESS
"Physical Therapy Treatment    Patient Name:  Willow Giron   MRN:  [id number]    Recommendations:     Discharge Recommendations: home health PT, home health OT, other (see comments) (with assist of family and sitters)  Discharge Equipment Recommendations: none  Barriers to discharge:  Patient requiring assist for mobility at this time    Assessment:     Willow Giron is a 89 y.o. female admitted with a medical diagnosis of Hyponatremia.  She presents with the following impairments/functional limitations: weakness, impaired functional mobility, impaired endurance, gait instability, impaired balance, decreased upper extremity function, impaired self care skills, decreased lower extremity function     Patient seen for physical therapy treatment on this date.  Patient agreeable to participate, but states she is fatigued and had just gotten back to bed.  Daughter present today.  Patient transitioned to EOB sitting with SBA, slow pace.  Patient performed sit to stand with SBA to rollator and gait with rollator x 60' with CG/SBA and VC's.  Patient performed seated and supine exercises, full report to follow.  Patient is making progress toward PT goals, physical therapy will continue to follow..    Rehab Prognosis: Fair; patient would benefit from acute skilled PT services to address these deficits and reach maximum level of function.    Recent Surgery: * No surgery found *      Plan:     During this hospitalization, patient to be seen 3 x/week to address the identified rehab impairments via gait training, therapeutic activities, therapeutic exercises, neuromuscular re-education and progress toward the following goals:    Plan of Care Expires:  02/22/23    Subjective     Chief Complaint: "I just got back in bed, but I guess I'll get up"  Patient/Family Comments/goals: To return to PLOF  Pain/Comfort:  Pain Rating 1: 0/10  Pain Rating Post-Intervention 1: 0/10      Objective:     Communicated with nurse Gill " prior to session.  Patient found supine with telemetry, peripheral IV, bed alarm upon PT entry to room.     General Precautions: Standard, airborne, contact, droplet, fall  Orthopedic Precautions: N/A  Braces: N/A  Respiratory Status: Room air     Functional Mobility:  Bed Mobility:     Rolling Left:  stand by assistance  Scooting: stand by assistance  Supine to Sit: stand by assistance  Sit to Supine: stand by assistance  Transfers:     Sit to Stand:  stand by assistance with  rollator  Gait: with Rollator x 60' with CG/SBA on level surfaces, slow pace, shuffling steps, CGA to manage Rollator, unsteady  Balance: Seated:  SBA at EOB    Standing:  requires rollator, CG/SBA       AM-PAC 6 CLICK MOBILITY  Turning over in bed (including adjusting bedclothes, sheets and blankets)?: 4  Sitting down on and standing up from a chair with arms (e.g., wheelchair, bedside commode, etc.): 4  Moving from lying on back to sitting on the side of the bed?: 4  Moving to and from a bed to a chair (including a wheelchair)?: 4  Need to walk in hospital room?: 3  Climbing 3-5 steps with a railing?: 1  Basic Mobility Total Score: 20       Treatment & Education:  Patient educated on safety with mobility, importance of OOB and upright activities.  Patient agreeable to participate, despite just returning back to bed.  Patient performed seated B LE exercises and supine B LE exercises while in bed.  Patient encouraged to perform B LE exercises while in bed.  Daughter present at end of session.    Patient left HOB elevated with all lines intact, call button in reach, bed alarm on, and nurse notified.    GOALS:   Multidisciplinary Problems       Physical Therapy Goals          Problem: Physical Therapy    Goal Priority Disciplines Outcome Goal Variances Interventions   Physical Therapy Goal     PT, PT/OT Ongoing, Progressing     Description: Goals to be met by: 2/22/2023     Patient will increase functional independence with mobility by  performin. Supine to sit with Modified [place]  2. Sit to supine with Modified [place]  3. Rolling to Left and Right with Modified [place].  4. Sit to stand transfer with Modified [place]  5. Bed to chair transfer with Modified [place] using Rolling Walker  6. Gait  x 100 feet with Stand-by Assistance using Rolling Walker.                          Time Tracking:     PT Received On: 23  PT Start Time: 1100     PT Stop Time: 1127  PT Total Time (min): 27 min     Billable Minutes: Gait Training 15 and Therapeutic Exercise 12    Treatment Type: Treatment  PT/PTA: PT     PTA Visit Number: 0     2023

## 2023-01-24 NOTE — PLAN OF CARE
Problem: Occupational Therapy  Goal: Occupational Therapy Goal  Description: Goals to be met by: 02/24/2023      Patient will increase functional independence with ADLs by performing:    UE Dressing with Supervision.  LE Dressing with Stand-by Assistance.  Grooming while standing with Supervision.  Toileting from bedside commode with Supervision for hygiene and clothing management.   Toilet transfer to bedside commode with Supervision.  Increased functional strength to WFL for self care.  Upper extremity exercise program x10 reps per handout, with assistance as needed.     Outcome: Ongoing, Progressing   Pt would benefit from continued OT to address deficits in self care and functional mobility. Recommending HHOT/PT with supervision and assistance from family and sitters; DME needs likely none

## 2023-01-24 NOTE — PT/OT/SLP EVAL
"Speech Language Pathology Evaluation  Bedside Swallow    Patient Name:  Willow Giron   MRN:  [id number]  Admitting Diagnosis: Hyponatremia    Recommendations:                Diet recommendations:  Mechanical soft, Thin   Aspiration Precautions: upright for meals, small sips and bites, alternate sips and bites, CRUSH MEDS   General Precautions: Standard, fall  Communication strategies:  none    History per MD      Willow Giron is a 89yo female with PMH of GERD, Arthritis, Hyperlipidemia, HTN, Afib s/p cardioversion +5yrs, and Hyponatremia who presented on 2023 to [facility] with primary complaint of weakness, nausea, and diarrhea. Per daughter, patient COVID + on  and prescribed anti-virals. Daughter discontinued antivirals due to nausea, one episode of vomiting, and diarrhea 2 days following start of therapy. Patient endorses rhinorrhea, cough, diaphoresis, decreased po intake, and weakness. Denies chest pain or SOB. Has been unable to take home meds 2/2 nausea.      Upon ED presentation, found to be hyponatremic.[facility] was consulted for the evaluation and management of hyponatremia.     History obtained by patient interview and supplemented by nursing documentation and chart review.     Past Medical History:   Diagnosis Date    Arthritis     GERD (gastroesophageal reflux disease)     Hyperlipidemia     Hypertension     Hyponatremia        Past Surgical History:   Procedure Laterality Date     SECTION, CLASSIC      HYSTERECTOMY      SHOULDER SURGERY           Prior Intubation HX:  n/a    Modified Barium Swallow: none per EMR    Chest X-Rays: 1. Chronic appearing interstitial findings, correlation with any history of COPD/emphysema.  No large focal consolidation    Prior diet: regular diet and thin liquids.    Subjective     ST orders received this date, pt seen in room.   Pt is COVID+, SLP donned proper PPE per protocol     Patient goals: "I not hungry." "     Pain/Comfort:  Pain Rating 1: 0/10    Respiratory Status: room air     Objective:   Pt seen in room, she is awake and alert. Needed coaxing to participate in swallow eval.    Oral Musculature Evaluation  Oral Musculature: WFL  Dentition: upper dentures, lower dentures  Secretion Management: problems swallowing secretions  Mucosal Quality: adequate  Mandibular Strength and Mobility: WFL  Oral Labial Strength and Mobility: impaired coordination  Lingual Strength and Mobility: impaired strength  Buccal Strength and Mobility: decreased tone  Volitional Cough: elicited  Volitional Swallow: multiple swallows  Voice Prior to PO Intake: clear voice    Bedside Swallow Eval:   Consistencies Assessed:  Ice chips  Water  Pudding  Diced peaches   Refused cracker     Oral Phase:   Slow mastication  Slow ap transfer  No oral residue      Pharyngeal Phase:   delayed swallow initation  no overt clinical signs/symptoms of aspiration  multiple spontaneous swallows  No voice changes    Treatment: Recommend diet modification for now of mech soft and thin liquids, and will add boost to meals. Notified RN of diet change.    Assessment:     Willow Giron is a 89 y.o. female admitted with Hyponatremia who would benefit from diet modification of mech soft and thin liquids.       Goals:   Multidisciplinary Problems       SLP Goals       Not on file                    Plan:     Patient to be seen:      Plan of Care expires:     Plan of Care reviewed with:  patient   SLP Follow-Up:  No       Discharge recommendations:  home health PT, home health OT   Barriers to Discharge:  None    Time Tracking:     SLP Treatment Date:   01/24/23  Speech Start Time:  1345  Speech Stop Time:  1359     Speech Total Time (min):  14 min    Billable Minutes: Eval Swallow and Oral Function 14    01/24/2023

## 2023-01-24 NOTE — PLAN OF CARE
Patient seen for physical therapy treatment on this date.  Patient agreeable to participate, but states she is fatigued and had just gotten back to bed.  Daughter present today.  Patient transitioned to EOB sitting with SBA, slow pace.  Patient performed sit to stand with SBA to rollator and gait with rollator x 60' with CG/SBA and VC's.  Patient performed seated and supine exercises, full report to follow.  Patient is making progress toward PT goals, physical therapy will continue to follow.    Problem: Physical Therapy  Goal: Physical Therapy Goal  Description: Goals to be met by: 2023     Patient will increase functional independence with mobility by performin. Supine to sit with Modified [place]  2. Sit to supine with Modified [place]  3. Rolling to Left and Right with Modified [place].  4. Sit to stand transfer with Modified [place]  5. Bed to chair transfer with Modified [place] using Rolling Walker  6. Gait  x 100 feet with Stand-by Assistance using Rolling Walker.     Outcome: Ongoing, Progressing

## 2023-01-24 NOTE — PT/OT/SLP EVAL
Occupational Therapy   Evaluation    Name: Willow Giron  MRN: [id number]  Admitting Diagnosis: Hyponatremia  Recent Surgery: * No surgery found *      Recommendations:     Discharge Recommendations: home health OT, home health PT (with supervision and assistance from family and sitters)  Discharge Equipment Recommendations:  none  Barriers to discharge:  None    Assessment:     Willow Giron is a 89 y.o. female with a medical diagnosis of Hyponatremia.  She presents with pain in back but able to reposition in bed well with some assistance from OT and son. Performance deficits affecting function: weakness, impaired endurance, impaired self care skills, impaired functional mobility, gait instability, decreased upper extremity function, decreased lower extremity function, impaired balance, pain, impaired skin, decreased ROM.      Rehab Prognosis: Good; patient would benefit from acute skilled OT services to address these deficits and reach maximum level of function.       Plan:     Patient to be seen 3 x/week to address the above listed problems via self-care/home management, therapeutic activities, therapeutic exercises  Plan of Care Expires: 02/24/23  Plan of Care Reviewed with: patient, son    Subjective     Chief Complaint: Pt reports that her back hurts  Patient/Family Comments/goals: To return to PLOF    Occupational Profile:  Living Environment: Pt lives with spouse (who is on hospice) in 2SH (bed/bath on first floor) ramp access with [facility] with [facility]  Previous level of function: Supervision to Mod I ADLs and functional mobility, assistance from daughter for bathing t/f but pt states that she completes most washing for self while bathing  Roles and Routines: Caretaker to self and home. Pt states she does not cook much or clean.   Equipment Used at Home: bedside commode, rollator, shower chair, wheelchair, cane, straight  Assistance upon Discharge: Family, paid caregivers    Pain/Comfort:  Pain Rating 1:  5/10  Location - Orientation 1: generalized  Location 1: back  Pain Addressed 1: Reposition, Distraction, Cessation of Activity, Nurse notified (pt asked for pain medication at end of session,nsg notified)  Pain Rating Post-Intervention 1: 5/10    Patients cultural, spiritual, Methodist conflicts given the current situation:      Objective:     Communicated with: nsg prior to session.  Patient found HOB elevated with bed alarm, peripheral IV, telemetry upon OT entry to room.    General Precautions: Standard, fall  Orthopedic Precautions: N/A  Braces: N/A  Respiratory Status: Room air    Occupational Performance:    Bed Mobility:    Patient completed Scooting/Bridging with moderate assistance    Functional Mobility/Transfers:  Pt declined EOB/OOB activity 2/2 pain in mid back    Cognitive/Visual Perceptual:  Cognitive/Psychosocial Skills:     -       Oriented to: Person, Place, Time and Situation   -       Follows Commands/attention:Follows multistep  commands  -       Communication: clear/fluent  -       Memory: No Deficits noted  -       Safety awareness/insight to disability: intact   -       Mood/Affect/Coping skills/emotional control: Appropriate to situation     Physical Exam:  Postural examination/scapula alignment:    -      Rounded shoulders  Skin integrity: R forearm with gerneralized bruising which pt reports is from blood draw  Sensation:    -       Intact per pt report  Motor Planning:    -       WFL  Dominant hand:    -       R handed  Upper Extremity Range of Motion: BUE WFL     Upper Extremity Strength:  BUE grossly 3 to 3+/5   Strength:  B hands 3+/5  Fine Motor Coordination:    -       Intact  Gross motor coordination:   WFL     AMPAC 6 Click ADL:  AMPAC Total Score: 16    Treatment & Education:  Pt educated on role of OT and POC.   Pt performing skills as listed above.     Patient left HOB elevated with all lines intact, call button in reach, bed alarm on, nsg notified, and son  present    GOALS:   Multidisciplinary Problems       Occupational Therapy Goals          Problem: Occupational Therapy    Goal Priority Disciplines Outcome Interventions   Occupational Therapy Goal     OT, PT/OT Ongoing, Progressing    Description: Goals to be met by: 2023      Patient will increase functional independence with ADLs by performing:    UE Dressing with Supervision.  LE Dressing with Stand-by Assistance.  Grooming while standing with Supervision.  Toileting from bedside commode with Supervision for hygiene and clothing management.   Toilet transfer to bedside commode with Supervision.  Increased functional strength to WF for self care.  Upper extremity exercise program x10 reps per handout, with assistance as needed.                          History:     Past Medical History:   Diagnosis Date    Arthritis     GERD (gastroesophageal reflux disease)     Hyperlipidemia     Hypertension     Hyponatremia          Past Surgical History:   Procedure Laterality Date     SECTION, CLASSIC      HYSTERECTOMY      SHOULDER SURGERY         Time Tracking:     OT Date of Treatment: 23  OT Start Time: 163  OT Stop Time: 1646  OT Total Time (min): 14 min    Billable Minutes:Evaluation 14    2023

## 2023-01-24 NOTE — PROGRESS NOTES
"[facility] Medicine Progress Note    Primary Team: [facility] Hospitalist Team B  Attending Physician: Domingo Gaming MD  Resident: Vandana Ivory  Intern: Alhaji Martinez    Subjective:      Overnight tolerating PO with jello but notes nausea and gagging with other solid food intake, no vomiting. Reports pain controlled with lidocaine patch. Episode of diarrhea this AM. Denies SOB, CP, fever/chills.      Objective:     Last 24 Hour Vital Signs:  BP  Min: 117/57  Max: 147/70  Temp  Av.7 °F (37.1 °C)  Min: 97.4 °F (36.3 °C)  Max: 99.4 °F (37.4 °C)  Pulse  Av.3  Min: 60  Max: 76  Resp  Av.3  Min: 16  Max: 18  SpO2  Av.2 %  Min: 93 %  Max: 97 %  Height  Av' 11" (149.9 cm)  Min: 4' 11" (149.9 cm)  Max: 4' 11" (149.9 cm)  Weight  Av kg (86 lb)  Min: 39 kg (86 lb)  Max: 39 kg (86 lb)  I/O last 3 completed shifts:  In: 250 [I.V.:50; IV Piggyback:200]  Out: 50 [Urine:50]    Physical Examination:  Physical Exam  Constitutional:       General: She is not in acute distress.     Appearance: She is underweight. She is not toxic-appearing.   HENT:      Head: Normocephalic and atraumatic.      Mouth/Throat:      Mouth: Mucous membranes are moist.      Pharynx: Oropharynx is clear.   Eyes:      General: No scleral icterus.     Extraocular Movements: Extraocular movements intact.      Conjunctiva/sclera: Conjunctivae normal.   Cardiovascular:      Rate and Rhythm: Normal rate and regular rhythm.      Pulses: Normal pulses.      Heart sounds: Normal heart sounds. No murmur heard.  Pulmonary:      Effort: Pulmonary effort is normal. No respiratory distress.      Breath sounds: Normal breath sounds.      Comments: +cough, nonproductive  Abdominal:      General: Bowel sounds are normal. There is no distension.      Palpations: Abdomen is soft.      Tenderness: There is no abdominal tenderness. There is no guarding or rebound.   Musculoskeletal:         General: No swelling or tenderness.      Right lower leg: No " edema.      Left lower leg: No edema.   Skin:     General: Skin is warm and dry.      Comments: +skin tenting on forehead and sternum   Neurological:      General: No focal deficit present.      Mental Status: She is alert. Mental status is at baseline.   Psychiatric:         Mood and Affect: Mood normal.         Behavior: Behavior normal.         Laboratory:  Na: 120<--122<- 126 <- 125 <- 124  K: 4.0<- 4.7 <- 2.5<- 3.1  Serum Osm: 261  Urine Osm: 371  Urine Na: 41  Urine Creat: 44.2  TSH=0.53  AM Cortisol=33    Microbiology Data Reviewed:   COVID + 1/18/23  UA with rare bacteria    Other Results:    CXR with coarse interstitial marks, no focal consolidation    TTE: EF-50%, grade III LV diastolic dysfunction, moderate LA enlargement, mild to mod pulm HTN, normal RV size&systolic function.    Current Medications:     Infusions:       Scheduled:   aspirin  81 mg Oral Daily    diltiaZEM  240 mg Oral Daily    enoxaparin  30 mg Subcutaneous Daily    LIDOcaine  1 patch Transdermal Q24H    lisinopriL  40 mg Oral Daily    pantoprazole  40 mg Oral Daily    pravastatin  80 mg Oral Daily        PRN:  benzonatate, dextrose 10%, dextrose 10%, glucagon (human recombinant), glucose, glucose, melatonin, naloxone, sodium chloride 0.9%        Assessment:     Willow Giron is a 89 y.o.female with PMHx spinal stenosis with injury, HTN, HLD, past SIADH episode years ago who presents with 1 week history of COVID-19 with nausea, poor po intake, cough. Found to have Na 124; admitted to [facility] for severe hyponatremia likely 2/2 volume depletion.      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    Arthritis, multiple joint involvement 07/29/2019    Normocytic anemia 03/19/2018    HCAP (healthcare-associated pneumonia) 03/18/2018    Elevated troponin     Opioid dependence 03/12/2018    Hyponatremia 02/17/2018     Essential hypertension 02/17/2018    Hyperlipidemia 02/17/2018    GERD (gastroesophageal reflux disease) 02/17/2018    Ankle edema 01/30/2018    History of thromboembolism of vein 10/23/2017    Other nonthrombocytopenic purpura 07/24/2017    Major depressive disorder, recurrent, 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   -likely 2/2 poor po intake x several days; patient very thin on exam with profound skin tenting  -Urine sodium 41, Urine osm 371; ?SIADH component; AM cortisol=33  - Currently no acute hyponatremic symptoms; mental status at baseline, Aox3  -nephrology consulted, recs greatly appreciated  -Goal correction no more than 4-6 in 24 hour period; Goal Na for today 126  -Q4 BMP checks for Na; DDAVP if overcorrects; continue to monitor closely  -repeat urine studies     Atrial fibrillation, unknown if new  -EKG showing LBBB and prolonged QTC  -Per daughter, patient has history of A fib s/p cardioversion, now unclear if paroxysmal or resolved? Not on anticoagulation at home. Unknown baseline rhythm  - Suspect EKG changes 2/2 electrolyte derangements but concern for paroxysmal afib/flutter  -telemetry  -TTE: EF-50%, grade III LV diastolic dysfunction, moderate LA enlargement, mild to mod pulm HTN, normal RV size&systolic function  -Consider DOAC in setting of afib, however high risk with recent fall; will need cost/benefit discussion prior to initiation  -Resumed home med of lisinopril and diltiazem (as below); holding lasix given hypovolemia      HTN:  -BP on admission 206/107  -unable to take home meds 2/2 nausea prior to admission  -Home meds: diltiazem 240, lisinopril 40, lasix 20  -Resume dilt and lisinopril at  this time  -Monitor, improving with home meds    COVID+ induced N/V, weakness:  - COVID + test on 1/18  - dehydration noted on physical exam  - Continue IVF resuscitation per nephro  - PT/OT consulted, appreciate recs    Hypokalemia:  -K= 3.1 on admission, low of 2.5 on 1/22  -likely 2/2 poor po intake   -Continue to replete PRN    Chronic back pain with opioid dependence  -Lumbar radiculopathy, arthritis, fall within last 6mths with trauma to spine with fracture  -Prescribed large quantity of opioids at home (norco 7.5 PRN TID, 75 pills monthly) as well as xanax  - Avoid opioids if possible given advanced patient age and concern for worsening of mental status and stability (has had recent falls)  - Will approach with multimodal pain control; lidocaine patch to sacrum PRN controlling pain at this time  -  on safe medication practices for geriatric patients as several of home meds are contraindicated (Beer's list)  - pain currently controlled on lidocaine patch only    HLD  -Continue home simvastatin    GERD  -Continue home omeprazole        Edinson Penaloza, MS-4  Vandana Ivory MD  [facility] HO-3    [facility] Medicine Hospitalist Pager numbers:   [facility] Hospitalist Medicine Team A (Adrien/Rufino): [phone]  [facility] Hospitalist Medicine Team B (Emily/Mekhi):  [phone]

## 2023-01-24 NOTE — PLAN OF CARE
SW spoke with pts daughter to discuss dc planning. SW expressed no dc today per MD. Pts daughter expressed understanding. SW will continue to follow pt throughout her transitions of care and assist with any dc needs.     Future Appointments   Date Time Provider Department Center   1/30/2023 11:00 AM Qamar Bland MD [facility]        01/24/23 1127   Post-Acute Status   Post-Acute Authorization Other

## 2023-01-24 NOTE — CONSULTS
[facility] Consult Note    Reason for Consult:     Hyponatremia    Subjective:      History of Present Illness:  Willow Giron is a 89 y.o. 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 She was found to be COVID positive on admit. Also had initial labs concerning for hyponatremia with sodium initially 124. In the ED on  she received 1L LR, 250ml NS, and 20mg PO lasix. Urine studies from that night likely inconclusive give fluids and lasix. On  she received 5 k riders so had 500ml fluids this day and sodium continued to decrease. This morning sodium is now 120 and nephrology is consulted.    She reports eating and drinking well since being here. Her appetite is normal and she feels thirsty and is drinking water. She has made minimal urine today however and has not made enough by this afternoon to collect new urine studies that were ordered this morning. She is conversant but cannot recall her medications or past issues with hyponatremia. Daughter reported nausea from salt tabs in past.     Past Medical History:  Past Medical History:   Diagnosis Date    Arthritis     GERD (gastroesophageal reflux disease)     Hyperlipidemia     Hypertension     Hyponatremia        Past Surgical History:  Past Surgical History:   Procedure Laterality Date     SECTION, CLASSIC      HYSTERECTOMY      SHOULDER SURGERY         Allergies:  Review of patient's allergies indicates:   Allergen Reactions    Pcn [penicillins] Rash    Sulfa (sulfonamide antibiotics) Hives    Codeine Rash       Medications:   In-Hospital Scheduled Medications:   aspirin  81 mg Oral Daily    diltiaZEM  240 mg Oral Daily    enoxaparin  30 mg Subcutaneous Daily    LIDOcaine  1 patch Transdermal Q24H    lisinopriL  40 mg Oral Daily    pantoprazole  40 mg Oral  Daily    pravastatin  80 mg Oral Daily      In-Hospital PRN Medications:  benzonatate, dextrose 10%, dextrose 10%, glucagon (human recombinant), glucose, glucose, melatonin, naloxone, sodium chloride 0.9%   In-Hospital IV Infusion Medications:     Home Medications:  Prior to Admission medications    Medication Sig Start Date End Date Taking? Authorizing Provider   aspirin (ECOTRIN) 81 MG EC tablet Take 81 mg by mouth once daily. 3/28/18  Yes Historical Provider   benzonatate (TESSALON) 100 MG capsule Take 1 capsule (100 mg total) by mouth 3 (three) times daily as needed for Cough. 1/18/23 1/28/23 Yes Dieudonne Panchal NP   cetirizine (ZYRTEC) 10 MG tablet Take 1 tablet (10 mg total) by mouth once daily. 1/18/23 2/17/23 Yes Dieudonne Panchal NP   diltiaZEM (DILACOR XR) 240 MG CDCR TAKE 1 CAPSULE (240 MG TOTAL) BY MOUTH ONCE DAILY. 4/14/22  Yes Qamar Bland MD   diphenhydrAMINE-acetaminophen (TYLENOL PM)  mg Tab Take 1 tablet by mouth nightly as needed.   Yes Historical Provider   docusate sodium (COLACE) 100 MG capsule Take 100 mg by mouth daily as needed for Constipation.   Yes Historical Provider   fluticasone propionate (FLONASE) 50 mcg/actuation nasal spray 2 sprays (100 mcg total) by Each Nostril route once daily. 1/18/23 2/17/23 Yes Dieudonne Panchal NP   furosemide (LASIX) 20 MG tablet Take 1 tablet (20 mg total) by mouth once daily. 12/14/21  Yes Qamar Bland MD   HYDROcodone-acetaminophen (NORCO) 7.5-325 mg per tablet Take 1 tablet by mouth every 8 (eight) hours as needed for Pain (only for SEVERE PAIN). 1/20/23  Yes Qamar Bland MD   ibuprofen-diphenhydrAMINE cit (ADVIL PM) 200-38 mg Tab Take 1 tablet by mouth nightly as needed.   Yes Historical Provider   lisinopriL (PRINIVIL,ZESTRIL) 40 MG tablet TAKE 1 TABLET BY MOUTH EVERY DAY 6/28/22  Yes Qamar Bland MD   meloxicam (MOBIC) 15 MG tablet Take 1 tablet (15 mg total) by mouth once daily. 10/13/22  Yes Qamar Bland MD   molnupiravir 200 mg capsule  "(EUA) Take 4 capsules (800 mg total) by mouth every 12 (twelve) hours. for 5 days 23 Yes Dieudonne Panchal NP   mv,Ca,min-folic acid-vit K1 (ONE-A-DAY WOMEN'S 50 PLUS) 400-20 mcg Tab Take 1 tablet by mouth once daily.   Yes Historical Provider   nystatin (MYCOSTATIN) cream Apply topically 2 (two) times daily. 22  Yes Qamar Bland MD   omeprazole (PRILOSEC) 20 MG capsule TAKE 1 CAPSULE BY MOUTH EVERY DAY 22  Yes Qamar Bland MD   polyethylene glycol (GLYCOLAX) 17 gram/dose powder Take 17 g by mouth twice a week. 3/30/18  Yes Historical Provider   simvastatin (ZOCOR) 40 MG tablet TAKE 1 TABLET BY MOUTH EVERY DAY IN THE EVENING 22  Yes Qamar Bland MD   ALPRAZolam (XANAX) 0.5 MG tablet Take 1 tablet (0.5 mg total) by mouth 2 (two) times daily as needed for Anxiety.  Patient not taking: Reported on 2023   Qamar Bland MD       Family History:  No family history on file.    Social History:  Social History     Tobacco Use    Smoking status: Former     Passive exposure: Never    Smokeless tobacco: Never    Tobacco comments:     Quit 50+ yrs. ago   Substance Use Topics    Alcohol use: Yes     Comment: social    Drug use: No       Review of Systems:  Pertinent items are noted in HPI. All other systems are reviewed and are negative.       Objective:   Last 24 Hour Vital Signs:  BP  Min: 117/57  Max: 154/72  Temp  Av.4 °F (36.9 °C)  Min: 97.2 °F (36.2 °C)  Max: 99.4 °F (37.4 °C)  Pulse  Av.8  Min: 60  Max: 76  Resp  Av.3  Min: 16  Max: 18  SpO2  Av.3 %  Min: 93 %  Max: 97 %  Height  Av' 11" (149.9 cm)  Min: 4' 11" (149.9 cm)  Max: 4' 11" (149.9 cm)  Weight  Av kg (86 lb)  Min: 39 kg (86 lb)  Max: 39 kg (86 lb)  I/O last 3 completed shifts:  In: 250 [I.V.:50; IV Piggyback:200]  Out: 50 [Urine:50]    Physical Examination:  Gen: awake, alert, NAD  HEENT: normocephalic, atraumatic, PERRL, EOMI, MMM  CV: RRR, no murmurs  Lungs: CTAB, symmetric chest " rise  GI: Soft, non tender, non distended  Extrem: no edema, clubbing, or cyanosis  Skin: dry, warm, intact. Extensive bruising to arms   Neuro: AAOx3, no focal deficits       Laboratory Results:  Most Recent Data:  CBC:   Lab Results   Component Value Date    WBC 9.33 01/24/2023    HGB 10.3 (L) 01/24/2023    HCT 29.0 (L) 01/24/2023     01/24/2023    MCV 88 01/24/2023    RDW 11.5 01/24/2023     BMP:   Lab Results   Component Value Date     (L) 01/24/2023    K 4.0 01/24/2023    CL 87 (L) 01/24/2023    CO2 29 01/24/2023    BUN 18 01/24/2023     01/24/2023    CALCIUM 8.3 (L) 01/24/2023    MG 1.8 01/22/2023    PHOS 2.6 (L) 01/22/2023     LFTs:   Lab Results   Component Value Date    PROT 5.1 (L) 01/24/2023    ALBUMIN 2.7 (L) 01/24/2023    BILITOT 0.6 01/24/2023    AST 48 (H) 01/24/2023    ALKPHOS 228 (H) 01/24/2023    ALT 39 01/24/2023     Cardiac:   Lab Results   Component Value Date    TROPONINI 0.625 (H) 01/23/2023     Urinalysis:   Lab Results   Component Value Date    COLORU Yellow 01/22/2023    SPECGRAV 1.015 01/22/2023    NITRITE Negative 01/22/2023    KETONESU 1+ (A) 01/22/2023    UROBILINOGEN Negative 01/22/2023       Trended Lab Data:  Recent Labs   Lab 01/22/23  1238 01/22/23  1552 01/22/23  2031 01/23/23  0512 01/23/23  0748 01/23/23  1319 01/24/23  0023 01/24/23  0331 01/24/23  0801   WBC 6.49  --   --   --  7.11  --   --  9.33  --    HGB 13.5  --   --   --  11.6*  --   --  10.3*  --    HCT 37.5  --   --   --  32.6*  --   --  29.0*  --      --   --   --  261  --   --  244  --    MCV 88  --   --   --  87  --   --  88  --    RDW 11.5  --   --   --  11.5  --   --  11.5  --    * 124*   < > 125*  125* 126*   < > 122* 120*  120* 122*   K 3.1* 3.3*   < > 4.7  4.7 4.2   < > 3.9 4.0  4.0 4.0   CL 84* 85*   < > 88*  88* 89*   < > 87* 87*  87* 87*   CO2 28 25   < > 26  26 27   < > 28 28  28 29   BUN 16 16   < > 17  17 17   < > 18 18  18 18   * 112*   < > 90  90 91   <  > 102 102  102 105   PROT  --  6.0  --  5.2*  --   --   --  5.1*  --    ALBUMIN  --  3.1*  --  2.7*  --   --   --  2.7*  --    BILITOT  --  0.6  --  0.6  --   --   --  0.6  --    AST  --  47*  --  55*  --   --   --  48*  --    ALKPHOS  --  262*  --  236*  --   --   --  228*  --    ALT  --  38  --  39  --   --   --  39  --     < > = values in this interval not displayed.         Other Results:  Radiology:  Imaging has been reviewed personally.      Assessment and Plan:     Willow Giron is a 89 y.o. 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 She was found to be COVID positive on admit. Also had initial labs concerning for hyponatremia with sodium initially 124. This morning sodium is now 120 and nephrology is consulted.     Hyponatremia, hypochloremia   - Na 124 on arrival; urine sodium then 41 and osm 371 however received 1L LR and 250ml NS and lasix 20 PO that day as well   - Sodium has continued to decrease since admit however on 1/23 received 500ml sterile water with 5 K riders for hypokalemia  - today has not urinated enough to provide urine studies to repeat them  - Na 120 this am; chloride 87  - given history of COVID with poor PO intake and diarrhea, will give 500ml NS today and repeat labs after and adjust plan accordingly. In past has had SIADH but this clinical setting appears more consistent with volume depletion     Hypokalemia  - K now replete with K riders yesterday after resulting at 3.5  - avoid further IV Kcl since it comes in 100ml sterile water which will lower sodium further    HTN  - can continue lisinopril 40, diltiazem 240   - BP stable in 140s systolic    Hypocalcemia  - Ca corrects for low albumin     Normocytic Anemia  - Hb 10; last iron profile years ago. If Hb decreasing should check new iron  studies       Thank you for allowing us to participate in the care of this patient. Please contact me if you have any questions regarding this consult.    Estefani Joy,   [facility]

## 2023-01-25 NOTE — PLAN OF CARE
Problem: Occupational Therapy  Goal: Occupational Therapy Goal  Description: Goals to be met by: 02/24/2023      Patient will increase functional independence with ADLs by performing:    UE Dressing with Supervision.  LE Dressing with Stand-by Assistance.  Grooming while standing with Supervision.  Toileting from bedside commode with Supervision for hygiene and clothing management.   Toilet transfer to bedside commode with Supervision.  Increased functional strength to WFL for self care.  Upper extremity exercise program x10 reps per handout, with assistance as needed.     Outcome: Ongoing, Progressing   Pt progressing well towards OT goals. Cont OT POC

## 2023-01-25 NOTE — PLAN OF CARE
1900 Pt appeared to be in no distress. Reported no pain.     No accu ck.     Tele: AF,  HR  60,  No alarms.     Bed in lowest position, wheels locked, non skid socks, ID band worn, personal items and call bell with in reach, bed alarm set.

## 2023-01-25 NOTE — NURSING
Notified team that patient refused PO sodium dose due at 1500. Team requested to inform the patient of the importance of the medication and try to have her take next dose due.    normal...

## 2023-01-25 NOTE — PROGRESS NOTES
[facility] Progress Note    Subjective:      Ms. Giron is feeling fatigued today. Yesterday given normal saline while awaiting urine studies. Sodium subsequently decreased and fluids discontinued. Now fluid studies resulted and appear consistent with SIADH which was discussed with patient. She understands plan and agreeable. No chest pain, SOB, nausea, confusion, light headedness.      Objective:   Last 24 Hour Vital Signs:  BP  Min: 142/65  Max: 183/81  Temp  Av.8 °F (36.6 °C)  Min: 96.9 °F (36.1 °C)  Max: 98.6 °F (37 °C)  Pulse  Av.3  Min: 60  Max: 75  Resp  Av.9  Min: 17  Max: 18  SpO2  Av.1 %  Min: 90 %  Max: 97 %  I/O last 3 completed shifts:  In: -   Out: 110 [Urine:110]    Physical Examination:  Gen: awake, alert, NAD  HEENT: normocephalic, EOMI, MMM  CV: RRR, no murmurs  Lungs: CTAB, symmetric chest rise  GI: Soft, non tender, non distended  Extrem: no edema, clubbing, or cyanosis  Skin: dry, warm, intact. Bruising to bilateral arms  Neuro: AAOx3, no focal deficits       Laboratory:  Recent Labs   Lab 23  0512 23  0748 23  1319 23  0331 23  0801 23  2006 23  0000 23  0432   WBC  --  7.11  --  9.33  --   --   --  9.28   HGB  --  11.6*  --  10.3*  --   --   --  10.5*   HCT  --  32.6*  --  29.0*  --   --   --  29.4*   PLT  --  261  --  244  --   --   --  278   *  125* 126*   < > 120*  120*   < > 117* 118* 119*  119*   K 4.7  4.7 4.2   < > 4.0  4.0   < > 3.6 3.5 3.4*  3.4*   CL 88*  88* 89*   < > 87*  87*   < > 86* 86* 86*  86*   CREATININE 0.8  0.8 0.8   < > 0.8  0.8   < > 0.7 0.7 0.6  0.6   BUN 17  17 17   < > 18  18   < > 15 14 14  14   CO2 26  26 27   < > 28  28   < > 26 25 23  23   ALT 39  --   --  39  --   --   --  29   AST 55*  --   --  48*  --   --   --  33    < > = values in this interval not displayed.         Radiology:  Imaging has been reviewed personally.       Assessment and Plan:      Willow Evans  Mack is a 89 y.o. 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 She was found to be COVID positive on admit. Also had initial labs concerning for hyponatremia with sodium initially 124. After IV fluids sodium dropped to 120 and then again to 117. Now being treated for SIADH.      Hyponatremia due to SIADH  - Na 124 on arrival; urine sodium then 41 and osm 371 however received 1L LR and 250ml NS and lasix 20 PO that day as well   - Sodium has continued to decrease since admit however on 1/23 received 500ml sterile water with 5 K riders for hypokalemia  - 1/24 new urine studies ordered and given NS in meantime. Sodium dropped to 117 with fluids and discontinued. Urine sodium now 39, urine osm 829 and consistent with SIADH  - suspect COVID as etiology of SIADH this time though has known history of this  - continue fluid restriction of free water (solute rich fluid like broth and boost ok)  - in past had nausea with salt tabs. Will try today and see how tolerated. Urea packets are alternative but not on formulary here      Hypokalemia  - K 3.4, give PO repletion   - avoid further IV Kcl since it comes in 100ml sterile water      HTN  - can continue lisinopril 40, diltiazem 240   - /81 today  - would add nifedipine today      Hypocalcemia  - Ca corrects for low albumin      Normocytic Anemia  - Hb 10.5; last iron profile years ago. If Hb decreasing should check new iron studies       Thank you for allowing us to participate in the care of this patient. Please call with any questions.       Estefani Joy, DO  [facility]

## 2023-01-25 NOTE — PROGRESS NOTES
[facility] Progress Note    Primary Team: [facility] Hospitalist Team B  Attending Physician: Domingo Gaming MD  Resident: Vandana Ivory  Intern: Alhaji Martinez    Subjective:     Patient seen and examined by me this AM. States she is feeling a little bit better and requests to sit in bedside chair.   Denies SOB, CP, fever/chills.      Objective:     Last 24 Hour Vital Signs:  BP  Min: 142/65  Max: 163/77  Temp  Av.7 °F (36.5 °C)  Min: 96.9 °F (36.1 °C)  Max: 98.5 °F (36.9 °C)  Pulse  Av.6  Min: 60  Max: 73  Resp  Av.8  Min: 17  Max: 18  SpO2  Av %  Min: 94 %  Max: 97 %  I/O last 3 completed shifts:  In: -   Out: 110 [Urine:110]    Physical Examination:  Physical Exam  Constitutional:       General: She is not in acute distress.     Appearance: She is underweight. She is not toxic-appearing.   HENT:      Head: Normocephalic and atraumatic.      Mouth/Throat:      Mouth: Mucous membranes are moist.      Pharynx: Oropharynx is clear.   Eyes:      General: No scleral icterus.     Extraocular Movements: Extraocular movements intact.      Conjunctiva/sclera: Conjunctivae normal.   Cardiovascular:      Rate and Rhythm: Normal rate and regular rhythm.      Pulses: Normal pulses.      Heart sounds: Normal heart sounds. No murmur heard.  Pulmonary:      Effort: Pulmonary effort is normal. No respiratory distress.      Breath sounds: Normal breath sounds.      Comments: +cough, nonproductive  Abdominal:      General: Bowel sounds are normal. There is no distension.      Palpations: Abdomen is soft.      Tenderness: There is no abdominal tenderness. There is no guarding or rebound.   Musculoskeletal:         General: No swelling or tenderness.      Right lower leg: No edema.      Left lower leg: No edema.   Skin:     General: Skin is warm and dry.      Comments: +skin tenting on forehead and sternum, mildly improved from admit   Neurological:      General: No focal deficit present.      Mental Status:  She is alert. Mental status is at baseline.   Psychiatric:         Mood and Affect: Mood normal.         Behavior: Behavior normal.         Laboratory:  Na: 119<--120<--122<- 126 <- 125 <- 124  K: 3.4<--4.0<- 4.7 <- 2.5<- 3.1      Microbiology Data Reviewed:   COVID + 1/18/23  UA with rare bacteria    Other Results:    CXR with coarse interstitial marks, no focal consolidation    TTE: EF-50%, grade III LV diastolic dysfunction, moderate LA enlargement, mild to mod pulm HTN, normal RV size&systolic function.    Current Medications:     Infusions:       Scheduled:   aspirin  81 mg Oral Daily    diltiaZEM  240 mg Oral Daily    enoxaparin  30 mg Subcutaneous Daily    LIDOcaine  1 patch Transdermal Q24H    lisinopriL  40 mg Oral Daily    pantoprazole  40 mg Oral Daily    pravastatin  80 mg Oral Daily    sodium chloride  1 g Oral TID        PRN:  acetaminophen, benzonatate, dextrose 10%, dextrose 10%, glucagon (human recombinant), glucose, glucose, melatonin, naloxone, sodium chloride 0.9%        Assessment:     Willow Giron is a 89 y.o.female with PMHx spinal stenosis with injury, HTN, HLD, past SIADH episode years ago who presents with 1 week history of COVID-19 with nausea, poor po intake, cough. Found to have Na 124; admitted to [facility] for severe hyponatremia likely 2/2 volume depletion.      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    Arthritis, multiple joint involvement 07/29/2019    Normocytic anemia 03/19/2018    HCAP (healthcare-associated pneumonia) 03/18/2018    Elevated troponin     Opioid dependence 03/12/2018    Hyponatremia 02/17/2018    Essential hypertension 02/17/2018    Hyperlipidemia 02/17/2018    GERD (gastroesophageal reflux disease) 02/17/2018    Ankle edema 01/30/2018    History of thromboembolism of vein 10/23/2017    Other nonthrombocytopenic purpura 07/24/2017     Major depressive disorder, recurrent, 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   -likely 2/2 poor po intake x several days; patient very thin on exam with profound skin tenting  -Urine sodium 41, Urine osm 371; ?SIADH component; AM cortisol=33  - Currently no acute hyponatremic symptoms; mental status at baseline, Aox3  -nephrology consulted, recs greatly appreciated  -Goal correction no more than 4-6 in 24 hour period; Goal Na for today 124  -Q4 BMP checks for Na; DDAVP if overcorrects; continue to monitor closely  -repeat urine studies with: Urine Na 39, Urine osm 829  - Salt tabs TID  - Fluid Restriction     Atrial fibrillation, unknown if new  -EKG showing LBBB and prolonged QTC  -Per daughter, patient has history of A fib s/p cardioversion, now unclear if paroxysmal or resolved? Not on anticoagulation at home. Unknown baseline rhythm  - Suspect EKG changes 2/2 electrolyte derangements but concern for paroxysmal afib/flutter  -telemetry  -TTE: EF-50%, grade III LV diastolic dysfunction, moderate LA enlargement, mild to mod pulm HTN, normal RV size&systolic function  -Consider DOAC in setting of afib, however high risk with recent fall; will need cost/benefit discussion prior to initiation  -Resumed home med of lisinopril and diltiazem (as below); holding lasix given hypovolemia  - Repeat EKG      HTN:  -BP on admission 206/107  -unable to take home meds 2/2 nausea prior to admission  -Home meds: diltiazem 240, lisinopril 40, lasix 20  -Resume dilt and lisinopril at this time  -Monitor, improving with home meds    COVID+ induced N/V, weakness:  - COVID + test on 1/18  - dehydration noted on physical exam  - s/p IVF  resuscitation per nephro  - PT/OT consulted, appreciate recs  - Fluid restriction 1L    Hypokalemia:  -K= 3.1 on admission, low of 2.5 on 1/22  -likely 2/2 poor po intake   -Continue to replete PRN    Chronic back pain with opioid dependence  -Lumbar radiculopathy, arthritis, fall within last 6mths with trauma to spine with fracture  -Prescribed large quantity of opioids at home (norco 7.5 PRN TID, 75 pills monthly) as well as xanax  - Avoid opioids if possible given advanced patient age and concern for worsening of mental status and stability (has had recent falls)  - Will approach with multimodal pain control; lidocaine patch to sacrum PRN controlling pain at this time  -  on safe medication practices for geriatric patients as several of home meds are contraindicated (Beer's list)  - pain currently controlled on lidocaine patch only    HLD  -Continue home simvastatin    GERD  -Continue home omeprazole        Edinson Penaloza, MS-4  Alhaji Martinez MD  [facility] Internal Medicine HO-1    [facility] Medicine Hospitalist Pager numbers:   [facility] Hospitalist Medicine Team A (Adrien/Rufino): [phone]  [facility] Hospitalist Medicine Team B (Emily/Mekhi):  [phone]

## 2023-01-25 NOTE — PLAN OF CARE
Plan of care reviewed with pt. Pt voiced understanding. Pt shows no acute distress. Afib on monitor. Bed alarm on for pt safety.

## 2023-01-25 NOTE — PT/OT/SLP PROGRESS
Occupational Therapy   Treatment    Name: Willow Giron  MRN: [id number]  Admitting Diagnosis:  Hyponatremia       Recommendations:     Discharge Recommendations: home health OT, home health PT (with supervision and assistance from family and sitters)  Discharge Equipment Recommendations:  none  Barriers to discharge:  None    Assessment:     Willow Giron is a 89 y.o. female with a medical diagnosis of Hyponatremia.  She presents with deconditioning. Performance deficits affecting function are weakness, impaired endurance, impaired self care skills, impaired functional mobility, gait instability, decreased lower extremity function, decreased upper extremity function, impaired balance, impaired cardiopulmonary response to activity, impaired skin, pain.     Rehab Prognosis:  Good and Fair; patient would benefit from acute skilled OT services to address these deficits and reach maximum level of function.       Plan:     Patient to be seen 3 x/week to address the above listed problems via self-care/home management, therapeutic activities, therapeutic exercises  Plan of Care Expires: 02/24/23  Plan of Care Reviewed with: patient    Subjective     Pain/Comfort:  Pain Rating 1: 3/10  Location - Orientation 1: generalized  Location 1: back  Pain Addressed 1: Reposition, Distraction, Cessation of Activity  Pain Rating Post-Intervention 1:  (did not rate)    Objective:     Communicated with: nsjaniya prior to session.  Patient found up in chair with bed alarm, telemetry, peripheral IV upon OT entry to room.    General Precautions: Standard, fall    Orthopedic Precautions:N/A  Braces: N/A  Respiratory Status: Room air     Occupational Performance:     Bed Mobility:    Patient completed Rolling/Turning to Left with  moderate assistance  Patient completed Rolling/Turning to Right with moderate assistance  Patient completed Scooting/Bridging with minimum assistance  Patient completed Sit to Supine with maximal assistance      Functional Mobility/Transfers:  Patient completed Sit <> Stand Transfer with minimum assistance  with  personal 3 wheel rollator   Patient completed Bed <> Chair Transfer using Step Transfer technique with minimum assistance with personal 3 wheel rollator  Functional Mobility: Pt with fair dynamic seated and standing balance. Pt able to take very small shuffle steps for SPT to return to seated in bed, mod A to scoot posteriorly while seated EOB, min A to scoot to edge of bed side chair with use of lateral lean to off load with mod v/c to perform technique    Activities of Daily Living:  Grooming: moderate assistance to brush teeth, OT assisted to brush dentures and pt brushed lower teeth with SBA as needed.       [facility] 6 Click ADL: 15    Treatment & Education:  Pt educated on role of OT and POC.   Pt performing skills as listed above.     Patient left HOB elevated with all lines intact, call button in reach, bed alarm on, and nsg notified    GOALS:   Multidisciplinary Problems       Occupational Therapy Goals          Problem: Occupational Therapy    Goal Priority Disciplines Outcome Interventions   Occupational Therapy Goal     OT, PT/OT Ongoing, Progressing    Description: Goals to be met by: 02/24/2023      Patient will increase functional independence with ADLs by performing:    UE Dressing with Supervision.  LE Dressing with Stand-by Assistance.  Grooming while standing with Supervision.  Toileting from bedside commode with Supervision for hygiene and clothing management.   Toilet transfer to bedside commode with Supervision.  Increased functional strength to WFL for self care.  Upper extremity exercise program x10 reps per handout, with assistance as needed.                          Time Tracking:     OT Date of Treatment: 01/25/23  OT Start Time: 1355  OT Stop Time: 1430  OT Total Time (min): 35 min    Billable Minutes:Self Care/Home Management 20  Therapeutic Activity 15    OT/RACQUEL: OT     RACQUEL Visit  Number: 0    1/25/2023

## 2023-01-26 NOTE — PLAN OF CARE
Problem: Adult Inpatient Plan of Care  Goal: Plan of Care Review  Outcome: Ongoing, Progressing  Goal: Absence of Hospital-Acquired Illness or Injury  Outcome: Ongoing, Progressing  Goal: Optimal Comfort and Wellbeing  Outcome: Ongoing, Progressing  Goal: Readiness for Transition of Care  Outcome: Ongoing, Progressing     Problem: Fall Injury Risk  Goal: Absence of Fall and Fall-Related Injury  Outcome: Ongoing, Progressing     Problem: Skin Injury Risk Increased  Goal: Skin Health and Integrity  Outcome: Ongoing, Progressing     Problem: Electrolyte Imbalance  Goal: Electrolyte Balance  Outcome: Ongoing, Progressing     Problem: Fatigue  Goal: Improved Activity Tolerance  Outcome: Ongoing, Progressing

## 2023-01-26 NOTE — PT/OT/SLP PROGRESS
Physical Therapy Treatment    Patient Name:  Willow Giron   MRN:  [id number]    Recommendations:     Discharge Recommendations: home health PT, home health OT  Discharge Equipment Recommendations: none  Barriers to discharge: None    Assessment:     Willow Giron is a 89 y.o. female admitted with a medical diagnosis of Hyponatremia.  She presents with the following impairments/functional limitations: impaired endurance, weakness, impaired self care skills, impaired functional mobility, gait instability, impaired balance, decreased upper extremity function, decreased lower extremity function, decreased coordination ;pt with good mobility today, able to amb. Around room w/ Rollator (pt's own) and CGA, O2:97% on RA, forward flexed posture noted.     Rehab Prognosis: Good; patient would benefit from acute skilled PT services to address these deficits and reach maximum level of function.    Recent Surgery: * No surgery found *      Plan:     During this hospitalization, patient to be seen 3 x/week to address the identified rehab impairments via gait training, therapeutic activities, therapeutic exercises, neuromuscular re-education and progress toward the following goals:    Plan of Care Expires:  02/22/23    Subjective     Chief Complaint: none stated  Patient/Family Comments/goals: pt agreeable to session, son present in room.  Pain/Comfort:  Pain Rating 1: 0/10  Pain Rating Post-Intervention 1: 0/10      Objective:     Communicated with nurse prior to session.  Patient found up in chair with Tom, peripheral IV, telemetry, chair check upon PT entry to room.     General Precautions: Standard, fall  Orthopedic Precautions: N/A  Braces: N/A  Respiratory Status: Room air     Functional Mobility:  Transfers:     Sit to Stand:  contact guard assistance and minimum assistance with 3 wheeled rollator  Gait: amb'd ~60' w/ rollator and CGA, O2:97% on RA following, cueing for forw flexed posture.      AM-PAC 6  CLICK MOBILITY  Turning over in bed (including adjusting bedclothes, sheets and blankets)?: 4  Sitting down on and standing up from a chair with arms (e.g., wheelchair, bedside commode, etc.): 3  Moving from lying on back to sitting on the side of the bed?: 4  Moving to and from a bed to a chair (including a wheelchair)?: 3  Need to walk in hospital room?: 3  Climbing 3-5 steps with a railing?: 1  Basic Mobility Total Score: 18       Treatment & Education:  O2:94% on RA at rest  O2:97% on RA w/ amb.    Patient left up in chair with all lines intact, call button in reach, chair alarm on, nurse notified, and son present..    GOALS:   Multidisciplinary Problems       Physical Therapy Goals          Problem: Physical Therapy    Goal Priority Disciplines Outcome Goal Variances Interventions   Physical Therapy Goal     PT, PT/OT Ongoing, Progressing     Description: Goals to be met by: 2023     Patient will increase functional independence with mobility by performin. Supine to sit with Modified Gila  2. Sit to supine with Modified Gila  3. Rolling to Left and Right with Modified Gila.  4. Sit to stand transfer with Modified Gila  5. Bed to chair transfer with Modified Gila using Rolling Walker  6. Gait  x 100 feet with Stand-by Assistance using Rolling Walker.                          Time Tracking:     PT Received On: 23  PT Start Time: 1159     PT Stop Time: 1216  PT Total Time (min): 17 min     Billable Minutes: Gait Training 17    Treatment Type: Treatment  PT/PTA: PTA     PTA Visit Number: 1     2023

## 2023-01-26 NOTE — PLAN OF CARE
SW contacted pts daughter Aliya Bloom [phone] to discuss dc planning. SW expressed no dc today. Pts daughter expressed understanding. SW will continue to follow pt throughout her transitions of care and assist with any dc needs.     Pts daughter had concerns. SW notified pts nurse of pts daughter concerns.Pts daughter reported that she will be coming to hospital shortly to visit pt.        01/26/23 7211   Post-Acute Status   Post-Acute Authorization Other

## 2023-01-26 NOTE — PROGRESS NOTES
[facility] Medicine Progress Note    Primary Team: [facility] Hospitalist Team B  Attending Physician: Emily  Resident: Vandana Ivory  Intern: Alhaji Martinez    Subjective:     Patient seen and examined by me this AM.  Resting comfortably in bed this morning, asleep. Roused easily and states she feels better this AM and feels like she is sleeping well.    Denies SOB, CP, fever/chills.      Objective:     Last 24 Hour Vital Signs:  BP  Min: 118/58  Max: 192/90  Temp  Av.5 °F (36.4 °C)  Min: 96.3 °F (35.7 °C)  Max: 99.6 °F (37.6 °C)  Pulse  Av.5  Min: 61  Max: 75  Resp  Av.6  Min: 14  Max: 18  SpO2  Av.9 %  Min: 83 %  Max: 95 %  Weight  Av.5 kg (82 lb 10.8 oz)  Min: 37.5 kg (82 lb 10.8 oz)  Max: 37.5 kg (82 lb 10.8 oz)  I/O last 3 completed shifts:  In: -   Out: 310 [Urine:310]    Physical Examination:  Physical Exam  Constitutional:       General: She is not in acute distress.     Appearance: She is underweight. She is not toxic-appearing.   HENT:      Head: Normocephalic and atraumatic.      Mouth/Throat:      Mouth: Mucous membranes are moist.      Pharynx: Oropharynx is clear.   Eyes:      General: No scleral icterus.     Extraocular Movements: Extraocular movements intact.      Conjunctiva/sclera: Conjunctivae normal.   Cardiovascular:      Rate and Rhythm: Normal rate and regular rhythm.      Pulses: Normal pulses.      Heart sounds: Normal heart sounds. No murmur heard.  Pulmonary:      Effort: Pulmonary effort is normal. No respiratory distress.      Breath sounds: Normal breath sounds.      Comments: +cough, nonproductive  Abdominal:      General: Bowel sounds are normal. There is no distension.      Palpations: Abdomen is soft.      Tenderness: There is no abdominal tenderness. There is no guarding or rebound.   Musculoskeletal:         General: No swelling or tenderness.      Right lower leg: No edema.      Left lower leg: No edema.   Skin:     General: Skin is warm and dry.       Comments: +skin tenting on forehead and sternum, mildly improved from admit   Neurological:      General: No focal deficit present.      Mental Status: She is alert. Mental status is at baseline.   Psychiatric:         Mood and Affect: Mood normal.         Behavior: Behavior normal.         Laboratory:  Na: 120<--119<--120<--122<- 126 <- 125 <- 124  K: 5<--3.4<--4.0<- 4.7 <- 2.5<- 3.1      Microbiology Data Reviewed:   COVID + 1/18/23 and COVID + 1/25/23  UA with rare bacteria    Other Results:    CXR with coarse interstitial marks, no focal consolidation    TTE: EF-50%, grade III LV diastolic dysfunction, moderate LA enlargement, mild to mod pulm HTN, normal RV size&systolic function.    Current Medications:     Infusions:       Scheduled:   aspirin  81 mg Oral Daily    diltiaZEM  240 mg Oral Daily    enoxaparin  30 mg Subcutaneous Daily    LIDOcaine  1 patch Transdermal Q24H    lisinopriL  40 mg Oral Daily    NIFEdipine  30 mg Oral Daily    pantoprazole  40 mg Oral Daily    pravastatin  80 mg Oral Daily    scopolamine  1 patch Transdermal Q3 Days    sodium chloride  2 g Oral TID    urea  15 g Oral BID        PRN:  acetaminophen, benzonatate, dextrose 10%, dextrose 10%, glucagon (human recombinant), glucose, glucose, melatonin, naloxone, sodium chloride 0.9%        Assessment:     Willow Giron is a 89 y.o.female with PMHx spinal stenosis with injury, HTN, HLD, past SIADH episode years ago who presents with 1 week history of COVID-19 with nausea, poor po intake, cough. Found to have Na 124; admitted to [facility] for severe hyponatremia likely 2/2 volume depletion.      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    Arthritis, multiple joint involvement 07/29/2019    Normocytic anemia 03/19/2018    HCAP (healthcare-associated pneumonia) 03/18/2018    Elevated troponin     Opioid dependence  03/12/2018    Hyponatremia 02/17/2018    Essential hypertension 02/17/2018    Hyperlipidemia 02/17/2018    GERD (gastroesophageal reflux disease) 02/17/2018    Ankle edema 01/30/2018    History of thromboembolism of vein 10/23/2017    Other nonthrombocytopenic purpura 07/24/2017    Major depressive disorder, recurrent, 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   -likely 2/2 poor po intake x several days; patient very thin on exam with profound skin tenting  - Admit urine studies with Urine sodium 41, Urine osm 371; ?SIADH component; AM cortisol=33  - Currently no acute hyponatremic symptoms; mental status at baseline, Aox3  -nephrology consulted, recs greatly appreciated  -Goal correction no more than 4-6 in 24 hour period; Goal Na for today 124-126  -Q4 BMP checks for Na; DDAVP if overcorrects; continue to monitor closely  -repeat urine studies on 1/25/23 with: Urine Na 39, Urine osm 829  - Salt tabs TID  - Fluid Restriction  - Urea 15g BID     Atrial fibrillation, unknown if new  -EKG showing LBBB and prolonged QTC  -Per daughter, patient has history of A fib s/p cardioversion, now unclear if paroxysmal or resolved? Not on anticoagulation at home. Unknown baseline rhythm  - Suspect EKG changes 2/2 electrolyte derangements but concern for paroxysmal afib/flutter  -telemetry  -TTE: EF-50%, grade III LV diastolic dysfunction, moderate LA enlargement, mild to mod pulm HTN, normal RV size&systolic function  -Consider DOAC in setting of afib, however high risk with recent fall; will need cost/benefit discussion prior to initiation  -Resumed home med of lisinopril and diltiazem (as below); holding lasix given hypovolemia  -  Repeat EKG with RBBB, widening QRS,       HTN:  -BP on admission 206/107  -unable to take home meds 2/2 nausea prior to admission  -Home meds: diltiazem 240, lisinopril 40, lasix 20  -Resume dilt and lisinopril at this time  -Monitor, improving with home meds    COVID+ induced N/V, weakness:  - COVID + test on 1/18  - dehydration noted on physical exam  - s/p IVF resuscitation per nephro  - PT/OT consulted, appreciate recs  - Fluid restriction 1L    Hypokalemia:  -K= 3.1 on admission, low of 2.5 on 1/22  -likely 2/2 poor po intake   -Continue to replete PRN    Chronic back pain with opioid dependence  -Lumbar radiculopathy, arthritis, fall within last 6mths with trauma to spine with fracture  -Prescribed large quantity of opioids at home (norco 7.5 PRN TID, 75 pills monthly) as well as xanax  - Avoid opioids if possible given advanced patient age and concern for worsening of mental status and stability (has had recent falls)  - Will approach with multimodal pain control; lidocaine patch to sacrum PRN controlling pain at this time  -  on safe medication practices for geriatric patients as several of home meds are contraindicated (Beer's list)  - pain currently controlled on lidocaine patch only    HLD  -Continue home simvastatin    GERD  -Continue home omeprazole        Edinson Penaloza, MS-4  Alhaji Martinez MD  [facility] HO-1    [facility] Hospitalist Pager numbers:   [facility] Hospitalist Medicine Team A (Adrien/Rufino): [phone]  [facility] Hospitalist Medicine Team B (Emily/Mekhi):  [phone]

## 2023-01-26 NOTE — PROGRESS NOTES
[facility] Progress Note    Subjective:      Ms. Giron is feeling ok. Did not feel well enough to take sodium tablets yesterday and sodium decreased to 116. She did take the 2g that were ordered after that and sodium now improved to 120. Pharmacy confirmed they do have urea packets now and she is started on that this morning.      Objective:   Last 24 Hour Vital Signs:  BP  Min: 118/58  Max: 192/90  Temp  Av.3 °F (36.3 °C)  Min: 96.3 °F (35.7 °C)  Max: 99.6 °F (37.6 °C)  Pulse  Av.5  Min: 61  Max: 65  Resp  Av.3  Min: 14  Max: 18  SpO2  Av.1 %  Min: 83 %  Max: 95 %  Weight  Av.5 kg (82 lb 10.8 oz)  Min: 37.5 kg (82 lb 10.8 oz)  Max: 37.5 kg (82 lb 10.8 oz)  I/O last 3 completed shifts:  In: -   Out: 310 [Urine:310]    Physical Examination:  Gen: awake, alert, NAD  HEENT: normocephalic, EOMI, MMM  CV: RRR, no murmurs  Lungs: CTAB, symmetric chest rise  GI: Soft, non tender, non distended  Extrem: no edema, clubbing, or cyanosis  Skin: dry, warm, intact. Bruising to bilateral arms  Neuro: AAOx3, no focal deficits       Laboratory:  Recent Labs   Lab 23  0331 23  0801 23  0432 23  0751 23  0044 23  0408 23  0804   WBC 9.33  --  9.28  --   --  12.07  --    HGB 10.3*  --  10.5*  --   --  10.8*  --    HCT 29.0*  --  29.4*  --   --  30.2*  --      --  278  --   --  328  --    *  120*   < > 119*  119*   < > 119* 120*  120* 120*   K 4.0  4.0   < > 3.4*  3.4*   < > 4.4 5.0  5.0 4.8   CL 87*  87*   < > 86*  86*   < > 89* 90*  90* 91*   CREATININE 0.8  0.8   < > 0.6  0.6   < > 0.7 0.7  0.7 0.7   BUN 18  18   < > 14  14   < > 18 19  19 18   CO2 28  28   < > 23  23   < > 21* 20*  20* 21*   ALT 39  --  29  --   --  25  --    AST 48*  --  33  --   --  21  --     < > = values in this interval not displayed.       Radiology:  Imaging has been reviewed personally.       Assessment and Plan:      Willow Giron is a 89 y.o.  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 She was found to be COVID positive on admit. Also had initial labs concerning for hyponatremia with sodium initially 124. After IV fluids sodium dropped to 120 and then again to 117. Now being treated for SIADH.      Hyponatremia due to SIADH  - Na 124 on arrival; urine sodium then 41 and osm 371 however received 1L LR and 250ml NS and lasix 20 PO that day as well   - Sodium has continued to decrease since admit however on 1/23 received 500ml sterile water with 5 K riders for hypokalemia  - 1/24 new urine studies ordered and given NS in meantime. Sodium dropped to 117 with fluids and discontinued. Urine sodium now 39, urine osm 829 and consistent with SIADH  - suspect COVID as etiology of SIADH this time though has known history of this  - continue fluid restriction of free water  - not tolerating salt tablets well. Did take 2g last night and then again this morning  - urea packets now available. Take 15g BID and continue checking sodium  - Na this am 120, goal 124-126 in next 24 hours     Metabolic Acidosis  - bicarb 20, no need for repletion, just monitor for now     HTN  - can continue lisinopril 40, diltiazem 240, nifedipine 30   - BP wnl today     Hypocalcemia  - Ca corrects for low albumin      Normocytic Anemia  - Hb 10.8 and stable       Thank you for allowing us to participate in the care of this patient. Please call with any questions.       Estefani Joy, DO  [facility]

## 2023-01-26 NOTE — PLAN OF CARE
Problem: Occupational Therapy  Goal: Occupational Therapy Goal  Description: Goals to be met by: 02/24/2023      Patient will increase functional independence with ADLs by performing:    UE Dressing with Supervision.  LE Dressing with Stand-by Assistance.  Grooming while standing with Supervision.  Toileting from bedside commode with Supervision for hygiene and clothing management.   Toilet transfer to bedside commode with Supervision.  Increased functional strength to WFL for self care.  Upper extremity exercise program x10 reps per handout, with assistance as needed.     Outcome: Ongoing, Progressing   Pt progressing towards OT goals. Pt with notably foul smelling, watery BM. Pt assisted to BSC and able to complete BM seated on commode. Pt cleansed and returned to seated upright in bedside chair per pt preference but c/o nausea after seated. Nsg notified, assisted to provide pt with emesis bag and attending well to pt at end of OT session. Cont OT POC

## 2023-01-26 NOTE — NURSING
Dr Howard Marroquin notified pt required 4L NC, Pt was 83% on RA, pt was repositioned but stayed at 83%. $l NC applied, pt O2 Sat was 92%. Order received for supp O2. Will cont to monitor.

## 2023-01-26 NOTE — PT/OT/SLP PROGRESS
Occupational Therapy   Treatment    Name: Willow Giron  MRN: [id number]  Admitting Diagnosis:  Hyponatremia       Recommendations:     Discharge Recommendations: home health OT, home health PT  Discharge Equipment Recommendations:  none  Barriers to discharge:  None    Assessment:     Willow Giron is a 89 y.o. female with a medical diagnosis of Hyponatremia.  She presents with foul smelling, watery BM, nausea while seated in bed side chair at end of OT session. Nsg notified and attended well to pt. Performance deficits affecting function are weakness, impaired endurance, impaired self care skills, impaired functional mobility, gait instability, decreased lower extremity function, decreased upper extremity function, impaired cardiopulmonary response to activity, impaired skin, impaired balance.     Rehab Prognosis:  Good; patient would benefit from acute skilled OT services to address these deficits and reach maximum level of function.       Plan:     Patient to be seen 3 x/week to address the above listed problems via self-care/home management, therapeutic activities, therapeutic exercises  Plan of Care Expires: 02/24/23  Plan of Care Reviewed with: patient    Subjective     Pain/Comfort:  Pain Rating 1: 0/10    Objective:     Communicated with: nsg prior to session.  Patient found HOB elevated with bed alarm, telemetry, peripheral IV, PureWick, oxygen upon OT entry to room.    General Precautions: Standard, fall    Orthopedic Precautions:N/A  Braces: N/A  Respiratory Status: Nasal cannula, flow 3 L/min     Occupational Performance:     Bed Mobility:    Patient completed Rolling/Turning to Right with minimum assistance  Patient completed Scooting/Bridging with moderate assistance  Patient completed Supine to Sit with minimum assistance and moderate assistance     Functional Mobility/Transfers:  Patient completed Sit <> Stand Transfer with minimum assistance and moderate assistance  with  personal 3  wheeled rollator   Patient completed Bed <> Chair Transfer using Step Transfer technique with contact guard assistance and minimum assistance with  personal 3 wheeled rollator  Patient completed Toilet Transfer Stand Pivot technique with minimum assistance and moderate assistance with   personal 3 wheeled rollator to bed side commode  Functional Mobility: Pt with fair to fair- dynamic seated and standing balance.     Activities of Daily Living:  Lower Body Dressing: maximal assistance to manage diaper in toileting  Toileting: maximal assistance for waistband management and pericare      AMPAC 6 Click ADL: 15    Treatment & Education:  Pt educated on role of OT and POC.   Pt performing skills as listed above.     Patient left up in chair with all lines intact, call button in reach, chair alarm on, nsg notified, and nsg present    GOALS:   Multidisciplinary Problems       Occupational Therapy Goals          Problem: Occupational Therapy    Goal Priority Disciplines Outcome Interventions   Occupational Therapy Goal     OT, PT/OT Ongoing, Progressing    Description: Goals to be met by: 02/24/2023      Patient will increase functional independence with ADLs by performing:    UE Dressing with Supervision.  LE Dressing with Stand-by Assistance.  Grooming while standing with Supervision.  Toileting from bedside commode with Supervision for hygiene and clothing management.   Toilet transfer to bedside commode with Supervision.  Increased functional strength to WFL for self care.  Upper extremity exercise program x10 reps per handout, with assistance as needed.                          Time Tracking:     OT Date of Treatment: 01/26/23  OT Start Time: 1040  OT Stop Time: 1104  OT Total Time (min): 24 min    Billable Minutes:Self Care/Home Management 24    OT/RACQUEL: OT     RACQUEL Visit Number: 0    1/26/2023

## 2023-01-27 NOTE — NURSING
Emily group notified patient having difficulty swallowing crushed meds with pudding and sips of water coughing patient states I can't swallow. Swallow study ordered.

## 2023-01-27 NOTE — PROGRESS NOTES
[facility] Medicine Progress Note    Primary Team: [facility] Hospitalist Team B  Attending Physician: Emily  Resident: Vandana Ivory  Intern: Alhaji Martinez    Subjective:     Patient alert this AM, required O2 overnight for O2 sats 84%. Now on 4LNC. Nausea ok this AM.     Objective:     Last 24 Hour Vital Signs:  BP  Min: 120/59  Max: 137/68  Temp  Av.8 °F (36.6 °C)  Min: 97.1 °F (36.2 °C)  Max: 98.9 °F (37.2 °C)  Pulse  Av.4  Min: 58  Max: 84  Resp  Av  Min: 14  Max: 18  SpO2  Av.7 %  Min: 85 %  Max: 95 %  Weight  Av.5 kg (82 lb 10.8 oz)  Min: 37.5 kg (82 lb 10.8 oz)  Max: 37.5 kg (82 lb 10.8 oz)  I/O last 3 completed shifts:  In: 75 [P.O.:75]  Out: 0     Physical Examination:  Physical Exam  Constitutional:       General: She is not in acute distress.     Appearance: She is underweight. She is not toxic-appearing.   HENT:      Head: Normocephalic and atraumatic.      Mouth/Throat:      Mouth: Mucous membranes are moist.      Pharynx: Oropharynx is clear.   Eyes:      General: No scleral icterus.     Extraocular Movements: Extraocular movements intact.      Conjunctiva/sclera: Conjunctivae normal.   Cardiovascular:      Rate and Rhythm: Normal rate and regular rhythm.      Pulses: Normal pulses.      Heart sounds: Murmur heard.   Pulmonary:      Effort: Pulmonary effort is normal. No respiratory distress.      Breath sounds: Normal breath sounds.      Comments: +cough, nonproductive  Abdominal:      General: Bowel sounds are normal. There is no distension.      Palpations: Abdomen is soft.      Tenderness: There is no abdominal tenderness. There is no guarding or rebound.   Musculoskeletal:         General: No swelling or tenderness.      Right lower leg: No edema.      Left lower leg: No edema.   Skin:     General: Skin is warm and dry.   Neurological:      General: No focal deficit present.      Mental Status: She is alert. Mental status is at baseline.   Psychiatric:         Mood and  Affect: Mood normal.         Behavior: Behavior normal.         Laboratory:  Na: 122<- 123<- 120<--119<--120<--122<- 126 <- 125 <- 124  K: 5<--3.4<--4.0<- 4.7 <- 2.5<- 3.1      Microbiology Data Reviewed:   COVID + 1/18/23 and COVID + 1/25/23  UA with rare bacteria    Other Results:    CXR with coarse interstitial marks, no focal consolidation    TTE: EF-50%, grade III LV diastolic dysfunction, moderate LA enlargement, mild to mod pulm HTN, normal RV size&systolic function.    Current Medications:     Infusions:       Scheduled:   aspirin  81 mg Oral Daily    diltiaZEM  240 mg Oral Daily    enoxaparin  30 mg Subcutaneous Daily    LIDOcaine  1 patch Transdermal Q24H    lisinopriL  40 mg Oral Daily    NIFEdipine  30 mg Oral Daily    pantoprazole  40 mg Oral Daily    pravastatin  80 mg Oral Daily    scopolamine  1 patch Transdermal Q3 Days    sodium bicarbonate  650 mg Oral TID    urea  15 g Oral BID        PRN:  acetaminophen, benzonatate, dextrose 10%, dextrose 10%, glucagon (human recombinant), glucose, glucose, melatonin, naloxone, sodium chloride 0.9%        Assessment:     Willow Giron is a 89 y.o.female with PMHx spinal stenosis with injury, HTN, HLD, past SIADH episode years ago who presents with 1 week history of COVID-19 with nausea, poor po intake, cough. Found to have Na 124; admitted to [facility] for severe hyponatremia likely 2/2 volume depletion.      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    Arthritis, multiple joint involvement 07/29/2019    Normocytic anemia 03/19/2018    HCAP (healthcare-associated pneumonia) 03/18/2018    Elevated troponin     Opioid dependence 03/12/2018    Hyponatremia 02/17/2018    Essential hypertension 02/17/2018    Hyperlipidemia 02/17/2018    GERD (gastroesophageal reflux disease) 02/17/2018    Ankle edema 01/30/2018    History of  thromboembolism of vein 10/23/2017    Other nonthrombocytopenic purpura 07/24/2017    Major depressive disorder, recurrent, 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   -likely 2/2 poor po intake x several days; patient very thin on exam with profound skin tenting  - Admit urine studies with Urine sodium 41, Urine osm 371; ?SIADH component; AM cortisol=33  - Currently no acute hyponatremic symptoms; mental status at baseline, Aox3  -nephrology consulted, recs greatly appreciated  -Goal correction no more than 4-6 in 24 hour period; Goal Na for today 124-126  -Q4 BMP checks for Na; DDAVP if overcorrects; continue to monitor closely  -repeat urine studies on 1/25/23 with: Urine Na 39, Urine osm 829  - Salt tabs TID  - Fluid Restriction  - Urea 15g TID     Atrial fibrillation, unknown if new  -EKG showing LBBB and prolonged QTC  -Per daughter, patient has history of A fib s/p cardioversion, now unclear if paroxysmal or resolved? Not on anticoagulation at home. Unknown baseline rhythm  - Suspect EKG changes 2/2 electrolyte derangements but concern for paroxysmal afib/flutter  -telemetry  -TTE: EF-50%, grade III LV diastolic dysfunction, moderate LA enlargement, mild to mod pulm HTN, normal RV size&systolic function  -Consider DOAC in setting of afib, however high risk with recent fall; will need cost/benefit discussion prior to initiation  -Resumed home med of lisinopril and diltiazem (as below); holding lasix given hypovolemia  - Repeat EKG with RBBB, widened QRS,       HTN:  -BP on admission 206/107  -unable to take home meds 2/2 nausea prior to admission  -Home meds: diltiazem 240, lisinopril 40, lasix 20  -Added  nifedipine 30mg, improved BP with this  -Monitor, improving with home meds    COVID+ induced N/V, weakness:  - COVID + test on 1/18  - dehydration noted on physical exam  - s/p IVF resuscitation per nephro  - PT/OT consulted, appreciate recs  - Fluid restriction 1L    Hypokalemia:  -K= 3.1 on admission, low of 2.5 on 1/22  -likely 2/2 poor po intake   -Continue to replete PRN    Chronic back pain with opioid dependence  -Lumbar radiculopathy, arthritis, fall within last 6mths with trauma to spine with fracture  -Prescribed large quantity of opioids at home (norco 7.5 PRN TID, 75 pills monthly) as well as xanax  - Avoid opioids if possible given advanced patient age and concern for worsening of mental status and stability (has had recent falls)  - Will approach with multimodal pain control; lidocaine patch to sacrum PRN controlling pain at this time  -  on safe medication practices for geriatric patients as several of home meds are contraindicated (Beer's list)  - pain currently controlled on lidocaine patch only    HLD  -Continue home simvastatin    GERD  -Continue home omeprazole        Vandana Ivory MD  [facility]    [facility] Medicine Hospitalist Pager numbers:   [facility] Hospitalist Medicine Team A (Adrien/Rufino): [phone]  [facility] Hospitalist Medicine Team B (Emily/Mekhi):  [phone]

## 2023-01-27 NOTE — PT/OT/SLP EVAL
Speech Language Pathology Evaluation  Bedside Swallow    Patient Name:  Willow Giron   MRN:  [id number]  K460/K460 A  Admitting Diagnosis: Hyponatremia    Recommendations:                 General Recommendations:  Modified barium swallow study  Diet recommendations:  NPO, NPO except ice chips sparingly for pleasure  Aspiration Precautions: Frequent oral care and Strict aspiration precautions   General Precautions: Standard, fall, aspiration, airborne, contact, droplet, NPO  Communication strategies:  none    History:     Past Medical History:   Diagnosis Date    Arthritis     GERD (gastroesophageal reflux disease)     Hyperlipidemia     Hypertension     Hyponatremia        Past Surgical History:   Procedure Laterality Date     SECTION, CLASSIC      HYSTERECTOMY      SHOULDER SURGERY         Chest X-Rays: Stable upper normal to enlarged heart size and arch calcification.  Development of mild central pulmonary vascular prominence.  In this patient is based on the earlier exam does have some chronic bilateral interstitial changes, detrimental development of bilateral perihilar and bilateral lower lung zone interstitial and airspace opacities that could be on the basis of infection and or atelectasis and or edema.  Clinical correlation.  Interval development of bilateral right larger than left small effusions.  No right or left pneumothorax.  Stable osteopenia, scoliosis, spinal degenerative spondylosis changes, and abnormal appearance of the right humeral head and right glenohumeral articulation as well as bilateral shoulder girdle arthritic findings.  Reconfirmed kyphoplasty changes in association with 1 of the lower thoracic vertebral segments.    Prior diet: prior ST recommendations from 23 for mechanical soft solids and thin liquids    Subjective     Patient awake and cooperative. Family member present.   Patient goals:  none stated     Pain/Comfort:  Pain Rating 1: 0/10      Objective:     Oral  Musculature Evaluation  Oral Musculature: WFL  Dentition: upper and lower dentures  Secretion Management: coughing on secretions  Mucosal Quality: adequate  Mandibular Strength and Mobility: WFL  Oral Labial Strength and Mobility: impaired coordination  Lingual Strength and Mobility: impaired strength  Buccal Strength and Mobility: decreased tone  Volitional Cough: elicited  Volitional Swallow: multiple swallows  Voice Prior to PO Intake: clear voice    Bedside Swallow Eval:   Consistencies Assessed:  Thin liquids sips of water  Puree bites of pudding      Oral Phase:   WFL    Pharyngeal Phase:   coughing/choking  delayed swallow initation  multiple spontaneous swallows  throat clearing  wet vocal quality after swallow    Compensatory Strategies  None    Treatment: SLP provided patient and family education on possible MBSS today vs. Monday, SLP recommendations, SLP role, s/s and risks of aspiration, safe swallow precautions, and POC. All questions addressed within SLP scope and patient and patient's family member verbalized understanding of all discussed.       Assessment:     Willow Giron is a 89 y.o. female with an SLP diagnosis of Dysphagia.  She presents with increased difficulty with swallowing. Modified Barium Swallow Study to be completed to objectively r/o aspiration and determine the least restrictive and safest po diet.     Goals:   Multidisciplinary Problems       SLP Goals       Not on file                    Plan:       Plan of Care reviewed with:  patient, family   SLP Follow-Up:  Yes       Discharge recommendations:  home health PT, home health OT   Barriers to Discharge:  None    Time Tracking:     SLP Treatment Date:   01/27/23  Speech Start Time:  1330  Speech Stop Time:  1346     Speech Total Time (min):  16 min    Billable Minutes: Eval Swallow and Oral Function 8 and Self Care/Home Management Training 8    01/27/2023

## 2023-01-27 NOTE — PROGRESS NOTES
Pharmacist Intervention IV to PO Note    Willow Giron is a 89 y.o. female being treated with IV medication pantoprazole    Patient Data:    Vital Signs (Most Recent):  Temp: 97.9 °F (36.6 °C) (01/27/23 0802)  Pulse: 76 (01/27/23 1231)  Resp: 18 (01/27/23 1156)  BP: (!) 107/56 (01/27/23 1156)  SpO2: (!) 93 % (01/27/23 1156)   Vital Signs (72h Range):  Temp:  [96.3 °F (35.7 °C)-99.6 °F (37.6 °C)]   Pulse:  [58-84]   Resp:  [14-18]   BP: (107-192)/(56-90)   SpO2:  [83 %-97 %]      CBC:  Recent Labs   Lab 01/25/23  0432 01/26/23  0408 01/27/23  0242   WBC 9.28 12.07 20.17*   RBC 3.42* 3.53* 3.20*   HGB 10.5* 10.8* 10.0*   HCT 29.4* 30.2* 27.8*    328 350   MCV 86 86 87   MCH 30.7 30.6 31.3*   MCHC 35.7 35.8 36.0     CMP:     Recent Labs   Lab 01/25/23  0432 01/25/23  0751 01/26/23  0408 01/26/23  0804 01/27/23  0242 01/27/23  0746 01/27/23  1228   GLU 97  97   < > 131*  131*   < > 105  105 91 106   CALCIUM 7.9*  7.9*   < > 8.3*  8.3*   < > 8.3*  8.3* 8.4* 8.4*   ALBUMIN 2.6*  --  2.6*  --  2.3*  --   --    PROT 5.2*  --  5.3*  --  5.1*  --   --    *  119*   < > 120*  120*   < > 122*  122* 124* 125*   K 3.4*  3.4*   < > 5.0  5.0   < > 4.7  4.7 4.8 4.3   CO2 23  23   < > 20*  20*   < > 21*  21* 24 24   CL 86*  86*   < > 90*  90*   < > 92*  92* 93* 94*   BUN 14  14   < > 19  19   < > 39*  39* 40* 40*   CREATININE 0.6  0.6   < > 0.7  0.7   < > 0.7  0.7 0.8 0.8   ALKPHOS 220*  --  199*  --  156*  --   --    ALT 29  --  25  --  15  --   --    AST 33  --  21  --  17  --   --    BILITOT 0.7  --  0.7  --  0.8  --   --     < > = values in this interval not displayed.       Dietary Orders:  Diet Orders            Diet NPO: NPO starting at 01/27 1355            Based on the following criteria, this patient qualifies for intravenous to oral conversion:  [x] The patients gastrointestinal tract is functioning (tolerating medications via oral or enteral route for 24 hours and tolerating  food or enteral feeds for 24 hours).  [x] The patient is hemodynamically stable for 24 hours (heart rate <100 beats per minute, systolic blood pressure >99 mm Hg, and respiratory rate <20 breaths per minute).  [x] The patient shows clinical improvement (afebrile for at least 24 hours and white blood cell count downtrending or normalized). Additionally, the patient must be non-neutropenic (absolute neutrophil count >500 cells/mm3).  [x] For antimicrobials, the patient has received IV therapy for at least 24 hours.    pANTOPRAZOLE iv TO po . Patient has failed the swallow test    Pharmacist's Name: Chato Jenkins  Pharmacist's Extension: [phone]

## 2023-01-27 NOTE — PROCEDURES
Modified Barium Swallow    Patient Name:  Willow Giron   MRN:  [id number]  K460/K460 A      Recommendations:     Recommendations:                General Recommendations:   education and diet follow up  Diet recommendations:  Mechanical soft, Thin; meds crushed in puree  Aspiration Precautions:   1 small bite/sip at a time,   Double swallow with each bite/sip,   Frequent oral care,   HOB to 90 degrees and remain upright 30 minutes-1 hour post meal,  Continue to monitor for signs and symptoms of aspiration and discontinue oral feeding should you notice any of the following: watery eyes, reddened facial area, wet vocal quality, increased work of breathing, change in respiratory status, increased congestion, coughing, fever, etc.  General Precautions: Standard, airborne, aspiration, contact, droplet, fall  Communication strategies:  none    Referral     Reason for Referral  Patient was referred for a Modified Barium Swallow Study to assess the efficiency of his/her swallow function, rule out aspiration and make recommendations regarding safe dietary consistencies, effective compensatory strategies, and safe eating environment.     Diagnosis: Hyponatremia       History:     Past Medical History:   Diagnosis Date    Arthritis     GERD (gastroesophageal reflux disease)     Hyperlipidemia     Hypertension     Hyponatremia        Objective:     Current Respiratory Status: 01/27/23    Alert: yes    Cooperative: yes    Follows Directions: yes    Visualization  Patient was seen in the lateral view    Oral Peripheral Examination  Oral Musculature: WFL  Dentition: upper and lower dentures  Secretion Management: coughing on secretions  Mucosal Quality: adequate  Mandibular Strength and Mobility: WFL  Oral Labial Strength and Mobility: impaired coordination  Lingual Strength and Mobility: impaired strength  Buccal Strength and Mobility: decreased tone  Volitional Cough: elicited  Volitional Swallow: multiple swallows  Voice  Prior to PO Intake: clear voice    Consistencies Assessed  Thin sips of thin barium via teaspoon, cup, straw  Puree bites of pudding  Soft solids bite of peaches  Solids bite of emeterio cracker with pudding barium    Best: Rosenbek 8-Point Penetration-Aspiration Scale Score: 1: Material does not enter the airway.  Worst: Rosenbek 8-Point Penetration-Aspiration Scale Score: 2: Material enters the airway, remains above the vocal folds, and is ejected from the airway.    Oral Preparation/Oral Phase  prolonged A-P transfer  Oral residue present post swallow  Decreased base of tongue mobility    Pharyngeal Phase   Patient presents with mild-moderate pharyngeal dysphagia c/b overall general weakness, decreased BOT retraction, decreased pharyngeal wall constriction, decreased hyolaryngeal elevation/excursion, and delayed epiglottic inversion patterns resulting in inconsistent penetration of thin liquid and pudding trials which is immediately cleared by a spontaneous throat clear. Patient with moderate vallecular and piriform sinus residue which is decreased with spontaneous multiple swallows. No aspiration with all consistencies trialed.  Immediate coughing appreciated following all trials without aspiration or airway compromise observed under fluoro. General weakness of swallow suspected to be 2/2 baseline presbyphagia exacerbated by current COVID-19 diagnosis and increased overall weakness.     Cervical Esophageal Phase  UES appeared to accommodate all bolus types without stasis or retrograde movement observed     Assessment:     Impressions   Patient presents with mild-moderate oropharyngeal dysphagia c/b overall general weakness, delayed a-p transit, decreased BOT retraction, decreased pharyngeal wall constriction, decreased hyolaryngeal elevation/excursion, and delayed epiglottic inversion patterns resulting in inconsistent penetration of thin liquid and pudding trials which is immediately cleared by a spontaneous  throat clear. Patient with moderate vallecular and piriform sinus residue which is decreased with spontaneous multiple swallows. General weakness of swallow suspected to be 2/2 baseline presbyphagia exacerbated by current COVID-19 diagnosis and increased overall weakness.     Prognosis: Guarded 2/2 suspected baseline presbyphagia     Barriers:  Fatigue  Coughing following all trials    Plan  SLP will follow up to assess tolerance of diet and provide patient and family education.     Education  Results and recommendations were discussed with patient. Results were discussed with Medical Team who was in agreement with plan. SLP returned to patient's room to provide further education, however, family member no longer present in room and patient remained KRISH. SLP will continue to follow to provide ongoing education. Recommendations communicated with RN.     Goals:   Multidisciplinary Problems       SLP Goals       Not on file                    Plan:   Patient to be seen:  Therapy Frequency: 3 x/week   Plan of Care expires:     Plan of Care reviewed with:  patient        Discharge recommendations:  home health PT, home health OT   Barriers to Discharge:  None per ST discipline    Time Tracking:   SLP Treatment Date:   01/27/23  Speech Start Time:  1450  Speech Stop Time:  1515     Speech Total Time (min):  25 min    01/27/2023

## 2023-01-27 NOTE — NURSING
Patient unable to tolerate most po liquids this shift.  Patient would have coughing and increased secretions when attempted.  Patient also placed on 2L NC as patient was 86% on room air, now 92% on 2L NC.  Patient was never in distress.  Physician notified, will continue to monitor.

## 2023-01-27 NOTE — NURSING
Swallow bedside eval performed by Justine with speech, patient failed Dr. Diaz group notified, awaiting orders for modified barium swallow.

## 2023-01-27 NOTE — PROGRESS NOTES
[facility] Progress Note    Subjective:      Ms. Giron is feeling better. Sodium improving with urea packets. No changes in condition overnight.      Objective:   Last 24 Hour Vital Signs:  BP  Min: 120/59  Max: 137/68  Temp  Av.8 °F (36.6 °C)  Min: 97.1 °F (36.2 °C)  Max: 98.9 °F (37.2 °C)  Pulse  Av.4  Min: 58  Max: 84  Resp  Av  Min: 14  Max: 18  SpO2  Av.7 %  Min: 85 %  Max: 95 %  Weight  Av.5 kg (82 lb 10.8 oz)  Min: 37.5 kg (82 lb 10.8 oz)  Max: 37.5 kg (82 lb 10.8 oz)  I/O last 3 completed shifts:  In: 75 [P.O.:75]  Out: 0     Physical Examination:  Gen: awake, alert, NAD  HEENT: normocephalic, EOMI, MMM  CV: RRR, no murmurs  Lungs: CTAB, symmetric chest rise  GI: Soft, non tender, non distended  Extrem: no edema, clubbing, or cyanosis  Skin: dry, warm, intact. Bruising to bilateral arms  Neuro: AAOx3, no focal deficits       Laboratory:  Recent Labs   Lab 23  0432 23  0751 23  0408 23  0804 23  2349 23  0242 23  0746   WBC 9.28  --  12.07  --   --  20.17*  --    HGB 10.5*  --  10.8*  --   --  10.0*  --    HCT 29.4*  --  30.2*  --   --  27.8*  --      --  328  --   --  350  --    *  119*   < > 120*  120*   < > 123* 122*  122* 124*   K 3.4*  3.4*   < > 5.0  5.0   < > 4.4 4.7  4.7 4.8   CL 86*  86*   < > 90*  90*   < > 91* 92*  92* 93*   CREATININE 0.6  0.6   < > 0.7  0.7   < > 0.7 0.7  0.7 0.8   BUN 14  14   < > 19  19   < > 36* 39*  39* 40*   CO2 23  23   < > 20*  20*   < > 22* 21*  21* 24   ALT 29  --  25  --   --  15  --    AST 33  --  21  --   --  17  --     < > = values in this interval not displayed.       Radiology:  Imaging has been reviewed personally.       Assessment and Plan:      Willow Evans Giron is a 89 y.o. 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 She was found to be COVID positive on admit. Also had initial labs concerning for hyponatremia with sodium initially 124. After IV fluids sodium dropped to 120 and then again to 117. Now being treated for SIADH.      Hyponatremia due to SIADH  - Na 124 on arrival; urine sodium then 41 and osm 371 however received 1L LR and 250ml NS and lasix 20 PO that day as well   - Sodium has continued to decrease with fluids on arrival  - 1/24 sodium dropped to 117; new urine sodium now 39, urine osm 829 and consistent with SIADH  - suspect COVID as etiology of SIADH this time though also has known history of this  - continue fluid restriction of free water  - didn't tolerate salt tabs  - urea packets 15 g BID started 1/26; improved sodium from 118 to 123 today; now at 124   - can space labs to q6 hours to reduce amount of times she is being stuck   - goal sodium not to exceed 130 today     Metabolic Acidosis, resolved   - bicarb 24 now and resolved, monitor     HTN  - continue lisinopril 40, diltiazem 240, nifedipine 30   - BP wnl today     Hypocalcemia  - Ca corrects for low albumin      Normocytic Anemia  - Hb 10 and stable       Thank you for allowing us to participate in the care of this patient. Please call with any questions.       Estefani Joy, DO  [facility]

## 2023-01-27 NOTE — PLAN OF CARE
Problem: SLP  Goal: SLP Goal  Description: Short Term Goals:   1. Pt will tolerate a mechanical soft diet and thin liquids with aspiration precautions in place.   2. SLP will provide further patient and family education on MBSS results, SLP recommendations, SLP role, s/s and risks of aspiration, safe swallow precautions, and POC.    Outcome: Ongoing, Progressing  MBSS completed. Patient presents with mild-moderate pharyngeal dysphagia c/b overall general weakness, decreased BOT retraction, decreased pharyngeal wall constriction, decreased hyolaryngeal elevation/excursion, and delayed epiglottic inversion patterns resulting in inconsistent penetration of thin liquid and pudding trials which is immediately cleared by a spontaneous throat clear. Patient with moderate vallecular and piriform sinus residue which is decreased with spontaneous multiple swallows. No aspiration with all consistencies trialed.  Immediate coughing appreciated following all trials without aspiration or airway compromise observed under fluoro. General weakness of swallow suspected to be 2/2 baseline presbyphagia exacerbated by current COVID-19 diagnosis and increased overall weakness.  Diet recommendations:  Mechanical soft, Thin; meds crushed in puree  Aspiration Precautions:   1 small bite/sip at a time,   Double swallow with each bite/sip,   Frequent oral care,   HOB to 90 degrees and remain upright 30 minutes-1 hour post meal,  Continue to monitor for signs and symptoms of aspiration and discontinue oral feeding should you notice any of the following: watery eyes, reddened facial area, wet vocal quality, increased work of breathing, change in respiratory status, increased congestion, coughing, fever, etc.

## 2023-01-28 NOTE — PLAN OF CARE
Patient on oxygen with documented flow.  Will attempt to wean per O2 order protocol. Chest physiotherapy. The proper method of use, as well as anticipated side effects, of this aerosol treatment are discussed and demonstrated to the patient.  Lung expansion therapy. Will continue to monitor.

## 2023-01-28 NOTE — PT/OT/SLP PROGRESS
"Speech Language Pathology Treatment    Patient Name:  Willow Giron   MRN:  [id number]  Admitting Diagnosis: Hyponatremia    Recommendations:                 General Recommendations:   education and diet f/u x1-2  Diet recommendations:  Mechanical soft, Liquid Diet Level: Thin   Aspiration Precautions:    1 small bite/sip at a time,   Double swallow with each bite/sip,   Frequent oral care,   HOB to 90 degrees and remain upright 30 minutes-1 hour post meal,  Continue to monitor for signs and symptoms of aspiration and discontinue oral feeding should you notice any of the following: watery eyes, reddened facial area, wet vocal quality, increased work of breathing, change in respiratory status, increased congestion, coughing, fever, etc.  General Precautions: Standard, airborne, contact, fall, droplet  Communication strategies:  none    Subjective     Pt found in bed awake and alert with daughter and RN at bedside. Pt agreeable to consuming PO meds with thin liquids and puree textures; however, deferred further PO trials until breakfast tray arrived -- tray did not arrive while SLP in room.    Patient goals: "Can I try it with the water?" Per pt re: whole PO meds vs burying in puree texture, as this PO med could not be crushed.        Respiratory Status: Nasal cannula, flow 2-3 L/min    Objective:     Has the patient been evaluated by SLP for swallowing?   Yes  Keep patient NPO? No   Current Respiratory Status:        Swallowing: Pt seen this AM for dysphagia tx. Pt reported reduced appetite and continues to cough following all PO intake. Pt consumed straw sips of water (some water consumed crushed PO meds) and tsp bites of pudding (x1 with whole PO med and x2 bites with crushed PO meds)  - Oral: Good labial seal, fair bolus prep, adequate bolus control and fairly timely AP transit for thin liquids and with some postural changes noted for puree textures. X1 episode of holding whole PO med in oral cavity; however, " cleared with subsequent swallows of water. No oral residue noted across thin liquids and puree trials    Pharyngeal: Pt observed with immediate, prolonged coughing episodes s/p swallow initial trials of thin liquids and puree textures. However, per MBSS, pt with no aspiration seen under fluoro. When given relaxation and breathing techniques and cued to implement safe swallow precautions (ie slow deep breaths s/p swallow, swallowing x2-3 times per sip/bite, slow rate of intake), reduced and/or no episodes of coughing noted. However, pt required mod cueing to implement     Voice: Pt observed with rough, slightly breathy vocal quality -- SLP suspects this is in part 2/2 frequent coughing -- SLP educated pt and daughter on frequent coughing negatively impacts vocal quality and how frequent coughing is a vocally abusive behavior. SLP further educated pt and daughter on how aforementioned techniques and swallow precautions can reduced episodes of coughing/vocally abusive behavior. Pt and daughter acknowledged     Assessment:     Willow Giron is a 89 y.o. female with an SLP diagnosis of oropharyngeal Dysphagia and Dysphonia -- dysphonia appears to be in part d/t frequent coughing noted. Pt continues with consistent, prolonged episodes of coughing s/p swallow all PO intake, which as MBSS demonstrated is not d/t to aspiration, as it was not observed under fluoro. However, reduced episodes noted when redirected to implement safe swallow precautions, as well as newly given relaxation and breathing techniques during PO intake -- reduced episodes as well as length/time of coughing noted. Pt's prognosis remains guarded 2/2 suspected baseline of presbyphagia     Goals:   Multidisciplinary Problems       SLP Goals          Problem: SLP    Goal Priority Disciplines Outcome   SLP Goal     SLP Ongoing, Progressing   Description: Short Term Goals:   1. Pt will tolerate a mechanical soft diet and thin liquids with aspiration  precautions in place.   2. SLP will provide further patient and family education on MBSS results, SLP recommendations, SLP role, s/s and risks of aspiration, safe swallow precautions, and POC.  3. Pt will implement safe swallow precautions and relaxation techniques across all PO intake with 100% acc with min assist                         Plan:     Patient to be seen:  3 x/week   Plan of Care expires:  02/27/23  Plan of Care reviewed with:  patient, other (see comments), daughter (RN)   SLP Follow-Up:  Yes       Discharge recommendations:  home health OT, home health PT   Barriers to Discharge:  None    Time Tracking:     SLP Treatment Date:   01/28/23  Speech Start Time:  0845  Speech Stop Time:  0859     Speech Total Time (min):  14 min    Billable Minutes: Treatment Swallowing Dysfunction 14    01/28/2023

## 2023-01-28 NOTE — PROGRESS NOTES
[facility] Ne[facility]rology Progress Note    Subjective:      Ms. Giron is feeling better. Sodium improving with urea packets. No changes in condition overnight.      Objective:   Last 24 Hour Vital Signs:  BP  Min: 107/56  Max: 158/67  Temp  Av.5 °F (36.4 °C)  Min: 96.8 °F (36 °C)  Max: 97.9 °F (36.6 °C)  Pulse  Av.2  Min: 72  Max: 88  Resp  Av.9  Min: 18  Max: 22  SpO2  Av.9 %  Min: 93 %  Max: 99 %  I/O last 3 completed shifts:  In: 75 [P.O.:75]  Out: 0     Physical Examination:  Gen: awake, alert, NAD  HEENT: normocephalic, EOMI, MMM  CV: RRR, no murmurs  Lungs: CTAB, symmetric chest rise  GI: Soft, non tender, non distended  Extrem: no edema, clubbing, or cyanosis  Skin: dry, warm, intact. Bruising to bilateral arms  Neuro: AAOx3, no focal deficits       Laboratory:  Recent Labs   Lab 23  0408 23  0804 23  0242 23  0746 23  1806 23  0005 23  0444   WBC 12.07  --  20.17*  --   --   --  12.35   HGB 10.8*  --  10.0*  --   --   --  9.3*   HCT 30.2*  --  27.8*  --   --   --  26.7*     --  350  --   --   --  338   *  120*   < > 122*  122*   < > 127* 129* 128*   K 5.0  5.0   < > 4.7  4.7   < > 4.2 4.2 4.3   CL 90*  90*   < > 92*  92*   < > 95 95 95   CREATININE 0.7  0.7   < > 0.7  0.7   < > 0.8 0.8 0.8   BUN 19  19   < > 39*  39*   < > 37* 42* 38*   CO2 20*  20*   < > 21*  21*   < > 25 22* 22*   ALT 25  --  15  --   --   --  16   AST 21  --  17  --   --   --  17    < > = values in this interval not displayed.     Radiology:  Imaging has been reviewed personally.       Assessment and Plan:      Willow Giron is a 89 y.o. 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 She was found to be COVID positive on admit. Also had initial labs concerning for  hyponatremia with sodium initially 124. After IV fluids sodium dropped to 120 and then again to 117. Now being treated for SIADH.      Hyponatremia due to SIADH  - Na 124 on arrival; urine sodium then 41 and osm 371 however received 1L LR and 250ml NS and lasix 20 PO that day as well   - Sodium has continued to decrease with fluids on arrival  - 1/24 sodium dropped to 117; new urine sodium now 39, urine osm 829 and consistent with SIADH  - suspect COVID as etiology of SIADH this time though also has known history of this  - continue fluid restriction of free water  - didn't tolerate salt tabs  - urea packets 15 g BID started 1/26; improved sodium from 118 to 123; now at 132  - OK to check labs once daily, no need for frequent monitoring  - Will consider dc'ing urea packets upon discharge     Metabolic Acidosis, resolved   - bicarb 24 now and resolved, monitor     HTN  - continue lisinopril 40, diltiazem 240, nifedipine 30   - BP wnl today     Hypocalcemia  - Ca corrects for low albumin      Normocytic Anemia  - Hb stable at 9.3      Thank you for allowing us to participate in the care of this patient. Please call with any questions.       Lashanda Ndiaye MD  [facility], -[facility]

## 2023-01-28 NOTE — PROGRESS NOTES
"[facility] Medicine Progress Note    Primary Team: [facility] Hospital[facility] B  Attending Physician: Emily  Resident: Chance Orellana  Intern: Iron Camarena    Subjective:     Patient seen at bedside with daughter. States she feels "pretty good" today & denies new or worsening symptoms. Daughter states patient able to eat breakfast & a smoothie today without issue.     Daughter expresses need for continued PT as her mom has become weak while hospitalized. Pt endorses congestion & wheezing that is slowly improving. Has mild productive cough. Denies change in sputum color, subjective fever, or SOB. Still requiring minimal supplemental O2 via NC.     Objective:     Last 24 Hour Vital Signs:  BP  Min: 107/56  Max: 158/67  Temp  Av.4 °F (36.3 °C)  Min: 96.8 °F (36 °C)  Max: 97.9 °F (36.6 °C)  Pulse  Av  Min: 72  Max: 88  Resp  Av  Min: 18  Max: 22  SpO2  Av.6 %  Min: 93 %  Max: 99 %  I/O last 3 completed shifts:  In: 75 [P.O.:75]  Out: 0     Physical Examination:  Physical Exam  Constitutional:       General: She is not in acute distress.     Appearance: She is underweight. She is not toxic-appearing.   HENT:      Head: Normocephalic and atraumatic.      Mouth/Throat:      Mouth: Mucous membranes are moist.      Pharynx: Oropharynx is clear.   Eyes:      General: No scleral icterus.     Extraocular Movements: Extraocular movements intact.      Conjunctiva/sclera: Conjunctivae normal.   Cardiovascular:      Rate and Rhythm: Normal rate and regular rhythm.      Pulses: Normal pulses.      Heart sounds: Murmur heard.   Pulmonary:      Effort: Pulmonary effort is normal. No respiratory distress.      Breath sounds: Wheezing present.      Comments: +cough, nonproductive  Abdominal:      General: Bowel sounds are normal. There is no distension.      Palpations: Abdomen is soft.      Tenderness: There is no abdominal tenderness. There is no guarding or rebound.   Musculoskeletal:         General: No swelling or " tenderness.      Right lower leg: No edema.      Left lower leg: No edema.   Skin:     General: Skin is warm and dry.      Findings: Bruising (senile purpura) present.   Neurological:      General: No focal deficit present.      Mental Status: She is alert. Mental status is at baseline.   Psychiatric:         Mood and Affect: Mood normal.         Behavior: Behavior normal.     Laboratory:  Trended Lab Data:   Na+ 124, 122, 117, 116, 123, 127, 132    Recent Labs   Lab 01/26/23  0408 01/26/23  0804 01/27/23  0242 01/27/23  0746 01/28/23  0005 01/28/23  0444 01/28/23  1230   WBC 12.07  --  20.17*  --   --  12.35  --    HGB 10.8*  --  10.0*  --   --  9.3*  --    HCT 30.2*  --  27.8*  --   --  26.7*  --      --  350  --   --  338  --    MCV 86  --  87  --   --  89  --    RDW 11.4*  --  11.8  --   --  12.0  --    *  120*   < > 122*  122*   < > 129* 128* 132*   K 5.0  5.0   < > 4.7  4.7   < > 4.2 4.3 4.1   CL 90*  90*   < > 92*  92*   < > 95 95 98   CO2 20*  20*   < > 21*  21*   < > 22* 22* 24   BUN 19  19   < > 39*  39*   < > 42* 38* 52*   *  131*   < > 105  105   < > 102 91 141*   PROT 5.3*  --  5.1*  --   --  5.2*  --    ALBUMIN 2.6*  --  2.3*  --   --  2.2*  --    BILITOT 0.7  --  0.8  --   --  0.6  --    AST 21  --  17  --   --  17  --    ALKPHOS 199*  --  156*  --   --  156*  --    ALT 25  --  15  --   --  16  --     < > = values in this interval not displayed.      Trended Cardiac Data:   Recent Labs   Lab 01/22/23  1627 01/22/23  2211 01/23/23  0512   TROPONINI 0.834* 0.830* 0.625*        Trended Lab Data:   Recent Labs   Lab 01/22/23  1552 01/26/23  0408   FERRITIN  --  272   INR 1.0  --    APTT 25.7  --       Microbiology Data Reviewed:   COVID + 1/18/23 and COVID + 1/25/23  UA with rare bacteria    Other Results:  CXR with coarse interstitial marks, no focal consolidation    TTE: EF-50%, grade III LV diastolic dysfunction, moderate LA enlargement, mild to mod pulm HTN, normal RV  size&systolic function.    Current Medications:     Infusions:       Scheduled:   albuterol-ipratropium  3 mL Nebulization Q6H WAKE    aspirin  81 mg Oral Daily    diltiaZEM  240 mg Oral Daily    enoxaparin  30 mg Subcutaneous Daily    LIDOcaine  1 patch Transdermal Q24H    lisinopriL  40 mg Oral Daily    NIFEdipine  30 mg Oral Daily    pantoprazole  40 mg Intravenous Daily    pravastatin  80 mg Oral Daily    scopolamine  1 patch Transdermal Q3 Days    sodium bicarbonate  650 mg Oral TID    urea  15 g Oral BID        PRN:  acetaminophen, benzonatate, dextrose 10%, dextrose 10%, glucagon (human recombinant), glucose, glucose, melatonin, naloxone, sodium chloride 0.9%      Assessment:     Willow Giron is a 89 y.o.female with PMHx spinal stenosis with injury, HTN, HLD, past SIADH episode years ago who presents with 1 week history of COVID-19 with nausea, poor po intake, cough. Hyponatremia discovered on labs. Admitted to [facility] for symptomatic hyponatremia. Findings consistent with SIADH possibly related to COVID infection. Improving with urea tabs & fluid restriction.     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    Arthritis, multiple joint involvement 07/29/2019    Normocytic anemia 03/19/2018    HCAP (healthcare-associated pneumonia) 03/18/2018    Elevated troponin     Opioid dependence 03/12/2018    Hyponatremia 02/17/2018    Essential hypertension 02/17/2018    Hyperlipidemia 02/17/2018    GERD (gastroesophageal reflux disease) 02/17/2018    Ankle edema 01/30/2018    History of thromboembolism of vein 10/23/2017    Other nonthrombocytopenic purpura 07/24/2017    Major depressive disorder, recurrent, mild 01/23/2017    Peripheral vascular disease 01/23/2017    Edema 09/21/2015    Insomnia 01/13/2015    Stage 3 chronic kidney disease 01/13/2015    Lumbar radiculopathy 08/26/2013     Menopause 08/26/2013    Degeneration of lumbar intervertebral disc 08/08/2013        Plan:     Hyponatremia, hypovolemic vs ?SIADH:  -Na 124 on admission.   -likely 2/2 poor po intake x several days; patient very thin on exam with profound skin tenting  - Admit urine studies with Urine sodium 41, Urine osm 371; ?SIADH component; AM cortisol=33  - Currently no acute hyponatremic symptoms; mental status at baseline, Aox3  -nephrology consulted, recs greatly appreciated  -Goal correction no more than 4-6 in 24 hour period. Na 128 --> 132 today  -Stopping q6h BMP, can now check daily per nephrology  - Didn't tolerate salt tabs  - Fluid Restriction  - Urea 15g BID today. Will likely discontinue tomorrow if sodium remains in 130's     Atrial fibrillation, unknown if new  -EKG showing LBBB and prolonged QTC  -Per daughter, patient has history of A fib s/p cardioversion, now unclear if paroxysmal or resolved? Not on anticoagulation at home. Unknown baseline rhythm  - Suspect EKG changes 2/2 electrolyte derangements but concern for paroxysmal afib/flutter  -telemetry  -TTE: EF-50%, grade III LV diastolic dysfunction, moderate LA enlargement, mild to mod pulm HTN, normal RV size&systolic function  -Consider DOAC in setting of afib, however high risk with recent fall. Senile purpura noted diffusely. Will need cost/benefit discussion prior to initiation  -Resumed home med of lisinopril and diltiazem (as below); holding lasix given hypovolemia  -Repeat EKG with RBBB, widened QRS,     HTN:  -BP on admission 206/107  -unable to take home meds 2/2 nausea prior to admission  -Home meds: diltiazem 240, lisinopril 40, lasix 20  -Added nifedipine 30mg, improved BP with this  -Holding home lasix currently  -Monitor, improving with home meds    COVID+ induced N/V, weakness:  - COVID + test on 1/18  - dehydration noted on physical exam  - s/p IVF resuscitation per nephro  - PT/OT consulted, appreciate recs  - Fluid restriction  1L    Hypokalemia:  -K= 3.1 on admission, low of 2.5 on 1/22  -likely 2/2 poor po intake. Appetite improving  -Continue to replete PRN    Chronic back pain with opioid dependence  -Lumbar radiculopathy, arthritis, fall within last 6mths with trauma to spine with fracture  -Prescribed large quantity of opioids at home (norco 7.5 PRN TID, 75 pills monthly) as well as xanax  - Avoid opioids if possible given advanced patient age and concern for worsening of mental status and stability (has had recent falls)  - Will approach with multimodal pain control; lidocaine patch to sacrum PRN controlling pain at this time  -  on safe medication practices for geriatric patients as several of home meds are contraindicated (Beer's list)  - pain currently controlled on lidocaine patch only    HLD  -Continue home simvastatin    GERD  -Continue home omeprazole        Chance Orellana MD  [facility] Internal Medicine HO-II    [facility] Medicine Hospitalist Pager numbers:   [facility] Hospitalist Medicine Team A (Adrien/Rufino): [phone]  [facility] Hospitalist Medicine Team B (Emily/Mekhi):  [phone]

## 2023-01-28 NOTE — PLAN OF CARE
Problem: SLP  Goal: SLP Goal  Description: Short Term Goals:   1. Pt will tolerate a mechanical soft diet and thin liquids with aspiration precautions in place.   2. SLP will provide further patient and family education on MBSS results, SLP recommendations, SLP role, s/s and risks of aspiration, safe swallow precautions, and POC.  3. Pt will implement safe swallow precautions and relaxation techniques across all PO intake with 100% acc with min assist    Outcome: Ongoing, Progressing   1/28/23:  Pt seen this AM for dysphagia tx. Pt reported reduced appetite and continues to cough following all PO intake. Pt observed with immediate, prolonged coughing episodes s/p swallow PO trials. However, when given relaxation and breathing techniques (ie slow deep breaths s/p swallow, swallowing x2-3 times per sip/bite, slow rate of intake), reduced episodes of coughing noted.    SLP recs: Continue current diet recs/swallow precautions. Implement relaxation and breathing techniques during PO intake to reduce episodes of coughing

## 2023-01-29 NOTE — PROGRESS NOTES
Pharmacist Renal Dose Adjustment Note    Willow Giron is a 89 y.o. female being treated with the medication Cefepime    Patient Data:    Vital Signs (Most Recent):  Temp: 98.1 °F (36.7 °C) (01/29/23 1131)  Pulse: 78 (01/29/23 1131)  Resp: 19 (01/29/23 1131)  BP: (!) 142/63 (01/29/23 1131)  SpO2: (!) 90 % (01/29/23 1131)   Vital Signs (72h Range):  Temp:  [96 °F (35.6 °C)-98.8 °F (37.1 °C)]   Pulse:  [58-88]   Resp:  [14-22]   BP: (107-158)/(55-73)   SpO2:  [78 %-99 %]      Recent Labs   Lab 01/28/23  0444 01/28/23  1230 01/29/23  0241   CREATININE 0.8 0.7 0.7     Serum creatinine: 0.7 mg/dL 01/29/23 0241  Estimated creatinine clearance: 30.4 mL/min    Medication:Cefepime 2 gm IVPB q8h will be changed to Cefepime 2 gm IVPB q12h.      Pharmacist's Name: Yunior Mcgill  Pharmacist's Extension: [phone]

## 2023-01-29 NOTE — PROGRESS NOTES
[facility] Progress Note    Subjective:      Ms. Giron developed worsening of her cough and SOB overnight, her O2 requirements increases to 5L, now requiring 3L NC O2. Na continues to improve w/ the current regimen ~ 134 today.     Objective:   Last 24 Hour Vital Signs:  BP  Min: 119/59  Max: 155/70  Temp  Av.5 °F (36.4 °C)  Min: 96 °F (35.6 °C)  Max: 98.3 °F (36.8 °C)  Pulse  Av.2  Min: 72  Max: 82  Resp  Av.3  Min: 16  Max: 22  SpO2  Av.1 %  Min: 78 %  Max: 95 %  I/O last 3 completed shifts:  In: -   Out: 800 [Urine:800]    Physical Examination:  Gen: awake, alert, NAD  HEENT: normocephalic, EOMI, MMM  CV: RRR, no murmurs  Lungs: symmetric chest rise, has B/L wheezes  GI: Soft, non tender, non distended  Extrem: no edema, clubbing, or cyanosis  Skin: dry, warm, intact. Bruising to bilateral arms  Neuro: AAOx3, no focal deficits       Laboratory:  Recent Labs   Lab 23  0242 23  0746 23  0444 23  1230 23  0241   WBC 20.17*  --  12.35  --  13.45*   HGB 10.0*  --  9.3*  --  9.3*   HCT 27.8*  --  26.7*  --  27.4*     --  338  --  406   *  122*   < > 128* 132* 134*   K 4.7  4.7   < > 4.3 4.1 4.0   CL 92*  92*   < > 95 98 99   CREATININE 0.7  0.7   < > 0.8 0.7 0.7   BUN 39*  39*   < > 38* 52* 64*   CO2 21*  21*   < > 22* 24 26   ALT 15  --  16  --  17   AST 17  --  17  --  22    < > = values in this interval not displayed.     Radiology:  Imaging has been reviewed personally.       Assessment and Plan:      Willow Giron is a 89 y.o. 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 She was found to be COVID positive on admit. Also had initial labs concerning for hyponatremia with sodium initially 124. After IV fluids sodium dropped to 120 and then again to 117.  Now being treated for SIADH.      Hyponatremia due to SIADH  - Na 124 on arrival; urine sodium then 41 and osm 371 however received 1L LR and 250ml NS and lasix 20 PO that day as well   - Sodium has continued to decrease with fluids on arrival  - 1/24 sodium dropped to 117; new urine sodium now 39, urine osm 829 and consistent with SIADH  - suspect COVID as etiology of SIADH this time though also has known history of this  - continue fluid restriction of free water  - didn't tolerate salt tabs  - urea packets 15 g BID started 1/26; improved sodium from 118 to 123; now at 134  - OK to check labs once daily, no need for frequent monitoring  - Will consider dc'ing urea packets upon discharge     Metabolic Acidosis, resolved   - bicarb 24 now and resolved, monitor     HTN  - continue lisinopril 40, diltiazem 240, nifedipine 30   - BP wnl today     Hypocalcemia  - Ca corrects for low albumin      Normocytic Anemia  - Hb stable at 9.3      Thank you for allowing us to participate in the care of this patient. Please call with any questions.       Lashanda Ndiaye MD  [facility], [facility]

## 2023-01-29 NOTE — PROGRESS NOTES
[facility] Medicine Progress Note    Primary Team: [facility] Hospitalist Team B  Attending Physician: Emily  Resident: Chance Orellana  Intern: Iron Camarena    Subjective:     Patient seen at bedside this morning. Overnight, O2 requirement increased from 2 to 5 L NC. Patient denies SOB, worsening cough, subjective fever, sputum production, or chills this morning. O2 sat dropped from 93 to 87 after removing NC this morning. Returned to >90% at 3L NC.     Also complains of sacral pain. Appetite continues to improve.    Objective:     Last 24 Hour Vital Signs:  BP  Min: 116/55  Max: 156/67  Temp  Av.7 °F (36.5 °C)  Min: 96 °F (35.6 °C)  Max: 98.8 °F (37.1 °C)  Pulse  Av.8  Min: 72  Max: 78  Resp  Av.1  Min: 16  Max: 19  SpO2  Av.1 %  Min: 78 %  Max: 95 %  I/O last 3 completed shifts:  In: -   Out: 800 [Urine:800]    Physical Examination:  Physical Exam  Constitutional:       General: She is not in acute distress.     Appearance: She is underweight. She is not ill-appearing or toxic-appearing.   HENT:      Head: Normocephalic and atraumatic.      Mouth/Throat:      Mouth: Mucous membranes are moist.      Pharynx: Oropharynx is clear.   Eyes:      General: No scleral icterus.     Extraocular Movements: Extraocular movements intact.      Conjunctiva/sclera: Conjunctivae normal.      Pupils: Pupils are equal, round, and reactive to light.   Cardiovascular:      Rate and Rhythm: Normal rate and regular rhythm.      Pulses: Normal pulses.      Heart sounds: Murmur heard.   Pulmonary:      Effort: Pulmonary effort is normal. No respiratory distress.      Breath sounds: Wheezing and rhonchi present.      Comments: +cough, nonproductive  Abdominal:      General: Bowel sounds are normal. There is no distension.      Palpations: Abdomen is soft.      Tenderness: There is no abdominal tenderness. There is no guarding or rebound.   Musculoskeletal:         General: No swelling or tenderness.      Right lower leg: No  edema.      Left lower leg: No edema.   Skin:     General: Skin is warm and dry.      Findings: Bruising (senile purpura) present.   Neurological:      General: No focal deficit present.      Mental Status: She is alert and oriented to person, place, and time. Mental status is at baseline.      Motor: Weakness present.   Psychiatric:         Mood and Affect: Mood normal.         Behavior: Behavior normal.     Laboratory:  Trended Lab Data:   Na+ 124, 122, 117, 116, 123, 127, 132, 134    Recent Labs   Lab 01/27/23  0242 01/27/23  0746 01/28/23  0444 01/28/23  1230 01/29/23  0241   WBC 20.17*  --  12.35  --  13.45*   HGB 10.0*  --  9.3*  --  9.3*   HCT 27.8*  --  26.7*  --  27.4*     --  338  --  406   MCV 87  --  89  --  90   RDW 11.8  --  12.0  --  12.2   *  122*   < > 128* 132* 134*   K 4.7  4.7   < > 4.3 4.1 4.0   CL 92*  92*   < > 95 98 99   CO2 21*  21*   < > 22* 24 26   BUN 39*  39*   < > 38* 52* 64*     105   < > 91 141* 131*   PROT 5.1*  --  5.2*  --  5.5*   ALBUMIN 2.3*  --  2.2*  --  2.3*   BILITOT 0.8  --  0.6  --  0.5   AST 17  --  17  --  22   ALKPHOS 156*  --  156*  --  175*   ALT 15  --  16  --  17    < > = values in this interval not displayed.        Trended Cardiac Data:   Recent Labs   Lab 01/22/23  1627 01/22/23  2211 01/23/23  0512   TROPONINI 0.834* 0.830* 0.625*          Trended Lab Data:   Recent Labs   Lab 01/22/23  1552 01/26/23  0408   FERRITIN  --  272   INR 1.0  --    APTT 25.7  --         Microbiology Data Reviewed:   COVID + 1/18/23 and COVID + 1/25/23  UA with rare bacteria  Procal pending    Other Results:  CXR with coarse interstitial marks, no focal consolidation    TTE: EF-50%, grade III LV diastolic dysfunction, moderate LA enlargement, mild to mod pulm HTN, normal RV size&systolic function.    Current Medications:     Infusions:       Scheduled:   albuterol-ipratropium  3 mL Nebulization Q6H WAKE    aspirin  81 mg Oral Daily    diltiaZEM  240 mg Oral  Daily    enoxaparin  30 mg Subcutaneous Daily    LIDOcaine  1 patch Transdermal Q24H    lisinopriL  40 mg Oral Daily    NIFEdipine  30 mg Oral Daily    pantoprazole  40 mg Intravenous Daily    pravastatin  80 mg Oral Daily    scopolamine  1 patch Transdermal Q3 Days    sodium bicarbonate  650 mg Oral TID    urea  15 g Oral BID        PRN:  acetaminophen, benzonatate, dextrose 10%, dextrose 10%, glucagon (human recombinant), glucose, glucose, melatonin, naloxone, sodium chloride 0.9%      Assessment:     Willow Giron is a 89 y.o.female with PMHx spinal stenosis with injury, HTN, HLD, past SIADH episode years ago who presents with 1 week history of COVID-19 with nausea, poor po intake, cough. Hyponatremia discovered on labs. Admitted to [facility] for symptomatic hyponatremia. Findings consistent with SIADH, possibly related to COVID infection. Sodium nearly normalized with urea tabs & fluid restriction. Increased oxygen requirement overnight.     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    Arthritis, multiple joint involvement 07/29/2019    Normocytic anemia 03/19/2018    HCAP (healthcare-associated pneumonia) 03/18/2018    Elevated troponin     Opioid dependence 03/12/2018    Hyponatremia 02/17/2018    Essential hypertension 02/17/2018    Hyperlipidemia 02/17/2018    GERD (gastroesophageal reflux disease) 02/17/2018    Ankle edema 01/30/2018    History of thromboembolism of vein 10/23/2017    Other nonthrombocytopenic purpura 07/24/2017    Major depressive disorder, recurrent, mild 01/23/2017    Peripheral vascular disease 01/23/2017    Edema 09/21/2015    Insomnia 01/13/2015    Stage 3 chronic kidney disease 01/13/2015    Lumbar radiculopathy 08/26/2013    Menopause 08/26/2013    Degeneration of lumbar intervertebral disc 08/08/2013     Plan:     Hyponatremia, SIADH:  -Na 124 on  admission.   -likely 2/2 poor po intake x several days; patient very thin on exam with profound skin tenting  - Admit urine studies with Urine sodium 41, Urine osm 371; ?SIADH component; AM cortisol=33  - Nephrology consulted, recs greatly appreciated  - Na 134 today  - Potentially discontinue urea tabs & fluid restriction today. Will discuss with nephrology      Acute Hypoxic Respiratory Failure  - 1/28 increasing oxygen requirement. Requiring 3-5L NC for sats > 90%  - CXR with possible interval development of RLL infiltrate  - Repeat procalcitonin   - BNP mildly elevated. Resumed home lasix   - Continuous pulse ox today  - Initial procal 0.13, repeat 0.29  - Starting cefepime for HAP given worsening O2 requirement, procal elevation, uptrending neutrophils    COVID+ induced N/V, weakness  - COVID + test on 1/18  - Dehydration noted on physical exam  - s/p IVF resuscitation per nephro  - PT/OT consulted, appreciate recs    Atrial fibrillation, unknown if new  -EKG showing LBBB and prolonged QTC  -Per daughter, patient has history of A fib s/p cardioversion, now unclear if paroxysmal or resolved? Not on anticoagulation at home. Unknown baseline rhythm  - Suspect EKG changes 2/2 electrolyte derangements but concern for paroxysmal afib/flutter  -telemetry  -TTE: EF-50%, grade III LV diastolic dysfunction, moderate LA enlargement, mild to mod pulm HTN, normal RV size&systolic function  -Consider DOAC in setting of afib, however high risk with recent fall. Senile purpura noted diffusely. Will need cost/benefit discussion prior to initiation  -Resumed home med of lisinopril and diltiazem (as below); holding lasix given hypovolemia  -Repeat EKG with RBBB, widened QRS,     HTN:  -BP on admission 206/107  -unable to take home meds 2/2 nausea prior to admission  -Home meds: diltiazem 240, lisinopril 40, lasix 20  -Added nifedipine 30mg, improved BP  -Holding home lasix currently  -Monitor, improving with home  meds    Hypokalemia (resolved)   -K= 3.1 on admission, low of 2.5 on 1/22  -likely 2/2 poor po intake. Appetite improving  -Continue to replete PRN    Chronic back pain with opioid dependence  -Lumbar radiculopathy, arthritis, fall within last 6mths with trauma to spine with fracture  -Prescribed large quantity of opioids at home (norco 7.5 PRN TID, 75 pills monthly) as well as xanax  - Avoid opioids if possible given advanced patient age and concern for worsening of mental status and stability (has had recent falls)  - Will approach with multimodal pain control; lidocaine patch to sacrum PRN controlling pain at this time  -  on safe medication practices for geriatric patients as several of home meds are contraindicated (Beer's list)  - pain currently controlled on lidocaine patch only    HLD  -Continue home simvastatin    GERD  -Continue home omeprazole        Chance Orellana MD  [facility] Medicine [facility]    [facility] Medicine Hospitalist Pager numbers:   [facility] Hospitalist Medicine Team A (Adrien/Rufino): [phone]  [facility] Hospitalist Medicine Team B (Emily/Mekhi):  [phone]

## 2023-01-30 NOTE — ACP (ADVANCE CARE PLANNING)
Patient documented as partial code in system (no intubation). Talked to patient about her worsening respiratory status and need for more oxygen with potential need for intubation if her respiratory status declines. Patient states that if she deteriorates, would want to be intubated if it would make her better. Code changed to full.     James Liu MD

## 2023-01-30 NOTE — PROGRESS NOTES
[facility] Medicine Progress Note    Primary Team: [facility] Hospitalist Team B  Attending Physician: Emily  Resident: Peterson  Intern: Juan    Subjective:     Patient seen and examined by me this morning. Complains of dry mouth and thirstiness.     Objective:     Last 24 Hour Vital Signs:  BP  Min: 132/62  Max: 155/70  Temp  Av.3 °F (36.3 °C)  Min: 96.5 °F (35.8 °C)  Max: 98.2 °F (36.8 °C)  Pulse  Av.1  Min: 75  Max: 82  Resp  Av.7  Min: 18  Max: 22  SpO2  Av.8 %  Min: 83 %  Max: 93 %  Weight  Av.6 kg (82 lb 14.3 oz)  Min: 37.6 kg (82 lb 14.3 oz)  Max: 37.6 kg (82 lb 14.3 oz)  I/O last 3 completed shifts:  In: 400 [P.O.:400]  Out: 1400 [Urine:1400]    Physical Examination:  Physical Exam  Constitutional:       General: She is not in acute distress.     Appearance: She is underweight. She is not ill-appearing or toxic-appearing.   HENT:      Head: Normocephalic and atraumatic.      Mouth/Throat:      Mouth: Mucous membranes are moist.      Pharynx: Oropharynx is clear.   Eyes:      General: No scleral icterus.     Extraocular Movements: Extraocular movements intact.      Conjunctiva/sclera: Conjunctivae normal.      Pupils: Pupils are equal, round, and reactive to light.   Cardiovascular:      Rate and Rhythm: Normal rate and regular rhythm.      Pulses: Normal pulses.      Heart sounds: Murmur heard.   Pulmonary:      Effort: Pulmonary effort is normal. No respiratory distress.      Breath sounds: Wheezing and rhonchi present.      Comments: +cough, nonproductive  Abdominal:      General: Bowel sounds are normal. There is no distension.      Palpations: Abdomen is soft.      Tenderness: There is no abdominal tenderness. There is no guarding or rebound.   Musculoskeletal:         General: No swelling or tenderness.      Right lower leg: No edema.      Left lower leg: No edema.   Skin:     General: Skin is warm and dry.      Findings: Bruising (senile purpura) present.   Neurological:       General: No focal deficit present.      Mental Status: She is alert and oriented to person, place, and time. Mental status is at baseline.      Motor: Weakness present.   Psychiatric:         Mood and Affect: Mood normal.         Behavior: Behavior normal.     Laboratory:  Trended Lab Data:   Na+ 124, 122, 117, 116, 123, 127, 132, 134, 137    Recent Labs   Lab 01/28/23  0444 01/28/23  1230 01/29/23  0241 01/30/23  0246   WBC 12.35  --  13.45* 13.26*   HGB 9.3*  --  9.3* 10.1*   HCT 26.7*  --  27.4* 30.4*     --  406 446   MCV 89  --  90 93   RDW 12.0  --  12.2 12.2   * 132* 134* 137   K 4.3 4.1 4.0 4.4   CL 95 98 99 100   CO2 22* 24 26 27   BUN 38* 52* 64* 69*   GLU 91 141* 131* 145*   PROT 5.2*  --  5.5* 5.9*   ALBUMIN 2.2*  --  2.3* 2.4*   BILITOT 0.6  --  0.5 0.5   AST 17  --  22 17   ALKPHOS 156*  --  175* 148*   ALT 16  --  17 19        Trended Cardiac Data:   Recent Labs   Lab 01/29/23  0806   *          Trended Lab Data:   Recent Labs   Lab 01/26/23  0408   FERRITIN 272        Microbiology Data Reviewed:       Other Results:  CXR with coarse interstitial marks, no focal consolidation    TTE: EF-50%, grade III LV diastolic dysfunction, moderate LA enlargement, mild to mod pulm HTN, normal RV size&systolic function.    Current Medications:     Infusions:       Scheduled:   albuterol-ipratropium  3 mL Nebulization Q6H WAKE    aspirin  81 mg Oral Daily    ceFEPime (MAXIPIME) IVPB  2 g Intravenous Q12H    diltiaZEM  240 mg Oral Daily    enoxaparin  30 mg Subcutaneous Daily    LIDOcaine  1 patch Transdermal Q24H    lisinopriL  40 mg Oral Daily    NIFEdipine  30 mg Oral Daily    pantoprazole  40 mg Intravenous Daily    pravastatin  80 mg Oral Daily    scopolamine  1 patch Transdermal Q3 Days        PRN:  sodium chloride 0.9%, acetaminophen, benzonatate, dextrose 10%, dextrose 10%, glucagon (human recombinant), glucose, glucose, melatonin, naloxone, sodium chloride 0.9%      Assessment:      Willow Giron is a 89 y.o.female with PMHx spinal stenosis with injury, HTN, HLD, past SIADH episode years ago who presents with 1 week history of COVID-19 with nausea, poor po intake, cough. Hyponatremia discovered on labs. Admitted to [facility] for symptomatic hyponatremia. Findings consistent with SIADH, possibly related to COVID infection. Sodium nearly normalized with urea tabs & fluid restriction. Increased oxygen requirement overnight to 9L. Repeat CXR ordered.    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    Arthritis, multiple joint involvement 07/29/2019    Normocytic anemia 03/19/2018    HCAP (healthcare-associated pneumonia) 03/18/2018    Elevated troponin     Opioid dependence 03/12/2018    Hyponatremia 02/17/2018    Essential hypertension 02/17/2018    Hyperlipidemia 02/17/2018    GERD (gastroesophageal reflux disease) 02/17/2018    Ankle edema 01/30/2018    History of thromboembolism of vein 10/23/2017    Other nonthrombocytopenic purpura 07/24/2017    Major depressive disorder, recurrent, mild 01/23/2017    Peripheral vascular disease 01/23/2017    Edema 09/21/2015    Insomnia 01/13/2015    Stage 3 chronic kidney disease 01/13/2015    Lumbar radiculopathy 08/26/2013    Menopause 08/26/2013    Degeneration of lumbar intervertebral disc 08/08/2013     Plan:     Hyponatremia, SIADH:  -Na 124 on admission.   -likely 2/2 poor po intake x several days; patient very thin on exam with profound skin tenting  - Admit urine studies with Urine sodium 41, Urine osm 371; ?SIADH component; AM cortisol=33  - Nephrology consulted, recs greatly appreciated  - Na 137 today  - Potentially discontinue urea tabs & fluid restriction today.      Acute Hypoxic Respiratory Failure  - 1/28 increasing oxygen requirement. Requiring 3-5L NC for sats > 90%  - CXR with possible interval development of  RLL infiltrate  - Repeat procalcitonin   - BNP mildly elevated. Resumed home lasix   - Continuous pulse ox today  - Initial procal 0.13, repeat 0.29  - Starting cefepime for HAP given worsening O2 requirement, procal elevation, uptrending neutrophils  - Repeat CXR ordered    COVID+ induced N/V, weakness  - COVID + test on 1/18  - Dehydration noted on physical exam  - s/p IVF resuscitation per nephro  - PT/OT consulted, appreciate recs    Atrial fibrillation, unknown if new  -EKG showing LBBB and prolonged QTC  -Per daughter, patient has history of A fib s/p cardioversion, now unclear if paroxysmal or resolved? Not on anticoagulation at home. Unknown baseline rhythm  - Suspect EKG changes 2/2 electrolyte derangements but concern for paroxysmal afib/flutter  -telemetry  -TTE: EF-50%, grade III LV diastolic dysfunction, moderate LA enlargement, mild to mod pulm HTN, normal RV size&systolic function  -Consider DOAC in setting of afib, however high risk with recent fall. Senile purpura noted diffusely. Will need cost/benefit discussion prior to initiation  -Resumed home med of lisinopril and diltiazem (as below); holding lasix given hypovolemia  -Repeat EKG with RBBB, widened QRS,     HTN:  -BP on admission 206/107  -unable to take home meds 2/2 nausea prior to admission  -Home meds: diltiazem 240, lisinopril 40, lasix 20  -Added nifedipine 30mg, improved BP  -Holding home lasix currently  -Monitor, improving with home meds    Hypokalemia (resolved)   -K= 3.1 on admission, low of 2.5 on 1/22  -likely 2/2 poor po intake. Appetite improving  -Continue to replete PRN    Chronic back pain with opioid dependence  -Lumbar radiculopathy, arthritis, fall within last 6mths with trauma to spine with fracture  -Prescribed large quantity of opioids at home (norco 7.5 PRN TID, 75 pills monthly) as well as xanax  - Avoid opioids if possible given advanced patient age and concern for worsening of mental status and stability (has  had recent falls)  - Will approach with multimodal pain control; lidocaine patch to sacrum PRN controlling pain at this time  -  on safe medication practices for geriatric patients as several of home meds are contraindicated (Beer's list)  - pain currently controlled on lidocaine patch only    HLD  -Continue home simvastatin    GERD  -Continue home omeprazole        Alhaji Martinez MD  [facility] Internal Medicine [facility]    [facility] Hospitalist Pager numbers:   [facility] Hospitalist Medicine Team A (Adrien/Rufino): [phone]  [facility] Hospitalist Medicine Team B (Emily/Mekhi):  [phone]

## 2023-01-30 NOTE — CONSULTS
[facility] - Telemetry  Wound Care    Patient Name:  Willow Giron   MRN:  [id number]  Date: 1/30/2023  Diagnosis: Hyponatremia    History:     Past Medical History:   Diagnosis Date    Arthritis     GERD (gastroesophageal reflux disease)     Hyperlipidemia     Hypertension     Hyponatremia        Social History     Socioeconomic History    Marital status:    Tobacco Use    Smoking status: Former     Passive exposure: Never    Smokeless tobacco: Never    Tobacco comments:     Quit 50+ yrs. ago   Substance and Sexual Activity    Alcohol use: Yes     Comment: social    Drug use: No     Social Determinants of Health     Social Connections: Unknown    Marital Status:        Precautions:     Allergies as of 01/22/2023 - Reviewed 01/22/2023   Allergen Reaction Noted    Pcn [penicillins] Rash 10/10/2013    Sulfa (sulfonamide antibiotics) Hives 11/07/2022    Codeine Rash 05/07/2018       WO Assessment Details/Treatment     Assessment with PT while pt standing-    Coccyx- darkening non blanchable redness- pressure prevention interventions are in place-          Heels intact with no redness      Recommendations discussed with pt, nurse and Dr. Liu:   - Continue with pressure injury prevention interventions- d/c waffle overlay and begin CECILY surface  - Venelex ointment BID and prn incontinent episode      01/30/2023

## 2023-01-30 NOTE — PLAN OF CARE
Problem: Occupational Therapy  Goal: Occupational Therapy Goal  Description: Goals to be met by: 02/24/2023      Patient will increase functional independence with ADLs by performing:    UE Dressing with Supervision.  LE Dressing with Stand-by Assistance.  Grooming while standing with Supervision.  Toileting from bedside commode with Supervision for hygiene and clothing management.   Toilet transfer to bedside commode with Supervision.  Increased functional strength to WF for self care.  Upper extremity exercise program x10 reps per handout, with assistance as needed.     Outcome: Ongoing, Progressing     Patient tolerated activity fairly -- desats /c simple bed mobility and fatigues very quickly. Patient will need hospital bed if D/C home.

## 2023-01-30 NOTE — PROGRESS NOTES
[facility] Progress Note    Subjective:      Ms. Giron is resting comfortably today. Breathing more comfortably today than noted over the weekend. Has been off urea packets since  and sodium has been stable.      Objective:   Last 24 Hour Vital Signs:  BP  Min: 132/62  Max: 159/71  Temp  Av.2 °F (36.2 °C)  Min: 96.5 °F (35.8 °C)  Max: 97.8 °F (36.6 °C)  Pulse  Av.2  Min: 75  Max: 79  Resp  Av.2  Min: 18  Max: 20  SpO2  Av.1 %  Min: 83 %  Max: 94 %  Weight  Av.6 kg (82 lb 14.3 oz)  Min: 37.6 kg (82 lb 14.3 oz)  Max: 37.6 kg (82 lb 14.3 oz)  I/O last 3 completed shifts:  In: 400 [P.O.:400]  Out: 1400 [Urine:1400]    Physical Examination:  Gen: awake, alert, frail  HEENT: normocephalic, EOMI, MMM  CV: RRR, no murmurs appreciated  Lungs: diminished breath sounds,symmetric chest rise  GI: Soft, non tender, non distended  Extrem: no edema, clubbing, or cyanosis  Skin: dry, warm, intact. Bruising to arms  Neuro: AAOx3, no focal deficits      Laboratory:  Recent Labs   Lab 23  0444 23  1230 23  0241 23  0246   WBC 12.35  --  13.45* 13.26*   HGB 9.3*  --  9.3* 10.1*   HCT 26.7*  --  27.4* 30.4*     --  406 446   * 132* 134* 137   K 4.3 4.1 4.0 4.4   CL 95 98 99 100   CREATININE 0.8 0.7 0.7 0.7   BUN 38* 52* 64* 69*   CO2 22* 24 26 27   ALT 16  --  17 19   AST 17  --  22 17           Radiology:  Imaging has been reviewed personally.       Assessment and Plan:     Willow Giron is a 89 y.o. 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 She was found to be COVID positive on admit. Also had initial labs concerning for hyponatremia with sodium initially 124. After IV fluids sodium dropped to 120 and then again to 117. Now being treated for SIADH.      Hyponatremia due to  SIADH  - Na 124 on arrival; urine sodium then 41 and osm 371 however received 1L LR and 250ml NS and lasix 20 PO that day as well   - Sodium has continued to decrease with fluids on arrival  - 1/24 sodium dropped to 117; new urine sodium now 39, urine osm 829 and consistent with SIADH. Has known history of this as well   - continue fluid restriction of free water  - didn't tolerate salt tabs  - urea packets 15 g BID started 1/26; improved sodium from 118 to 123; now at 137. Last dose of urea packets 1/28 and no longer on this. Only on free water restriction  - discharge with free water restriction, no need for urea packets      Metabolic Acidosis, resolved   - bicarb 24 now and resolved, monitor     HTN  - well controlled with lisinopril 40, diltiazem 240, nifedipine 30      Hypocalcemia  - Ca corrects for low albumin      Normocytic Anemia  - Hb stable at 9.3        Thank you for allowing us to participate in the care of this patient. [facility] will now sign off.       Estefani Joy, DO  [facility]

## 2023-01-30 NOTE — PLAN OF CARE
SHEEBA contacted pts daughter Aliya Bloom (Daughter) [phone] to discuss dc planning. SHEEBA expressed PT recommendations of HH and hospital bed upon dc. SHEEBA expressed closer to dc SW will send HH orders to [facility]. Pts daughter is agreeable to hospital bed. SHEEBA sent request for hospital bed to be reviewed by Ana with [facility]. SW will continue to follow pt throughout her transitions of care and assist with any dc needs. SHEEBA expressed no dc today per MD. Pts daughter expressed understanding.        01/30/23 1345   Post-Acute Status   Post-Acute Authorization [facility]

## 2023-01-30 NOTE — PT/OT/SLP PROGRESS
Physical Therapy Treatment    Patient Name:  Willow Giron   MRN:  [id number]    Recommendations:     Discharge Recommendations: home health OT, home health PT, other (see comments) (with 24/7 assist)  Discharge Equipment Recommendations: hospital bed  Barriers to discharge:  patient with increased oxygen demands, orthostatic, requires increased assist with functional mobility.      Assessment:     Willow Giron is a 89 y.o. female admitted with a medical diagnosis of Hyponatremia.  She presents with the following impairments/functional limitations: weakness, impaired functional mobility, impaired cardiopulmonary response to activity, impaired endurance, gait instability, impaired balance, impaired skin, impaired self care skills, decreased lower extremity function     Patient seen for physical therapy treatment on this date. Patient now on 9L supplemental oxygen.  Patient agreeable to participate, but states she feels weak.  Patient rolls L with moderate assist, and performs supine to sit with max assist.  Patient reports dizziness with sitting, O2 sats decrease to mid 80s, but increase within 1-2 minutes of deep breathing.  Patient performs sit to stand with min assist to rollator, wound nurse present to assess sacrum.  Patient tolerates standing x ~ 2 minutes, but reports fatigue and dizziness and requests to lay back supine.  Patient transitions to supine with max assist x 2 and scoots to HOB with total assist.  Patient rolls L and R to adjust sheets with total assist, O2 sats decrease to low 80s with rolling, increases with supine rest and deep breathing.  Patient then performs B LE exercises.  Full report to follow.  Patient positioned in Left sidelying for pressure relief of sacral area.  Patient reports itchiness in B LEs, nsg notified.  Physical therapy will continue to follow..    Rehab Prognosis: Fair; patient would benefit from acute skilled PT services to address these deficits and reach  "maximum level of function.    Recent Surgery: * No surgery found *      Plan:     During this hospitalization, patient to be seen 3 x/week to address the identified rehab impairments via gait training, therapeutic activities, therapeutic exercises, neuromuscular re-education and progress toward the following goals:    Plan of Care Expires:  02/22/23    Subjective     Chief Complaint: "I just am weak"  Patient/Family Comments/goals: To return home at [facility]  Pain/Comfort:  Pain Rating 1: 0/10  Pain Rating Post-Intervention 1: 0/10      Objective:     Communicated with nurse [place] prior to session.  Patient found supine with PureWick, peripheral IV, telemetry, oxygen upon PT entry to room.     General Precautions: Standard, airborne, contact, droplet, fall  Orthopedic Precautions: N/A  Braces: N/A  Respiratory Status: High flow, flow 9 L/min,     Functional Mobility:  Bed Mobility:     Rolling Left:  total assistance  Rolling Right: total assistance  Scooting: total assistance  Bridging: contact guard assistance  Supine to Sit: maximal assistance  Sit to Supine: maximal assistance  Transfers:     Sit to Stand:  minimum assistance with rollator, complains of dizziness, requests to lay back down  Balance: Seated:  EOB, SBA, mild complaints of dizziness    Standing:  min A with rollator, unsteady      AM-PAC 6 CLICK MOBILITY  Turning over in bed (including adjusting bedclothes, sheets and blankets)?: 2  Sitting down on and standing up from a chair with arms (e.g., wheelchair, bedside commode, etc.): 2  Moving from lying on back to sitting on the side of the bed?: 2  Moving to and from a bed to a chair (including a wheelchair)?: 2  Need to walk in hospital room?: 1  Climbing 3-5 steps with a railing?: 1  Basic Mobility Total Score: 10       Treatment & Education:  Patient now on 9L supplemental oxygen.  Patient agreeable to participate, but states she feels weak.  Patient rolls L with moderate assist, and performs supine " to sit with max assist.  Patient reports dizziness with sitting, O2 sats decrease to mid 80s, but increase within 1-2 minutes of deep breathing.  Patient performs sit to stand with min assist to rollator, wound nurse present to assess sacrum.  Patient tolerates standing x ~ 2 minutes, but reports fatigue and dizziness and requests to lay back supine.  Patient transitions to supine with max assist x 2 and scoots to HOB with total assist.  Patient rolls L and R to adjust sheets with total assist, O2 sats decrease to low 80s with rolling, increases with supine rest and deep breathing.  Patient then performs B LE exercises - APs, Heel Slides, Hip Ab/Ad 2 x 10 reps, then bridging x 3 reps.  Patient requests to rest. Patient positioned in Left sidelying for pressure relief of sacral area.  Patient reports itchiness in B LEs, nsg notified.  Physical therapy will continue to follow..    Patient left left sidelying with all lines intact, call button in reach, bed alarm on, and nurse notified..    GOALS:   Multidisciplinary Problems       Physical Therapy Goals          Problem: Physical Therapy    Goal Priority Disciplines Outcome Goal Variances Interventions   Physical Therapy Goal     PT, PT/OT Ongoing, Progressing     Description: Goals to be met by: 2023     Patient will increase functional independence with mobility by performin. Supine to sit with Modified [place]  2. Sit to supine with Modified [place]  3. Rolling to Left and Right with Modified [place].  4. Sit to stand transfer with Modified [place]  5. Bed to chair transfer with Modified [place] using Rolling Walker  6. Gait  x 100 feet with Stand-by Assistance using Rolling Walker.                          Time Tracking:     PT Received On: 23  PT Start Time: 1058     PT Stop Time: 1124  PT Total Time (min): 26 min     Billable Minutes: Therapeutic Activity 15 and Therapeutic Exercise 11    Treatment Type:  Treatment  PT/PTA: PT     PTA Visit Number: 0     01/30/2023

## 2023-01-30 NOTE — PT/OT/SLP PROGRESS
Occupational Therapy   Treatment    Name: Willow Giron  MRN: [id number]  Admitting Diagnosis:  Hyponatremia       Recommendations:     Discharge Recommendations: home health PT, home health OT (and 24/7 assistance)  Discharge Equipment Recommendations:  hospital bed  Barriers to discharge:  None    Assessment:     Willow Giron is a 89 y.o. female with a medical diagnosis of Hyponatremia.  Performance deficits affecting function are weakness, impaired endurance, impaired self care skills, impaired functional mobility, gait instability, impaired balance, decreased coordination, decreased upper extremity function, impaired skin, decreased lower extremity function, impaired cardiopulmonary response to activity, decreased ROM, impaired coordination, impaired joint extensibility.     Rehab Prognosis:  Fair and Poor; patient would benefit from acute skilled OT services to address these deficits and reach maximum level of function.       Plan:     Patient to be seen 3 x/week to address the above listed problems via self-care/home management, therapeutic activities, therapeutic exercises  Plan of Care Expires: 02/24/23  Plan of Care Reviewed with: patient, son    Subjective     Pain/Comfort:  Pain Rating 1: 0/10  Pain Rating Post-Intervention 1: 0/10    Objective:     Communicated with: [place] maldonado prior to session.  Patient found HOB elevated with bed alarm, telemetry, PureWick, oxygen upon OT entry to room.    General Precautions: Standard, airborne, contact, droplet, fall    Orthopedic Precautions:N/A  Braces: N/A  Respiratory Status: High flow, flow 6 L/min    Bed Mobility:    Patient completed Rolling/Turning to Left with  minimum assistance and with side rail  Patient completed Rolling/Turning to Right with minimum assistance and with side rail  Patient completed Scooting/Bridging with Dep to HOB via drawsheet      Functional Mobility/Transfers:  N/a    Activities of Daily Living:  Upper Body Dressing:  minimum assistance to change gowns  Toileting: total assistance purewick not functioning properly    [facility] 6 Click ADL: 15    Treatment & Education:  P atient on 6L HFNC, at rest O2 sats 88%-91%. Despite being fatigued, patient was agreeable to sit EOB for ADL tasks. Patient found to be saturated /c urine (purewick malfunctioning). Bed mob as noted above for entire bed linen change, gown change, brief change and cleanup. Patient then stated she was too fatigued for EOB tasks and completed facial hygiene from bed level. During bed mobility, patient desats to 84-85%.    Patient left HOB elevated with all lines intact, call button in reach, bed alarm on, and son present    GOALS:   Multidisciplinary Problems       Occupational Therapy Goals          Problem: Occupational Therapy    Goal Priority Disciplines Outcome Interventions   Occupational Therapy Goal     OT, PT/OT Ongoing, Progressing    Description: Goals to be met by: 02/24/2023      Patient will increase functional independence with ADLs by performing:    UE Dressing with Supervision.  LE Dressing with Stand-by Assistance.  Grooming while standing with Supervision.  Toileting from bedside commode with Supervision for hygiene and clothing management.   Toilet transfer to bedside commode with Supervision.  Increased functional strength to WFL for self care.  Upper extremity exercise program x10 reps per handout, with assistance as needed.                          Time Tracking:     OT Date of Treatment: 01/30/23  OT Start Time: 1405  OT Stop Time: 1446  OT Total Time (min): 41 min    Billable Minutes:Self Care/Home Management 41    OT/RACQUEL: RACQUEL     RACQUEL Visit Number: 1 1/30/2023

## 2023-01-30 NOTE — PLAN OF CARE
Patient seen for physical therapy treatment on this date. Patient now on 9L supplemental oxygen.  Patient agreeable to participate, but states she feels weak.  Patient rolls L with moderate assist, and performs supine to sit with max assist.  Patient reports dizziness with sitting, O2 sats decrease to mid 80s, but increase within 1-2 minutes of deep breathing.  Patient performs sit to stand with min assist to rollator, wound nurse present to assess sacrum.  Patient tolerates standing x ~ 2 minutes, but reports fatigue and dizziness and requests to lay back supine.  Patient transitions to supine with max assist x 2 and scoots to HOB with total assist.  Patient rolls L and R to adjust sheets with total assist, O2 sats decrease to low 80s with rolling, increases with supine rest and deep breathing.  Patient then performs B LE exercises.  Full report to follow.  Patient positioned in Left sidelying for pressure relief of sacral area.  Patient reports itchiness in B LEs, nsg notified.  Physical therapy will continue to follow.      Problem: Physical Therapy  Goal: Physical Therapy Goal  Description: Goals to be met by: 2023     Patient will increase functional independence with mobility by performin. Supine to sit with Modified [place]  2. Sit to supine with Modified [place]  3. Rolling to Left and Right with Modified [place].  4. Sit to stand transfer with Modified [place]  5. Bed to chair transfer with Modified [place] using Rolling Walker  6. Gait  x 100 feet with Stand-by Assistance using Rolling Walker.     Outcome: Ongoing, Progressing

## 2023-01-31 NOTE — PLAN OF CARE
Patient received on   8 Lpm NC with SpO2    94%. Pt with no apparent distress noted. Will continue to monitor.

## 2023-01-31 NOTE — PROGRESS NOTES
Pharmacist Renal Dose Adjustment Note    Willow Giron is a 89 y.o. female being treated with the medication cefepime    Patient Data:    Vital Signs (Most Recent):  Temp: 98.2 °F (36.8 °C) (01/31/23 0429)  Pulse: 79 (01/31/23 1150)  Resp: 18 (01/31/23 1150)  BP: (!) 131/58 (01/31/23 1150)  SpO2: 95 % (01/31/23 1150)   Vital Signs (72h Range):  Temp:  [96 °F (35.6 °C)-98.3 °F (36.8 °C)]   Pulse:  [72-82]   Resp:  [16-22]   BP: (116-159)/(55-71)   SpO2:  [78 %-95 %]      Recent Labs   Lab 01/29/23  0241 01/30/23  0246 01/31/23  0244   CREATININE 0.7 0.7 0.8     Serum creatinine: 0.8 mg/dL 01/31/23 0244  Estimated creatinine clearance: 26.6 mL/min    Medication:cefepime dose: 2grams frequency q12h will be changed to medication:cefepime dose:2grams frequency:q24h    Pharmacist's Name: Chato Jenkins  Pharmacist's Extension: [phone]

## 2023-01-31 NOTE — PLAN OF CARE
Recommendation:  1. Encourage intake of meals & Boost as tolerated.   2. Add Boost Pudding BID.   3. Monitor weight/labs.   4. RD to follow to monitor po intake    Goals:  Pt will tolerate diet with at least 50% intake at meals by RD follow up  Nutrition Goal Status: new

## 2023-01-31 NOTE — PT/OT/SLP PROGRESS
Speech Language Pathology Treatment    Patient Name:  Willow Giron   MRN:  [id number]  [id number]    Admitting Diagnosis: Hyponatremia    Recommendations:                 General Recommendations:  education and diet f/u x 1  Diet recommendations:  Mechanical soft, Liquid Diet Level: Thin   Aspiration Precautions:    1 small bite/sip at a time,   Double swallow with each bite/sip,   Frequent oral care,   HOB to 90 degrees and remain upright 30 minutes-1 hour post meal,  Continue to monitor for signs and symptoms of aspiration and discontinue oral feeding should you notice any of the following: watery eyes, reddened facial area, wet vocal quality, increased work of breathing, change in respiratory status, increased congestion, coughing, fever, etc.  General Precautions: Standard, airborne, contact, droplet, fall  Communication strategies:  none    Subjective     Pt found in bed awake and alert with daughter and PCT at bedside.  Patient's O2 in upper 70s and low 80s upon SLP entry. RN and RT entered room to adjust oxygen and O2 sats returned to 90s.     Respiratory Status: HFNC    Objective:     Has the patient been evaluated by SLP for swallowing?   Yes  Keep patient NPO? No   Current Respiratory Status:    high flow nasal cannula    Patient seen for ongoing education and to assess tolerance of diet. Patient's daughter present in the room reports increased tolerance of po intake over the weekend. Daughter reports patient with adequate tolerance of most soft solids, however, she continues to have difficulty with chopped meats. Patient and family selecting food items Ms. Giron feels she can tolerate. Pharmacy present to administer medications. HOB elevated for po medications with pharmacy and trials with SLP. Patient with severely hoarse vocal quality and reported sore throat which is likely 2/2 frequent coughing. Patient accepted crushed medications in puree x2 tsp bites, whole medications with straw sips of  water, sips of juice via cup and straw, and bites of yogurt. She presents with continued coughing throughout po trials, however, noted to decrease when accepted 1 small sip and bite at a time. Coughing noted during MBSS completed on 1/27 with no aspiration observed under x-ray. Increased coughing appreciated when patient took self regulated straw sips.     SLP provided patient and family education on MBSS results, continued risk for aspiration from suspected baseline presbyphagia which is likely exacerbated by increased weakness with COVID-19 diagnosis, SLP recommendations, importance of taking small bites and sips at a slow rate, SLP role, s/s and risks of aspiration, safe swallow precautions, and POC. All questions addressed within SLP scope and patient and patient's family verbalized understanding of all discussed.        Assessment:     Willow Giron is a 89 y.o. female with an SLP diagnosis of oropharyngeal Dysphagia and Dysphonia -- dysphonia appears to be in part d/t frequent coughing noted. Pt continues with consistent, prolonged episodes of coughing s/p swallow all PO intake, which as MBSS demonstrated is not d/t to aspiration, as it was not observed under fluoro. However, reduced episodes noted when redirected to implement safe swallow precautions. Pt's prognosis remains guarded 2/2 suspected baseline of presbyphagia.    Goals:   Multidisciplinary Problems       SLP Goals          Problem: SLP    Goal Priority Disciplines Outcome   SLP Goal     SLP Ongoing, Progressing   Description: Short Term Goals:   1. Pt will tolerate a mechanical soft diet and thin liquids with aspiration precautions in place.   2. SLP will provide further patient and family education on MBSS results, SLP recommendations, SLP role, s/s and risks of aspiration, safe swallow precautions, and POC.  3. Pt will implement safe swallow precautions and relaxation techniques across all PO intake with 100% acc with min assist                          Plan:     Patient to be seen:  3 x/week   Plan of Care expires:  02/27/23  Plan of Care reviewed with:  patient, family   SLP Follow-Up:  Yes       Discharge recommendations:  home health speech therapy   Barriers to Discharge:  None    Time Tracking:     SLP Treatment Date:   01/31/23  Speech Start Time:  0822  Speech Stop Time:  0916     Speech Total Time (min):  54 min    Billable Minutes: Treatment Swallowing Dysfunction 24 Education 30    01/31/2023

## 2023-01-31 NOTE — PROGRESS NOTES
[facility] Medicine Progress Note     Primary Team: [facility] Hospitalist Team B  Attending Physician: Andrea Diaz MD  Resident: James Winkler  Intern: Alhaji Martinez     Subjective:      No acute events overnight. Lidocaine patch changed yesterday but notes 10/10 pain of low back. Endorses continued difficulty breathing. Satting 94% on 8L O2. Denies CP or fever/ chills.      Objective:      Last 24 Hour Vital Signs:  BP  Min: 124/60  Max: 159/71  Temp  Av.7 °F (36.5 °C)  Min: 97 °F (36.1 °C)  Max: 98.2 °F (36.8 °C)  Pulse  Av.5  Min: 75  Max: 81  Resp  Av  Min: 16  Max: 20  SpO2  Av %  Min: 88 %  Max: 94 %  I/O last 3 completed shifts:  In: 650 [P.O.:650]  Out: 1200 [Urine:1200]     Physical Examination:  Constitutional:       General: She is not in acute distress.     Appearance: She is underweight. She is not ill-appearing or toxic-appearing.   HENT:      Head: Normocephalic and atraumatic.      Mouth/Throat:      Mouth: Mucous membranes are moist.      Pharynx: Oropharynx is clear.   Eyes:      General: No scleral icterus.     Extraocular Movements: Extraocular movements intact.      Conjunctiva/sclera: Conjunctivae normal.      Pupils: Pupils are equal, round, and reactive to light.   Cardiovascular:      Rate and Rhythm: Normal rate and regular rhythm.      Pulses: Normal pulses.      Heart sounds: Murmur heard.   Pulmonary:      Effort: Pulmonary effort is normal. No respiratory distress.      Breath sounds: Wheezing and rhonchi present.      Comments: +cough, nonproductive  Abdominal:      General: Bowel sounds are normal. There is no distension.      Palpations: Abdomen is soft.      Tenderness: There is no abdominal tenderness. There is no guarding or rebound.   Musculoskeletal:         General: No swelling or tenderness.      Right lower leg: No edema.      Left lower leg: No edema.   Skin:     General: Skin is warm and dry.      Findings: Bruising (senile purpura) present.    Neurological:      General: No focal deficit present.      Mental Status: She is alert and oriented to person, place, and time. Mental status is at baseline.      Motor: Weakness present.   Psychiatric:         Mood and Affect: Mood normal.         Behavior: Behavior normal.         Laboratory:  Trended Lab Data:  Na+ 124, 122, 117, 116, 123, 127, 132, 134, 137, 140           Other Results:     Radiology Data Reviewed: yes  Pertinent Findings:  CXR (1/30)- no acute process, small R low effusion     CXR (1/29)- with coarse interstitial marks, no focal consolidation     TTE: EF-50%, grade III LV diastolic dysfunction, moderate LA enlargement, mild to mod pulm HTN, normal RV size&systolic function.     Current Medications:     Infusions:        Scheduled:   albuterol-ipratropium  3 mL Nebulization Q6H WAKE    aspirin  81 mg Oral Daily    balsam peru-castor oiL   Topical (Top) BID    ceFEPime (MAXIPIME) IVPB  2 g Intravenous Q12H    diltiaZEM  240 mg Oral Daily    enoxaparin  30 mg Subcutaneous Daily    furosemide (LASIX) injection  40 mg Intravenous Once    LIDOcaine  1 patch Transdermal Q24H    LIDOcaine  1 patch Transdermal Q24H    lisinopriL  40 mg Oral Daily    NIFEdipine  30 mg Oral Daily    pantoprazole  40 mg Intravenous Daily    polyethylene glycol  17 g Oral Daily    pravastatin  80 mg Oral Daily    scopolamine  1 patch Transdermal Q3 Days    senna  8.6 mg Oral BID         PRN:  sodium chloride 0.9%, acetaminophen, benzonatate, dextrose 10%, dextrose 10%, glucagon (human recombinant), glucose, glucose, melatonin, naloxone, sodium chloride 0.9%     Antibiotics and Day Number of Therapy:  Cefepime 2g [id number]        Assessment:      Willow Giron is a 89 y.o.female with PMHx spinal stenosis with injury, HTN, HLD, past SIADH episode years ago who presents with 1 week history of COVID-19 with nausea, poor po intake, cough. Hyponatremia discovered on labs. Admitted to [facility] for symptomatic  hyponatremia. Findings consistent with SIADH, possibly related to COVID infection. Sodium nearly normalized with urea tabs & fluid restriction. Increased oxygen requirement overnight to 9L. Repeat CXR ordered.          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    Arthritis, multiple joint involvement 07/29/2019    Normocytic anemia 03/19/2018    HCAP (healthcare-associated pneumonia) 03/18/2018    Elevated troponin      Opioid dependence 03/12/2018    Hyponatremia 02/17/2018    Essential hypertension 02/17/2018    Hyperlipidemia 02/17/2018    GERD (gastroesophageal reflux disease) 02/17/2018    Ankle edema 01/30/2018    History of thromboembolism of vein 10/23/2017    Other nonthrombocytopenic purpura 07/24/2017    Major depressive disorder, recurrent, mild 01/23/2017    Peripheral vascular disease 01/23/2017    Edema 09/21/2015    Insomnia 01/13/2015    Stage 3 chronic kidney disease 01/13/2015    Lumbar radiculopathy 08/26/2013    Menopause 08/26/2013    Degeneration of lumbar intervertebral disc 08/08/2013         Plan:         Acute Hypoxic Respiratory Failure  - 1/28 increasing oxygen requirement. Requiring 8L NC for sats > 90%  - CXR with possible interval development of RLL infiltrate  - BNP mildly elevated. Resumed home lasix   - Continuous pulse ox today  - Initial procal 0.13, repeat 0.29  - Starting cefepime for HAP given worsening O2 requirement, procal elevation, uptrending neutrophils  - Repeat CXR  - IV lasix 40mg given this AM, monitor UOP     Hyponatremia, SIADH:  -Na 124 on admission.   -likely 2/2 poor po intake x several days; patient very thin on exam with profound skin tenting  - Admit urine studies with Urine sodium 41, Urine osm 371; ?SIADH component; AM cortisol=33  - Nephrology signed off  - Na 140 today  - urea tabs discontinued& fluid restriction today.      COVID+  induced N/V, weakness  - COVID + test on 1/18  - Dehydration noted on physical exam  - s/p IVF resuscitation per nephro  - PT/OT consulted, appreciate recs     Atrial fibrillation, unknown if new  -EKG showing LBBB and prolonged QTC  -Per daughter, patient has history of A fib s/p cardioversion, now unclear if paroxysmal or resolved? Not on anticoagulation at home. Unknown baseline rhythm  - Suspect EKG changes 2/2 electrolyte derangements but concern for paroxysmal afib/flutter  -telemetry  -TTE: EF-50%, grade III LV diastolic dysfunction, moderate LA enlargement, mild to mod pulm HTN, normal RV size&systolic function  -Consider DOAC in setting of afib, however high risk with recent fall. Senile purpura noted diffusely. Will need cost/benefit discussion prior to initiation  -Resumed home med of lisinopril and diltiazem (as below); holding lasix given hypovolemia  -Repeat EKG with RBBB, widened QRS,      HTN:  -BP on admission 206/107  -unable to take home meds 2/2 nausea prior to admission  -Home meds: diltiazem 240, lisinopril 40, lasix 20  -Added nifedipine 30mg, improved BP  -Holding home lasix currently  -Monitor, improving with home meds     Hypokalemia (resolved)   -K= 3.1 on admission, low of 2.5 on 1/22  -likely 2/2 poor po intake. Appetite improving  -Continue to replete PRN     Chronic back pain with opioid dependence  -Lumbar radiculopathy, arthritis, fall within last 6mths with trauma to spine with fracture  -Prescribed large quantity of opioids at home (norco 7.5 PRN TID, 75 pills monthly) as well as xanax  - Avoid opioids if possible given advanced patient age and concern for worsening of mental status and stability (has had recent falls)  - Will approach with multimodal pain control; lidocaine patch to sacrum PRN controlling pain at this time  -  on safe medication practices for geriatric patients as several of home meds are contraindicated (Beer's list)  - pain currently controlled on  lidocaine patch only     HLD  -Continue home simvastatin     GERD  -Continue home omeprazole           Edinson Penaloza, MS-4     Alhaji Martinez  [facility] HO-1     [facility] Hospitalist Pager numbers:   [facility] Hospitalist Medicine Team A (Adrien/Rufino):          [phone]  [facility] Hospitalist Medicine Team B (Emily/Mekhi):        [phone]

## 2023-01-31 NOTE — PROGRESS NOTES
Wound care follow up:  Wound to coccyx less red than yesterday's assessment- applied venelex ointment to wound with moisture barrier to buttocks/perineum. Will continue to monitor redness for any further skin breakdown.  CECILY surface in place and pt repositioned pt to L side.  Family at bedside. Notified nurse Sebas.

## 2023-01-31 NOTE — PLAN OF CARE
Referral sent to [facility].       01/31/23 1533   Post-Acute Status   Post-Acute Authorization Placement   Post-Acute Placement Status Referrals Sent       Ankit Orellana RN   Supervisor Case Management-[facility]  [phone]

## 2023-01-31 NOTE — PLAN OF CARE
SHEEBA faxed and emailed social work consult for [facility] placement  to [facility].        01/31/23 8898   Post-Acute Status   Post-Acute Authorization Placement

## 2023-01-31 NOTE — PT/OT/SLP PROGRESS
"Occupational Therapy   Treatment    Name: Willow Giron  MRN: [id number]  Admitting Diagnosis:  Hyponatremia       Recommendations:     Discharge Recommendations: home health PT, home health OT (Patient will need 24/7 supervision and assistance)  Discharge Equipment Recommendations:  hospital bed  Barriers to discharge:  None    Assessment:     Willow Giron is a 89 y.o. female with a medical diagnosis of Hyponatremia.  Performance deficits affecting function are weakness, impaired endurance, impaired self care skills, impaired functional mobility, gait instability, impaired balance, pain, decreased safety awareness, impaired skin, decreased lower extremity function, decreased upper extremity function, impaired coordination, decreased coordination, decreased ROM, impaired joint extensibility, impaired cardiopulmonary response to activity, impaired fine motor.     Rehab Prognosis:  Poor; patient would benefit from acute skilled OT services to address these deficits and reach maximum level of function.       Plan:     Patient to be seen 3 x/week to address the above listed problems via self-care/home management, therapeutic activities, therapeutic exercises  Plan of Care Expires: 02/24/23  Plan of Care Reviewed with: patient    Subjective     Pain/Comfort:  Pain Rating 1:  (did not rate; "my butt hurts")  Location 1: sacral spine  Pain Addressed 1: Reposition, Distraction, Cessation of Activity, Nurse notified    Objective:     Communicated with: Sebas maldonado prior to session.  Patient found HOB elevated with bed alarm, pulse ox (continuous), oxygen, PureWick upon OT entry to room.    General Precautions: Standard, airborne, contact, droplet, fall, respiratory    Orthopedic Precautions:N/A  Braces: N/A  Respiratory Status: High flow, flow 9 L/min    Bed Mobility:    Patient completed Rolling/Turning to Left with  moderate assistance and with side rail  Patient completed Rolling/Turning to Right with " moderate assistance and with side rail  Patient completed Scooting/Bridging with dependent and 2 persons to HOB via drawsheet    Functional Mobility/Transfers:  N/A; patient declined    Activities of Daily Living:  Feeding:  minimum assistance to drink from cup using straw; maximum encouragement to attempt by herself    [facility] 6 Click ADL: 10    Treatment & Education:  Patient reporting increased fatigue and feeling unwell; declined EOB/OOB, despite encouragement. Patient agreeable to more therapeutic positioning, as she has a sacral wound and requires pressure offloading. Bed mob as noted above; rolled multiple times to adjust sheets and pads, place pillows for sacral offloading, pillow between knees for improved back/hip alignment and pillow for UEs for support/comfort.     Patient left right sidelying with all lines intact, call button in reach, bed alarm on, and nsg notified    GOALS:   Multidisciplinary Problems       Occupational Therapy Goals          Problem: Occupational Therapy    Goal Priority Disciplines Outcome Interventions   Occupational Therapy Goal     OT, PT/OT Ongoing, Progressing    Description: Goals to be met by: 02/24/2023      Patient will increase functional independence with ADLs by performing:    UE Dressing with Supervision.  LE Dressing with Stand-by Assistance.  Grooming while standing with Supervision.  Toileting from bedside commode with Supervision for hygiene and clothing management.   Toilet transfer to bedside commode with Supervision.  Increased functional strength to WFL for self care.  Upper extremity exercise program x10 reps per handout, with assistance as needed.                          Time Tracking:     OT Date of Treatment: 01/31/23  OT Start Time: 1011  OT Stop Time: 1035  OT Total Time (min): 24 min Cotx /c PT 2/2 decreased tolerance/endurance for multiple sessions.    Billable Minutes:Self Care/Home Management 8  Therapeutic Activity 16    OT/RACQUEL: RACQUEL     RACQUEL Visit  Number: 2    1/31/2023

## 2023-01-31 NOTE — PROGRESS NOTES
"[facility] - Telemetry  Adult Nutrition  Progress Note    SUMMARY       Recommendations    Recommendation:  1. Encourage intake of meals & Boost as tolerated.   2. Add Boost Pudding BID.   3. Monitor weight/labs.   4. RD to follow to monitor po intake    Goals:  Pt will tolerate diet with at least 50% intake at meals by RD follow up  Nutrition Goal Status: new  Communication of RD Recs: reviewed with RN    Assessment and Plan  Nutrition Problem  Inadequate energy intake    Related to (etiology):   COVID    Signs and Symptoms (as evidenced by):   Poor intake at meals     Interventions:  Collaboration with other providers  Commercial beverage    Nutrition Diagnosis Status:   New      Malnutrition Assessment             Weight Loss (Malnutrition):  (10% x 5 months)                         Reason for Assessment    Reason For Assessment: length of stay  Diagnosis:  (hyponatremia)  Relevant Medical History: HTN, HLD, arthritis, hysterectomy, shoulder surgery  General Information Comments: Pt on dysphagia [facility] Soft diet with Boost Plus. Noted poor intake at meals. Boost was ordered but she hasn't received-notified dietary. Chiki 16- coccyx wound. Noted 11lb weight loss x 5 months. Pt on COVID isolation-unable to assess NFPE  Nutrition Discharge Planning: pt to d/c on dysphagia [facility] Soft diet    Nutrition Risk Screen    Nutrition Risk Screen: difficulty chewing/swallowing    Nutrition/Diet History    Food Preferences: no Anglican or cultural food prefs identified  Spiritual, Cultural Beliefs, Baptist Practices, Values that Affect Care: no  Factors Affecting Nutritional Intake: decreased appetite    Anthropometrics    Temp: 98.2 °F (36.8 °C)  Height Method: Stated  Height: 4' 11" (149.9 cm)  Height (inches): 59 in  Weight Method: Bed Scale  Weight: 37.6 kg (82 lb 14.3 oz)  Weight (lb): 82.89 lb  Ideal Body Weight (IBW), Female: 95 lb  % Ideal Body Weight, Female (lb): 87.25 %  BMI (Calculated): 16.7  BMI Grade: 16 - 16.9 " protein-energy malnutrition grade II  Usual Body Weight (UBW), k.2 kg ()  % Usual Body Weight: 89.29  % Weight Change From Usual Weight: -10.9 %       Lab/Procedures/Meds    Pertinent Labs Reviewed: reviewed  Pertinent Labs Comments: BUN 42H, Glu 136H, Alb 2.3L  Pertinent Medications Reviewed: reviewed  Pertinent Medications Comments: aspirin, lisniopril, pantoprazole, senna    Physical Findings/Assessment         Estimated/Assessed Needs    Weight Used For Calorie Calculations: 37.6 kg (82 lb 14.3 oz)  Energy Calorie Requirements (kcal): 1316 (35 kcal/kg)  Energy Need Method: Kcal/kg  Protein Requirements: 45g (1.2g/kg)  Weight Used For Protein Calculations: 37.6 kg (82 lb 14.3 oz)     Estimated Fluid Requirement Method: RDA Method  RDA Method (mL): 1316         Nutrition Prescription Ordered    Current Diet Order: dysphagia [facility] Soft  Oral Nutrition Supplement: Boost Plus    Evaluation of Received Nutrient/Fluid Intake    I/O: 250/600  Energy Calories Required: not meeting needs  Protein Required: not meeting needs  Fluid Required: not meeting needs  Comments: LBM 1/26  % Intake of Estimated Energy Needs: 0 - 25 %  % Meal Intake: 0 - 25 %    Nutrition Risk  Level of Risk/Frequency of Follow-up:  (2xweekly)     Monitor and Evaluation  Food and Nutrient Intake: food and beverage intake  Food and Nutrient Adminstration: diet order  Physical Activity and Function: nutrition-related ADLs and IADLs  Anthropometric Measurements: weight  Biochemical Data, Medical Tests and Procedures: electrolyte and renal panel  Nutrition-Focused Physical Findings: overall appearance     Nutrition Follow-Up  RD Follow-up?: Yes

## 2023-01-31 NOTE — PT/OT/SLP PROGRESS
Physical Therapy Treatment    Patient Name:  Willow Giron   MRN:  [id number]    Recommendations:     Discharge Recommendations: home health OT, home health PT (24/7 assist)  Discharge Equipment Recommendations: hospital bed  Barriers to discharge:  increased oxygen demands and assist for functional mobility    Assessment:     Willow Giron is a 89 y.o. female admitted with a medical diagnosis of Hyponatremia.  She presents with the following impairments/functional limitations: weakness, gait instability, impaired balance, impaired endurance, impaired self care skills, impaired functional mobility, decreased upper extremity function, decreased lower extremity function, impaired fine motor, impaired coordination, decreased coordination, decreased ROM, impaired cardiopulmonary response to activity, pain, decreased safety awareness .Pt reports not feeling well this date and wishing to remain resting. Pt agreeable to B rolling and repositioning. Pt educated on importance of turning schedule for pressure relief. Cont with PT POC per pt's tolerance.     Rehab Prognosis: Poor; patient would benefit from acute skilled PT services to address these deficits and reach maximum level of function.    Recent Surgery: * No surgery found *      Plan:     During this hospitalization, patient to be seen 3 x/week to address the identified rehab impairments via gait training, therapeutic activities, therapeutic exercises, neuromuscular re-education and progress toward the following goals:    Plan of Care Expires:  02/22/23    Subjective     Chief Complaint: not feeling well, fatigue  Patient/Family Comments/goals: Pt reports not feeling well this date and wishing to remain resting.  Pain/Comfort:  Pain Rating 1:  (did not rate)  Location - Side 1: Bilateral  Location 1:  (buttocks)  Pain Addressed 1: Reposition, Distraction, Nurse notified  Pain Rating Post-Intervention 1:  (not rated)      Objective:     Communicated with  nsg prior to session.  Patient found HOB elevated with bed alarm, pulse ox (continuous), oxygen, PureWick upon PT entry to room.     General Precautions: Standard, airborne, contact, droplet, fall, respiratory  Orthopedic Precautions: N/A  Braces: N/A  Respiratory Status: High flow, flow 9 L/min     Functional Mobility:  Bed Mobility:     Rolling Left:  moderate assistance X 1 trial  Rolling Right: moderate assistance X 2 trials  Scooting: dependence and of 2 persons to HOB with use of draw sheet      AM-PAC 6 CLICK MOBILITY  Turning over in bed (including adjusting bedclothes, sheets and blankets)?: 2  Sitting down on and standing up from a chair with arms (e.g., wheelchair, bedside commode, etc.): 1  Moving from lying on back to sitting on the side of the bed?: 1  Moving to and from a bed to a chair (including a wheelchair)?: 1  Need to walk in hospital room?: 1  Climbing 3-5 steps with a railing?: 1  Basic Mobility Total Score: 7       Treatment & Education:  Pt reports not feeling well this date and wishing to remain resting. Pt declined EOB/OOB activity despite max education and encouragement  Pt agreeable to B rolling and repositioning. Repositioned bed sheets/linens  Pt educated on importance of turning schedule for pressure relief / to offload sacrum due to wound present  Pt positioned in R side-lying with pillows to promote neutral alignment of hips/spine and for UE support as well as for comfort  SpO2 maintained >90% with bed mobility    Patient left right sidelying with all lines intact, call button in reach, bed alarm on, and nsg notified..    GOALS:   Multidisciplinary Problems       Physical Therapy Goals          Problem: Physical Therapy    Goal Priority Disciplines Outcome Goal Variances Interventions   Physical Therapy Goal     PT, PT/OT Ongoing, Progressing     Description: Goals to be met by: 2023     Patient will increase functional independence with mobility by performin. Supine to  sit with Modified [place]  2. Sit to supine with Modified [place]  3. Rolling to Left and Right with Modified [place].  4. Sit to stand transfer with Modified [place]  5. Bed to chair transfer with Modified [place] using Rolling Walker  6. Gait  x 100 feet with Stand-by Assistance using Rolling Walker.                          Time Tracking:     PT Received On: 01/31/23  PT Start Time: 1011     PT Stop Time: 1035  PT Total Time (min): 24 min     Billable Minutes: Therapeutic Activity 24 (cotx with OT 2/2 decreased activity tolerance)    Treatment Type: Treatment  PT/PTA: PT     PTA Visit Number: 0     01/31/2023

## 2023-01-31 NOTE — PLAN OF CARE
Problem: Physical Therapy  Goal: Physical Therapy Goal  Description: Goals to be met by: 2023     Patient will increase functional independence with mobility by performin. Supine to sit with Modified [place]  2. Sit to supine with Modified [place]  3. Rolling to Left and Right with Modified [place].  4. Sit to stand transfer with Modified [place]  5. Bed to chair transfer with Modified [place] using Rolling Walker  6. Gait  x 100 feet with Stand-by Assistance using Rolling Walker.     Outcome: Ongoing, Progressing     Pt reports not feeling well this date and wishing to remain resting. Pt agreeable to B rolling and repositioning. Pt educated on importance of turning schedule for pressure relief. Cont with PT POC per pt's tolerance.

## 2023-02-01 NOTE — PLAN OF CARE
Patient on high flow nasal cannula in no apparent distress, given aerosol and CPT treatment, no adverse reactions will continue to monitor.

## 2023-02-01 NOTE — PLAN OF CARE
VN note: Patient chart, labs, and vitals reviewed. VN to continue to be available as needed.       Problem: Adult Inpatient Plan of Care  Goal: Plan of Care Review  Outcome: Ongoing, Progressing

## 2023-02-01 NOTE — PROGRESS NOTES
Wound care follow up:    Heels intact with no redness- heel boots in place    Coccyx- non blanchable redness- no drainage- no purple discolored areas- applied venelex ointment and foam dressing- repositioned pt and notified nursing to maintain pressure injury prevention interventions.

## 2023-02-01 NOTE — PROGRESS NOTES
[facility] Medicine Progress Note     Primary Team: \[facility]\"" Hospitalist Team B  Attending Physician: Andrea Diaz MD  Resident: James Winkler  Intern: Ronak More     Subjective:      NAEON. Pt reports she is feeling okay and breathing okay. She is having difficulty speaking and complaining of a cough.      Objective:      Last 24 Hour Vital Signs:  BP  Min: 124/60  Max: 159/71  Temp  Av.7 °F (36.5 °C)  Min: 97 °F (36.1 °C)  Max: 98.2 °F (36.8 °C)  Pulse  Av.5  Min: 75  Max: 81  Resp  Av  Min: 16  Max: 20  SpO2  Av %  Min: 88 %  Max: 94 %  I/O last 3 completed shifts:  In: 650 [P.O.:650]  Out: 1200 [Urine:1200]     Physical Examination:  Constitutional:       General: She is not in acute distress.     Appearance: She is underweight. She is not ill-appearing or toxic-appearing.   HENT:      Head: Normocephalic and atraumatic.      Mouth/Throat:      Mouth: Mucous membranes are moist.      Pharynx: Oropharynx is clear.   Eyes:      General: No scleral icterus.     Extraocular Movements: Extraocular movements intact.      Conjunctiva/sclera: Conjunctivae normal.      Pupils: Pupils are equal, round, and reactive to light.   Cardiovascular:      Rate and Rhythm: Normal rate and regular rhythm.      Pulses: Normal pulses.      Heart sounds: Murmur heard.   Pulmonary:      Effort: Pulmonary effort is normal. No respiratory distress.      Breath sounds: rhonchi and rales present.      Comments: +cough, nonproductive  Abdominal:      General: Bowel sounds are normal. There is no distension.      Palpations: Abdomen is soft.      Tenderness: There is no abdominal tenderness. There is no guarding or rebound.   Musculoskeletal:         General: No swelling or tenderness.      Right lower leg: No edema.      Left lower leg: No edema.   Skin:     General: Skin is warm and dry.      Findings: Bruising (senile purpura) present.   Neurological:      General: No focal deficit present.      Mental Status:  She is alert and oriented to person, place, and time. Mental status is at baseline.      Motor: Weakness present.   Psychiatric:         Mood and Affect: Mood normal.         Behavior: Behavior normal.         Laboratory:  Trended Lab Data:  Na+ 124, 122, 117, 116, 123, 127, 132, 134, 137, 140           Other Results:     Radiology Data Reviewed: yes  Pertinent Findings:  CXR (1/30)- no acute process, small R low effusion     CXR (1/29)- with coarse interstitial marks, no focal consolidation     TTE: EF-50%, grade III LV diastolic dysfunction, moderate LA enlargement, mild to mod pulm HTN, normal RV size&systolic function.     Current Medications:     Infusions:        Scheduled:   albuterol-ipratropium  3 mL Nebulization Q6H WAKE    aspirin  81 mg Oral Daily    balsam peru-castor oiL   Topical (Top) BID    ceFEPime (MAXIPIME) IVPB  2 g Intravenous Q12H    diltiaZEM  240 mg Oral Daily    enoxaparin  30 mg Subcutaneous Daily    furosemide (LASIX) injection  40 mg Intravenous Once    LIDOcaine  1 patch Transdermal Q24H    LIDOcaine  1 patch Transdermal Q24H    lisinopriL  40 mg Oral Daily    NIFEdipine  30 mg Oral Daily    pantoprazole  40 mg Intravenous Daily    polyethylene glycol  17 g Oral Daily    pravastatin  80 mg Oral Daily    scopolamine  1 patch Transdermal Q3 Days    senna  8.6 mg Oral BID         PRN:  sodium chloride 0.9%, acetaminophen, benzonatate, dextrose 10%, dextrose 10%, glucagon (human recombinant), glucose, glucose, melatonin, naloxone, sodium chloride 0.9%     Antibiotics and Day Number of Therapy:  Cefepime 2g once daily, day 4 out of 7        Assessment:      Willow Giron is a 89 y.o.female with PMHx spinal stenosis with injury, HTN, HLD, past SIADH episode years ago who presents with 1 week history of COVID-19 with nausea, poor po intake, cough. Hyponatremia discovered on labs. Admitted to [facility] for symptomatic hyponatremia. Findings consistent with SIADH, possibly related to  COVID infection. Sodium nearly normalized with urea tabs & fluid restriction. Increased oxygen requirement overnight to 9L. Repeat CXR ordered.          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    Arthritis, multiple joint involvement 07/29/2019    Normocytic anemia 03/19/2018    HCAP (healthcare-associated pneumonia) 03/18/2018    Elevated troponin      Opioid dependence 03/12/2018    Hyponatremia 02/17/2018    Essential hypertension 02/17/2018    Hyperlipidemia 02/17/2018    GERD (gastroesophageal reflux disease) 02/17/2018    Ankle edema 01/30/2018    History of thromboembolism of vein 10/23/2017    Other nonthrombocytopenic purpura 07/24/2017    Major depressive disorder, recurrent, mild 01/23/2017    Peripheral vascular disease 01/23/2017    Edema 09/21/2015    Insomnia 01/13/2015    Stage 3 chronic kidney disease 01/13/2015    Lumbar radiculopathy 08/26/2013    Menopause 08/26/2013    Degeneration of lumbar intervertebral disc 08/08/2013         Plan:         Acute Hypoxic Respiratory Failure  - 1/28 increasing oxygen requirement. Worsening, on 10 L today satting in the high 80s. Advanced to simple mask.   - CXR with possible interval development of RLL infiltrate   - BNP mildly elevated. Resumed home lasix   - Continuous pulse ox today  - Initial procal 0.13, repeat 0.29  - Starting cefepime (day 4/7) for HAP given worsening O2 requirement, procal elevation, uptrending neutrophils.   - Repeat CXR today  - IV lasix 40mg once today, closely monitor Cr and UOP.   - Pulmonology evaluated and agreed with gentle diuresis       COVID+ induced N/V, weakness  - COVID + test on 1/18  - Dehydration noted on physical exam  - s/p IVF resuscitation per nephro  - PT/OT recommend HH, home PT, OT and hospital bed     Atrial fibrillation, unknown if new  -EKG showing LBBB and prolonged QTC  -Per daughter,  patient has history of A fib s/p cardioversion, now unclear if paroxysmal or resolved? Not on anticoagulation at home. Unknown baseline rhythm  - Suspect EKG changes 2/2 electrolyte derangements but concern for paroxysmal afib/flutter  -telemetry  -TTE: EF-50%, grade III LV diastolic dysfunction, moderate LA enlargement, mild to mod pulm HTN, normal RV size&systolic function  -Consider DOAC in setting of afib, however high risk with recent fall. Senile purpura noted diffusely. Will need cost/benefit discussion prior to initiation  -CHADSVASC: 4, HASBLED 3.   -Resumed home med of lisinopril and diltiazem (as below); holding lasix given hypovolemia  -Repeat EKG with RBBB, widened QRS,      HTN:  -BP on admission 206/107, BP stable   -unable to take home meds 2/2 nausea prior to admission  -Home meds: diltiazem 240, lisinopril 40, lasix 20  -Added nifedipine 30mg, improved BP  -Holding home lasix currently  -Monitor, improving with home meds     Hypokalemia   -K= 3.1 on admission, low of 2.5 on 1/22  -likely 2/2 poor po intake. Appetite improving  -Continue to replete PRN     Hyponatremia, SIADH (resolved)  -Na 124 on admission, 140 today.   -likely 2/2 poor po intake x several days; patient very thin on exam with profound skin tenting  - Admit urine studies with Urine sodium 41, Urine osm 371; ?SIADH component; AM cortisol=33  - Nephrology signed off  - Na 140 today  - urea tabs discontinued& fluid restriction today.     Chronic back pain with opioid dependence  -Lumbar radiculopathy, arthritis, fall within last 6mths with trauma to spine with fracture  -Prescribed large quantity of opioids at home (norco 7.5 PRN TID, 75 pills monthly) as well as xanax  - Avoid opioids if possible given advanced patient age and concern for worsening of mental status and stability (has had recent falls)  - Will approach with multimodal pain control; lidocaine patch to sacrum PRN controlling pain at this time  -  on safe  medication practices for geriatric patients as several of home meds are contraindicated (Beer's list)  - pain currently controlled on lidocaine patch only     HLD  -Continue home simvastatin     GERD  -Continue home omeprazole        Ronak More MD  [facility] Internal Medicine/Emergency Medicine PGY-1        [facility] Medicine Hospitalist Pager numbers:   [facility] Hospitalist Medicine Team A (Adrien/Rufino):          [phone]  [facility] Hospitalist Medicine Team B (Emily/Mekhi):        [phone]

## 2023-02-01 NOTE — NURSING
Pt sats now 84-85% on 15L HF NC. MD notified. States will put in orders for vapotherm. Respiratory notified.

## 2023-02-02 PROBLEM — R09.02 HYPOXIA: Status: ACTIVE | Noted: 2023-01-01

## 2023-02-02 PROBLEM — E87.1 HYPONATREMIA: Status: RESOLVED | Noted: 2018-02-17 | Resolved: 2023-01-01

## 2023-02-02 PROBLEM — R05.9 COUGH: Status: ACTIVE | Noted: 2023-01-01

## 2023-02-02 PROBLEM — Z51.5 COMFORT MEASURES ONLY STATUS: Status: ACTIVE | Noted: 2023-01-01

## 2023-02-02 PROBLEM — U07.1 COVID-19: Status: ACTIVE | Noted: 2023-01-01

## 2023-02-02 NOTE — PLAN OF CARE
Problem: Adult Inpatient Plan of Care  Goal: Plan of Care Review  Outcome: Ongoing, Not Progressing  Flowsheets (Taken 2/2/2023 0557)  Plan of Care Reviewed With: patient     Problem: Respiratory Compromise (Pneumonia)  Goal: Effective Oxygenation and Ventilation  Outcome: Ongoing, Not Progressing  Intervention: Optimize Oxygenation and Ventilation  Flowsheets (Taken 2/2/2023 0557)  Airway/Ventilation Management:   airway patency maintained   pulmonary hygiene promoted   calming measures promoted   humidification applied  Head of Bed (HOB) Positioning:   HOB elevated   HOB at 30-45 degrees     Problem: Skin Injury Risk Increased  Goal: Skin Health and Integrity  Outcome: Ongoing, Not Progressing  Intervention: Optimize Skin Protection  Flowsheets (Taken 2/2/2023 0557)  Pressure Reduction Techniques:   heels elevated off bed   positioned off wounds   pressure points protected   weight shift assistance provided  Pressure Reduction Devices: specialty bed utilized  Skin Protection: incontinence pads utilized  Head of Bed (HOB) Positioning:   HOB elevated   HOB at 30-45 degrees     Problem: Gas Exchange Impaired  Goal: Optimal Gas Exchange  Outcome: Ongoing, Not Progressing  Intervention: Optimize Oxygenation and Ventilation  Flowsheets (Taken 2/2/2023 0557)  Airway/Ventilation Management:   airway patency maintained   pulmonary hygiene promoted   calming measures promoted   humidification applied  Head of Bed (HOB) Positioning:   HOB elevated   HOB at 30-45 degrees     Problem: Impaired Wound Healing  Goal: Optimal Wound Healing  Outcome: Ongoing, Not Progressing  Intervention: Promote Wound Healing  Flowsheets (Taken 2/2/2023 0557)  Oral Nutrition Promotion: rest periods promoted  Sleep/Rest Enhancement:   awakenings minimized   consistent schedule promoted   music provided   noise level reduced   room darkened   regular sleep/rest pattern promoted   therapeutic touch utilized   relaxation techniques  promoted  Activity Management: Rolling - L1  Pain Management Interventions:   pillow support provided   position adjusted   premedicated for activity   quiet environment facilitated   relaxation techniques promoted     Patient transferred from floor to ICU for increased oxygen demand and continuous BiPAP use.  at bedside overnight with patient and family. No family present overnight. VSS. Decreased I/O due to BiPAP mask; increase in WBCs; see orders given for patient care. Patient remains in isolation. Plan of care for goals of care to be determined for patient outcomes.  Bed in lowest position. Call light in reach. Room near nurses' station. Report to be given and care relinquished to day shift RN.

## 2023-02-02 NOTE — PROGRESS NOTES
[facility] Medicine Progress Note     Primary Team: [facility] Hospitalist Team B  Attending Physician: Andrea Diaz MD  Resident: James Winkler  Intern: Ronak More     Subjective:      Patient stepped up to ICU yesterday for increased oxygen requirement and has required bipap since yesterday evening. Family visiting with patient. Decreased alertness and not as conversant as yesterday.      Objective:      Last 24 Hour Vital Signs:  BP  Min: 124/60  Max: 159/71  Temp  Av.7 °F (36.5 °C)  Min: 97 °F (36.1 °C)  Max: 98.2 °F (36.8 °C)  Pulse  Av.5  Min: 75  Max: 81  Resp  Av  Min: 16  Max: 20  SpO2  Av %  Min: 88 %  Max: 94 %  I/O last 3 completed shifts:  In: 650 [P.O.:650]  Out: 1200 [Urine:1200]     Physical Examination:  General: alert, not conversant, ill appearing  HEENT: Normocephalic, atraumatic, EOMi  CV: Normal rate, regular rhythm, normal s1 and s2, no murmur  Respiratory: on bipap, difficult to auscultate  Abdomen: Soft, nontender, nondistended, normoactive bowel sounds  Extremities: No edema. Atraumatic. Distal Pulses intact  Skin: Warm and dry, no rashes/lesions/bruising  Neuro: alert to voice, mumbles to questions, weak        Laboratory:  Cr 1.7 from 1.2  Na 143  WBC 16.4        Current Medications:     Infusions:        Scheduled:   albuterol-ipratropium  3 mL Nebulization Q6H WAKE    aspirin  81 mg Oral Daily    balsam peru-castor oiL   Topical (Top) BID    ceFEPime (MAXIPIME) IVPB  2 g Intravenous Q12H    diltiaZEM  240 mg Oral Daily    enoxaparin  30 mg Subcutaneous Daily    furosemide (LASIX) injection  40 mg Intravenous Once    LIDOcaine  1 patch Transdermal Q24H    LIDOcaine  1 patch Transdermal Q24H    lisinopriL  40 mg Oral Daily    NIFEdipine  30 mg Oral Daily    pantoprazole  40 mg Intravenous Daily    polyethylene glycol  17 g Oral Daily    pravastatin  80 mg Oral Daily    scopolamine  1 patch Transdermal Q3 Days    senna  8.6 mg Oral BID         PRN:  sodium chloride  0.9%, acetaminophen, benzonatate, dextrose 10%, dextrose 10%, glucagon (human recombinant), glucose, glucose, melatonin, naloxone, sodium chloride 0.9%     Antibiotics and Day Number of Therapy:  Cefepime 2g once daily, day 5 out of 7        Assessment:      Willow Giron is a 89 y.o.female with PMHx spinal stenosis with injury, HTN, HLD, past SIADH episode years ago who presents with 1 week history of COVID-19 with nausea, poor po intake, cough. Hyponatremia discovered on labs. Admitted to [facility] for symptomatic hyponatremia. Findings consistent with SIADH, possibly related to COVID infection. Sodium nearly normalized with urea tabs & fluid restriction. Increased oxygen requirement and stepped up to ICU 2/1 evening for continuous bipap. Made DNR/DNI. Patient's family visiting with her, likely transition to comfort care today.          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    Arthritis, multiple joint involvement 07/29/2019    Normocytic anemia 03/19/2018    HCAP (healthcare-associated pneumonia) 03/18/2018    Elevated troponin      Opioid dependence 03/12/2018    Hyponatremia 02/17/2018    Essential hypertension 02/17/2018    Hyperlipidemia 02/17/2018    GERD (gastroesophageal reflux disease) 02/17/2018    Ankle edema 01/30/2018    History of thromboembolism of vein 10/23/2017    Other nonthrombocytopenic purpura 07/24/2017    Major depressive disorder, recurrent, mild 01/23/2017    Peripheral vascular disease 01/23/2017    Edema 09/21/2015    Insomnia 01/13/2015    Stage 3 chronic kidney disease 01/13/2015    Lumbar radiculopathy 08/26/2013    Menopause 08/26/2013    Degeneration of lumbar intervertebral disc 08/08/2013         Plan:         Acute Hypoxic Respiratory Failure  - 1/28 increasing oxygen requirement. Worsening, on 10 L today satting in the high 80s. Advanced to  simple mask.   - CXR with possible interval development of RLL infiltrate   - BNP mildly elevated  - Continuous pulse ox   - Initial procal 0.13, repeat 0.29  - On cefepime (day 5/7) for HAP given worsening O2 requirement, procal elevation, uptrending neutrophils.   - Repeat CXR today  - IV Lasix 40 x 3 days with no improvement  - 2/1 evening worsened and stepped up to ICU for bipap - possible aspiration worsening hypoxic respiratory failure     COVID+ induced N/V, weakness  - COVID + test on 1/18  - Dehydration noted on physical exam  - s/p IVF resuscitation per nephro  - PT/OT recommend HH, home PT, OT and hospital bed     Atrial fibrillation, unknown if new  -EKG showing LBBB and prolonged QTC  -Per daughter, patient has history of A fib s/p cardioversion, now unclear if paroxysmal or resolved? Not on anticoagulation at home. Unknown baseline rhythm  - Suspect EKG changes 2/2 electrolyte derangements but concern for paroxysmal afib/flutter  -telemetry  -TTE: EF-50%, grade III LV diastolic dysfunction, moderate LA enlargement, mild to mod pulm HTN, normal RV size&systolic function  -Consider DOAC in setting of afib, however high risk with recent fall. Senile purpura noted diffusely. Will need cost/benefit discussion prior to initiation  -CHADSVASC: 4, HASBLED 3.   -Resumed home med of lisinopril and diltiazem (as below); holding lasix given hypovolemia  -Repeat EKG with RBBB, widened QRS,      HTN:  -BP on admission 206/107, BP stable   -unable to take home meds 2/2 nausea prior to admission  -Home meds: diltiazem 240, lisinopril 40, lasix 20  -Added nifedipine 30mg, improved BP  -Monitor, improving with home meds      Hyponatremia, SIADH (resolved)  -Na 124 on admission, 140 today.   -likely 2/2 poor po intake x several days; patient very thin on exam with profound skin tenting  - Admit urine studies with Urine sodium 41, Urine osm 371; ?SIADH component; AM cortisol=33  - Nephrology signed off  - Na 140  today  - urea tabs discontinued& fluid restriction 1/30.     Chronic back pain with opioid dependence  -Lumbar radiculopathy, arthritis, fall within last 6mths with trauma to spine with fracture  -Prescribed large quantity of opioids at home (norco 7.5 PRN TID, 75 pills monthly) as well as xanax  - Avoid opioids if possible given advanced patient age and concern for worsening of mental status and stability (has had recent falls)  - Will approach with multimodal pain control; lidocaine patch to sacrum PRN controlling pain at this time  -  on safe medication practices for geriatric patients as several of home meds are contraindicated (Beer's list)  - pain currently controlled on lidocaine patch only     HLD  -Continue home simvastatin     GERD  -Continue home omeprazole        James Liu MD  [facility] Internal Medicine PGY-3       [facility] Medicine Hospitalist Pager numbers:   [facility] Hospitalist Medicine Team A (Adrien/Rufino):          [phone]  [facility] Hospitalist Medicine Team B (Emily/Mekhi):        [phone]

## 2023-02-02 NOTE — CONSULTS
"  [facility] - Intensive Care  Adult Nutrition  Consult Note    SUMMARY     Recommendations    Recommendation:  1. Encourage intake of meals and supplements as tolerated.   2. Monitor wt and labs.   3. RD to follow to monitor PO intake.    Goals:  1. Pt will consume at least 50% of meal and supplement by RD follow up.  Nutrition Goal Status: progressing towards goal  Communication of RD Recs: reviewed with RN    Assessment and Plan   Nutrition Problem  Inadequate energy intake     Related to (etiology):   COVID     Signs and Symptoms (as evidenced by):   Poor intake at meals      Interventions:  Collaboration with other providers  Commercial beverage     Nutrition Diagnosis Status:   Continues    Malnutrition Assessment  Weight Loss (Malnutrition):  (-20%)       Reason for Assessment  Reason For Assessment: consult (Liquid protein for wound healing)  Diagnosis: other (see comments) (Hyponatremia)  Relevant Medical History: HTN, HLD, arthritis, hysterectomy, shoulder surgery  General Information Comments: Pt on dysphagia mech soft diet with boost pudding and boost plus BID. Noted poor intake of meals. 19lb wt loss x 5 months. Chiki- 11 coccyx wound. Unable to assess NFPE due to COVID isolation. Noted pt on bipap at visit. Per RN possible discharge transfer to comfort care today  Nutrition Discharge Planning: pt to d/c on dysphagia Mech Soft diet    Nutrition Risk Screen  Nutrition Risk Screen: difficulty chewing/swallowing    Nutrition/Diet History  Patient Reported Diet/Restrictions/Preferences: other (see comments) (Dysphagia mech soft)  Food Preferences: no Shinto or cultural food prefs identified  Spiritual, Cultural Beliefs, Catholic Practices, Values that Affect Care: no  Factors Affecting Nutritional Intake: decreased appetite (bipap)    Anthropometrics  Temp: 98.5 °F (36.9 °C)  Height Method: Stated  Height: 4' 11.02" (149.9 cm)  Height (inches): 59.02 in  Weight Method: Bed Scale  Weight: 33.6 kg (74 lb 1.2 " oz)  Weight (lb): 74.08 lb  Ideal Body Weight (IBW), Female: 95.1 lb  % Ideal Body Weight, Female (lb): 77.9 %  BMI (Calculated): 15  BMI Grade: less than 16 protein-energy malnutrition grade III  Weight Loss: unintentional  Usual Body Weight (UBW), k.2 kg (UBW 22)  % Usual Body Weight: 79.79  % Weight Change From Usual Weight: -20.38 %     Lab/Procedures/Meds  Pertinent Labs Reviewed: reviewed  Pertinent Labs Comments: BUN58H,  Cre1.7H,  Alb2.1L  Pertinent Medications Reviewed: reviewed  Pertinent Medications Comments: albuterol, aspirin, lidocaine, lisinopril, muniprocin, pantoprazole, potassium chloride    Estimated/Assessed Needs  Weight Used For Calorie Calculations: 34 kg (74 lb 15.3 oz)  Energy Calorie Requirements (kcal): 1190 (35 kcal)  Energy Need Method: Kcal/kg  Protein Requirements: 40 g (1.2g)  Weight Used For Protein Calculations: 34 kg (74 lb 15.3 oz)  Estimated Fluid Requirement Method: RDA Method  RDA Method (mL): 1190     Nutrition Prescription Ordered  Current Diet Order: Dysphagia mech sodt (IDDSI level 5) thin  Oral Nutrition Supplement: Boost plus and Boost pudding    Evaluation of Received Nutrient/Fluid Intake  I/O: 87.5/0  Energy Calories Required: not meeting needs  Protein Required: not meeting needs  Fluid Required: not meeting needs  Comments: LBM: /  % Intake of Estimated Energy Needs: 0 - 25 %  % Meal Intake: 0 - 25 %    Nutrition Risk  Level of Risk/Frequency of Follow-up:  (1x per week)     Monitor and Evaluation  Food and Nutrient Intake: food and beverage intake  Food and Nutrient Adminstration: diet order  Physical Activity and Function: nutrition-related ADLs and IADLs  Anthropometric Measurements: weight  Biochemical Data, Medical Tests and Procedures: electrolyte and renal panel  Nutrition-Focused Physical Findings: overall appearance     Nutrition Follow-Up  RD Follow-up?: Yes      Makenzie Reynolds Dietetic Intern     I certify that Jenny SCOTT RD,  directed the dietetic intern in service delivery and guided them using my skilled judgment. As the cosigning dietitian, I have reviewed the dietetic interns documentation and am responsible for the treatment, assessment, and plan.

## 2023-02-02 NOTE — CONSULTS
Consult Note  Palliative Care    Consult Requested By: Andrea Diaz MD  Reason for Consult: End of life care    SUBJECTIVE:     History of Present Illness:  Disease Process: Covid-19 respiratory failure    89F with PMH of HTN, HLD, Afib s/p DCCV in 2018, and baseline hyponatremia 2/2 chronic SIADH who was initially admitted to [facility] on 1/23/23 for severe Covid-19 since upgraded to ICU for NIV. Pt comes from home with her daughter and at baseline could ambulate short distances with a walker.    Chief Complaint    Pt was found Covid-19 (+) as an outpt on 1/18 and was prescribed antiviral tx however pt had n/v following doses and family discontinued tx. She had progressively worsening rhinorrhea, cough, weakness, diaphoresis and anorexia over the subsequent 2-3 days and was BIB family as pt had worsening nausea unable to take home meds.    Arrived to ED euthermic, hypertensive with Na 124, K 3.1; LBBB on EKG. Bolused 1L LR. Admitted to  service for mgmt of hyponatremia 2/2 Covid-19 viral syndrome.    Interval Hospital Course    Na corrected and pt was having slow clinical improvement until 1/29 when O2 requirement began steadily rising from 2->5L NC.  - 1/31: satting 94% on 8LNC  - 2/1: satting 91% on 10LNC; placed on NIV in evening  - 2/2: pt has steadily decreasing alertness on NIV and continued progressive hypoxic respiratory failure. Family informed pt is in rapid decline and prognosis is bleak. Large group of extended family members are at bedside in agreement with transitioning to comfort focused care and liberating from NIV.    Palliative has been consulted for end of life care.    At time of writing pt weakly raises eyebrows to voice but is nonverbal and appears terminally ill. Family states pt indicated right shoulder pain when she was last fully alert. Much less responsive today. Advised family at bedside that pt has received all appropriate supportive care however clinically Covid-19 remains  unpredictable and patients frequently have a delayed onset of terminal respiratory failure approximately 14 days after initial infection. One of pt's granddaughters is a hospice RN and all present express appropriate understanding that pt is at end of life. They wish to withdraw NIV and other aggressive life prolonging measures.    Introduced the role of palliative care and preventing suffering during withdrawal of NIV including premedication with meds for pain and anxiety and ongoing gtt/prn administration. Discussed potential admission to hospice however all family members are present and requesting urgent withdrawal of care.    Writer has placed orders for comfort meds and terminal weaning to NC - see plan below. Primary team aware pt will remain on the hospitalist service for end of life care.    Past Medical History:   Diagnosis Date    Arthritis     GERD (gastroesophageal reflux disease)     Hyperlipidemia     Hypertension     Hyponatremia      Past Surgical History:   Procedure Laterality Date     SECTION, CLASSIC      HYSTERECTOMY      SHOULDER SURGERY       No family history on file.  Social History     Tobacco Use    Smoking status: Former     Passive exposure: Never    Smokeless tobacco: Never    Tobacco comments:     Quit 50+ yrs. ago   Substance Use Topics    Alcohol use: Yes     Comment: social    Drug use: No     Mental Status: Non-responsive    ECOG Performance Status Grade: 4 - Completely disabled    Review of Systems:  Review of systems not obtained due to patient factors nonverbal on NIV.    Review of Symptoms      Symptom Assessment (ESAS 0-10 Scale)  Unable to complete assessment due to Patient nonverbal     CAM / Delirium:  Negative  Constipation:  Negative  Diarrhea:  Negative      Pain Assessment in Advanced Demential Scale (PAINAD)   Breathing - Independent of vocalization:  0  Negative vocalization:  0  Facial expression:  0  Body language:  0  Consolability:  0  Total:  0    ECOG  Performance Status rdGrdrrdarddrderd:rd rd3rd Living Arrangements:  Lives in home and Lives with family    Psychosocial/Cultural:   See Palliative Psychosocial Note: No  Social Issues Identified: no  Bereavement Risk: Yes: Close or dependent relationship to the  person  Caregiver Needs Discussed. Caregiver Distress: hospice bereavement services will be offered  Cultural: no concerns, family is supportive and accepting  **Primary  to Follow**  Palliative Care  Consult: No    Spiritual:  F - Julissa and Belief:  Evangelical  I - Importance:  High  C - Community:  Pt received sacrament of anointing of the sick yesterday  A - Address in Care:  Pastoral care consulted     Time-Based Charting:  Yes  Chart Review: 35 minutes  Face to Face: 20 minutes  Symptom Assessment: 10 minutes  Coordination of Care: 5 minutes  Discharge Plannin minutes  Advance Care Planning: 15 minutes  Goals of Care: 10 minutes    Total Time Spent: 100 minutes    Advance Care Planning   Advance Directives:   Living Will: No        Oral Declaration: No    LaPOST: No    Do Not Resuscitate Status: Yes    Medical Power of : No    Agent's Name:  Aliya Voss (daughter)   Agent's Contact Number:  [phone]    Decision Making:  Family answered questions and Patient unable to communicate due to disease severity/cognitive impairment  Goals of Care: The family endorses that what is most important right now is to focus on symptom/pain control and quality of life, even if it means sacrificing a little time    Accordingly, we have decided that the best plan to meet the patient's goals includes transitioning to comfort measures only.       OBJECTIVE:     Physical Exam  Constitutional:       Appearance: She is cachectic. She is ill-appearing. She is not diaphoretic.      Interventions: Face mask in place.   HENT:      Head: Normocephalic and atraumatic.      Mouth/Throat:      Comments: On NIV  Eyes:      Comments: Deferred    Cardiovascular:      Rate and Rhythm: Normal rate and regular rhythm.      Pulses: Normal pulses.      Heart sounds: Normal heart sounds. No murmur heard.  Pulmonary:      Effort: No accessory muscle usage.      Breath sounds: Decreased breath sounds, wheezing and rhonchi present.   Abdominal:      General: Abdomen is flat. Bowel sounds are decreased. There is no distension.      Palpations: Abdomen is soft.   Musculoskeletal:         General: No swelling or deformity. Normal range of motion.      Comments: Diffuse skeletal muscle atrophy   Skin:     General: Skin is warm and dry.      Coloration: Skin is pale.   Neurological:      General: No focal deficit present.      Mental Status: She is alert.   Psychiatric:         Speech: She is noncommunicative.         Behavior: Behavior is withdrawn.     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 Pt is already severely deconditioned and in continued decline despite NIV support; family does not wish for intubation and wishes to transition to comfort focused care. Palliative consulted for end of life care.    Family is prepared to withdraw urgently and the principal benefit of hospice (bereavement support) will be provided after discharge. Defer formal hospice admission to avoid prolonging pt's suffering via bureaucratic delays.    Comfort measures and weaning to NC are ordered, see plan below.    Recommendations:  Medical: nonessential meds discontinued    Symptom Management:   # Pain  - morphine gtt RN titratable start at 4mg/hr  - IV morphine 4mg q1h prn pain or dyspnea with RR > 22  # Anxiety/agitation  - IV ativan 2mg q1h prn agitation or dyspnea with RR > 25  - IM Zyprexa 5mg x1 prn terminal agitation refractory to versed  #  Nausea/vomiting  - IV zofran 1st line, IV reglan 2nd line  # Secretions  - continue scopolamine patch  - add atropine gtt sl q4h prn secretions    Psychosocial: permanently incapacitated, caregivers are appropriately involved and respecting pt's dignity  Legal: no formal HCPOA, decisions are by consensus; all kin are in agreement with comfort care  Prognosis: < 12 hours after withdrawal of NIV    Paul Branham MD  Hospice and Palliative Medicine  Palliative Care Pager: [phone]

## 2023-02-02 NOTE — PLAN OF CARE
Recommendation:  1. Encourage intake of meals and supplements as tolerated.   2. Monitor wt and labs.   3. RD to follow to monitor PO intake.     Goals:  1. Pt will consume at least 50% of meal and supplement by RD follow up.  Nutrition Goal Status: progressing towards goal  Communication of RD Recs: reviewed with RN

## 2023-02-02 NOTE — PROGRESS NOTES
Progress Note  [facility]    Attending: Rosendo Rock MD   Admit Date: 1/22/2023  Today's Date: 02/02/2023    SUBJECTIVE:     Willow Giron is a 89 year old female with PMHx of HTN,HLD, past SIADH episode years ago who presents with 1 week history of COVID19 with nausea, poor po intake, cough. Hyponatremia discovered on labs. Admitted to [facility] for symptomatic hyponatremia. Findings consistent with SIADH, possibly related to COVID infection. Sodium nearly normalized with urea tabs & fluid restriction. Increased oxygen requirement and stepped up to ICU 2/1 evening for continuous bipap. Made DNR/DNI.     Patient seen and examined this morning. Pt was stepped up to ICU last night for increased O2 requirement and has continued to require BiPap. Pt is resting comfortably but not conversational. Family at bedside. Conversation had about goals of care. Family wants to proceed with comfort care.    Review of Systems   Constitutional:  Positive for malaise/fatigue.   HENT: Negative.     Eyes: Negative.    Respiratory:  Positive for shortness of breath and wheezing.    Cardiovascular: Negative.    Gastrointestinal:  Negative for diarrhea and vomiting.     OBJECTIVE:     Vital Signs Trends/Hx Reviewed  Vitals:    02/02/23 1428 02/02/23 1430 02/02/23 1445 02/02/23 1448   BP:  (!) 119/58     BP Location:       Patient Position:       Pulse:  80 68 71   Resp: (!) 28 (!) 38 (!) 41 (!) 23   Temp:       TempSrc:       SpO2:  (!) 69% (!) 52% (!) 52%   Weight:       Height:           Oxygen Concentration (%):  [60-80] 65    Physical Exam  Constitutional:       Appearance: She is ill-appearing.      Comments: Frail, eldery patient   HENT:      Head: Normocephalic and atraumatic.      Nose: Nose normal.      Mouth/Throat:      Mouth: Mucous membranes are moist.   Eyes:      Conjunctiva/sclera: Conjunctivae normal.   Cardiovascular:      Rate and Rhythm: Normal rate and regular rhythm.       Pulses: Normal pulses.   Pulmonary:      Breath sounds: Wheezing and rhonchi present.      Comments: On BIPAP  Abdominal:      General: Abdomen is flat.      Palpations: Abdomen is soft.   Musculoskeletal:      Comments: Diffuse muscle atrophy    Skin:     General: Skin is warm.      Findings: Bruising present.   Neurological:      General: No focal deficit present.       Laboratory:  Recent Labs   Lab 02/02/23 0337   WBC 16.48*   RBC 3.14*   HGB 9.6*   HCT 30.6*   *   MCV 98   MCH 30.6   MCHC 31.4*     Recent Labs   Lab 02/02/23  0337      K 5.1      CO2 23   BUN 58*   CREATININE 1.7*     No results for input(s): PH, PCO2, PO2, HCO3, POCSATURATED, BE in the last 24 hours.    Chest Imaging:   CXR (2/2/2023): Cardiomediastinal silhouette unchanged.  Redemonstration of lung interstitial coarsening with patchy opacity, similar in distribution with slight clearing at the right mid-upper lung.  Possible small right pleural effusion.  No gross pneumothorax.  No interval detrimental change.  Osseous structures including right glenohumeral abnormality unchanged.     CXR (2/1/2023): Mediastinal structures remain midline.  Stable cardiomediastinal silhouette.  Calcific atherosclerotic plaque at the aortic arch. No significant detrimental change in cardiopulmonary status. Stable lung volumes.  Continued coarsened interstitial attenuation and patchy opacities throughout both lungs, most prominent in the bilateral mid lung zones.  No pneumothorax.  Possible small right pleural effusion. Stable appearance of osseous structures.  No obvious free air in the upper abdomen.     CXR (1/31/2023): Cardiomediastinal silhouette within normal limits.  Allowing for slight variation in position and technique, redemonstration of parenchymal coarsening with mixed interstitial and airspace opacity with some progression at the right base.  Small right effusion.  Shoulder DJD with chronic right glenohumeral dislocation.     CT  Chest without Contrast (1/27/2023): Patchy subsegmental and nodular ground-glass foci involving multiple lobes.  Additional mixed consolidative opacity and atelectasis at the lower lungs and bases with pleural effusions.  Overall findings highly concerning for infectious etiology to include atypical viral etiology in this patient with known COVID positivity.     Barium Swallow (1/27/2023): Suboptimal positioning and visualization on initial thin barium swallow of which the patient demonstrated subsequent coughing episode.  Component of aspiration on this swallow could not be excluded.  No other convincing tracheal aspiration identified across remaining swallows.  Patient demonstrated overall weakened pharyngeal constriction with pharyngeal residue and intermixed laryngeal penetration across remaining tested consistencies.  Instances of delayed swallow initiation, most notable with pudding and barium coated solids.     Echo (1/23/2023):   Concentric hypertrophy and Moderate left atrial enlargement.  There are segmental left ventricular wall motion abnormalities.  Mild tricuspid regurgitation.  There is mild to moderate pulmonary hypertension.  The estimated ejection fraction is 50%.  Grade III left ventricular diastolic dysfunction.  Normal right ventricular size with normal right ventricular systolic function.  Mild mitral regurgitation.  Normal central venous pressure (3 mmHg).  The estimated PA systolic pressure is 47 mmHg.    ASSESSMENT & RECOMMENDATIONS   Willow Giron is a 89 y.o.female with PMHx HTN and HLD who presented for symptomatic hyponatremia which has since resolved. Pt now with acute hypoxic respiratory failure in the setting of covid-19 infection.     Neuro/Psych  - Declining interaction from patient.   - Melatonin nightly.   - No focal deficits.   - Lorazepam 2 mg for anxiety   - olanzapine injection 5 mg prn agitation      CV  #HTN   - home meds: diltiazem 240, lisinopril 40, lasix 20   -  nifedipine added, BP improved   - continue to monitor     #HLD  - pravastatin 80     #Atrial fibrillation   - EKG w RBBB and widened QRS,   - TTE: EF-50%, grade III LV diastolic dysfunction, moderate LA enlargement, mild to mod pulm HTN, normal RV size&systolic function  - comfort measures in place      Pulm  #Acute Hypoxic Respiratory failure   - CXR with worsening opacities. Ddx includes worsening covid, aspiration injury, secondary pna   - D/c lasix. 1 dose given yesterday. No improvement in respiratory symptoms with diuresis.   - currently on BiPAP PIP 10 PEEP 5 40% FiO2  - family discussion held and they wish to proceed with comfort measures  - continue with comfort care: morphine IV, licodaine transdermal patch 5% for low back pain    FEN/GI  F: NaCl 0.9% flushes   E: Na 143, K 5.1, Cl 108, Ca 8.9  N: liquid diet; boost nutrition pudding, boost plus   GI: metoclopramide Hcl IV q 6 prn, ondansetron IV q8 prn, scopolamine patch     #Hyponatremia, resolved   - Na 124 on admission. 143 today.      RENAL    Intake/Output Summary (Last 24 hours) at 2/2/2023 1550  Last data filed at 2/2/2023 0716  Gross per 24 hour   Intake 187.06 ml   Output 20 ml   Net 167.06 ml    I/O last 3 completed shifts:  In: 99.5 [I.V.:50; IV Piggyback:49.5]  Out: 720 [Urine:720]  I/O this shift:  In: 87.5 [IV Piggyback:87.5]  Out: -      BUN/Cr: 58/1.7, yesterday was 44/1.2   Continue to monitor renal status and urine output     Heme  H/H 9.6/30.6; stable  WBC 16.45 (increased from 9.68)   DVT prophylaxis: none     Endo  No known issues at this time.    ID  #COVID+   - comfort measures in place     Terri Garg, MS3

## 2023-02-02 NOTE — PLAN OF CARE
Pt. Cont. Bipap in  Use working ok,  Alarms ok and are audible. Pt on oxygen in no apparent distress.  Breathing tx. Given with ok pt. Effort.  Will cont. To monitor.

## 2023-02-02 NOTE — NURSING
ICU transfer and continuous BiPap orders noted. BiPap initiated on floor per respiratory. Report called to EJ Butt. Family at bedside and updated on POC. Patient transferred to [facility].

## 2023-02-02 NOTE — NURSING
Team spoke with daughter. Family to withdraw care. Awaiting for family to see pt before withdrawing

## 2023-02-02 NOTE — PLAN OF CARE
[facility] Internal Medicine Plan of Care Note    Continuous increase in oxygen requirements over last 2-3 days. O2 therapy escalated today from HFNC to vapotherm. Patient struggling to maintain 90% SpO2 on 40LPM 80% FiO2 vapotherm. Met with patient's family at bedside evening of 2/1 including son, daughter, & granddaughter. Discussed worsening oxygen requirements & failure of continued diuresis to improve oxygenation.     After long discussion, family made group decision to change code status to DNR/DNI. Discussed plan to transition vapotherm to BiPAP & family in agreement. Discussed with ICU team & placed transfer orders to ICU for continuous BiPAP. Code status updated in chart.     Chance Orellana MD  [facility] Internal Medicine, PGY2

## 2023-02-02 NOTE — PLAN OF CARE
Care Plan    POC reviewed with Willow Giron and family. Questions and concerns addressed. Acute events today requiring transfer to ICU for BIPAP.  Will continue to monitor. See below and flowsheets for full assessment and VS info.       Neuro:  Lilian Coma Scale  Best Eye Response: 3-->(E3) to speech  Best Motor Response: 1-->(M1) none  Best Verbal Response: 1-->(V1) none  [place] Coma Scale Score: 5  Assessment Qualifiers: patient not sedated/intubated  Pupil PERRLA: yes  24 hr Temp:  [97.2 °F (36.2 °C)-99.6 °F (37.6 °C)]      CV:  Rhythm: atrial rhythm  DVT prophylaxis: VTE Required Core Measure: Pharmacological prophylaxis initiated/maintained    Resp:     Oxygen Concentration (%): 80    GI/:  GI prophylaxis: yes  Diet/Nutrition Received: mechanical/dental soft  Last Bowel Movement: 01/26/23  Voiding Characteristics: external catheter   Intake/Output Summary (Last 24 hours) at 2/1/2023 2024  Last data filed at 2/1/2023 0544  Gross per 24 hour   Intake --   Output 700 ml   Net -700 ml       Labs/Accuchecks:  Recent Labs   Lab 02/01/23 0223   WBC 9.68   RBC 3.01*   HGB 9.6*   HCT 29.0*         Recent Labs   Lab 02/01/23 0223      K 3.0*   CO2 26      BUN 44*   CREATININE 1.2   ALKPHOS 113   ALT 12   AST 17   BILITOT 0.5    No results for input(s): PROTIME, INR, APTT, HEPANTIXA in the last 168 hours. No results for input(s): CPK, CPKMB, TROPONINI, MB in the last 168 hours.    Electrolytes: Electrolytes replaced  Accuchecks: none    Gtts/LDAs:      Lines/Drains/Airways       None                   Skin/Wounds  Bathing/Skin Care: incontinence care (01/27/23 2000)  Wounds: Yes  Wound care consulted: Yes    Consults  Consults (From admission, onward)          Status Ordering Provider     Inpatient consult to Registered Dietitian/Nutritionist  Once        Provider:  (Not yet assigned)    Acknowledged MAREN MILLER     Inpatient consult to Social Work  Once        Provider:  (Not yet  assigned)    Acknowledged SKY THOMSON     Inpatient consult to Pulmonology  Once        Provider:  (Not yet assigned)    Completed MAREN MILLER     Inpatient consult to Nephrology-[facility]  Once        Provider:  Maxi Davenport MD    Completed BONNIE MARTELL     IP consult to case management  Once        Provider:  (Not yet assigned)    Acknowledged TOLU BENTLEY     Inpatient consult to Cardiology-[facility]  Once        Provider:  Farrukh Molina III, MD    Completed BONNIE MARTELL

## 2023-02-03 NOTE — PROGRESS NOTES
[facility] - Intensive Care  Discharge Final Note    Primary Care Provider: Qamar Bland MD    Expected Discharge Date: 2023    Final Discharge Note (most recent)       Final Note - 23 0731          Final Note    Assessment Type Final Discharge Note     Anticipated Discharge Disposition                      Important Message from Medicare

## 2023-02-03 NOTE — CARE UPDATE
Time of Death Note    Ms. Willow Giron  at the time of 1547 on 23 in the comfort of her family bedside. This was confirmed by the following during my exam bedside:  - asystole on cardiac monitor  - no pulses on palpation  - absent heart sounds on auscultation  - absent lung sounds on auscultation  - no chest rise on visual inspection over five minutes  - no reaction on voice command or sternal rub. Absent corneal reflex. Nonreactive pupils.    Scott Duke MD  [facility] HO1

## 2023-02-03 NOTE — DISCHARGE SUMMARY
[facility] Discharge Summary    Primary Team: [facility] Team B  Attending Physician: Dr. Diaz  Resident: Alexa  Intern: Derik    Date of Admit: 2023  Date of Discharge: 2023    Discharge to:   Condition:     Discharge Diagnoses     Patient Active Problem List   Diagnosis    Essential hypertension    Hyperlipidemia    GERD (gastroesophageal reflux disease)    HCAP (healthcare-associated pneumonia)    Normocytic anemia    Degeneration of lumbar intervertebral disc    Arthritis, multiple joint involvement    Ankle edema    Edema    History of thromboembolism of vein    Inflammatory polyarthropathy    Insomnia    Lumbar radiculopathy    Menopause    Major depressive disorder, recurrent, mild    Opioid dependence    Peripheral vascular disease    Other nonthrombocytopenic purpura    Stage 3 chronic kidney disease    Spinal stenosis    Hypertensive urgency    Comfort measures only status    Cough    Hypoxia    COVID-19       Consultants and Procedures     Consultants:  Critical Care  Pulmonology  Palliative Care  Nephrology   Cardiology    Procedures:   Modified barium swallow    Brief History of Present Illness      Willow Giron is a 89yo female with PMH of GERD, Arthritis, Hyperlipidemia, HTN, Afib s/p cardioversion +5yrs, and Hyponatremia who presented on 2023 to [facility] with primary complaint of weakness, nausea, and diarrhea. Per daughter, patient COVID + on  and prescribed anti-virals. Daughter discontinued antivirals due to nausea, one episode of vomiting, and diarrhea 2 days following start of therapy. Patient endorsed rhinorrhea, cough, diaphoresis, decreased po intake, and weakness. Denied chest pain or SOB. Had been unable to take home meds 2/2 nausea. Upon ED presentation, found to be hyponatremic.[facility] was consulted for the evaluation and management of hyponatremia. Hyponatremia secondary to SIADH improved/resolved  with urea tabs and fluid restriction (discontinued ). Patient's main hospital issue in her last week of admission was hypoxemic respiratory failure that started 23 and worsened 23 to patient requiring 8L NC. Patient acutely worsened 23 to the point of being stepped up to ICU for bipap use. Patient became increasingly somnolent on NIV and had continued progressive hypoxemic respiratory failure. Family visited with patient and in agreement with transitioning to comfort focusing care and liberating patient from NIV. Patient  at 1547 23 with family at bedside.    For the full HPI please refer to the History & Physical from this admission.    Hospital Course By Problem with Pertinent Findings     Acute Hypoxemic Respiratory Failure  -  increasing oxygen requirement. Worsening, on 10 L today satting in the high 80s. Advanced to simple mask.   - CXR with possible interval development of RLL infiltrate   - BNP mildly elevated  - Continuous pulse ox   - Initial procal 0.13, repeat 0.29  - On cefepime (day 5) for HAP given worsening O2 requirement, procal elevation, uptrending neutrophils.   - IV Lasix 40 x 3 days with no improvement  -  evening worsened and stepped up to ICU for bipap - possible aspiration with worsening hypoxic respiratory failure in setting of viral illness - 2/2 family visited with patient and transitioned to comfort care     COVID+ induced N/V, weakness  - COVID + test on   - Dehydration noted on physical exam  - PT/OT following, s/p IVF     Atrial fibrillation  -EKG showing LBBB and prolonged QTC  -Per daughter, patient has history of A fib s/p cardioversion, now unclear if paroxysmal or resolved? Not on anticoagulation at home. Unknown baseline rhythm  - Suspect EKG changes 2/2 electrolyte derangements but concern for paroxysmal afib/flutter  -telemetry  -TTE: EF-50%, grade III LV diastolic dysfunction, moderate LA enlargement, mild to mod pulm HTN, normal RV  size&systolic function  -CHADSVASC: 4, HASBLED 3.   -Repeat EKG with RBBB, widened QRS,      HTN:  -BP on admission 206/107, BP stable   -unable to take home meds 2/2 nausea prior to admission  -Home meds: diltiazem 240, lisinopril 40, lasix 20  -Added nifedipine 30mg, improved BP      Hyponatremia, SIADH (resolved)  -Na 124 on admission, 140 today.   -likely 2/2 poor po intake x several days; patient very thin on exam with profound skin tenting  - Admit urine studies with Urine sodium 41, Urine osm 371; ?SIADH component; AM cortisol=33  - Nephrology signed off  - Na 140 today  - urea tabs discontinued& fluid restriction .      Chronic back pain with opioid dependence  -Lumbar radiculopathy, arthritis, fall within last 6mths with trauma to spine with fracture  -Prescribed large quantity of opioids at home (norco 7.5 PRN TID, 75 pills monthly) as well as xanax  - Avoid opioids if possible given advanced patient age and concern for worsening of mental status and stability (has had recent falls)  - Will approach with multimodal pain control; lidocaine patch to sacrum PRN controlling pain at this time  -  on safe medication practices for geriatric patients as several of home meds are contraindicated (Beer's list)     HLD  -Continued home simvastatin     GERD  -Continued home omeprazole    Discharge Medications     None, patient         James Liu  [facility], \[facility] [place] [facility]\""III

## 2023-02-07 LAB
BACTERIA BLD CULT: NORMAL
BACTERIA BLD CULT: NORMAL

## 2023-02-09 DIAGNOSIS — U07.1 COVID-19 VIRUS INFECTION: ICD-10-CM

## 2023-02-09 RX ORDER — CETIRIZINE HYDROCHLORIDE 10 MG/1
TABLET ORAL
Qty: 30 TABLET | Refills: 0 | OUTPATIENT
Start: 2023-02-09

## 2023-12-28 NOTE — PT/OT/SLP PROGRESS
Occupational Therapy   Treatment    Name: Willow Giron  MRN: [id number]  Admitting Diagnosis:  <principal problem not specified>       Recommendations:     Discharge Recommendations: home health OT, home health PT  Discharge Equipment Recommendations:  bath bench  Barriers to discharge:  None    Subjective     Communicated with: nsg prior to session.  Pain/Comfort:  · Pain Rating 1: 0/10  · Pain Rating Post-Intervention 1: 0/10    Patients cultural, spiritual, Syn[employer] conflicts given the current situation:      Objective:     Patient found with: telemetry    General Precautions: Standard, fall, seizure   Orthopedic Precautions:N/A   Braces: N/A     Occupational Performance:    Bed Mobility:    · Patient completed Supine to Sit with stand by assistance     Functional Mobility/Transfers:  · Patient completed Toilet Transfer Step Transfer technique with supervision with  rolling walker  · Functional Mobility: via RW around room w/ SB-CGA    Activities of Daily Living:  · Grooming: supervision in standing  · Toileting: supervision and stand by assistance on toilet    Patient left up in chair with call button in reach, nsg notified and son present    [facility] 6 Click:  [facility] Total Score:      Treatment & Education:  Pt agreeable to tx this date & reports feeling much better. Pt sup-->EOB w/ SBA, amb via RW around room for ADLs w/ CG-SBA, toilet t/f w/ Sup, toileting w/ sup, G/H in standing w/ Sup. Edu/tx re: general safety techs & HEP. Pt/fly verbalized/demo'ed understanding. Pt left up in b/s chair at tx termination w/ son in room.      Assessment:   Pt demo's increased mobility skills, but cont w/ decreased overall endurance/conditioning, balance/mobility & coordination w/ subsequent decline in (I)/safety w/ BADLs, fxnl mobility & fxnl t/f's. Cont w/ OT per POC to increase phys/fxnl status & maximize potential to achieve established goals for d/c-->home w/ HHOT.  Education:      Willow Giron is a 87 y.o.  Pt next appointment is on 01/15/24, 30 day rx pend. female with a medical diagnosis of <principal problem not specified>.  She presents with ..  Performance deficits affecting function are weakness, impaired endurance, impaired functional mobilty, impaired self care skills, impaired balance, decreased upper extremity function, decreased lower extremity function, decreased safety awareness.      Rehab Prognosis:  good; patient would benefit from acute skilled OT services to address these deficits and reach maximum level of function.       Plan:     Patient to be seen 5 x/week to address the above listed problems via self-care/home management, therapeutic activities, therapeutic exercises  · Plan of Care Expires: 04/19/18  · Plan of Care Reviewed with: patient, son    This Plan of care has been discussed with the patient who was involved in its development and understands and is in agreement with the identified goals and treatment plan    GOALS:    Occupational Therapy Goals        Problem: Occupational Therapy Goal    Goal Priority Disciplines Outcome Interventions   Occupational Therapy Goal     OT, PT/OT Ongoing (interventions implemented as appropriate)    Description:  Goals to be met by: 04/19/18     Patient will increase functional independence with ADLs by performing:    LE Dressing with Stand-by Assistance.  Grooming while standing at sink with Stand-by Assistance.--met 03/20/18  Toileting from toilet with Modified [place] for hygiene and clothing management.   Toilet transfer to toilet with Modified [place].                       Time Tracking:     OT Date of Treatment: 03/20/18  OT Start Time: 1119  OT Stop Time: 1145  OT Total Time (min): 26 min    Billable Minutes:Self Care/Home Management 15  Therapeutic Activity 11  Total Time 26    YOLY Martinez  3/20/2018

## 2024-04-13 NOTE — TELEPHONE ENCOUNTER
----- Message from Hali Sahu sent at 3/29/2018  9:43 AM CDT -----  New patient's daughter, Aliya, called.   No. [phone]  New Patient has an appointment and an Audiogram appointment on 4/4/18.  She would like to reschedule for the week of April 9 in the late morning or early afternoon.    Please call.     TTE with improved LV function   clinically compensated as well

## 2024-04-29 NOTE — PT/OT/SLP DISCHARGE
Physical Therapy Discharge Summary    Name: Willow Giron  MRN: [id number]   Principal Problem: HCAP (healthcare-associated pneumonia)     Patient Discharged from acute Physical Therapy on 3/28/2018.  Please refer to prior PT noted date on 3/27/2018 for functional status.     Assessment:     Patient appropriate for care in another setting.    Objective:     GOALS:    Physical Therapy Goals        Problem: Physical Therapy Goal    Goal Priority Disciplines Outcome Goal Variances Interventions   Physical Therapy Goal     PT/OT, PT Revised     Description:  Goals to be met by: 2018     Patient will increase functional independence with mobility by performin. Supine to sit with Modified [place] 3/22/18  2. Sit to supine with Modified [place] 3/22/18  3. Sit to stand transfer with Stand-by Assistance Met 3/21/18  4. Bed to chair transfer with Stand-by Assistance using Rolling Walker MET 3/21/18  5. Gait  x 75 feet with Contact Guard Assistance using Rolling Walker. MET 3/21/18  6. Ascend/descend 2 steps with left Handrails Contact Guard Assistance and Minimal Assistance. Unable to assess, based on clinical observation pt could perform 2 steps with L HR and CGA/Min A.   7. Lower extremity exercise program x10 reps with supervision MET 3/21/18                           Reasons for Discontinuation of Therapy Services  Transfer to alternate level of care.      Plan:     Patient Discharged to: Home with Home Health Service.    Levar Hurley, PT  3/29/2018   catheter removed  over the wire.

## 2024-09-18 NOTE — PLAN OF CARE
Problem: Patient Care Overview  Goal: Plan of Care Review  Outcome: Ongoing (interventions implemented as appropriate)  No respiratory distress noted at this time. Will continue to monitor pt.       Recommended the patient alternate her antihistamine.    Medrol Dosepak ordered.   Avoid/limit triggers such as pollen, dust, molds and animal dander.  Avoid smoke, strong chemicals, and strong cleaning products in addition to strong perfumes/colognes.  Use rescue inhaler when needed, use nebulizer for wheezing or URI.

## 2025-01-09 NOTE — PLAN OF CARE
0.045\" K-wire placed and brandon dominguez placed by Dr. Vines   Problem: Physical Therapy Goal  Goal: Physical Therapy Goal  Goals to be met by: 2018     Patient will increase functional independence with mobility by performin. Supine to sit with Modified [place] 3/22/18  2. Sit to supine with Modified [place] 3/22/18  3. Sit to stand transfer with Stand-by Assistance Met 3/21/18  4. Bed to chair transfer with Stand-by Assistance using Rolling Walker MET 3/21/18  5. Gait  x 75 feet with Contact Guard Assistance using Rolling Walker. MET 3/21/18  6. Ascend/descend 2 steps with left Handrails Contact Guard Assistance and Minimal Assistance. Unable to assess, based on clinical observation pt could perform 2 steps with L HR and CGA/Min A.   7. Lower extremity exercise program x10 reps with supervision MET 3/21/18        Outcome: Outcome(s) achieved Date Met: 18  Pt tolerated treatment session well and is being discharged from acute care PT services. She has achieved all goals, unable to assess stairs; however, based on clinical observations pt would be able to perform with CGA/Min A. Pt would benefit from HHPT.

## 2025-01-21 NOTE — ASSESSMENT & PLAN NOTE
Stable. Pt reports no blood in stool or urine. She had a hysterectomy in the past. Iron levels are normal but % saturation is low. Pt may benefit from trial of oral iron replacement.   Resident Resident

## 2025-06-04 NOTE — TELEPHONE ENCOUNTER
[place] called and needs some clarification on an Rx that was sent in to the Pharmacy.     [place] [phone]   Patient referred by Jennifer Guerrier for assistance with [employer]. I Spoke with her daughter and explained the program and what was needed to apply. She said she was going to call her father to start gathering proof of income and pharmacy printout and get back with me asap